# Patient Record
Sex: FEMALE | Race: WHITE | NOT HISPANIC OR LATINO | Employment: OTHER | URBAN - NONMETROPOLITAN AREA
[De-identification: names, ages, dates, MRNs, and addresses within clinical notes are randomized per-mention and may not be internally consistent; named-entity substitution may affect disease eponyms.]

---

## 2017-03-16 ENCOUNTER — TRANSCRIBE ORDERS (OUTPATIENT)
Dept: ADMINISTRATIVE | Facility: HOSPITAL | Age: 62
End: 2017-03-16

## 2017-03-16 ENCOUNTER — HOSPITAL ENCOUNTER (OUTPATIENT)
Dept: GENERAL RADIOLOGY | Facility: HOSPITAL | Age: 62
Discharge: HOME | End: 2017-03-16
Attending: INTERNAL MEDICINE | Admitting: INTERNAL MEDICINE

## 2017-03-16 ENCOUNTER — APPOINTMENT (OUTPATIENT)
Dept: LAB | Facility: HOSPITAL | Age: 62
End: 2017-03-16
Attending: INTERNAL MEDICINE

## 2017-03-16 DIAGNOSIS — R50.9 FEVER, UNSPECIFIED: Primary | ICD-10-CM

## 2017-03-16 DIAGNOSIS — J02.9 ACUTE PHARYNGITIS, UNSPECIFIED ETIOLOGY: ICD-10-CM

## 2017-03-16 LAB
ALBUMIN SERPL-MCNC: 4.2 G/DL (ref 3.5–5)
ALBUMIN/GLOB SERPL: 1.2 G/DL (ref 1.1–2.5)
ALP SERPL-CCNC: 83 U/L (ref 24–120)
ALT SERPL W P-5'-P-CCNC: 20 U/L (ref 0–54)
ANION GAP SERPL CALCULATED.3IONS-SCNC: 12 MMOL/L (ref 4–13)
AST SERPL-CCNC: 38 U/L (ref 7–45)
BACTERIA UR QL AUTO: ABNORMAL /HPF
BASOPHILS # BLD AUTO: 0.03 10*3/MM3 (ref 0–0.2)
BASOPHILS NFR BLD AUTO: 0.2 % (ref 0–2)
BILIRUB SERPL-MCNC: 0.7 MG/DL (ref 0.1–1)
BILIRUB UR QL STRIP: ABNORMAL
BUN BLD-MCNC: 20 MG/DL (ref 5–21)
BUN/CREAT SERPL: 28.2 (ref 7–25)
CALCIUM SPEC-SCNC: 8.8 MG/DL (ref 8.4–10.4)
CHLORIDE SERPL-SCNC: 96 MMOL/L (ref 98–110)
CLARITY UR: ABNORMAL
CO2 SERPL-SCNC: 33 MMOL/L (ref 24–31)
COLOR UR: ABNORMAL
CREAT BLD-MCNC: 0.71 MG/DL (ref 0.5–1.4)
DEPRECATED RDW RBC AUTO: 43.3 FL (ref 40–54)
EOSINOPHIL # BLD AUTO: 0.09 10*3/MM3 (ref 0–0.7)
EOSINOPHIL NFR BLD AUTO: 0.6 % (ref 0–4)
ERYTHROCYTE [DISTWIDTH] IN BLOOD BY AUTOMATED COUNT: 14.5 % (ref 12–15)
ERYTHROCYTE [SEDIMENTATION RATE] IN BLOOD: 22 MM/HR (ref 0–20)
FLUAV AG NPH QL: NEGATIVE
FLUBV AG NPH QL IA: NEGATIVE
GFR SERPL CREATININE-BSD FRML MDRD: 84 ML/MIN/1.73
GLOBULIN UR ELPH-MCNC: 3.6 GM/DL
GLUCOSE BLD-MCNC: 88 MG/DL (ref 70–100)
GLUCOSE UR STRIP-MCNC: NEGATIVE MG/DL
HCT VFR BLD AUTO: 39.3 % (ref 37–47)
HETEROPH AB SER QL LA: NEGATIVE
HGB BLD-MCNC: 13.2 G/DL (ref 12–16)
HGB UR QL STRIP.AUTO: NEGATIVE
HYALINE CASTS UR QL AUTO: ABNORMAL /LPF
IMM GRANULOCYTES # BLD: 0.03 10*3/MM3 (ref 0–0.03)
IMM GRANULOCYTES NFR BLD: 0.2 % (ref 0–5)
KETONES UR QL STRIP: ABNORMAL
LEUKOCYTE ESTERASE UR QL STRIP.AUTO: ABNORMAL
LYMPHOCYTES # BLD AUTO: 2.4 10*3/MM3 (ref 0.72–4.86)
LYMPHOCYTES NFR BLD AUTO: 17.1 % (ref 15–45)
MAGNESIUM SERPL-MCNC: 1.1 MG/DL (ref 1.4–2.2)
MCH RBC QN AUTO: 27.5 PG (ref 28–32)
MCHC RBC AUTO-ENTMCNC: 33.6 G/DL (ref 33–36)
MCV RBC AUTO: 81.9 FL (ref 82–98)
MONOCYTES # BLD AUTO: 1.09 10*3/MM3 (ref 0.19–1.3)
MONOCYTES NFR BLD AUTO: 7.8 % (ref 4–12)
NEUTROPHILS # BLD AUTO: 10.41 10*3/MM3 (ref 1.87–8.4)
NEUTROPHILS NFR BLD AUTO: 74.1 % (ref 39–78)
NITRITE UR QL STRIP: NEGATIVE
PH UR STRIP.AUTO: 6 [PH] (ref 5–8)
PLATELET # BLD AUTO: 284 10*3/MM3 (ref 130–400)
PMV BLD AUTO: 9.9 FL (ref 6–12)
POTASSIUM BLD-SCNC: 3.2 MMOL/L (ref 3.5–5.3)
PROT SERPL-MCNC: 7.8 G/DL (ref 6.3–8.7)
PROT UR QL STRIP: ABNORMAL
RBC # BLD AUTO: 4.8 10*6/MM3 (ref 4.2–5.4)
RBC # UR: ABNORMAL /HPF
REF LAB TEST METHOD: ABNORMAL
SODIUM BLD-SCNC: 141 MMOL/L (ref 135–145)
SP GR UR STRIP: 1.02 (ref 1–1.03)
SQUAMOUS #/AREA URNS HPF: ABNORMAL /HPF
UROBILINOGEN UR QL STRIP: ABNORMAL
WBC NRBC COR # BLD: 14.05 10*3/MM3 (ref 4.8–10.8)
WBC UR QL AUTO: ABNORMAL /HPF

## 2017-03-16 PROCEDURE — 86308 HETEROPHILE ANTIBODY SCREEN: CPT | Performed by: INTERNAL MEDICINE

## 2017-03-16 PROCEDURE — 87040 BLOOD CULTURE FOR BACTERIA: CPT | Performed by: INTERNAL MEDICINE

## 2017-03-16 PROCEDURE — 85025 COMPLETE CBC W/AUTO DIFF WBC: CPT | Performed by: INTERNAL MEDICINE

## 2017-03-16 PROCEDURE — 83735 ASSAY OF MAGNESIUM: CPT | Performed by: INTERNAL MEDICINE

## 2017-03-16 PROCEDURE — 80053 COMPREHEN METABOLIC PANEL: CPT | Performed by: INTERNAL MEDICINE

## 2017-03-16 PROCEDURE — 85651 RBC SED RATE NONAUTOMATED: CPT | Performed by: INTERNAL MEDICINE

## 2017-03-16 PROCEDURE — 81001 URINALYSIS AUTO W/SCOPE: CPT | Performed by: INTERNAL MEDICINE

## 2017-03-16 PROCEDURE — 71020 HC CHEST PA AND LATERAL: CPT

## 2017-03-16 PROCEDURE — 87804 INFLUENZA ASSAY W/OPTIC: CPT | Performed by: INTERNAL MEDICINE

## 2017-03-16 PROCEDURE — 36415 COLL VENOUS BLD VENIPUNCTURE: CPT | Performed by: INTERNAL MEDICINE

## 2017-03-16 PROCEDURE — 87070 CULTURE OTHR SPECIMN AEROBIC: CPT | Performed by: INTERNAL MEDICINE

## 2017-03-18 LAB — BACTERIA SPEC AEROBE CULT: NORMAL

## 2017-03-21 LAB
BACTERIA SPEC AEROBE CULT: NORMAL
BACTERIA SPEC AEROBE CULT: NORMAL

## 2017-08-22 ENCOUNTER — TRANSCRIBE ORDERS (OUTPATIENT)
Dept: ADMINISTRATIVE | Facility: HOSPITAL | Age: 62
End: 2017-08-22

## 2017-08-22 DIAGNOSIS — Z13.820 SCREENING FOR OSTEOPOROSIS: ICD-10-CM

## 2017-08-22 DIAGNOSIS — Z12.31 ENCOUNTER FOR SCREENING MAMMOGRAM FOR MALIGNANT NEOPLASM OF BREAST: Primary | ICD-10-CM

## 2017-08-23 ENCOUNTER — HOSPITAL ENCOUNTER (OUTPATIENT)
Dept: BONE DENSITY | Facility: HOSPITAL | Age: 62
Discharge: HOME | End: 2017-08-23
Attending: INTERNAL MEDICINE

## 2017-08-23 ENCOUNTER — HOSPITAL ENCOUNTER (OUTPATIENT)
Dept: MAMMOGRAPHY | Facility: HOSPITAL | Age: 62
Discharge: HOME | End: 2017-08-23
Attending: INTERNAL MEDICINE | Admitting: INTERNAL MEDICINE

## 2017-08-23 DIAGNOSIS — Z13.820 SCREENING FOR OSTEOPOROSIS: ICD-10-CM

## 2017-08-23 DIAGNOSIS — Z12.31 ENCOUNTER FOR SCREENING MAMMOGRAM FOR MALIGNANT NEOPLASM OF BREAST: ICD-10-CM

## 2017-08-23 PROCEDURE — 77063 BREAST TOMOSYNTHESIS BI: CPT

## 2017-08-23 PROCEDURE — 77080 DXA BONE DENSITY AXIAL: CPT

## 2017-08-23 PROCEDURE — G0202 SCR MAMMO BI INCL CAD: HCPCS

## 2018-02-27 ENCOUNTER — APPOINTMENT (OUTPATIENT)
Dept: PREADMISSION TESTING | Facility: HOSPITAL | Age: 63
End: 2018-02-27

## 2018-02-27 ENCOUNTER — HOSPITAL ENCOUNTER (OUTPATIENT)
Dept: GENERAL RADIOLOGY | Facility: HOSPITAL | Age: 63
Discharge: HOME | End: 2018-02-27
Admitting: ORTHOPAEDIC SURGERY

## 2018-02-27 VITALS
HEIGHT: 59 IN | BODY MASS INDEX: 35.29 KG/M2 | RESPIRATION RATE: 16 BRPM | WEIGHT: 175.04 LBS | DIASTOLIC BLOOD PRESSURE: 75 MMHG | HEART RATE: 58 BPM | TEMPERATURE: 98.3 F | OXYGEN SATURATION: 99 % | SYSTOLIC BLOOD PRESSURE: 141 MMHG

## 2018-02-27 LAB
ALBUMIN SERPL-MCNC: 4.2 G/DL (ref 3.5–5)
ALBUMIN/GLOB SERPL: 1.4 G/DL (ref 1.1–2.5)
ALP SERPL-CCNC: 62 U/L (ref 24–120)
ALT SERPL W P-5'-P-CCNC: 29 U/L (ref 0–54)
ANION GAP SERPL CALCULATED.3IONS-SCNC: 10 MMOL/L (ref 4–13)
APTT PPP: 33.7 SECONDS (ref 24.1–34.8)
AST SERPL-CCNC: 35 U/L (ref 7–45)
BACTERIA UR QL AUTO: ABNORMAL /HPF
BASOPHILS # BLD AUTO: 0.03 10*3/MM3 (ref 0–0.2)
BASOPHILS NFR BLD AUTO: 0.5 % (ref 0–2)
BILIRUB SERPL-MCNC: 0.3 MG/DL (ref 0.1–1)
BILIRUB UR QL STRIP: NEGATIVE
BUN BLD-MCNC: 20 MG/DL (ref 5–21)
BUN/CREAT SERPL: 31.7 (ref 7–25)
CALCIUM SPEC-SCNC: 9.3 MG/DL (ref 8.4–10.4)
CHLORIDE SERPL-SCNC: 98 MMOL/L (ref 98–110)
CLARITY UR: CLEAR
CO2 SERPL-SCNC: 33 MMOL/L (ref 24–31)
COLOR UR: YELLOW
CREAT BLD-MCNC: 0.63 MG/DL (ref 0.5–1.4)
DEPRECATED RDW RBC AUTO: 41.4 FL (ref 40–54)
EOSINOPHIL # BLD AUTO: 0.09 10*3/MM3 (ref 0–0.7)
EOSINOPHIL NFR BLD AUTO: 1.4 % (ref 0–4)
ERYTHROCYTE [DISTWIDTH] IN BLOOD BY AUTOMATED COUNT: 14.2 % (ref 12–15)
GFR SERPL CREATININE-BSD FRML MDRD: 96 ML/MIN/1.73
GLOBULIN UR ELPH-MCNC: 3 GM/DL
GLUCOSE BLD-MCNC: 109 MG/DL (ref 70–100)
GLUCOSE UR STRIP-MCNC: NEGATIVE MG/DL
HCT VFR BLD AUTO: 39.2 % (ref 37–47)
HGB BLD-MCNC: 13 G/DL (ref 12–16)
HGB UR QL STRIP.AUTO: NEGATIVE
HYALINE CASTS UR QL AUTO: ABNORMAL /LPF
IMM GRANULOCYTES # BLD: 0.03 10*3/MM3 (ref 0–0.03)
IMM GRANULOCYTES NFR BLD: 0.5 % (ref 0–5)
INR PPP: 0.94 (ref 0.91–1.09)
KETONES UR QL STRIP: NEGATIVE
LEUKOCYTE ESTERASE UR QL STRIP.AUTO: ABNORMAL
LYMPHOCYTES # BLD AUTO: 1.8 10*3/MM3 (ref 0.72–4.86)
LYMPHOCYTES NFR BLD AUTO: 27.5 % (ref 15–45)
MCH RBC QN AUTO: 27 PG (ref 28–32)
MCHC RBC AUTO-ENTMCNC: 33.2 G/DL (ref 33–36)
MCV RBC AUTO: 81.5 FL (ref 82–98)
MONOCYTES # BLD AUTO: 0.58 10*3/MM3 (ref 0.19–1.3)
MONOCYTES NFR BLD AUTO: 8.9 % (ref 4–12)
NEUTROPHILS # BLD AUTO: 4.02 10*3/MM3 (ref 1.87–8.4)
NEUTROPHILS NFR BLD AUTO: 61.2 % (ref 39–78)
NITRITE UR QL STRIP: NEGATIVE
NRBC BLD MANUAL-RTO: 0 /100 WBC (ref 0–0)
PH UR STRIP.AUTO: 7 [PH] (ref 5–8)
PLATELET # BLD AUTO: 271 10*3/MM3 (ref 130–400)
PMV BLD AUTO: 9.8 FL (ref 6–12)
POTASSIUM BLD-SCNC: 3.8 MMOL/L (ref 3.5–5.3)
PROT SERPL-MCNC: 7.2 G/DL (ref 6.3–8.7)
PROT UR QL STRIP: NEGATIVE
PROTHROMBIN TIME: 12.8 SECONDS (ref 11.9–14.6)
RBC # BLD AUTO: 4.81 10*6/MM3 (ref 4.2–5.4)
RBC # UR: ABNORMAL /HPF
REF LAB TEST METHOD: ABNORMAL
SODIUM BLD-SCNC: 141 MMOL/L (ref 135–145)
SP GR UR STRIP: 1.01 (ref 1–1.03)
SQUAMOUS #/AREA URNS HPF: ABNORMAL /HPF
UROBILINOGEN UR QL STRIP: ABNORMAL
WBC NRBC COR # BLD: 6.55 10*3/MM3 (ref 4.8–10.8)
WBC UR QL AUTO: ABNORMAL /HPF

## 2018-02-27 PROCEDURE — 85025 COMPLETE CBC W/AUTO DIFF WBC: CPT | Performed by: ORTHOPAEDIC SURGERY

## 2018-02-27 PROCEDURE — 71046 X-RAY EXAM CHEST 2 VIEWS: CPT

## 2018-02-27 PROCEDURE — 85730 THROMBOPLASTIN TIME PARTIAL: CPT | Performed by: ORTHOPAEDIC SURGERY

## 2018-02-27 PROCEDURE — 81001 URINALYSIS AUTO W/SCOPE: CPT | Performed by: ORTHOPAEDIC SURGERY

## 2018-02-27 PROCEDURE — 36415 COLL VENOUS BLD VENIPUNCTURE: CPT

## 2018-02-27 PROCEDURE — 93005 ELECTROCARDIOGRAM TRACING: CPT

## 2018-02-27 PROCEDURE — 93010 ELECTROCARDIOGRAM REPORT: CPT | Performed by: INTERNAL MEDICINE

## 2018-02-27 PROCEDURE — 80053 COMPREHEN METABOLIC PANEL: CPT | Performed by: ORTHOPAEDIC SURGERY

## 2018-02-27 PROCEDURE — 85610 PROTHROMBIN TIME: CPT | Performed by: ORTHOPAEDIC SURGERY

## 2018-02-27 ASSESSMENT — KOOS JR
KOOS JR SCORE: 36.931
KOOS JR SCORE: 20

## 2018-03-09 ENCOUNTER — HOSPITAL ENCOUNTER (INPATIENT)
Facility: HOSPITAL | Age: 63
LOS: 3 days | Discharge: HOME | End: 2018-03-12
Attending: ORTHOPAEDIC SURGERY | Admitting: ORTHOPAEDIC SURGERY

## 2018-03-09 ENCOUNTER — ANESTHESIA EVENT (OUTPATIENT)
Dept: PERIOP | Facility: HOSPITAL | Age: 63
End: 2018-03-09

## 2018-03-09 ENCOUNTER — APPOINTMENT (OUTPATIENT)
Dept: GENERAL RADIOLOGY | Facility: HOSPITAL | Age: 63
End: 2018-03-09

## 2018-03-09 ENCOUNTER — ANESTHESIA (OUTPATIENT)
Dept: PERIOP | Facility: HOSPITAL | Age: 63
End: 2018-03-09

## 2018-03-09 DIAGNOSIS — Z74.09 IMPAIRED MOBILITY: Primary | ICD-10-CM

## 2018-03-09 PROBLEM — E16.2 HYPOGLYCEMIA: Chronic | Status: ACTIVE | Noted: 2018-03-09

## 2018-03-09 PROBLEM — E78.5 HYPERLIPIDEMIA: Chronic | Status: ACTIVE | Noted: 2018-03-09

## 2018-03-09 PROBLEM — M17.12 LEFT KNEE DJD: Status: ACTIVE | Noted: 2018-03-09

## 2018-03-09 LAB
ABO GROUP BLD: NORMAL
BLD GP AB SCN SERPL QL: NEGATIVE
RH BLD: POSITIVE

## 2018-03-09 PROCEDURE — 25010000002 DEXAMETHASONE PER 1 MG: Performed by: ORTHOPAEDIC SURGERY

## 2018-03-09 PROCEDURE — 25010000003 CEFAZOLIN PER 500 MG: Performed by: ORTHOPAEDIC SURGERY

## 2018-03-09 PROCEDURE — 25010000002 MIDAZOLAM PER 1 MG: Performed by: ANESTHESIOLOGY

## 2018-03-09 PROCEDURE — 25010000002 DEXAMETHASONE PER 1 MG: Performed by: NURSE ANESTHETIST, CERTIFIED REGISTERED

## 2018-03-09 PROCEDURE — 94799 UNLISTED PULMONARY SVC/PX: CPT

## 2018-03-09 PROCEDURE — 25010000002 KETOROLAC TROMETHAMINE PER 15 MG: Performed by: NURSE ANESTHETIST, CERTIFIED REGISTERED

## 2018-03-09 PROCEDURE — C1713 ANCHOR/SCREW BN/BN,TIS/BN: HCPCS | Performed by: ORTHOPAEDIC SURGERY

## 2018-03-09 PROCEDURE — 86901 BLOOD TYPING SEROLOGIC RH(D): CPT | Performed by: ORTHOPAEDIC SURGERY

## 2018-03-09 PROCEDURE — 0SRD0J9 REPLACEMENT OF LEFT KNEE JOINT WITH SYNTHETIC SUBSTITUTE, CEMENTED, OPEN APPROACH: ICD-10-PCS | Performed by: ORTHOPAEDIC SURGERY

## 2018-03-09 PROCEDURE — 25010000002 PROPOFOL 10 MG/ML EMULSION: Performed by: NURSE ANESTHETIST, CERTIFIED REGISTERED

## 2018-03-09 PROCEDURE — 73560 X-RAY EXAM OF KNEE 1 OR 2: CPT

## 2018-03-09 PROCEDURE — 86900 BLOOD TYPING SEROLOGIC ABO: CPT | Performed by: ORTHOPAEDIC SURGERY

## 2018-03-09 PROCEDURE — 86850 RBC ANTIBODY SCREEN: CPT | Performed by: ORTHOPAEDIC SURGERY

## 2018-03-09 PROCEDURE — C1776 JOINT DEVICE (IMPLANTABLE): HCPCS | Performed by: ORTHOPAEDIC SURGERY

## 2018-03-09 DEVICE — IMPLANTABLE DEVICE: Type: IMPLANTABLE DEVICE | Status: FUNCTIONAL

## 2018-03-09 DEVICE — COMP FEM PERSONA PS COCR CMT NRW SZ6 LT: Type: IMPLANTABLE DEVICE | Status: FUNCTIONAL

## 2018-03-09 DEVICE — CAP TOTL KN CMT PREMIUM: Type: IMPLANTABLE DEVICE | Status: FUNCTIONAL

## 2018-03-09 DEVICE — STEM TIB/KN PERSONA CMT 5D SZD LT: Type: IMPLANTABLE DEVICE | Status: FUNCTIONAL

## 2018-03-09 RX ORDER — METOCLOPRAMIDE HYDROCHLORIDE 5 MG/ML
5 INJECTION INTRAMUSCULAR; INTRAVENOUS
Status: DISCONTINUED | OUTPATIENT
Start: 2018-03-09 | End: 2018-03-09 | Stop reason: HOSPADM

## 2018-03-09 RX ORDER — DEXAMETHASONE SODIUM PHOSPHATE 4 MG/ML
INJECTION, SOLUTION INTRA-ARTICULAR; INTRALESIONAL; INTRAMUSCULAR; INTRAVENOUS; SOFT TISSUE AS NEEDED
Status: DISCONTINUED | OUTPATIENT
Start: 2018-03-09 | End: 2018-03-09 | Stop reason: SURG

## 2018-03-09 RX ORDER — FLUMAZENIL 0.1 MG/ML
0.2 INJECTION INTRAVENOUS AS NEEDED
Status: DISCONTINUED | OUTPATIENT
Start: 2018-03-09 | End: 2018-03-09 | Stop reason: HOSPADM

## 2018-03-09 RX ORDER — MIDAZOLAM HYDROCHLORIDE 1 MG/ML
2 INJECTION INTRAMUSCULAR; INTRAVENOUS
Status: DISCONTINUED | OUTPATIENT
Start: 2018-03-09 | End: 2018-03-09 | Stop reason: HOSPADM

## 2018-03-09 RX ORDER — KETOROLAC TROMETHAMINE 30 MG/ML
INJECTION, SOLUTION INTRAMUSCULAR; INTRAVENOUS AS NEEDED
Status: DISCONTINUED | OUTPATIENT
Start: 2018-03-09 | End: 2018-03-09 | Stop reason: SURG

## 2018-03-09 RX ORDER — MORPHINE SULFATE 2 MG/ML
2 INJECTION, SOLUTION INTRAMUSCULAR; INTRAVENOUS AS NEEDED
Status: DISCONTINUED | OUTPATIENT
Start: 2018-03-09 | End: 2018-03-09 | Stop reason: HOSPADM

## 2018-03-09 RX ORDER — CLINDAMYCIN PHOSPHATE 900 MG/50ML
900 INJECTION INTRAVENOUS ONCE
Status: DISCONTINUED | OUTPATIENT
Start: 2018-03-09 | End: 2018-03-09

## 2018-03-09 RX ORDER — ACETAMINOPHEN 160 MG/5ML
650 SOLUTION ORAL EVERY 4 HOURS PRN
Status: DISCONTINUED | OUTPATIENT
Start: 2018-03-09 | End: 2018-03-12 | Stop reason: HOSPADM

## 2018-03-09 RX ORDER — ACETAMINOPHEN 650 MG/1
650 SUPPOSITORY RECTAL EVERY 4 HOURS PRN
Status: DISCONTINUED | OUTPATIENT
Start: 2018-03-09 | End: 2018-03-12 | Stop reason: HOSPADM

## 2018-03-09 RX ORDER — OXYCODONE HYDROCHLORIDE 5 MG/1
10 TABLET ORAL EVERY 4 HOURS PRN
Status: DISCONTINUED | OUTPATIENT
Start: 2018-03-09 | End: 2018-03-10

## 2018-03-09 RX ORDER — SODIUM CHLORIDE, SODIUM LACTATE, POTASSIUM CHLORIDE, CALCIUM CHLORIDE 600; 310; 30; 20 MG/100ML; MG/100ML; MG/100ML; MG/100ML
1000 INJECTION, SOLUTION INTRAVENOUS CONTINUOUS
Status: DISCONTINUED | OUTPATIENT
Start: 2018-03-09 | End: 2018-03-09 | Stop reason: HOSPADM

## 2018-03-09 RX ORDER — ONDANSETRON 4 MG/1
4 TABLET, FILM COATED ORAL EVERY 6 HOURS PRN
Status: DISCONTINUED | OUTPATIENT
Start: 2018-03-09 | End: 2018-03-12 | Stop reason: HOSPADM

## 2018-03-09 RX ORDER — SODIUM CHLORIDE 0.9 % (FLUSH) 0.9 %
3 SYRINGE (ML) INJECTION AS NEEDED
Status: DISCONTINUED | OUTPATIENT
Start: 2018-03-09 | End: 2018-03-09 | Stop reason: HOSPADM

## 2018-03-09 RX ORDER — NALOXONE HCL 0.4 MG/ML
0.4 VIAL (ML) INJECTION
Status: DISCONTINUED | OUTPATIENT
Start: 2018-03-09 | End: 2018-03-12 | Stop reason: HOSPADM

## 2018-03-09 RX ORDER — DEXAMETHASONE SODIUM PHOSPHATE 10 MG/ML
10 INJECTION INTRAMUSCULAR; INTRAVENOUS ONCE
Status: COMPLETED | OUTPATIENT
Start: 2018-03-09 | End: 2018-03-09

## 2018-03-09 RX ORDER — NALOXONE HCL 0.4 MG/ML
0.1 VIAL (ML) INJECTION
Status: DISCONTINUED | OUTPATIENT
Start: 2018-03-09 | End: 2018-03-12 | Stop reason: HOSPADM

## 2018-03-09 RX ORDER — PHENYLEPHRINE HCL IN 0.9% NACL 0.8MG/10ML
SYRINGE (ML) INTRAVENOUS AS NEEDED
Status: DISCONTINUED | OUTPATIENT
Start: 2018-03-09 | End: 2018-03-09 | Stop reason: SURG

## 2018-03-09 RX ORDER — IPRATROPIUM BROMIDE AND ALBUTEROL SULFATE 2.5; .5 MG/3ML; MG/3ML
3 SOLUTION RESPIRATORY (INHALATION) ONCE AS NEEDED
Status: DISCONTINUED | OUTPATIENT
Start: 2018-03-09 | End: 2018-03-09 | Stop reason: HOSPADM

## 2018-03-09 RX ORDER — MAGNESIUM HYDROXIDE 1200 MG/15ML
LIQUID ORAL AS NEEDED
Status: DISCONTINUED | OUTPATIENT
Start: 2018-03-09 | End: 2018-03-09 | Stop reason: HOSPADM

## 2018-03-09 RX ORDER — NALOXONE HCL 0.4 MG/ML
0.04 VIAL (ML) INJECTION AS NEEDED
Status: DISCONTINUED | OUTPATIENT
Start: 2018-03-09 | End: 2018-03-09 | Stop reason: HOSPADM

## 2018-03-09 RX ORDER — BUPIVACAINE HYDROCHLORIDE 5 MG/ML
INJECTION, SOLUTION EPIDURAL; INTRACAUDAL AS NEEDED
Status: DISCONTINUED | OUTPATIENT
Start: 2018-03-09 | End: 2018-03-09 | Stop reason: SURG

## 2018-03-09 RX ORDER — HYDRALAZINE HYDROCHLORIDE 20 MG/ML
5 INJECTION INTRAMUSCULAR; INTRAVENOUS
Status: DISCONTINUED | OUTPATIENT
Start: 2018-03-09 | End: 2018-03-09 | Stop reason: HOSPADM

## 2018-03-09 RX ORDER — OXYCODONE HCL 10 MG/1
10 TABLET, FILM COATED, EXTENDED RELEASE ORAL EVERY 12 HOURS SCHEDULED
Status: DISCONTINUED | OUTPATIENT
Start: 2018-03-09 | End: 2018-03-12 | Stop reason: HOSPADM

## 2018-03-09 RX ORDER — ONDANSETRON 2 MG/ML
4 INJECTION INTRAMUSCULAR; INTRAVENOUS AS NEEDED
Status: DISCONTINUED | OUTPATIENT
Start: 2018-03-09 | End: 2018-03-09 | Stop reason: HOSPADM

## 2018-03-09 RX ORDER — PAROXETINE 10 MG/1
10 TABLET, FILM COATED ORAL EVERY MORNING
Status: DISCONTINUED | OUTPATIENT
Start: 2018-03-10 | End: 2018-03-12 | Stop reason: HOSPADM

## 2018-03-09 RX ORDER — TRANEXAMIC ACID 100 MG/ML
INJECTION, SOLUTION INTRAVENOUS AS NEEDED
Status: DISCONTINUED | OUTPATIENT
Start: 2018-03-09 | End: 2018-03-09 | Stop reason: SURG

## 2018-03-09 RX ORDER — SODIUM CHLORIDE, SODIUM LACTATE, POTASSIUM CHLORIDE, CALCIUM CHLORIDE 600; 310; 30; 20 MG/100ML; MG/100ML; MG/100ML; MG/100ML
100 INJECTION, SOLUTION INTRAVENOUS CONTINUOUS
Status: DISCONTINUED | OUTPATIENT
Start: 2018-03-09 | End: 2018-03-09 | Stop reason: HOSPADM

## 2018-03-09 RX ORDER — ATORVASTATIN CALCIUM 10 MG/1
10 TABLET, FILM COATED ORAL NIGHTLY
Status: DISCONTINUED | OUTPATIENT
Start: 2018-03-09 | End: 2018-03-12 | Stop reason: HOSPADM

## 2018-03-09 RX ORDER — MORPHINE SULFATE 4 MG/ML
4 INJECTION, SOLUTION INTRAMUSCULAR; INTRAVENOUS
Status: DISCONTINUED | OUTPATIENT
Start: 2018-03-09 | End: 2018-03-12 | Stop reason: HOSPADM

## 2018-03-09 RX ORDER — ACETAMINOPHEN 325 MG/1
650 TABLET ORAL EVERY 4 HOURS PRN
Status: DISCONTINUED | OUTPATIENT
Start: 2018-03-09 | End: 2018-03-12 | Stop reason: HOSPADM

## 2018-03-09 RX ORDER — SODIUM CHLORIDE 0.9 % (FLUSH) 0.9 %
1-10 SYRINGE (ML) INJECTION AS NEEDED
Status: DISCONTINUED | OUTPATIENT
Start: 2018-03-09 | End: 2018-03-09 | Stop reason: HOSPADM

## 2018-03-09 RX ORDER — ONDANSETRON 4 MG/1
4 TABLET, ORALLY DISINTEGRATING ORAL EVERY 6 HOURS PRN
Status: DISCONTINUED | OUTPATIENT
Start: 2018-03-09 | End: 2018-03-12 | Stop reason: HOSPADM

## 2018-03-09 RX ORDER — ACETAMINOPHEN 500 MG
1000 TABLET ORAL ONCE
Status: COMPLETED | OUTPATIENT
Start: 2018-03-09 | End: 2018-03-09

## 2018-03-09 RX ORDER — SODIUM CHLORIDE 9 MG/ML
150 INJECTION, SOLUTION INTRAVENOUS CONTINUOUS
Status: DISCONTINUED | OUTPATIENT
Start: 2018-03-09 | End: 2018-03-12 | Stop reason: HOSPADM

## 2018-03-09 RX ORDER — PROPOFOL 10 MG/ML
VIAL (ML) INTRAVENOUS AS NEEDED
Status: DISCONTINUED | OUTPATIENT
Start: 2018-03-09 | End: 2018-03-09

## 2018-03-09 RX ORDER — MEPERIDINE HYDROCHLORIDE 25 MG/ML
12.5 INJECTION INTRAMUSCULAR; INTRAVENOUS; SUBCUTANEOUS
Status: DISCONTINUED | OUTPATIENT
Start: 2018-03-09 | End: 2018-03-09 | Stop reason: HOSPADM

## 2018-03-09 RX ORDER — PREGABALIN 75 MG/1
75 CAPSULE ORAL ONCE
Status: COMPLETED | OUTPATIENT
Start: 2018-03-09 | End: 2018-03-09

## 2018-03-09 RX ORDER — ONDANSETRON 2 MG/ML
4 INJECTION INTRAMUSCULAR; INTRAVENOUS EVERY 6 HOURS PRN
Status: DISCONTINUED | OUTPATIENT
Start: 2018-03-09 | End: 2018-03-12 | Stop reason: HOSPADM

## 2018-03-09 RX ORDER — OXYCODONE HCL 10 MG/1
10 TABLET, FILM COATED, EXTENDED RELEASE ORAL ONCE
Status: COMPLETED | OUTPATIENT
Start: 2018-03-09 | End: 2018-03-09

## 2018-03-09 RX ORDER — PROPOFOL 10 MG/ML
VIAL (ML) INTRAVENOUS CONTINUOUS PRN
Status: DISCONTINUED | OUTPATIENT
Start: 2018-03-09 | End: 2018-03-09 | Stop reason: SURG

## 2018-03-09 RX ORDER — DIAZEPAM 5 MG/1
5 TABLET ORAL EVERY 6 HOURS PRN
Status: DISCONTINUED | OUTPATIENT
Start: 2018-03-09 | End: 2018-03-12 | Stop reason: HOSPADM

## 2018-03-09 RX ORDER — SENNA AND DOCUSATE SODIUM 50; 8.6 MG/1; MG/1
2 TABLET, FILM COATED ORAL NIGHTLY
Status: DISCONTINUED | OUTPATIENT
Start: 2018-03-09 | End: 2018-03-12 | Stop reason: HOSPADM

## 2018-03-09 RX ORDER — FAMOTIDINE 20 MG/1
20 TABLET, FILM COATED ORAL DAILY
Status: DISCONTINUED | OUTPATIENT
Start: 2018-03-09 | End: 2018-03-12 | Stop reason: HOSPADM

## 2018-03-09 RX ORDER — MIDAZOLAM HYDROCHLORIDE 1 MG/ML
1 INJECTION INTRAMUSCULAR; INTRAVENOUS
Status: DISCONTINUED | OUTPATIENT
Start: 2018-03-09 | End: 2018-03-09 | Stop reason: HOSPADM

## 2018-03-09 RX ORDER — LABETALOL HYDROCHLORIDE 5 MG/ML
5 INJECTION, SOLUTION INTRAVENOUS
Status: DISCONTINUED | OUTPATIENT
Start: 2018-03-09 | End: 2018-03-09 | Stop reason: HOSPADM

## 2018-03-09 RX ORDER — CLINDAMYCIN PHOSPHATE 900 MG/50ML
900 INJECTION INTRAVENOUS EVERY 8 HOURS
Status: COMPLETED | OUTPATIENT
Start: 2018-03-09 | End: 2018-03-11

## 2018-03-09 RX ADMIN — Medication 80 MCG: at 09:46

## 2018-03-09 RX ADMIN — Medication 160 MCG: at 10:22

## 2018-03-09 RX ADMIN — TRANEXAMIC ACID 1000 MG: 100 INJECTION, SOLUTION INTRAVENOUS at 09:05

## 2018-03-09 RX ADMIN — PROPOFOL 75 MCG/KG/MIN: 10 INJECTION, EMULSION INTRAVENOUS at 08:55

## 2018-03-09 RX ADMIN — RIVAROXABAN 10 MG: 10 TABLET, FILM COATED ORAL at 18:27

## 2018-03-09 RX ADMIN — OXYCODONE HYDROCHLORIDE 10 MG: 5 TABLET ORAL at 13:38

## 2018-03-09 RX ADMIN — PREGABALIN 75 MG: 75 CAPSULE ORAL at 07:39

## 2018-03-09 RX ADMIN — MIDAZOLAM HYDROCHLORIDE 2 MG: 1 INJECTION, SOLUTION INTRAMUSCULAR; INTRAVENOUS at 08:09

## 2018-03-09 RX ADMIN — CEFAZOLIN 2 G: 1 INJECTION, POWDER, FOR SOLUTION INTRAVENOUS at 18:26

## 2018-03-09 RX ADMIN — Medication 80 MCG: at 09:28

## 2018-03-09 RX ADMIN — Medication 80 MCG: at 09:03

## 2018-03-09 RX ADMIN — CEFAZOLIN 2 G: 1 INJECTION, POWDER, FOR SOLUTION INTRAVENOUS at 09:03

## 2018-03-09 RX ADMIN — SODIUM CHLORIDE, POTASSIUM CHLORIDE, SODIUM LACTATE AND CALCIUM CHLORIDE 1000 ML: 600; 310; 30; 20 INJECTION, SOLUTION INTRAVENOUS at 07:09

## 2018-03-09 RX ADMIN — EPHEDRINE SULFATE 15 MG: 50 INJECTION INTRAMUSCULAR; INTRAVENOUS; SUBCUTANEOUS at 09:31

## 2018-03-09 RX ADMIN — FAMOTIDINE 20 MG: 20 TABLET, FILM COATED ORAL at 13:40

## 2018-03-09 RX ADMIN — MAGNESIUM GLUCONATE 500 MG ORAL TABLET 2000 MG: 500 TABLET ORAL at 18:26

## 2018-03-09 RX ADMIN — DEXAMETHASONE SODIUM PHOSPHATE 10 MG: 10 INJECTION, SOLUTION INTRAMUSCULAR; INTRAVENOUS at 08:09

## 2018-03-09 RX ADMIN — Medication 80 MCG: at 09:13

## 2018-03-09 RX ADMIN — CLINDAMYCIN PHOSPHATE 900 MG: 18 INJECTION, SOLUTION INTRAVENOUS at 13:40

## 2018-03-09 RX ADMIN — OXYCODONE HYDROCHLORIDE 10 MG: 5 TABLET ORAL at 19:04

## 2018-03-09 RX ADMIN — KETOROLAC TROMETHAMINE 15 MG: 30 INJECTION, SOLUTION INTRAMUSCULAR at 10:55

## 2018-03-09 RX ADMIN — BUPIVACAINE HYDROCHLORIDE 3 ML: 5 INJECTION, SOLUTION EPIDURAL; INTRACAUDAL; PERINEURAL at 08:50

## 2018-03-09 RX ADMIN — SODIUM CHLORIDE 150 ML/HR: 9 INJECTION, SOLUTION INTRAVENOUS at 18:25

## 2018-03-09 RX ADMIN — OXYCODONE HYDROCHLORIDE 10 MG: 10 TABLET, FILM COATED, EXTENDED RELEASE ORAL at 07:39

## 2018-03-09 RX ADMIN — DEXAMETHASONE SODIUM PHOSPHATE 10 MG: 4 INJECTION, SOLUTION INTRAMUSCULAR; INTRAVENOUS at 09:00

## 2018-03-09 RX ADMIN — ACETAMINOPHEN 1000 MG: 500 TABLET ORAL at 07:39

## 2018-03-09 RX ADMIN — ATORVASTATIN CALCIUM 10 MG: 10 TABLET, FILM COATED ORAL at 20:44

## 2018-03-09 RX ADMIN — OXYCODONE HYDROCHLORIDE 10 MG: 10 TABLET, FILM COATED, EXTENDED RELEASE ORAL at 20:44

## 2018-03-09 RX ADMIN — LIDOCAINE HYDROCHLORIDE 0.5 ML: 10 INJECTION, SOLUTION EPIDURAL; INFILTRATION; INTRACAUDAL; PERINEURAL at 07:09

## 2018-03-09 RX ADMIN — DOCUSATE SODIUM AND SENNOSIDES 2 TABLET: 8.6; 5 TABLET, FILM COATED ORAL at 20:44

## 2018-03-09 RX ADMIN — CLINDAMYCIN PHOSPHATE 900 MG: 18 INJECTION, SOLUTION INTRAVENOUS at 20:44

## 2018-03-09 RX ADMIN — SODIUM CHLORIDE, POTASSIUM CHLORIDE, SODIUM LACTATE AND CALCIUM CHLORIDE: 600; 310; 30; 20 INJECTION, SOLUTION INTRAVENOUS at 09:32

## 2018-03-09 RX ADMIN — EPHEDRINE SULFATE 10 MG: 50 INJECTION INTRAMUSCULAR; INTRAVENOUS; SUBCUTANEOUS at 09:46

## 2018-03-09 NOTE — BRIEF OP NOTE
TOTAL KNEE ARTHROPLASTY  Progress Note    Sammi Cordero  3/9/2018    Pre-op Diagnosis:   LEFT KNEE OSTEOARTHRITIS        Post-Op Diagnosis Codes:       Procedure/CPT® Codes:      Procedure(s):  LEFT TOTAL KNEE ARTHROPLASTY    Surgeon(s):  Orlin Meza MD    Anesthesia: Spinal    Staff:   Circulator: Iman Tovar RN  Scrub Person: Laine Haynes  Other: Claire De Leon    Estimated Blood Loss: minimal    Urine Voided: 300 mL    Specimens:                None      Drains:   Urethral Catheter 03/09/18 0927 100% silicone 10 10 (Active)   Daily Indications Required activity restriction from trauma, surgery, (e.g. unstable spine, fracture, hemodynamics) 3/9/2018 11:13 AM   Securement secured to upper leg with adhesive device 3/9/2018 11:13 AM   Tolerance no signs/symptoms of discomfort 3/9/2018 11:13 AM           Findings:     Complications:       Orlin Meza MD     Date: 3/9/2018  Time: 11:30 AM

## 2018-03-09 NOTE — PLAN OF CARE
Problem: Patient Care Overview (Adult)  Goal: Plan of Care Review  Outcome: Ongoing (interventions implemented as appropriate)   03/09/18 8658   Coping/Psychosocial Response Interventions   Plan Of Care Reviewed With patient   Patient Care Overview   Progress improving   Outcome Evaluation   Outcome Summary/Follow up Plan Patient had a left total knee today by Dr. Meza. Medicated once for pain with some relief noted. Safety maintained.     Goal: Adult Individualization and Mutuality  Outcome: Ongoing (interventions implemented as appropriate)    Goal: Discharge Needs Assessment  Outcome: Ongoing (interventions implemented as appropriate)      Problem: Perioperative Period (Adult)  Goal: Signs and Symptoms of Listed Potential Problems Will be Absent or Manageable (Perioperative Period)  Outcome: Ongoing (interventions implemented as appropriate)      Problem: Knee Replacement, Total (Adult)  Goal: Signs and Symptoms of Listed Potential Problems Will be Absent or Manageable (Knee Replacement, Total)  Outcome: Ongoing (interventions implemented as appropriate)

## 2018-03-09 NOTE — OP NOTE
Operative Report    Pt Name: [unfilled]  MRN: @MRN@  Birthdate: @@  Date: @TD@        PREOPERATIVE DIAGNOSIS: Left knee primary osteoarthritis    POSTOPERATIVE DIAGNOSIS: Left knee primary osteoarthritis     PROCEDURE:  Left Total Knee Arthroplasty     SURGEON:  Orlin Meza MD     ASSISTANT:  Adalberto Arciniega PA-C    The assistant aided in limb position as well as retractor placement.  The assistant was also critical in implantation of the prosthesis.  In addition to this, the assistant was responsible for wound closure.  It was necessary to have the assistant present in order to complete the procedure.    ANESTHESIA:  Spinal    ESTIMATED BLOOD LOSS:  100cc    COMPLICATIONS:  None.    CONDITION:  Stable.    INDICATIONS:  61 yo female  presents today for a left total knee arthroplasty for primary osteoarthritis.  The patient has failed conservative management including: time, activity modifications, NSAIDs and corticosteroids.    PROCEDURE:  The patient was interviewed in the pre-anesthesia area.  The operative limb was then marked with a marking pen.  The patient was administered a regional block per the anesthesia team.  The patient was then taken to the operative suite where general endotracheal anesthesia was performed by the anesthesia team.  A time out was then called to confirm the administration of anesthesia as well as the administration of tranexamic acid prior to incision.  The patient was then prepped and draped in standard sterile fashion using ChloraPrep.      Once fully prepped and draped, the limb was reprepped with ChloraPrep.  Sterile marking pen was then used to earlene out the tibia distally as well as the first web space.  The leg was then exsanguinated with Esmarch and tourniquet was inflated to 300mmhg.  The operative foot was placed in a Murray leg machado and a standard midline incision was carried out followed by a medial parapatellar arthrotomy.  The knee did demonstrate a large knee joint  effusion with tricompartmental osteoarthrosis.     Attention was then turned to the distal femoral resection.  A drill was used to enter the intramedullary canal just anterior to the PCL insertion.  The extramedullary guide was then placed.  The guide was set at 4 degrees of valgus.  We then made a plus 2 mm distal femoral cut.  We then sized the femur to a size 6 using an anterior referencing guide and penned the femoral block into position.  Care was taken to make sure that the block was perpendicular to the epicondylar axis and parallel to the Whitesides line, thus in external rotation.  Our anterior, posterior as well as chamfer cuts were then made.      Attention was then turned to the tibia.  The PCL retractor was placed posteriorly as the ACL and PCL were resected.  The infrapatellar fat pad was excised and Homans retractor was placed medially and laterally.  An extramedullary tibial guide was then set parallel to the tibia on the AP and lateral views in line with the first web space and second metatarsal, just slightly medial to the medial center of the ankle.  The guide was set at 7 degrees posterior slope.  A 2/10 guide was then used to measure for approximately a 2 mm resection medially and 10 mm resection laterally.  The proximal tibia was then removed en block.  The knee was then brought into extension where the medial and lateral menisci were then excised with a radiofrequency device.  We also performed a medial soft tissue release around the posteromedial corner of the knee.  Curved osteotome was then used to remove any posterior osteophytes off the medial and lateral femoral condyles as well as strip the posterior capsule.  A size D tibial tray was then placed with the central portion of the tibial tray in line with the medial third of the tibial tubercle, thus in external rotation, and pinned into position.     We then placed the femoral component into position, taking care to make sure that the  femoral component was flush with the lateral cortex.  The box cutting guide was placed and our standard box cut was made without difficulty.  We then placed a box insert and placed a size 10 trial spacer.  The knee was brought into extension and did achieve full extension.  The knee was also stable in flexion.  There was approximately 2 mm of opening with varus and valgus stress testing.  Intraoperative fluoroscopy at this point confirmed position of our components.    Attention was then turned to the patella.  The patella was everted and resected down to 14 mm.  We then prepped for a size 29 patella implant  to sit superomedially for tracking purposes.     At this point in time, all trial components were removed.  The knee was copiously irrigated with pulsatile lavage.  We then cemented in our size D tibia component with a size 6 femoral component as well as our 29 mm patellar component.  A 10 mm trial spacer was placed with the knee in full extension as we waited on the cement to cure.  Meticulous cement clean up was carried out.  Once the cement was allowed to cure, we then trialed with our trial spacers.  We elected to go with a size 10 mm polyethylene spacer as with this in position the knee achieved full extension and was stable in flexion, as well as having 2 mm of opening with varus/valgus stress testing.  Thus, the trial component was removed.  The knee was once again copiously irrigated with pulsatile lavage.  We then injected the posterior capsule with a local cocktail solution, both posteromedially and posterolaterally.  Care was taken to make sure there was no remaining cement.  A size 10 mm polyethylene spacer was then placed.  The knee was then brought into 30 degrees of flexion.  The tourniquet was deflated and meticulous hemostasis was maintained with electrocautery.  The extensor mechanism was then closed with a #1 Vicryl suture.  The skin was then approximated with a 3-0 Vicryl suture and closed  with a Prineo wound closure system.  The patient was placed in a sterile dressing.  The patient was awakened from anesthesia without difficulty and was transferred to the PACU in stable condition.  All sponge, needle and instrument counts were correct at the end of the procedure.     Orlin Meza MD  3/9/2018  11:52 AM

## 2018-03-09 NOTE — ANESTHESIA POSTPROCEDURE EVALUATION
Patient: Sammi Cordero    Procedure Summary     Date Anesthesia Start Anesthesia Stop Room / Location    03/09/18 0825 1117 [facility] OR [facility] / [facility]       Procedure Diagnosis Surgeon Provider    LEFT TOTAL KNEE ARTHROPLASTY (Left Knee) (LEFT KNEE OSTEOARTHRITIS ) MD Miky Reyna CRNA          Anesthesia Type: spinal  Last vitals  BP   123/43 (03/09/18 1535)   Temp   97.5 °F (36.4 °C) (03/09/18 1535)   Pulse   84 (03/09/18 1535)   Resp   18 (03/09/18 1535)     SpO2   94 % (03/09/18 1535)     Post Anesthesia Care and Evaluation    Patient location during evaluation: PACU  Patient participation: complete - patient participated  Level of consciousness: awake and alert  Pain management: adequate  Airway patency: patent  Anesthetic complications: No anesthetic complications  PONV Status: none  Cardiovascular status: acceptable and hemodynamically stable  Respiratory status: acceptable  Hydration status: acceptable    Comments: Blood pressure 123/43, pulse 84, temperature 97.5 °F (36.4 °C), temperature source Oral, resp. rate 18, SpO2 94 %, not currently breastfeeding.    Patient discharged from PACU based upon Asya score. Please see RN notes for further details

## 2018-03-09 NOTE — ANESTHESIA PROCEDURE NOTES
Spinal Block    Patient location during procedure: OB  Indication:procedure for pain  Performed By  CRNA: YORDY STRAUSS  Preanesthetic Checklist  Completed: patient identified, site marked, surgical consent, pre-op evaluation, timeout performed, IV checked, risks and benefits discussed and monitors and equipment checked  Spinal Block Prep:  Patient Position:sitting  Sterile Tech:cap, gloves, mask and sterile barriers  Prep:DuraPrep  Patient Monitoring:blood pressure monitoring and continuous pulse oximetry  Spinal Block Procedure  Approach:midline  Guidance:landmark technique and palpation technique  Location:L3-L4  Needle Type:Pencan  Needle Gauge:22 G  Placement of Spinal needle event:cerebrospinal fluid aspirated  Paresthesia: no  Fluid Appearance:clear  Post Assessment  Patient Tolerance:patient tolerated the procedure well with no apparent complications  Complications yes  Additional Notes  X 2 ATTEMPT, 3ML 1% LIDOCAINE LOCAL INFILTRATION, NEGATIVE PARESTHESIA

## 2018-03-09 NOTE — PLAN OF CARE
Problem: Patient Care Overview (Adult)  Goal: Plan of Care Review  Outcome: Ongoing (interventions implemented as appropriate)   03/09/18 1217   Coping/Psychosocial Response Interventions   Plan Of Care Reviewed With patient   Patient Care Overview   Progress improving   Outcome Evaluation   Outcome Summary/Follow up Plan Meets PACU d/c criteria. Feeling and movement in legs progressing downward.       Problem: Perioperative Period (Adult)  Goal: Signs and Symptoms of Listed Potential Problems Will be Absent or Manageable (Perioperative Period)  Outcome: Ongoing (interventions implemented as appropriate)

## 2018-03-09 NOTE — ANESTHESIA PREPROCEDURE EVALUATION
Anesthesia Evaluation     Patient summary reviewed   no history of anesthetic complications:  NPO Solid Status: > 8 hours  NPO Liquid Status: > 8 hours           Airway   Mallampati: I  TM distance: <3 FB  Neck ROM: full  No difficulty expected  Dental - normal exam     Pulmonary - normal exam   (-) asthma, sleep apnea, not a smoker  Cardiovascular - normal exam  Exercise tolerance: good (4-7 METS)    ECG reviewed    (+) hyperlipidemia,   (-) hypertension      Neuro/Psych  (-) seizures, TIA, CVA  GI/Hepatic/Renal/Endo    (+) obesity,  GERD,    (-) liver disease, no renal disease, diabetes    Musculoskeletal     Abdominal    Substance History      OB/GYN          Other                        Anesthesia Plan    ASA 2     spinal     intravenous induction   Anesthetic plan and risks discussed with patient.

## 2018-03-09 NOTE — PLAN OF CARE
Problem: Patient Care Overview (Adult)  Goal: Plan of Care Review  Outcome: Ongoing (interventions implemented as appropriate)   03/09/18 0755   Coping/Psychosocial Response Interventions   Plan Of Care Reviewed With patient   Patient Care Overview   Progress no change       Problem: Perioperative Period (Adult)  Goal: Signs and Symptoms of Listed Potential Problems Will be Absent or Manageable (Perioperative Period)  Outcome: Ongoing (interventions implemented as appropriate)   03/09/18 0755   Perioperative Period   Problems Assessed (Perioperative Period) pain;embolism;infection   Problems Present (Perioperative Period) none

## 2018-03-10 LAB
ANION GAP SERPL CALCULATED.3IONS-SCNC: 9 MMOL/L (ref 4–13)
BASOPHILS # BLD AUTO: 0.01 10*3/MM3 (ref 0–0.2)
BASOPHILS NFR BLD AUTO: 0.1 % (ref 0–2)
BUN BLD-MCNC: 11 MG/DL (ref 5–21)
BUN/CREAT SERPL: 20.4 (ref 7–25)
CALCIUM SPEC-SCNC: 8.2 MG/DL (ref 8.4–10.4)
CHLORIDE SERPL-SCNC: 96 MMOL/L (ref 98–110)
CO2 SERPL-SCNC: 36 MMOL/L (ref 24–31)
CREAT BLD-MCNC: 0.54 MG/DL (ref 0.5–1.4)
DEPRECATED RDW RBC AUTO: 40.9 FL (ref 40–54)
EOSINOPHIL # BLD AUTO: 0 10*3/MM3 (ref 0–0.7)
EOSINOPHIL NFR BLD AUTO: 0 % (ref 0–4)
ERYTHROCYTE [DISTWIDTH] IN BLOOD BY AUTOMATED COUNT: 13.9 % (ref 12–15)
GFR SERPL CREATININE-BSD FRML MDRD: 115 ML/MIN/1.73
GLUCOSE BLD-MCNC: 145 MG/DL (ref 70–100)
HCT VFR BLD AUTO: 30.9 % (ref 37–47)
HGB BLD-MCNC: 10.4 G/DL (ref 12–16)
IMM GRANULOCYTES # BLD: 0.05 10*3/MM3 (ref 0–0.03)
IMM GRANULOCYTES NFR BLD: 0.4 % (ref 0–5)
LYMPHOCYTES # BLD AUTO: 0.95 10*3/MM3 (ref 0.72–4.86)
LYMPHOCYTES NFR BLD AUTO: 7.3 % (ref 15–45)
MCH RBC QN AUTO: 27.4 PG (ref 28–32)
MCHC RBC AUTO-ENTMCNC: 33.7 G/DL (ref 33–36)
MCV RBC AUTO: 81.3 FL (ref 82–98)
MONOCYTES # BLD AUTO: 1.18 10*3/MM3 (ref 0.19–1.3)
MONOCYTES NFR BLD AUTO: 9.1 % (ref 4–12)
NEUTROPHILS # BLD AUTO: 10.83 10*3/MM3 (ref 1.87–8.4)
NEUTROPHILS NFR BLD AUTO: 83.1 % (ref 39–78)
NRBC BLD MANUAL-RTO: 0 /100 WBC (ref 0–0)
PLATELET # BLD AUTO: 280 10*3/MM3 (ref 130–400)
PMV BLD AUTO: 9.6 FL (ref 6–12)
POTASSIUM BLD-SCNC: 3.2 MMOL/L (ref 3.5–5.3)
RBC # BLD AUTO: 3.8 10*6/MM3 (ref 4.2–5.4)
SODIUM BLD-SCNC: 141 MMOL/L (ref 135–145)
WBC NRBC COR # BLD: 13.02 10*3/MM3 (ref 4.8–10.8)

## 2018-03-10 PROCEDURE — G8978 MOBILITY CURRENT STATUS: HCPCS

## 2018-03-10 PROCEDURE — 97116 GAIT TRAINING THERAPY: CPT

## 2018-03-10 PROCEDURE — 80048 BASIC METABOLIC PNL TOTAL CA: CPT | Performed by: FAMILY MEDICINE

## 2018-03-10 PROCEDURE — 97110 THERAPEUTIC EXERCISES: CPT

## 2018-03-10 PROCEDURE — 85025 COMPLETE CBC W/AUTO DIFF WBC: CPT | Performed by: FAMILY MEDICINE

## 2018-03-10 PROCEDURE — G8979 MOBILITY GOAL STATUS: HCPCS

## 2018-03-10 PROCEDURE — 97162 PT EVAL MOD COMPLEX 30 MIN: CPT

## 2018-03-10 PROCEDURE — 25010000002 MORPHINE PER 10 MG: Performed by: ORTHOPAEDIC SURGERY

## 2018-03-10 PROCEDURE — 25010000003 CEFAZOLIN PER 500 MG: Performed by: ORTHOPAEDIC SURGERY

## 2018-03-10 RX ORDER — OXYCODONE HYDROCHLORIDE 5 MG/1
20 TABLET ORAL
Status: DISCONTINUED | OUTPATIENT
Start: 2018-03-10 | End: 2018-03-12 | Stop reason: HOSPADM

## 2018-03-10 RX ORDER — TRAMADOL HYDROCHLORIDE 50 MG/1
50 TABLET ORAL EVERY 6 HOURS SCHEDULED
Status: DISCONTINUED | OUTPATIENT
Start: 2018-03-10 | End: 2018-03-12 | Stop reason: HOSPADM

## 2018-03-10 RX ADMIN — DIAZEPAM 2.5 MG: 5 TABLET ORAL at 10:07

## 2018-03-10 RX ADMIN — DOCUSATE SODIUM AND SENNOSIDES 2 TABLET: 8.6; 5 TABLET, FILM COATED ORAL at 20:06

## 2018-03-10 RX ADMIN — OXYCODONE HYDROCHLORIDE 20 MG: 5 TABLET ORAL at 17:06

## 2018-03-10 RX ADMIN — FAMOTIDINE 20 MG: 20 TABLET, FILM COATED ORAL at 08:15

## 2018-03-10 RX ADMIN — CLINDAMYCIN PHOSPHATE 900 MG: 18 INJECTION, SOLUTION INTRAVENOUS at 14:27

## 2018-03-10 RX ADMIN — ATORVASTATIN CALCIUM 10 MG: 10 TABLET, FILM COATED ORAL at 20:06

## 2018-03-10 RX ADMIN — MAGNESIUM GLUCONATE 500 MG ORAL TABLET 2000 MG: 500 TABLET ORAL at 17:01

## 2018-03-10 RX ADMIN — MORPHINE SULFATE 2 MG: 4 INJECTION, SOLUTION INTRAMUSCULAR; INTRAVENOUS at 04:09

## 2018-03-10 RX ADMIN — OXYCODONE HYDROCHLORIDE 20 MG: 5 TABLET ORAL at 20:06

## 2018-03-10 RX ADMIN — OXYCODONE HYDROCHLORIDE 10 MG: 10 TABLET, FILM COATED, EXTENDED RELEASE ORAL at 20:06

## 2018-03-10 RX ADMIN — PAROXETINE 10 MG: 10 TABLET, FILM COATED ORAL at 08:15

## 2018-03-10 RX ADMIN — OXYCODONE HYDROCHLORIDE 20 MG: 5 TABLET ORAL at 10:07

## 2018-03-10 RX ADMIN — OXYCODONE HYDROCHLORIDE 10 MG: 5 TABLET ORAL at 02:06

## 2018-03-10 RX ADMIN — OXYCODONE HYDROCHLORIDE 10 MG: 10 TABLET, FILM COATED, EXTENDED RELEASE ORAL at 08:15

## 2018-03-10 RX ADMIN — CEFAZOLIN 2 G: 1 INJECTION, POWDER, FOR SOLUTION INTRAVENOUS at 00:19

## 2018-03-10 RX ADMIN — OXYCODONE HYDROCHLORIDE 10 MG: 5 TABLET ORAL at 06:01

## 2018-03-10 RX ADMIN — CLINDAMYCIN PHOSPHATE 900 MG: 18 INJECTION, SOLUTION INTRAVENOUS at 21:33

## 2018-03-10 RX ADMIN — OXYCODONE HYDROCHLORIDE 20 MG: 5 TABLET ORAL at 14:11

## 2018-03-10 RX ADMIN — TRAMADOL HYDROCHLORIDE 50 MG: 50 TABLET, COATED ORAL at 11:52

## 2018-03-10 RX ADMIN — RIVAROXABAN 10 MG: 10 TABLET, FILM COATED ORAL at 17:02

## 2018-03-10 RX ADMIN — TRAMADOL HYDROCHLORIDE 50 MG: 50 TABLET, COATED ORAL at 19:03

## 2018-03-10 RX ADMIN — CLINDAMYCIN PHOSPHATE 900 MG: 18 INJECTION, SOLUTION INTRAVENOUS at 06:01

## 2018-03-10 NOTE — PROGRESS NOTES
Family Medicine Progress Note    Patient:  Sammi Cordero  YOB: 1955    MRN: [id number]     Acct: [id number]     Admit date: 3/9/2018    Patient Seen, Chart, Consults notes, Labs, Radiology studies reviewed.    Subjective: Day 1 of stay with end-stage osteoarthritis of the left knee and most recent (in last 24 hours) has had no specific issues postoperatively.  She was able to work with physical therapy.  Pain is reasonably controlled.  Is tolerating diet.    Past, Family, Social History unchanged from admission.    Diet: Diet Regular    Medications:  Scheduled Meds:  atorvastatin 10 mg Oral Nightly   clindamycin 900 mg Intravenous Q8H   famotidine 20 mg Oral Daily   magnesium oxide 2,000 mg Oral Q PM   oxyCODONE 10 mg Oral Q12H   PARoxetine 10 mg Oral QAM   rivaroxaban 10 mg Oral Daily With Dinner   sennosides-docusate sodium 2 tablet Oral Nightly   traMADol 50 mg Oral Q6H     Continuous Infusions:  sodium chloride 150 mL/hr Last Rate: 150 mL/hr (03/09/18 1825)     PRN Meds:•  acetaminophen **OR** acetaminophen **OR** acetaminophen  •  diazePAM  •  HYDROmorphone **AND** naloxone  •  Morphine **AND** naloxone  •  ondansetron **OR** ondansetron ODT **OR** ondansetron  •  oxyCODONE  •  pneumococcal polysaccharide 23-valent    Objective:    Lab Results (last 24 hours)     Procedure Component Value Units Date/Time    Basic Metabolic Panel [[id number]]  (Abnormal) Collected:  03/10/18 0838    Specimen:  Blood Updated:  03/10/18 0936     Glucose 145 (H) mg/dL      BUN 11 mg/dL      Creatinine 0.54 mg/dL      Sodium 141 mmol/L      Potassium 3.2 (L) mmol/L      Chloride 96 (L) mmol/L      CO2 36.0 (H) mmol/L      Calcium 8.2 (L) mg/dL      eGFR Non African Amer 115 mL/min/1.73      BUN/Creatinine Ratio 20.4     Anion Gap 9.0 mmol/L     Narrative:       GFR Normal >60  Chronic Kidney Disease <60  Kidney Failure <15    CBC & Differential [[id number]] Collected:  03/10/18 0838    Specimen:  Blood Updated:   03/10/18 0848    Narrative:       The following orders were created for panel order CBC & Differential.  Procedure                               Abnormality         Status                     ---------                               -----------         ------                     CBC Auto Differential[[id number]]        Abnormal            Final result                 Please view results for these tests on the individual orders.    CBC Auto Differential [[id number]]  (Abnormal) Collected:  03/10/18 0838    Specimen:  Blood Updated:  03/10/18 0848     WBC 13.02 (H) 10*3/mm3      RBC 3.80 (L) 10*6/mm3      Hemoglobin 10.4 (L) g/dL      Hematocrit 30.9 (L) %      MCV 81.3 (L) fL      MCH 27.4 (L) pg      MCHC 33.7 g/dL      RDW 13.9 %      RDW-SD 40.9 fl      MPV 9.6 fL      Platelets 280 10*3/mm3      Neutrophil % 83.1 (H) %      Lymphocyte % 7.3 (L) %      Monocyte % 9.1 %      Eosinophil % 0.0 %      Basophil % 0.1 %      Immature Grans % 0.4 %      Neutrophils, Absolute 10.83 (H) 10*3/mm3      Lymphocytes, Absolute 0.95 10*3/mm3      Monocytes, Absolute 1.18 10*3/mm3      Eosinophils, Absolute 0.00 10*3/mm3      Basophils, Absolute 0.01 10*3/mm3      Immature Grans, Absolute 0.05 (H) 10*3/mm3      nRBC 0.0 /100 WBC            Imaging Results (last 72 hours)     Procedure Component Value Units Date/Time    XR Knee 1 or 2 View Left [[id number]] Collected:  03/09/18 1218     Updated:  03/09/18 1221    Narrative:       EXAMINATION:   XR KNEE 1 OR 2 VW LEFT-  3/9/2018 12:18 PM CST     HISTORY: Postop     AP and lateral view the left knee is obtained. Postsurgical changes  noted from total left knee arthroplasty. Skeletal structures are  relatively aligned.     IMPRESSION status post left knee arthroplasty  This report was finalized on 03/09/2018 12:18 by Dr. Tim Smith MD.           Physical Exam:    Vitals: /62 (BP Location: Left arm, Patient Position: Lying)   Pulse 75   Temp 97.6 °F (36.4 °C) (Oral)   Resp 18  "  Ht 152.4 cm (60\")   Wt 75.3 kg (166 lb)   SpO2 94%   Breastfeeding? No   BMI 32.42 kg/m²   24 hour intake/output:  Intake/Output Summary (Last 24 hours) at 03/10/18 1133  Last data filed at 03/10/18 0338   Gross per 24 hour   Intake                0 ml   Output             2375 ml   Net            -2375 ml     Last 3 weights:  Wt Readings from Last 3 Encounters:   03/09/18 75.3 kg (166 lb)   02/27/18 79.4 kg (175 lb 0.7 oz)   10/17/16 80.7 kg (178 lb)       General Appearance alert, appears stated age and cooperative  Head normocephalic, without obvious abnormality  Eyes lids and lashes normal, conjunctivae and sclerae normal and no icterus  Neck suppple and trachea midline  Lungs clear to auscultation, respirations regular, respirations even and respirations unlabored  Heart regular rhythm & normal rate and normal S1, S2  Abdomen normal bowel sounds, no masses, soft non-tender and no guarding  Extremities moves extremities well and no edema  Skin Wound clean dry and intact  Neurologic Mental Status orientated to person, place, time and situation        Assessment:    Principal Problem:    Left knee DJD  Active Problems:    Hypoglycemia    Hyperlipidemia          Plan:  Continue postoperative routine care.  Watch for any signs of constipation and obstipation RELATED to surgery.  Blood counts currently at an expected level of anemia.  Anticipate that without further problems in her pain is controlled she will likely be discharged home in the next 24-48 hours.      Electronically signed by You Orozco MD on 3/10/2018 at 11:33 AM            "

## 2018-03-10 NOTE — DISCHARGE PLACEMENT REQUEST
"To:  [facility]  From:  Yesy Boucher [facility]  [phone]    Sammi Quinones (62 y.o. Female)     Date of Birth Social Security Number Address Home Phone MRN    1955  [address] [place] [address] [phone] [id number]    Denominational Marital Status          Other        Admission Date Admission Type Admitting Provider Attending Provider Department, Room/Bed    3/9/18 Elective Orlin Meza MD Patel, Shiraz Khushroo, MD [facility] 3A, 326/1    Discharge Date Discharge Disposition Discharge Destination                       Attending Provider:  Orlin Meza MD    Allergies:  Penicillins, Sulfa Antibiotics    Isolation:  None   Infection:  None   Code Status:  FULL    Ht:  152.4 cm (60\")   Wt:  75.3 kg (166 lb)    Admission Cmt:  None   Principal Problem:  Left knee DJD [M17.12]                 Active Insurance as of 3/9/2018     Primary Coverage     Payor Plan Insurance Group Employer/Plan Group    [facility] [employer] EMPL[employer]EE [id number]     Payor Plan Address Payor Plan Phone Number Effective From Effective To    [address] [phone] 1/1/2018     [place] [address]       Subscriber Name Subscriber Birth Date Member ID       SAMMI QUINONES 1955 [id number]                 Emergency Contacts      (Rel.) Home Phone Work Phone Mobile Phone    Sade Lepe (Daughter) [phone] -- --        Inpatient Consult to Case Management  [[id number]] (Order [id number])   Order   Date: 3/9/2018 Department: [address] Released By: Khadijah Triplett RN (auto-released) Authorizing: Orlin Meza MD   Order History   Inpatient   Date/Time Action Taken User Additional Information   03/09/18 1237 Release Khadijah Triplett RN (auto-released) From Order: [id number]   Order Details     Frequency Duration Priority Order Class   Once 1  occurrence Routine Hospital Performed   Order Questions "     Question Answer Comment   Reason for Consult? post op protocol with home pt           Consult Order Info     ID Description Priority Start Date Start Time   [id number] Inpatient Consult to Case [facility]  Routine 03/09/2018 12:38 PM   Provider Specialty Referred to   ______________________________________ _____________________________________   Acknowledgement Info     For At Acknowledged By Acknowledged On   Placing Order 03/09/18 1237 Khadijah Triplett RN 03/09/18 1237   Reprint Order Requisition     Inpatient Consult to [facility] (Order #[id number]) on 3/9/18   Encounter     View Encounter           Order Provider Info         Office phone Pager E-mail   Ordering User Orlin Meza [phone] -- --   Authorizing Provider Orlin Meza [phone] -- --   Attending Provider Orlin Meza [phone] -- --   Order Transmittal Tracking     Inpatient Consult to [facility]  (Order #[id number]) on 3/9/18            History & Physical      H&P filed by German Amezquita MD at 3/9/2018  7:41 PM     Scan on 3/9/2018 : [facility] - 02/15/2018 (below)            Electronically signed by Interface, Scans Incoming at 3/9/2018  7:41 PM     H&P filed by German Amezquita MD at 2/26/2018  2:55 PM     Scan on 3/9/2018 : 02/15/2018 (below)            Electronically signed by Interface, Scans Incoming at 2/26/2018  2:55 PM     H&P filed by German Amezquita MD at 2/26/2018 11:01 AM     Scan on 3/9/2018 : 02/15/2018 (below)            Electronically signed by Interface, Scans Incoming at 2/26/2018 11:01 AM     H&P filed by German Amezquita MD at 2/21/2018  3:46 PM     Scan on 3/9/2018 : HISTORY AND PHYSICAL 3/9/18 (below)            Electronically signed by Interface, Scans Incoming at 2/21/2018  3:46 PM       Prior to Admission Medications     Prescriptions Last Dose Informant Patient Reported? Taking?    magnesium  "oxide (MAGOX) 400 (241.3 MG) MG tablet tablet Past Week Pharmacy Yes Yes    Take 2,000 mg by mouth Every Evening.    meloxicam (MOBIC) 15 MG tablet Past Week Pharmacy Yes Yes    Take 7.5 mg by mouth Daily.    PARoxetine (PAXIL) 10 MG tablet Past Week Pharmacy Yes Yes    Take 10 mg by mouth Every Morning.    simvastatin (ZOCOR) 20 MG tablet Past Week Pharmacy Yes Yes    Take 1 tablet by mouth daily.          Hospital Medications (active)       Dose Frequency Start End    acetaminophen (TYLENOL) 160 MG/5ML solution 650 mg 650 mg Every 4 Hours PRN 3/9/2018     Sig - Route: Take 20.3 mL by mouth Every 4 (Four) Hours As Needed for Mild Pain . - Oral    Linked Group 1:  \"Or\" Linked Group Details        acetaminophen (TYLENOL) suppository 650 mg 650 mg Every 4 Hours PRN 3/9/2018     Sig - Route: Insert 1 suppository into the rectum Every 4 (Four) Hours As Needed for Mild Pain . - Rectal    Linked Group 1:  \"Or\" Linked Group Details        acetaminophen (TYLENOL) tablet 650 mg 650 mg Every 4 Hours PRN 3/9/2018     Sig - Route: Take 2 tablets by mouth Every 4 (Four) Hours As Needed for Mild Pain . - Oral    Linked Group 1:  \"Or\" Linked Group Details        atorvastatin (LIPITOR) tablet 10 mg 10 mg Nightly 3/9/2018     Sig - Route: Take 1 tablet by mouth Every Night. - Oral    ceFAZolin (ANCEF) 2 g/20ml IV PUSH syringe 2 g Every 8 Hours 3/9/2018 3/10/2018    Sig - Route: Infuse 20 mL into a venous catheter Every 8 (Eight) Hours. - Intravenous    clindamycin (CLEOCIN) 900 mg in dextrose 5% 50 mL IVPB (premix) 900 mg Every 8 Hours 3/9/2018 3/11/2018    Sig - Route: Infuse 50 mL into a venous catheter Every 8 (Eight) Hours. - Intravenous    diazePAM (VALIUM) tablet 5 mg 5 mg Every 6 Hours PRN 3/9/2018 3/19/2018    Sig - Route: Take 1 tablet by mouth Every 6 (Six) Hours As Needed for Anxiety or Muscle Spasms. - Oral    famotidine (PEPCID) tablet 20 mg 20 mg Daily 3/9/2018     Sig - Route: Take 1 tablet by mouth Daily. - Oral    " "HYDROmorphone (DILAUDID) injection 1 mg 1 mg Every 4 Hours PRN 3/9/2018 3/19/2018    Sig - Route: Infuse 1 mL into a venous catheter Every 4 (Four) Hours As Needed for Severe Pain . - Intravenous    Linked Group 2:  \"And\" Linked Group Details        magnesium oxide (MAGOX) tablet 2,000 mg 2,000 mg Every Evening 3/9/2018     Sig - Route: Take 5 tablets by mouth Every Evening. - Oral    Morphine sulfate (PF) injection 4 mg 4 mg Every 2 Hours PRN 3/9/2018 3/19/2018    Sig - Route: Infuse 1 mL into a venous catheter Every 2 (Two) Hours As Needed for Moderate Pain . - Intravenous    Linked Group 3:  \"And\" Linked Group Details        naloxone (NARCAN) injection 0.1 mg 0.1 mg Every 5 Minutes PRN 3/9/2018     Sig - Route: Infuse 0.25 mL into a venous catheter Every 5 (Five) Minutes As Needed for Respiratory Depression. - Intravenous    Linked Group 2:  \"And\" Linked Group Details        naloxone (NARCAN) injection 0.4 mg 0.4 mg Every 5 Minutes PRN 3/9/2018     Sig - Route: Infuse 1 mL into a venous catheter Every 5 (Five) Minutes As Needed for Respiratory Depression. - Intravenous    Linked Group 3:  \"And\" Linked Group Details        ondansetron (ZOFRAN) injection 4 mg 4 mg Every 6 Hours PRN 3/9/2018     Sig - Route: Infuse 2 mL into a venous catheter Every 6 (Six) Hours As Needed for Nausea or Vomiting. - Intravenous    Linked Group 4:  \"Or\" Linked Group Details        ondansetron (ZOFRAN) tablet 4 mg 4 mg Every 6 Hours PRN 3/9/2018     Sig - Route: Take 1 tablet by mouth Every 6 (Six) Hours As Needed for Nausea or Vomiting. - Oral    Linked Group 4:  \"Or\" Linked Group Details        ondansetron ODT (ZOFRAN-ODT) disintegrating tablet 4 mg 4 mg Every 6 Hours PRN 3/9/2018     Sig - Route: Take 1 tablet by mouth Every 6 (Six) Hours As Needed for Nausea or Vomiting. - Oral    Linked Group 4:  \"Or\" Linked Group Details        oxyCODONE (oxyCONTIN) 12 hr tablet 10 mg 10 mg Every 12 Hours Scheduled 3/9/2018 3/19/2018    Sig - " Route: Take 1 tablet by mouth Every 12 (Twelve) Hours. - Oral    oxyCODONE (ROXICODONE) immediate release tablet 20 mg 20 mg Every 3 Hours PRN 3/10/2018     Sig - Route: Take 4 tablets by mouth Every 3 (Three) Hours As Needed for Moderate Pain . - Oral    PARoxetine (PAXIL) tablet 10 mg 10 mg Every Morning 3/10/2018     Sig - Route: Take 1 tablet by mouth Every Morning. - Oral    pneumococcal polysaccharide 23-valent (PNEUMOVAX-23) vaccine 0.5 mL 0.5 mL During Hospitalization 3/9/2018     Sig - Route: Inject 0.5 mL into the shoulder, thigh, or buttocks During Hospitalization for Immunization. - Intramuscular    rivaroxaban (XARELTO) tablet 10 mg 10 mg Daily With Dinner 3/9/2018     Sig - Route: Take 1 tablet by mouth Daily With Dinner. - Oral    sennosides-docusate sodium (SENOKOT-S) 8.6-50 MG tablet 2 tablet 2 tablet Nightly 3/9/2018     Sig - Route: Take 2 tablets by mouth Every Night. - Oral    sodium chloride 0.9 % infusion 150 mL/hr Continuous 3/9/2018     Sig - Route: Infuse 150 mL/hr into a venous catheter Continuous. - Intravenous    traMADol (ULTRAM) tablet 50 mg 50 mg Every 6 Hours Scheduled 3/10/2018 3/20/2018    Sig - Route: Take 1 tablet by mouth Every 6 (Six) Hours. - Oral    atropine sulfate injection 0.5 mg (Discontinued) 0.5 mg Once As Needed 3/9/2018 3/9/2018    Sig - Route: Infuse 5 mL into a venous catheter 1 (One) Time As Needed for Bradycardia (symptomatic bradycardia (hypotension, dizziness, confusion). Notify attending anesthesiologist.). - Intravenous    Reason for Discontinue: Patient Transfer    buffered lidocaine syringe 0.5 mL (Discontinued) 0.5 mL Once As Needed 3/9/2018 3/9/2018    Sig - Route: Inject 0.5 mL as directed 1 (One) Time As Needed (IV Start). - Injection    Reason for Discontinue: Patient Transfer    bupivacaine (PF) (MARCAINE) 0.5 % injection (Discontinued)  As Needed 3/9/2018 3/9/2018    Sig: As Needed.    Reason for Discontinue: Anesthesia Stop    bupivacaine liposome  (EXPAREL) 1.3 % injection (Discontinued)  As Needed 3/9/2018 3/9/2018    Sig: As Needed.    Reason for Discontinue: Patient Discharge    dexamethasone (DECADRON) injection (Discontinued)  As Needed 3/9/2018 3/9/2018    Sig - Route: Infuse  into a venous catheter As Needed. - Intravenous    Reason for Discontinue: Anesthesia Stop    ePHEDrine injection (Discontinued)  As Needed 3/9/2018 3/9/2018    Sig: As Needed.    Reason for Discontinue: Anesthesia Stop    flumazenil (ROMAZICON) injection 0.2 mg (Discontinued) 0.2 mg As Needed 3/9/2018 3/9/2018    Sig - Route: Infuse 2 mL into a venous catheter As Needed (unresponsiveness). - Intravenous    Reason for Discontinue: Patient Transfer    hydrALAZINE (APRESOLINE) injection 5 mg (Discontinued) 5 mg Every 10 Minutes PRN 3/9/2018 3/9/2018    Sig - Route: Infuse 0.25 mL into a venous catheter Every 10 (Ten) Minutes As Needed for High Blood Pressure (for systolic blood pressure greater than 180 mmHg or diastolic blood pressure greater than 105 mmHg when HR less than 60). - Intravenous    Reason for Discontinue: Patient Transfer    HYDROmorphone (DILAUDID) injection 0.5 mg (Discontinued) 0.5 mg As Needed 3/9/2018 3/9/2018    Sig - Route: Infuse 0.5 mL into a venous catheter As Needed for Moderate Pain  or Severe Pain . - Intravenous    Reason for Discontinue: Patient Transfer    ipratropium-albuterol (DUO-NEB) nebulizer solution 3 mL (Discontinued) 3 mL Once As Needed 3/9/2018 3/9/2018    Sig - Route: Take 3 mL by nebulization 1 (One) Time As Needed for Wheezing or Shortness of Air (bronchospasm). - Nebulization    Reason for Discontinue: Patient Transfer    ketorolac (TORADOL) injection (Discontinued)  As Needed 3/9/2018 3/9/2018    Sig: As Needed for Moderate Pain .    Reason for Discontinue: Anesthesia Stop    labetalol (NORMODYNE,TRANDATE) injection 5 mg (Discontinued) 5 mg Every 5 Minutes PRN 3/9/2018 3/9/2018    Sig - Route: Infuse 1 mL into a venous catheter Every 5  "(Five) Minutes As Needed for High Blood Pressure (for systolic blood pressure greater than 180 mmHg or diastolic blood pressure greater than 105 mmHg). - Intravenous    Reason for Discontinue: Patient Transfer    lactated ringers infusion 1,000 mL (Discontinued) 1,000 mL Continuous 3/9/2018 3/9/2018    Sig - Route: Infuse 1,000 mL into a venous catheter Continuous. - Intravenous    Reason for Discontinue: Patient Transfer    lactated ringers infusion (Discontinued) 100 mL/hr Continuous 3/9/2018 3/9/2018    Sig - Route: Infuse 100 mL/hr into a venous catheter Continuous. - Intravenous    Reason for Discontinue: Patient Transfer    meperidine (DEMEROL) injection 12.5 mg (Discontinued) 12.5 mg Every 5 Minutes PRN 3/9/2018 3/9/2018    Sig - Route: Infuse 0.5 mL into a venous catheter Every 5 (Five) Minutes As Needed for Shivering (May repeat x 3). - Intravenous    Reason for Discontinue: Patient Transfer    metoclopramide (REGLAN) injection 5 mg (Discontinued) 5 mg Every 15 Minutes PRN 3/9/2018 3/9/2018    Sig - Route: Infuse 1 mL into a venous catheter Every 15 (Fifteen) Minutes As Needed for Nausea or Vomiting (for nausea/vomiting). - Intravenous    Reason for Discontinue: Patient Transfer    midazolam (VERSED) injection 1 mg (Discontinued) 1 mg Every 5 Minutes PRN 3/9/2018 3/9/2018    Sig - Route: Infuse 1 mL into a venous catheter Every 5 (Five) Minutes As Needed for Anxiety (Max 4mg midazolam TOTAL). - Intravenous    Reason for Discontinue: Patient Transfer    Linked Group 5:  \"Or\" Linked Group Details        midazolam (VERSED) injection 2 mg (Discontinued) 2 mg Every 5 Minutes PRN 3/9/2018 3/9/2018    Sig - Route: Infuse 2 mL into a venous catheter Every 5 (Five) Minutes As Needed for Anxiety (Max 4mg midazolam TOTAL). - Intravenous    Reason for Discontinue: Patient Transfer    Linked Group 5:  \"Or\" Linked Group Details        Morphine sulfate (PF) injection 2 mg (Discontinued) 2 mg As Needed 3/9/2018 3/9/2018    " Sig - Route: Infuse 1 mL into a venous catheter As Needed for Mild Pain . - Intravenous    Reason for Discontinue: Patient Transfer    naloxone (NARCAN) injection 0.04 mg (Discontinued) 0.04 mg As Needed 3/9/2018 3/9/2018    Sig - Route: Infuse 0.1 mL into a venous catheter As Needed for Opioid Reversal (unresponsiveness, decrease oxygen saturation). - Intravenous    Reason for Discontinue: Patient Transfer    ondansetron (ZOFRAN) injection 4 mg (Discontinued) 4 mg As Needed 3/9/2018 3/9/2018    Sig - Route: Infuse 2 mL into a venous catheter As Needed for Nausea or Vomiting. - Intravenous    Reason for Discontinue: Patient Transfer    oxyCODONE (ROXICODONE) immediate release tablet 10 mg (Discontinued) 10 mg Every 4 Hours PRN 3/9/2018 3/10/2018    Sig - Route: Take 2 tablets by mouth Every 4 (Four) Hours As Needed for Moderate Pain . - Oral    Phenylephrine HCl-NaCl (PF) 0.8-0.9 MG/10ML-% syringe solution prefilled syringe (Discontinued)  As Needed 3/9/2018 3/9/2018    Sig - Route: Infuse  into a venous catheter As Needed. - Intravenous    Reason for Discontinue: Anesthesia Stop    Propofol (DIPRIVAN) injection (Discontinued)  Continuous PRN 3/9/2018 3/9/2018    Sig - Route: Infuse  into a venous catheter Continuous As Needed. - Intravenous    Reason for Discontinue: Anesthesia Stop    rivaroxaban (XARELTO) tablet 10 mg (Discontinued) 10 mg Daily 3/10/2018 3/9/2018    Sig - Route: Take 1 tablet by mouth Daily. - Oral    sodium chloride (NS) irrigation solution (Discontinued)  As Needed 3/9/2018 3/9/2018    Sig: As Needed.    Reason for Discontinue: Patient Discharge    sodium chloride 0.9 % flush 1-10 mL (Discontinued) 1-10 mL As Needed 3/9/2018 3/9/2018    Sig - Route: Infuse 1-10 mL into a venous catheter As Needed for Line Care. - Intravenous    Reason for Discontinue: Patient Transfer    sodium chloride 0.9 % flush 3 mL (Discontinued) 3 mL As Needed 3/9/2018 3/9/2018    Sig - Route: Infuse 3 mL into a venous  catheter As Needed for Line Care. - Intravenous    Reason for Discontinue: Patient Transfer    sodium chloride 3,000 mL with ceFAZolin 3 g irrigation (Discontinued)  As Needed 3/9/2018 3/9/2018    Sig: As Needed.    Reason for Discontinue: Patient Discharge    sodium chloride 30 mL, bupivacaine PF 50 mL mixture (Discontinued)  As Needed 3/9/2018 3/9/2018    Sig: As Needed.    Reason for Discontinue: Patient Discharge    Tranexamic Acid Sterile Solution (Discontinued)  As Needed 3/9/2018 3/9/2018    Sig: As Needed.    Reason for Discontinue: Anesthesia Stop             Operative/Procedure Notes (most recent note)      Orlin Meza MD at 3/9/2018 11:51 AM        Operative Report    Pt Name: [unfilled]  MRN: @MRN@  Birthdate: @@  Date: @TD@        PREOPERATIVE DIAGNOSIS: Left knee primary osteoarthritis    POSTOPERATIVE DIAGNOSIS: Left knee primary osteoarthritis     PROCEDURE:  Left Total Knee Arthroplasty     SURGEON:  Orlin Meza MD     ASSISTANT:  Adalberto Arciniega PA-C    The assistant aided in limb position as well as retractor placement.  The assistant was also critical in implantation of the prosthesis.  In addition to this, the assistant was responsible for wound closure.  It was necessary to have the assistant present in order to complete the procedure.    ANESTHESIA:  Spinal    ESTIMATED BLOOD LOSS:  100cc    COMPLICATIONS:  None.    CONDITION:  Stable.    INDICATIONS:  61 yo female  presents today for a left total knee arthroplasty for primary osteoarthritis.  The patient has failed conservative management including: time, activity modifications, NSAIDs and corticosteroids.    PROCEDURE:  The patient was interviewed in the pre-anesthesia area.  The operative limb was then marked with a marking pen.  The patient was administered a regional block per the anesthesia team.  The patient was then taken to the operative suite where general endotracheal anesthesia was performed by the anesthesia  team.  A time out was then called to confirm the administration of anesthesia as well as the administration of tranexamic acid prior to incision.  The patient was then prepped and draped in standard sterile fashion using ChloraPrep.      Once fully prepped and draped, the limb was reprepped with ChloraPrep.  Sterile marking pen was then used to earlene out the tibia distally as well as the first web space.  The leg was then exsanguinated with Esmarch and tourniquet was inflated to 300mmhg.  The operative foot was placed in a Murray leg machado and a standard midline incision was carried out followed by a medial parapatellar arthrotomy.  The knee did demonstrate a large knee joint effusion with tricompartmental osteoarthrosis.     Attention was then turned to the distal femoral resection.  A drill was used to enter the intramedullary canal just anterior to the PCL insertion.  The extramedullary guide was then placed.  The guide was set at 4 degrees of valgus.  We then made a plus 2 mm distal femoral cut.  We then sized the femur to a size 6 using an anterior referencing guide and penned the femoral block into position.  Care was taken to make sure that the block was perpendicular to the epicondylar axis and parallel to the Whitesides line, thus in external rotation.  Our anterior, posterior as well as chamfer cuts were then made.      Attention was then turned to the tibia.  The PCL retractor was placed posteriorly as the ACL and PCL were resected.  The infrapatellar fat pad was excised and Homans retractor was placed medially and laterally.  An extramedullary tibial guide was then set parallel to the tibia on the AP and lateral views in line with the first web space and second metatarsal, just slightly medial to the medial center of the ankle.  The guide was set at 7 degrees posterior slope.  A 2/10 guide was then used to measure for approximately a 2 mm resection medially and 10 mm resection laterally.  The proximal  tibia was then removed en block.  The knee was then brought into extension where the medial and lateral menisci were then excised with a radiofrequency device.  We also performed a medial soft tissue release around the posteromedial corner of the knee.  Curved osteotome was then used to remove any posterior osteophytes off the medial and lateral femoral condyles as well as strip the posterior capsule.  A size D tibial tray was then placed with the central portion of the tibial tray in line with the medial third of the tibial tubercle, thus in external rotation, and pinned into position.     We then placed the femoral component into position, taking care to make sure that the femoral component was flush with the lateral cortex.  The box cutting guide was placed and our standard box cut was made without difficulty.  We then placed a box insert and placed a size 10 trial spacer.  The knee was brought into extension and did achieve full extension.  The knee was also stable in flexion.  There was approximately 2 mm of opening with varus and valgus stress testing.  Intraoperative fluoroscopy at this point confirmed position of our components.    Attention was then turned to the patella.  The patella was everted and resected down to 14 mm.  We then prepped for a size 29 patella implant  to sit superomedially for tracking purposes.     At this point in time, all trial components were removed.  The knee was copiously irrigated with pulsatile lavage.  We then cemented in our size D tibia component with a size 6 femoral component as well as our 29 mm patellar component.  A 10 mm trial spacer was placed with the knee in full extension as we waited on the cement to cure.  Meticulous cement clean up was carried out.  Once the cement was allowed to cure, we then trialed with our trial spacers.  We elected to go with a size 10 mm polyethylene spacer as with this in position the knee achieved full extension and was stable in  flexion, as well as having 2 mm of opening with varus/valgus stress testing.  Thus, the trial component was removed.  The knee was once again copiously irrigated with pulsatile lavage.  We then injected the posterior capsule with a local cocktail solution, both posteromedially and posterolaterally.  Care was taken to make sure there was no remaining cement.  A size 10 mm polyethylene spacer was then placed.  The knee was then brought into 30 degrees of flexion.  The tourniquet was deflated and meticulous hemostasis was maintained with electrocautery.  The extensor mechanism was then closed with a #1 Vicryl suture.  The skin was then approximated with a 3-0 Vicryl suture and closed with a Prineo wound closure system.  The patient was placed in a sterile dressing.  The patient was awakened from anesthesia without difficulty and was transferred to the PACU in stable condition.  All sponge, needle and instrument counts were correct at the end of the procedure.     Orlin Meza MD  3/9/2018  11:52 AM      Electronically signed by Orlin Meza MD at 3/9/2018 11:56 AM       Physician Progress Notes (most recent note)     No notes of this type exist for this encounter.           Consult Notes (most recent note)      Nico Ledesma MD at 3/9/2018  6:15 PM          Initial Consult      CHIEF COMPLAINT:  Left knee pain    Reason for Admission:  Left TKA    History Obtained From:  patient    HISTORY OF PRESENT ILLNESS:      The patient is a 62 y.o. female who was admitted to Dr. Meza's service and underwent a Left TKA. She is doing ok after surgery. Her pain is stable. No CP or SOA. No abdominal pain or N/V. She has a Roach in place. No recent illnesses or fevers. She has been eating and drinking.     Past Medical History:    Past Medical History:   Diagnosis Date   • Allergic rhinitis    • Colon polyps    • Depression    • Difficulty swallowing solids    • DJD (degenerative joint disease)    •  Gitelman syndrome    • Hyperlipidemia    • Hypoglycemia    • Obesity    • Osteopenia    • PONV (postoperative nausea and vomiting)      Past Surgical History:    Past Surgical History:   Procedure Laterality Date   • COLONOSCOPY  08/20/2010   • COLONOSCOPY W/ POLYPECTOMY  09/14/2016    2   5 mm sessile polyps removed with hot snare repeat 5 years   • DILATATION AND CURETTAGE     • ENDOSCOPY  09/14/2016    Medium sized HH, LA Grade A esohagitis, Fluid in the middle third of esohagus    • VT TOTAL KNEE ARTHROPLASTY Left 3/9/2018    Procedure: LEFT TOTAL KNEE ARTHROPLASTY;  Surgeon: Orlin Meza MD;  Location: [facility] [facility] OR;  Service: Orthopedics   • REPLACEMENT TOTAL KNEE Right          Medications Prior to Admission:    Prescriptions Prior to Admission   Medication Sig Dispense Refill Last Dose   • magnesium oxide (MAGOX) 400 (241.3 MG) MG tablet tablet Take 2,000 mg by mouth Every Evening.   Past Week at Unknown time   • meloxicam (MOBIC) 15 MG tablet Take 7.5 mg by mouth Daily.   Past Week at Unknown time   • PARoxetine (PAXIL) 10 MG tablet Take 10 mg by mouth Every Morning.  5 Past Week at Unknown time   • simvastatin (ZOCOR) 20 MG tablet Take 1 tablet by mouth daily.  9 Past Week at Unknown time       Allergies:  Penicillins and Sulfa antibiotics    Social History:   TOBACCO:   reports that she quit smoking about 15 years ago. Her smoking use included Cigarettes. She smoked 0.50 packs per day. She has never used smokeless tobacco.  ETOH:   reports that she does not drink alcohol.  DRUGS:   reports that she does not use drugs.  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 No   BMI 32.42 kg/m²      PHYSICAL EXAM:  Gen: NAD, alert, pleasant, in bed  HEENT: WNL  Lymph: no LAD  Neck: no JVD or masses  Chest: CTA bilat  CV: RRR  Abdomen: NT/ND  Extrem: no C/C/E  Neuro: non focal  Skin: no rashes  Joints: no redness  DATA:  I have reviewed the admission labs and imaging tests.    ASSESSMENT AND PLAN:      S/P Left TKA---follow with Ortho, continue with pain treatment, therapy, and anticoagulation  HPL---continue statin      Nico Ledesma MD  8:18 AM 3/10/2018    Electronically signed by Nico Ledesma MD at 3/10/2018  8:23 AM       Physical Therapy Notes (most recent note)     No notes of this type exist for this encounter.        "

## 2018-03-10 NOTE — CONSULTS
Initial Consult      CHIEF COMPLAINT:  Left knee pain    Reason for Admission:  Left TKA    History Obtained From:  patient    HISTORY OF PRESENT ILLNESS:      The patient is a 62 y.o. female who was admitted to Dr. Meza's service and underwent a Left TKA. She is doing ok after surgery. Her pain is stable. No CP or SOA. No abdominal pain or N/V. She has a Roach in place. No recent illnesses or fevers. She has been eating and drinking.     Past Medical History:    Past Medical History:   Diagnosis Date   • Allergic rhinitis    • Colon polyps    • Depression    • Difficulty swallowing solids    • DJD (degenerative joint disease)    • Gitelman syndrome    • Hyperlipidemia    • Hypoglycemia    • Obesity    • Osteopenia    • PONV (postoperative nausea and vomiting)      Past Surgical History:    Past Surgical History:   Procedure Laterality Date   • COLONOSCOPY  08/20/2010   • COLONOSCOPY W/ POLYPECTOMY  09/14/2016    2   5 mm sessile polyps removed with hot snare repeat 5 years   • DILATATION AND CURETTAGE     • ENDOSCOPY  09/14/2016    Medium sized HH, LA Grade A esohagitis, Fluid in the middle third of esohagus    • VT TOTAL KNEE ARTHROPLASTY Left 3/9/2018    Procedure: LEFT TOTAL KNEE ARTHROPLASTY;  Surgeon: Orlin Meza MD;  Location: [facility];  Service: Orthopedics   • REPLACEMENT TOTAL KNEE Right          Medications Prior to Admission:    Prescriptions Prior to Admission   Medication Sig Dispense Refill Last Dose   • magnesium oxide (MAGOX) 400 (241.3 MG) MG tablet tablet Take 2,000 mg by mouth Every Evening.   Past Week at Unknown time   • meloxicam (MOBIC) 15 MG tablet Take 7.5 mg by mouth Daily.   Past Week at Unknown time   • PARoxetine (PAXIL) 10 MG tablet Take 10 mg by mouth Every Morning.  5 Past Week at Unknown time   • simvastatin (ZOCOR) 20 MG tablet Take 1 tablet by mouth daily.  9 Past Week at Unknown time       Allergies:  Penicillins and Sulfa antibiotics    Social History:   TOBACCO:    "reports that she quit smoking about 15 years ago. Her smoking use included Cigarettes. She smoked 0.50 packs per day. She has never used smokeless tobacco.  ETOH:   reports that she does not drink alcohol.  DRUGS:   reports that she does not use drugs.  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 No   BMI 32.42 kg/m²     PHYSICAL EXAM:  Gen: NAD, alert, pleasant, in bed  HEENT: WNL  Lymph: no LAD  Neck: no JVD or masses  Chest: CTA bilat  CV: RRR  Abdomen: NT/ND  Extrem: no C/C/E  Neuro: non focal  Skin: no rashes  Joints: no redness  DATA:  I have reviewed the admission labs and imaging tests.    ASSESSMENT AND PLAN:      S/P Left TKA---follow with Ortho, continue with pain treatment, therapy, and anticoagulation  HPL---continue statin      Nico Ledesma MD  8:18 AM 3/10/2018  "

## 2018-03-10 NOTE — PLAN OF CARE
Problem: Patient Care Overview  Goal: Plan of Care Review  Outcome: Ongoing (interventions implemented as appropriate)   03/10/18 0468   Coping/Psychosocial   Plan of Care Reviewed With patient   OTHER   Outcome Summary patient has had increase pain today, Meds for pain given as prescribed, alert and oriented, up to walker, voiding, ambulated with PT     Goal: Individualization and Mutuality  Outcome: Ongoing (interventions implemented as appropriate)    Goal: Discharge Needs Assessment  Outcome: Ongoing (interventions implemented as appropriate)    Goal: Interprofessional Rounds/Family Conf  Outcome: Ongoing (interventions implemented as appropriate)      Problem: Knee Arthroplasty (Total, Partial) (Adult)  Goal: Signs and Symptoms of Listed Potential Problems Will be Absent, Minimized or Managed (Knee Arthroplasty)  Outcome: Ongoing (interventions implemented as appropriate)    Goal: Anesthesia/Sedation Recovery  Outcome: Ongoing (interventions implemented as appropriate)      Problem: Fall Risk (Adult)  Goal: Absence of Fall  Outcome: Ongoing (interventions implemented as appropriate)

## 2018-03-10 NOTE — PLAN OF CARE
Problem: Patient Care Overview (Adult)  Goal: Plan of Care Review  Outcome: Ongoing (interventions implemented as appropriate)   03/10/18 1113   Coping/Psychosocial Response Interventions   Plan Of Care Reviewed With patient;family   Outcome Evaluation   Outcome Summary/Follow up Plan PT eval complete. She required min assist to get her L LE in and out of bed and CGA to stand and ambulate ~ 125 ft with a RW. She demos pain and decreased ROM in L LE s/p surgery. PT will work to progress her functional mobility, strength and L knee ROM. Anticiapte d/c home with outpatient PT.

## 2018-03-10 NOTE — PLAN OF CARE
Problem: Patient Care Overview (Adult)  Goal: Plan of Care Review  Outcome: Ongoing (interventions implemented as appropriate)   03/09/18 8709   Coping/Psychosocial Response Interventions   Plan Of Care Reviewed With patient   Patient Care Overview   Progress no change   Outcome Evaluation   Outcome Summary/Follow up Plan PT CO pain 4-6, PRN PO pain meds as ordered, tolerating well. States she still ha some numbness in her toes. Drsg CDI, no NV changes. Cont to monitor.        Problem: Knee Replacement, Total (Adult)  Goal: Signs and Symptoms of Listed Potential Problems Will be Absent or Manageable (Knee Replacement, Total)  Outcome: Ongoing (interventions implemented as appropriate)

## 2018-03-10 NOTE — PROGRESS NOTES
Orthopaedic Progress Note      3/10/2018   9:19 AM    Name:  Sammi Cordero  MRN:    [id number]     Acct:     [id number]   Room:  [address] [place] [address] Day: 1     Admit Date: 3/9/2018  PCP: Avery Whaley MD        Subjective:     C/C: POD 1 Day Post-Op LEFT TOTAL KNEE ARTHROPLASTY (Left) tka    Interval History: Status: remained stable     Pain: remain unchanged       Medications:     Allergies:   Allergies   Allergen Reactions   • Penicillins Swelling and Rash   • Sulfa Antibiotics Rash and Other (See Comments)     Temp. fluctuations        Current Meds:   Current Facility-Administered Medications   Medication Dose Route Frequency Provider Last Rate Last Dose   • acetaminophen (TYLENOL) tablet 650 mg  650 mg Oral Q4H PRN Orlin Meza MD        Or   • acetaminophen (TYLENOL) 160 MG/5ML solution 650 mg  650 mg Oral Q4H PRN Orlin Meza MD        Or   • acetaminophen (TYLENOL) suppository 650 mg  650 mg Rectal Q4H PRN Orlin Meza MD       • atorvastatin (LIPITOR) tablet 10 mg  10 mg Oral Nightly Orlin Meza MD   10 mg at 03/09/18 2044   • clindamycin (CLEOCIN) 900 mg in dextrose 5% 50 mL IVPB (premix)  900 mg Intravenous Q8H Orlin Meza MD 50 mL/hr at 03/10/18 0601 900 mg at 03/10/18 0601   • diazePAM (VALIUM) tablet 5 mg  5 mg Oral Q6H PRN Orlin Meza MD       • famotidine (PEPCID) tablet 20 mg  20 mg Oral Daily Orlin Meza MD   20 mg at 03/10/18 0815   • HYDROmorphone (DILAUDID) injection 1 mg  1 mg Intravenous Q4H PRN Orlin Meza MD        And   • naloxone (NARCAN) injection 0.1 mg  0.1 mg Intravenous Q5 Min PRN Orlin Meza MD       • magnesium oxide (MAGOX) tablet 2,000 mg  2,000 mg Oral Q PM Orlin Meza MD   2,000 mg at 03/09/18 1826   • Morphine sulfate (PF) injection 4 mg  4 mg Intravenous Q2H PRN Orlin Meza MD   2 mg at 03/10/18 0409    And   • naloxone (NARCAN) injection 0.4 mg  0.4 mg  Intravenous Q5 Min PRN Orlin Meza MD       • ondansetron (ZOFRAN) tablet 4 mg  4 mg Oral Q6H PRN Orlin Meza MD        Or   • ondansetron ODT (ZOFRAN-ODT) disintegrating tablet 4 mg  4 mg Oral Q6H PRN Orlin Meza MD        Or   • ondansetron (ZOFRAN) injection 4 mg  4 mg Intravenous Q6H PRN Orlin Meza MD       • oxyCODONE (oxyCONTIN) 12 hr tablet 10 mg  10 mg Oral Q12H Orlin Meza MD   10 mg at 03/10/18 0815   • oxyCODONE (ROXICODONE) immediate release tablet 20 mg  20 mg Oral Q3H PRN Orlin Meza MD       • PARoxetine (PAXIL) tablet 10 mg  10 mg Oral QAM Orlin Meza MD   10 mg at 03/10/18 0815   • pneumococcal polysaccharide 23-valent (PNEUMOVAX-23) vaccine 0.5 mL  0.5 mL Intramuscular During Hospitalization Orlin Meza MD       • rivaroxaban (XARELTO) tablet 10 mg  10 mg Oral Daily With Dinner Orlin Meza MD   10 mg at 03/09/18 1827   • sennosides-docusate sodium (SENOKOT-S) 8.6-50 MG tablet 2 tablet  2 tablet Oral Nightly Orlin Meza MD   2 tablet at 03/09/18 2044   • sodium chloride 0.9 % infusion  150 mL/hr Intravenous Continuous Orlin Meza  mL/hr at 03/09/18 1825 150 mL/hr at 03/09/18 1825   • traMADol (ULTRAM) tablet 50 mg  50 mg Oral Q6H Orlin Meza MD               Data:     Code Status:  Full Code    Family History   Problem Relation Age of Onset   • Breast cancer Neg Hx        Social History     Social History   • Marital status:      Spouse name: N/A   • Number of children: N/A   • Years of education: N/A     Occupational History   • Not on file.     Social History Main Topics   • Smoking status: Former Smoker     Packs/day: 0.50     Types: Cigarettes     Quit date: 2003   • Smokeless tobacco: Never Used   • Alcohol use No   • Drug use: No   • Sexual activity: Defer     Other Topics Concern   • Not on file     Social History Narrative   • No narrative on file  "      Vitals:  /63 (BP Location: Left arm, Patient Position: Lying)   Pulse 73   Temp 97.7 °F (36.5 °C) (Oral)   Resp 18   Ht 152.4 cm (60\")   Wt 75.3 kg (166 lb)   SpO2 95%   Breastfeeding? No   BMI 32.42 kg/m²   Temp (24hrs), Av.6 °F (36.4 °C), Min:96.8 °F (36 °C), Max:98.2 °F (36.8 °C)      I/O (24Hr):    Intake/Output Summary (Last 24 hours) at 03/10/18 0919  Last data filed at 03/10/18 033   Gross per 24 hour   Intake             1300 ml   Output             2675 ml   Net            -1375 ml       Labs:  Lab Results (last 72 hours)     Procedure Component Value Units Date/Time    CBC & Differential [[id number]] Collected:  03/10/18 0838    Specimen:  Blood Updated:  03/10/18 0848    Narrative:       The following orders were created for panel order CBC & Differential.  Procedure                               Abnormality         Status                     ---------                               -----------         ------                     CBC Auto Differential[[id number]]        Abnormal            Final result                 Please view results for these tests on the individual orders.    CBC Auto Differential [[id number]]  (Abnormal) Collected:  03/10/18 0838    Specimen:  Blood Updated:  03/10/18 0848     WBC 13.02 (H) 10*3/mm3      RBC 3.80 (L) 10*6/mm3      Hemoglobin 10.4 (L) g/dL      Hematocrit 30.9 (L) %      MCV 81.3 (L) fL      MCH 27.4 (L) pg      MCHC 33.7 g/dL      RDW 13.9 %      RDW-SD 40.9 fl      MPV 9.6 fL      Platelets 280 10*3/mm3      Neutrophil % 83.1 (H) %      Lymphocyte % 7.3 (L) %      Monocyte % 9.1 %      Eosinophil % 0.0 %      Basophil % 0.1 %      Immature Grans % 0.4 %      Neutrophils, Absolute 10.83 (H) 10*3/mm3      Lymphocytes, Absolute 0.95 10*3/mm3      Monocytes, Absolute 1.18 10*3/mm3      Eosinophils, Absolute 0.00 10*3/mm3      Basophils, Absolute 0.01 10*3/mm3      Immature Grans, Absolute 0.05 (H) 10*3/mm3      nRBC 0.0 /100 WBC     Basic Metabolic " Panel [[id number]] Collected:  03/10/18 0838    Specimen:  Blood Updated:  03/10/18 0841              Physical Exam:     Left Knee: Incision is clean, dry, intact. Dressing is Clean, Dry, Intact. Neurovascular intact distally. Moderate tenderness to palpation, soft throughout. No signs or symptoms of DVT or PE.      Assessment:     Primary Problem  Left knee DJD  POD 1 Day Post-Op LEFT TOTAL KNEE ARTHROPLASTY (Left) tka         Plan:     1.     1. Continue PT/OT.  2. Continue DVT prophylaxis.  3. Continue WBAT left lower extremity.  4. Anticipate discharge tomorrow if pain well controlled.      Electronically signed by Orlin Meza MD on 3/10/2018 at 9:19 AM

## 2018-03-10 NOTE — THERAPY EVALUATION
Acute Care - Physical Therapy Initial Evaluation  [facility]     Patient Name: Sammi Cordero  : 1955  MRN: [id number]  Today's Date: 3/10/2018   Onset of Illness/Injury or Date of Surgery: 18  Date of Referral to PT: 18  Referring Physician: Vipul Land      Admit Date: 3/9/2018    Visit Dx:     ICD-10-CM ICD-9-CM   1. Impaired mobility Z74.09 799.89     Patient Active Problem List   Diagnosis   • Left knee DJD   • Hypoglycemia   • Hyperlipidemia     Past Medical History:   Diagnosis Date   • Allergic rhinitis    • Colon polyps    • Depression    • Difficulty swallowing solids    • DJD (degenerative joint disease)    • Gitelman syndrome    • Hyperlipidemia    • Hypoglycemia    • Obesity    • Osteopenia    • PONV (postoperative nausea and vomiting)      Past Surgical History:   Procedure Laterality Date   • COLONOSCOPY  2010   • COLONOSCOPY W/ POLYPECTOMY  2016    2   5 mm sessile polyps removed with hot snare repeat 5 years   • DILATATION AND CURETTAGE     • ENDOSCOPY  2016    Medium sized HH, LA Grade A esohagitis, Fluid in the middle third of esohagus    • [place] TOTAL KNEE ARTHROPLASTY Left 3/9/2018    Procedure: LEFT TOTAL KNEE ARTHROPLASTY;  Surgeon: Orlin Meza MD;  Location: [facility];  Service: Orthopedics   • REPLACEMENT TOTAL KNEE Right         PT ASSESSMENT (last 72 hours)      Physical Therapy Evaluation     Row Name 03/10/18 1113          PT Evaluation Time/Intention    Subjective Information complains of;pain;weakness;fatigue  -TOD     Document Type evaluation  -TOD     Mode of Treatment physical therapy  -TOD     Total Evaluation Minutes, Physical Therapy 42  -TOD     Row Name 03/10/18 1113          General Information    Patient Profile Reviewed? yes  -TOD     Onset of Illness/Injury or Date of Surgery 18  -TOD     Referring Physician Vipul Land  -TOD     Patient Observations alert;cooperative;agree to therapy  -TOD     Patient/Family Observations Family x  1 in room  -TOD     General Observations of Patient Alert, oriented, L knee bandage removed  -TOD     Prior Level of Function independent:;all household mobility;community mobility;ADL's;bathing;dressing;work  -TOD     Equipment Currently Used at Home commode;bath bench;walker, rolling;cane, straight;wheelchair  -TOD     Pertinent History of Current Functional Problem L knee OA s/p 3/9 L TKA. 3/10 H&H: 10.4/30.9.   -TOD     Existing Precautions/Restrictions fall  -TOD     Equipment Issued to Patient walker, front wheeled  -TOD     Risks Reviewed patient:;LOB;nausea/vomiting;dizziness;increased discomfort;change in vital signs;lines disloged;increased drainage  -TOD     Benefits Reviewed patient:;improve function;increase independence;increase strength;increase balance;decrease pain;increase knowledge;improve skin integrity  -TOD     Barriers to Rehab physical barrier  -TOD     Row Name 03/10/18 1113          Relationship/Environment    Lives With alone  -TOD     Family Caregiver if Needed child(lily), adult;sibling(s)  -TOD     Row Name 03/10/18 1113          Resource/Environmental Concerns    Current Living Arrangements home/apartment/condo  -TOD     Resource/Environmental Concerns home accessibility  -TOD     Home Accessibility Concerns stairs to enter home  -TOD     Row Name 03/10/18 1113          Home Main Entrance    Number of Stairs, Main Entrance seven  -TOD     Stair Railings, Main Entrance railing on left side (ascending)  -TOD     Row Name 03/10/18 1113          Cognitive Assessment/Interventions    Additional Documentation Cognitive Assessment/Intervention (Group)  -TOD     Row Name 03/10/18 1113          Cognitive Assessment/Intervention- PT/OT    Affect/Mental Status (Cognitive) WFL  -TOD     Orientation Status (Cognition) oriented x 4  -TOD     Follows Commands (Cognition) WFL  -TOD     Cognitive Function (Cognitive) WFL  -TOD     Personal Safety Interventions fall prevention program maintained;gait belt;muscle strengthening  facilitated;nonskid shoes/slippers when out of bed;supervised activity  -TOD     [facility] Name 03/10/18 1113          Safety Issues, Functional Mobility    Impairments Affecting Function (Mobility) pain;range of motion (ROM)  -TOD     Row Name 03/10/18 [facility]          Bed Mobility Assessment/Treatment    Bed Mobility Assessment/Treatment supine-sit-supine  -TOD     Supine-Sit-Supine [place] (Bed Mobility) minimum assist (75% patient effort)   Assist with L LE in/out of bed  -TOD     Bed Mobility, Safety Issues decreased use of legs for bridging/pushing  -TOD     Assistive Device (Bed Mobility) bed rails;head of bed elevated  -TOD     Row Name 03/10/18 1113          Transfer Assessment/Treatment    Transfer Assessment/Treatment sit-stand transfer;stand-sit transfer  -TOD     Sit-Stand [place] (Transfers) contact guard  -TOD     Stand-Sit [place] (Transfers) contact guard  -TOD     Row Name 03/10/18 1113          Sit-Stand Transfer    Assistive Device (Sit-Stand Transfers) walker, front-wheeled  -TOD     Row Name 03/10/18 [facility]          Stand-Sit Transfer    Assistive Device (Stand-Sit Transfers) walker, front-wheeled  -TOD     Row Name 03/10/18 [facility]          Gait/Stairs Assessment/Training    Gait/Stairs Assessment/Training gait/ambulation independence;gait/ambulation assistive device;distance ambulated;gait pattern;gait deviations  -TOD     [place] Level (Gait) contact guard  -TOD     Assistive Device (Gait) walker, front-wheeled  -TOD     Distance in Feet (Gait) 125  -TOD     Pattern (Gait) swing-through  -TOD     Deviations/Abnormal Patterns (Gait) left sided deviations;antalgic;bilateral deviations;petra decreased;stride length decreased  -TOD     [facility] Name 03/10/18 1113          General ROM    GENERAL ROM COMMENTS B UE and R LE AROM WFL, Deferred L LE  -TOD     Row Name 03/10/18 1113          General Assessment (Manual Muscle Testing)    Comment, General Manual Muscle Testing (MMT) Assessment B UE and R LE  functionally 4+/5. Deferred L LE  -TOD     Row Name 03/10/18 1113          Sensory Assessment/Intervention    Sensory General Assessment no sensation deficits identified   per pt  -TOD     Row Name 03/10/18 1113          Vision Assessment/Intervention    Visual Impairment/Limitations WFL   per pt  -TOD     Row Name 03/10/18 1113          Pain Assessment    Additional Documentation Pain Scale: Numbers Pre/Post-Treatment (Group)  -TOD     Row Name 03/10/18 1113          Pain Scale: Numbers Pre/Post-Treatment    Pain Scale: Numbers, Pretreatment 8/10  -TOD     Pain Location - Side Left  -TOD     Pain Location knee  -TOD     Pre/Post Treatment Pain Comment tolerated  -TOD     Pain Intervention(s) Medication (See MAR);Ambulation/increased activity  -TOD     Row Name 03/10/18 1113          Health Promotion    Additional Documentation Plan of Care Review (Group)  -TOD     Row Name             Wound 03/09/18 0837 Left knee surgical;incision    Wound - Properties Group Date first assessed: 03/09/18  -TM Time first assessed: 0837  -TM Present On Admission : no  -TM Side: Left  -TM Location: knee  -TM Type: surgical;incision  -TM Additional Comments: open to air, per Dr. Meza  -TM    Row Name 03/10/18 1113          Coping    Observed Emotional State accepting  -TOD     Row Name 03/10/18 1113          Plan of Care Review    Plan of Care Reviewed With patient;family  -TOD     Row Name 03/10/18 1113          Physical Therapy Clinical Impression    Date of Referral to PT 03/09/18  -TOD     PT Diagnosis (PT Clinical Impression) impaired mobility  -TOD     Functional Level at Time of Evaluation (PT Clinical Impression) Min assist bed mobility and CGA gait ~ 125 ft with RW  -TOD     Patient/Family Goals Statement (PT Clinical Impression) Go home hopefully tomorrow  -TOD     Criteria for Skilled Interventions Met (PT Clinical Impression) yes;treatment indicated  -TOD     Impairments Found (describe specific impairments) gait, locomotion, and  balance;ROM  -TOD     Rehab Potential (PT Clinical Summary) good, to achieve stated therapy goals  -TDO     Predicted Duration of Therapy (PT) until d/c  -TOD     Care Plan Review (PT) evaluation/treatment results reviewed;care plan/treatment goals reviewed;risks/benefits reviewed;current/potential barriers reviewed;patient/other agree to care plan  -TOD     Care Plan Review, Other Participant (PT Clinical Impression) family  -TOD     Row Name 03/10/18 1113          Physical Therapy Goals    Transfer Goal Selection (PT) transfer, PT goal 1  -TOD     Gait Training Goal Selection (PT) gait training, PT goal 1  -TOD     ROM Goal Selection (PT) ROM, PT goal 1  -TOD     Stairs Goal Selection (PT) stairs, PT goal 1  -TOD     Additional Documentation Stairs Goal Selection (PT) (Row);ROM Goal Selection (PT) (Row)  -TOD     Row Name 03/10/18 1113          Transfer Goal 1 (PT)    Activity/Assistive Device (Transfer Goal 1, PT) sit-to-stand/stand-to-sit;bed-to-chair/chair-to-bed;walker, rolling  -TOD     [place] Level/Cues Needed (Transfer Goal 1, PT) independent  -TOD     Time Frame (Transfer Goal 1, PT) long term goal (LTG);10 days  -TOD     Barriers (Transfers Goal 1, PT) pain, ROM  -TOD     Progress/Outcome (Transfer Goal 1, PT) goal ongoing  -TOD     Row Name 03/10/18 1113          Gait Training Goal 1 (PT)    Activity/Assistive Device (Gait Training Goal 1, PT) gait (walking locomotion);walker, rolling  -TOD     [place] Level (Gait Training Goal 1, PT) independent  -TOD     Distance (Gait Goal 1, PT) 200  -TOD     Time Frame (Gait Training Goal 1, PT) long term goal (LTG);10 days  -TOD     Barriers (Gait Training Goal 1, PT) pain, ROM  -TOD     Progress/Outcome (Gait Training Goal 1, PT) goal ongoing  -TOD     Row Name 03/10/18 1113          ROM Goal 1 (PT)    ROM Goal 1 (PT) L knee 0-90 degrees, AAROM  -TOD     Time Frame (ROM Goal 1, PT) long term goal (LTG)   10 days  -TOD     Barriers (ROM Goal 1, PT) pain, ROM  -TOD      Progress/Outcome (ROM Goal 1, PT) goal ongoing  -TOD     Row Name 03/10/18 1113          Stairs Goal 1 (PT)    Activity/Assistive Device (Stairs Goal 1, PT) stairs, all skills;step-over step;step-to-step;walker, rolling;using handrail;ascending stairs;descending stairs  -TOD     [place] Level/Cues Needed (Stairs Goal 1, PT) contact guard assist  -TOD     Number of Stairs (Stairs Goal 1, PT) 7  -TOD     Time Frame (Stairs Goal 1, PT) long term goal (LTG);10 days  -TOD     Barriers (Stairs Goal 1, PT) pain, ROM  -TOD     Progress/Outcome (Stairs Goal 1, PT) goal ongoing  -TOD     Row Name 03/10/18 1113          Positioning and Restraints    Pre-Treatment Position in bed  -TOD     Post Treatment Position bed  -TOD     In Bed notified nsg;fowlers;call light within reach;encouraged to call for assist;with family/caregiver  -TOD     Row Name 03/10/18 1113          Living Environment    Home Accessibility stairs to enter home   walk in shower, tub shower  -TOD       User Key  (r) = Recorded By, (t) = Taken By, (c) = Cosigned By    Initials Name Provider Type    TOD Pastor, PT DPT Physical Therapist    TM Rama Giron RN Registered Nurse          Physical Therapy Education     Title: PT OT SLP Therapies (Active)     Topic: Physical Therapy (Active)     Point: Mobility training (Done)    Learning Progress Summary     Learner Status Readiness Method Response Comment Documented by    Patient Done Acceptance E CIERRA WILLIAMSON Educated pt/family on progression of PT POC and benefits of activity TOD 03/10/18 1113    Family Done Acceptance E CIERRA WILLIAMSON Educated pt/family on progression of PT POC and benefits of activity TOD 03/10/18 1113                      User Key     Initials Effective Dates Name Provider Type Discipline    TOD 08/02/16 -  Brandon Pastor, PT DPT Physical Therapist PT                PT Recommendation and Plan  Planned Therapy Interventions (PT Eval): bed mobility training, transfer training, gait training, balance  training, home exercise program, patient/family education, postural re-education, stair training, strengthening, ROM (range of motion)  Therapy Frequency (PT Clinical Impression): 2 times/day  Plan of Care Reviewed With: patient, family  Plan of Care Reviewed With: patient, family          Outcome Measures     Row Name 03/10/18 1113             How much help from another person do you currently need...    Turning from your back to your side while in flat bed without using bedrails? 3  -TOD      Moving from lying on back to sitting on the side of a flat bed without bedrails? 3  -TOD      Moving to and from a bed to a chair (including a wheelchair)? 3  -TOD      Standing up from a chair using your arms (e.g., wheelchair, bedside chair)? 3  -TOD      Climbing 3-5 steps with a railing? 3  -TOD      To walk in hospital room? 3  -TOD      AM-PAC 6 Clicks Score 18  -TOD         Functional Assessment    Outcome Measure Options AM-PAC 6 Clicks Basic Mobility (PT)  -TOD        User Key  (r) = Recorded By, (t) = Taken By, (c) = Cosigned By    Initials Name Provider Type    TOD Pastor, PT DPT Physical Therapist           Time Calculation:         PT Charges     Row Name 03/10/18 1203             Time Calculation    Start Time 1113  -TOD      Stop Time 1155  -TOD      Time Calculation (min) 42 min  -TOD      PT Received On 03/10/18  -TOD      PT Goal Re-Cert Due Date 03/20/18  -TOD        User Key  (r) = Recorded By, (t) = Taken By, (c) = Cosigned By    Initials Name Provider Type    TOD Pastor PT DPT Physical Therapist          Therapy Charges for Today     Code Description Service Date Service Provider Modifiers Qty    02519033027 HC PT MOBILITY CURRENT 3/10/2018 Brandon Pastor, PT DPT GP, CK 1    [id number] HC PT MOBILITY PROJECTED 3/10/2018 Brandon Pastor, PT DPT GP, CI 1    [id number] HC PT EVAL MOD COMPLEXITY 3 3/10/2018 Brandon Pastor, PT DPT GP 1          PT G-Codes  Outcome Measure Options: AM-PAC 6 Clicks  Basic Mobility (PT)  Score: 18  Functional Limitation: Mobility: Walking and moving around  Mobility: Walking and Moving Around Current Status (): At least 40 percent but less than 60 percent impaired, limited or restricted  Mobility: Walking and Moving Around Goal Status (): At least 1 percent but less than 20 percent impaired, limited or restricted      Brandon Pastor, PT DPT  3/10/2018

## 2018-03-10 NOTE — PROGRESS NOTES
Discharge Planning Assessment  [facility]     Patient Name: Sammi Cordero  MRN: [id number]  Today's Date: 3/10/2018    Admit Date: 3/9/2018          Discharge Needs Assessment     Row Name 03/10/18 0912       Living Environment    Lives With alone    Unique Family Situation Pt states she will have family and friends over to help her.    Current Living Arrangements home/apartment/condo    Primary Care Provided by self    Quality of Family Relationships helpful;involved    Able to Return to Prior Arrangements yes       Resource/Environmental Concerns    Resource/Environmental Concerns none    Transportation Concerns car, none       Transition Planning    Patient/Family Anticipates Transition to home    Patient/Family Anticipated Services at Transition home health care   Referral for  services.  Pt was provided a list of  agencies and chose [facility].  SW has faxed referral to them at [phone].  Anticipated dc tomorrow 3/11.    Transportation Anticipated family or friend will provide       Discharge Needs Assessment    Readmission Within the Last 30 Days no previous admission in last 30 days    Concerns to be Addressed no discharge needs identified    Equipment Currently Used at Home cane, straight;commode;walker, rolling    Anticipated Changes Related to Illness none    Equipment Needed After Discharge none    Discharge Facility/Level of Care Needs home with home health    Offered/Gave Vendor List yes    Patient's Choice of Community Agency(s) [facility]            Discharge Plan    No documentation.       Destination     No service coordination in this encounter.      Durable Medical Equipment     No service coordination in this encounter.      Dialysis/Infusion     No service coordination in this encounter.      Home Medical Care     No service coordination in this encounter.      Social Care     No service coordination in this encounter.                Demographic Summary    No documentation.            Functional Status    No documentation.           Psychosocial    No documentation.           Abuse/Neglect    No documentation.           Legal    No documentation.           Substance Abuse    No documentation.           Patient Forms    No documentation.         MIC CosmeW

## 2018-03-11 LAB
DEPRECATED RDW RBC AUTO: 43.7 FL (ref 40–54)
ERYTHROCYTE [DISTWIDTH] IN BLOOD BY AUTOMATED COUNT: 14.4 % (ref 12–15)
HCT VFR BLD AUTO: 31.9 % (ref 37–47)
HGB BLD-MCNC: 10.6 G/DL (ref 12–16)
MCH RBC QN AUTO: 27.5 PG (ref 28–32)
MCHC RBC AUTO-ENTMCNC: 33.2 G/DL (ref 33–36)
MCV RBC AUTO: 82.6 FL (ref 82–98)
PLATELET # BLD AUTO: 266 10*3/MM3 (ref 130–400)
PMV BLD AUTO: 9.5 FL (ref 6–12)
RBC # BLD AUTO: 3.86 10*6/MM3 (ref 4.2–5.4)
WBC NRBC COR # BLD: 11.73 10*3/MM3 (ref 4.8–10.8)

## 2018-03-11 PROCEDURE — 94799 UNLISTED PULMONARY SVC/PX: CPT

## 2018-03-11 PROCEDURE — 85027 COMPLETE CBC AUTOMATED: CPT | Performed by: ORTHOPAEDIC SURGERY

## 2018-03-11 PROCEDURE — 97116 GAIT TRAINING THERAPY: CPT

## 2018-03-11 PROCEDURE — 97110 THERAPEUTIC EXERCISES: CPT

## 2018-03-11 RX ORDER — BISACODYL 10 MG
10 SUPPOSITORY, RECTAL RECTAL DAILY
Status: DISCONTINUED | OUTPATIENT
Start: 2018-03-11 | End: 2018-03-12 | Stop reason: HOSPADM

## 2018-03-11 RX ORDER — DIAZEPAM 5 MG/1
5 TABLET ORAL EVERY 6 HOURS PRN
Qty: 40 TABLET | Refills: 0 | Status: SHIPPED | OUTPATIENT
Start: 2018-03-11 | End: 2018-04-11

## 2018-03-11 RX ORDER — OXYCODONE HYDROCHLORIDE 10 MG/1
TABLET ORAL
Qty: 90 TABLET | Refills: 0 | Status: SHIPPED | OUTPATIENT
Start: 2018-03-11 | End: 2018-04-11

## 2018-03-11 RX ORDER — ONDANSETRON 4 MG/1
4 TABLET, FILM COATED ORAL EVERY 6 HOURS PRN
Qty: 30 TABLET | Refills: 0 | Status: SHIPPED | OUTPATIENT
Start: 2018-03-11 | End: 2018-08-22

## 2018-03-11 RX ORDER — SENNA AND DOCUSATE SODIUM 50; 8.6 MG/1; MG/1
2 TABLET, FILM COATED ORAL NIGHTLY
Qty: 60 TABLET | Refills: 1 | Status: SHIPPED | OUTPATIENT
Start: 2018-03-11 | End: 2018-08-22

## 2018-03-11 RX ADMIN — OXYCODONE HYDROCHLORIDE 5 MG: 5 TABLET ORAL at 15:36

## 2018-03-11 RX ADMIN — FAMOTIDINE 20 MG: 20 TABLET, FILM COATED ORAL at 08:14

## 2018-03-11 RX ADMIN — DOCUSATE SODIUM AND SENNOSIDES 2 TABLET: 8.6; 5 TABLET, FILM COATED ORAL at 20:22

## 2018-03-11 RX ADMIN — CLINDAMYCIN PHOSPHATE 900 MG: 18 INJECTION, SOLUTION INTRAVENOUS at 05:34

## 2018-03-11 RX ADMIN — TRAMADOL HYDROCHLORIDE 50 MG: 50 TABLET, COATED ORAL at 00:00

## 2018-03-11 RX ADMIN — BISACODYL 10 MG: 10 SUPPOSITORY RECTAL at 10:39

## 2018-03-11 RX ADMIN — TRAMADOL HYDROCHLORIDE 50 MG: 50 TABLET, COATED ORAL at 05:34

## 2018-03-11 RX ADMIN — OXYCODONE HYDROCHLORIDE 20 MG: 5 TABLET ORAL at 00:00

## 2018-03-11 RX ADMIN — OXYCODONE HYDROCHLORIDE 20 MG: 5 TABLET ORAL at 07:45

## 2018-03-11 RX ADMIN — SODIUM CHLORIDE 1000 ML: 9 INJECTION, SOLUTION INTRAVENOUS at 12:37

## 2018-03-11 RX ADMIN — OXYCODONE HYDROCHLORIDE 10 MG: 10 TABLET, FILM COATED, EXTENDED RELEASE ORAL at 20:22

## 2018-03-11 RX ADMIN — PAROXETINE 10 MG: 10 TABLET, FILM COATED ORAL at 08:14

## 2018-03-11 RX ADMIN — ATORVASTATIN CALCIUM 10 MG: 10 TABLET, FILM COATED ORAL at 20:22

## 2018-03-11 RX ADMIN — OXYCODONE HYDROCHLORIDE 20 MG: 5 TABLET ORAL at 03:48

## 2018-03-11 RX ADMIN — OXYCODONE HYDROCHLORIDE 10 MG: 10 TABLET, FILM COATED, EXTENDED RELEASE ORAL at 08:15

## 2018-03-11 RX ADMIN — MAGNESIUM GLUCONATE 500 MG ORAL TABLET 2000 MG: 500 TABLET ORAL at 17:49

## 2018-03-11 RX ADMIN — RIVAROXABAN 10 MG: 10 TABLET, FILM COATED ORAL at 17:49

## 2018-03-11 NOTE — PLAN OF CARE
Problem: Patient Care Overview  Goal: Plan of Care Review  Outcome: Ongoing (interventions implemented as appropriate)   03/11/18 5767   Coping/Psychosocial   Plan of Care Reviewed With patient;son   OTHER   Outcome Summary Up with minimal assist and walker to bathroom does well. Medicated for pain as needed. Voiding . Pleasant   Plan of Care Review   Progress improving       Problem: Knee Arthroplasty (Total, Partial) (Adult)  Goal: Signs and Symptoms of Listed Potential Problems Will be Absent, Minimized or Managed (Knee Arthroplasty)  Outcome: Ongoing (interventions implemented as appropriate)    Goal: Anesthesia/Sedation Recovery  Outcome: Outcome(s) achieved Date Met: 03/11/18      Problem: Fall Risk (Adult)  Goal: Identify Related Risk Factors and Signs and Symptoms  Outcome: Outcome(s) achieved Date Met: 03/11/18    Goal: Absence of Fall  Outcome: Ongoing (interventions implemented as appropriate)

## 2018-03-11 NOTE — PROGRESS NOTES
Continued Stay Note   [facility]     Patient Name: Sammi Cordero  MRN: [id number]  Today's Date: 3/11/2018    Admit Date: 3/9/2018          Discharge Plan     Row Name 03/11/18 0848       Plan    Patient/Family in Agreement with Plan yes    Final Discharge Disposition Code 06 - home with home health care    Final Note  has notified Ali at [facility] of dc and faxed dc summary.  HOLA Walker.              Discharge Codes    No documentation.       Expected Discharge Date and Time     Expected Discharge Date Expected Discharge Time    Mar 11, 2018             HOLA Cosme

## 2018-03-11 NOTE — DISCHARGE SUMMARY
Date of Discharge:  3/11/2018    Discharge Diagnosis: djd knee    Presenting Problem/History of Present Illness  Left knee DJD [M17.12]       Hospital Course  Patient is a 62 y.o. female presented with djd l knee.      Procedures Performed  Procedure(s):  LEFT TOTAL KNEE ARTHROPLASTY       Consults:   Consults     Date and Time Order Name Status Description    3/9/2018 1237 Inpatient Consult to Hospitalist            Pertinent Test Results: wnl    Condition on Discharge:  stable    Vital Signs  Temp:  [97.6 °F (36.4 °C)-99.1 °F (37.3 °C)] 99.1 °F (37.3 °C)  Heart Rate:  [75-81] 79  Resp:  [16-18] 16  BP: (118-155)/(51-62) 118/59    Physical Exam:     General Appearance:    Alert, cooperative, in no acute distress   Head:    Normocephalic, without obvious abnormality, atraumatic   Eyes:            Lids and lashes normal, conjunctivae and sclerae normal, no   icterus, no pallor, corneas clear, PERRLA   Ears:    Ears appear intact with no abnormalities noted   Throat:   No oral lesions, no thrush, oral mucosa moist   Neck:   No adenopathy, supple, trachea midline, no thyromegaly, no     carotid bruit, no JVD   Back:     No kyphosis present, no scoliosis present, no skin lesions,       erythema or scars, no tenderness to percussion or                   palpation,   range of motion normal   Lungs:     Clear to auscultation,respirations regular, even and                   unlabored    Heart:    Regular rhythm and normal rate, normal S1 and S2, no            murmur, no gallop, no rub, no click   Breast Exam:    Deferred   Abdomen:     Normal bowel sounds, no masses, no organomegaly, soft        non-tender, non-distended, no guarding, no rebound                 tenderness   Genitalia:    Deferred   Extremities:   Moves all extremities well, no edema, no cyanosis, no              redness   Pulses:   Pulses palpable and equal bilaterally   Skin:   No bleeding, bruising or rash   Lymph nodes:   No palpable adenopathy    Neurologic:   Cranial nerves 2 - 12 grossly intact, sensation intact, DTR        present and equal bilaterally       Discharge Disposition  Home or Self Care    Discharge Medications   Anders Corderovasyl HERNANDEZ   Home Medication Instructions CARMEN:[id number]    Printed on:03/11/18 1561   Medication Information                      diazePAM (VALIUM) 5 MG tablet  Take 1 tablet by mouth Every 6 (Six) Hours As Needed for Muscle Spasms for up to 40 doses.             magnesium oxide (MAGOX) 400 (241.3 MG) MG tablet tablet  Take 2,000 mg by mouth Every Evening.             ondansetron (ZOFRAN) 4 MG tablet  Take 1 tablet by mouth Every 6 (Six) Hours As Needed for Nausea or Vomiting.             oxyCODONE (ROXICODONE) 10 MG tablet  1 to 2 tabs po q 3 to 4 hours prn             PARoxetine (PAXIL) 10 MG tablet  Take 10 mg by mouth Every Morning.             rivaroxaban (XARELTO) 10 MG tablet  Take one tab po QD x 19 days.    Then ASA 81 mg po BID x 3 weeks if able to tolerate aspirin             sennosides-docusate sodium (SENOKOT-S) 8.6-50 MG tablet  Take 2 tablets by mouth Every Night.             simvastatin (ZOCOR) 20 MG tablet  Take 1 tablet by mouth daily.                 Discharge Diet:     Activity at Discharge:     Follow-up Appointments  No future appointments.      Test Results Pending at Discharge       Orlin Meza MD  03/11/18  8:27 AM    Time: 15 minutes

## 2018-03-11 NOTE — PROGRESS NOTES
Family Medicine Progress Note    Patient:  Sammi Cordero  YOB: 1955    MRN: [id number]     Acct: [id number]     Admit date: 3/9/2018    Patient Seen, Chart, Consults notes, Labs, Radiology studies reviewed.    Subjective: Day 2 of stay with left knee end-stage osteoarthritis requiring arthroplasty and most recent (in last 24 hours) has had no new complaints.  Pain is reasonably controlled.  Is tolerating diet.  Eager to go home today.    Past, Family, Social History unchanged from admission.    Diet: Diet Regular    Medications:  Scheduled Meds:  atorvastatin 10 mg Oral Nightly   bisacodyl 10 mg Rectal Daily   famotidine 20 mg Oral Daily   magnesium oxide 2,000 mg Oral Q PM   oxyCODONE 10 mg Oral Q12H   PARoxetine 10 mg Oral QAM   rivaroxaban 10 mg Oral Daily With Dinner   sennosides-docusate sodium 2 tablet Oral Nightly   traMADol 50 mg Oral Q6H     Continuous Infusions:  sodium chloride 150 mL/hr Last Rate: 150 mL/hr (03/09/18 3965)     PRN Meds:•  acetaminophen **OR** acetaminophen **OR** acetaminophen  •  diazePAM  •  HYDROmorphone **AND** naloxone  •  Morphine **AND** naloxone  •  ondansetron **OR** ondansetron ODT **OR** ondansetron  •  oxyCODONE  •  pneumococcal polysaccharide 23-valent    Objective:    Lab Results (last 24 hours)     ** No results found for the last 24 hours. **           Imaging Results (last 72 hours)     Procedure Component Value Units Date/Time    XR Knee 1 or 2 View Left [[id number]] Collected:  03/09/18 1218     Updated:  03/09/18 1221    Narrative:       EXAMINATION:   XR KNEE 1 OR 2 VW LEFT-  3/9/2018 12:18 PM CST     HISTORY: Postop     AP and lateral view the left knee is obtained. Postsurgical changes  noted from total left knee arthroplasty. Skeletal structures are  relatively aligned.     IMPRESSION status post left knee arthroplasty  This report was finalized on 03/09/2018 12:18 by Dr. Tim Smith MD.           Physical Exam:    Vitals: /59 (BP  "Location: Right arm, Patient Position: Sitting)   Pulse 79   Temp 99.1 °F (37.3 °C) (Oral)   Resp 16   Ht 152.4 cm (60\")   Wt 75.3 kg (166 lb)   SpO2 94%   Breastfeeding? No   BMI 32.42 kg/m²   24 hour intake/output:  Intake/Output Summary (Last 24 hours) at 03/11/18 1113  Last data filed at 03/10/18 2205   Gross per 24 hour   Intake              170 ml   Output              300 ml   Net             -130 ml     Last 3 weights:  Wt Readings from Last 3 Encounters:   03/09/18 75.3 kg (166 lb)   02/27/18 79.4 kg (175 lb 0.7 oz)   10/17/16 80.7 kg (178 lb)       General Appearance alert, appears stated age and cooperative  Head normocephalic, without obvious abnormality  Eyes lids and lashes normal, conjunctivae and sclerae normal and no icterus  Neck suppple and trachea midline  Lungs clear to auscultation, respirations regular, respirations even and respirations unlabored  Heart regular rhythm & normal rate and normal S1, S2  Abdomen normal bowel sounds, soft non-tender and no guarding  Extremities no edema and no cyanosis  Skin Incisions clean dry and intact  Neurologic Mental Status orientated to person, place, time and situation, Cranial Nerves cranial nerves 2 - 12 grossly intact as examined        Assessment:    Principal Problem:    Left knee DJD  Active Problems:    Hypoglycemia    Hyperlipidemia          Plan:  Medically stable for discharge.  I agree with plans as per Dr. Meza.  As far as follow-up she can call Dr. Ledesma's office on Monday to arrange for that.      Electronically signed by You Orozco MD on 3/11/2018 at 11:13 AM            "

## 2018-03-11 NOTE — DISCHARGE PLACEMENT REQUEST
"To:  [facility]  From:  HOLA Walker.  [phone]    Sammi Quinones (62 y.o. Female)     Date of Birth Social Security Number Address Home Phone MRN    1955  [address] [place] [id number] [phone] [id number]    [employer] Marital Status          Other        Admission Date Admission Type Admitting Provider Attending Provider Department, Room/Bed    3/9/18 Elective Orlin Meza MD Patel, Shiraz Khushroo, MD [facility] 3A, 326/1    Discharge Date Discharge Disposition Discharge Destination         Home or Self Care              Attending Provider:  Orlin Meza MD    Allergies:  Penicillins, Sulfa Antibiotics    Isolation:  None   Infection:  None   Code Status:  FULL    Ht:  152.4 cm (60\")   Wt:  75.3 kg (166 lb)    Admission Cmt:  None   Principal Problem:  Left knee DJD [M17.12]                 Active Insurance as of 3/9/2018     Primary Coverage     Payor Plan Insurance Group Employer/Plan Group    [employer] [id number]     Payor Plan Address Payor Plan Phone Number Effective From Effective To    [address] [phone] 1/1/2018     [place] [address]       Subscriber Name Subscriber Birth Date Member ID       SAMMI QUINONES 1955 [id number]                 Emergency Contacts      (Rel.) Home Phone Work Phone Mobile Phone    Sade Lepe (Daughter) [phone] -- --                 Discharge Summary      Orlin Meza MD at 3/11/2018  8:21 AM            Date of Discharge:  3/11/2018    Discharge Diagnosis: djd knee    Presenting Problem/History of Present Illness  Left knee DJD [M17.12]       Hospital Course  Patient is a 62 y.o. female presented with djd l knee.      Procedures Performed  Procedure(s):  LEFT TOTAL KNEE ARTHROPLASTY       Consults:   Consults     Date and Time Order Name Status Description    3/9/2018 1237 Inpatient Consult to Hospitalist  "           Pertinent Test Results: wnl    Condition on Discharge:  stable    Vital Signs  Temp:  [97.6 °F (36.4 °C)-99.1 °F (37.3 °C)] 99.1 °F (37.3 °C)  Heart Rate:  [75-81] 79  Resp:  [16-18] 16  BP: (118-155)/(51-62) 118/59    Physical Exam:     General Appearance:    Alert, cooperative, in no acute distress   Head:    Normocephalic, without obvious abnormality, atraumatic   Eyes:            Lids and lashes normal, conjunctivae and sclerae normal, no   icterus, no pallor, corneas clear, PERRLA   Ears:    Ears appear intact with no abnormalities noted   Throat:   No oral lesions, no thrush, oral mucosa moist   Neck:   No adenopathy, supple, trachea midline, no thyromegaly, no     carotid bruit, no JVD   Back:     No kyphosis present, no scoliosis present, no skin lesions,       erythema or scars, no tenderness to percussion or                   palpation,   range of motion normal   Lungs:     Clear to auscultation,respirations regular, even and                   unlabored    Heart:    Regular rhythm and normal rate, normal S1 and S2, no            murmur, no gallop, no rub, no click   Breast Exam:    Deferred   Abdomen:     Normal bowel sounds, no masses, no organomegaly, soft        non-tender, non-distended, no guarding, no rebound                 tenderness   Genitalia:    Deferred   Extremities:   Moves all extremities well, no edema, no cyanosis, no              redness   Pulses:   Pulses palpable and equal bilaterally   Skin:   No bleeding, bruising or rash   Lymph nodes:   No palpable adenopathy   Neurologic:   Cranial nerves 2 - 12 grossly intact, sensation intact, DTR        present and equal bilaterally       Discharge Disposition  Home or Self Care    Discharge Medications   Sammi Cordero   Home Medication Instructions CARMEN:[id number]    Printed on:03/11/18 0898   Medication Information                      diazePAM (VALIUM) 5 MG tablet  Take 1 tablet by mouth Every 6 (Six) Hours As Needed for  Muscle Spasms for up to 40 doses.             magnesium oxide (MAGOX) 400 (241.3 MG) MG tablet tablet  Take 2,000 mg by mouth Every Evening.             ondansetron (ZOFRAN) 4 MG tablet  Take 1 tablet by mouth Every 6 (Six) Hours As Needed for Nausea or Vomiting.             oxyCODONE (ROXICODONE) 10 MG tablet  1 to 2 tabs po q 3 to 4 hours prn             PARoxetine (PAXIL) 10 MG tablet  Take 10 mg by mouth Every Morning.             rivaroxaban (XARELTO) 10 MG tablet  Take one tab po QD x 19 days.    Then ASA 81 mg po BID x 3 weeks if able to tolerate aspirin             sennosides-docusate sodium (SENOKOT-S) 8.6-50 MG tablet  Take 2 tablets by mouth Every Night.             simvastatin (ZOCOR) 20 MG tablet  Take 1 tablet by mouth daily.                 Discharge Diet:     Activity at Discharge:     Follow-up Appointments  No future appointments.      Test Results Pending at Discharge       Orlin Meza MD  03/11/18  8:27 AM    Time: 15 minutes          Electronically signed by Orlin Meza MD at 3/11/2018  8:29 AM       Discharge Order     Start     Ordered    03/11/18 0820  Discharge patient  Once     Expected Discharge Date:  03/11/18    Expected Discharge Time:  Afternoon    Discharge Disposition:  Home or Self Care    Please choose which facility the patient is currently admitted if they are being discharged to another facility or unit.:   [facility]    Mode:  Family       Question Answer Comment   Please choose which facility the patient is currently admitted if they are being discharged to another facility or unit.  [facility]    Mode: Family        03/11/18 0819

## 2018-03-11 NOTE — PLAN OF CARE
Problem: Patient Care Overview (Adult)  Goal: Plan of Care Review  Outcome: Ongoing (interventions implemented as appropriate)   03/11/18 1038   Coping/Psychosocial Response Interventions   Plan Of Care Reviewed With patient   Patient Care Overview   Progress improving   Outcome Evaluation   Outcome Summary/Follow up Plan Pt requires min assist for bed mobility and supervision assist for sit to stand transfers. Pt ambulated 75' supervision assist with rolling walker and completed TKR exorcises with assist. Plans to discharge home today with home health.

## 2018-03-11 NOTE — THERAPY TREATMENT NOTE
Acute Care - Physical Therapy Treatment Note  [facility]     Patient Name: Sammi Cordero  : 1955  MRN: [id number]  Today's Date: 3/11/2018  Onset of Illness/Injury or Date of Surgery: 18  Date of Referral to PT: 18  Referring Physician: Vipul Land    Admit Date: 3/9/2018    Visit Dx:    ICD-10-CM ICD-9-CM   1. Impaired mobility Z74.09 799.89     Patient Active Problem List   Diagnosis   • Left knee DJD   • Hypoglycemia   • Hyperlipidemia       Therapy Treatment    Therapy Treatment / Health Promotion    Treatment Time/Intention  Discipline: physical therapy assistant  Document Type: therapy note (daily note)  Subjective Information: complains of, pain  Patient Effort: good    Vitals/Pain/Safety  Pain Scale: Numbers Pre/Post-Treatment  Pain Scale: Numbers, Pretreatment: 4/10  Pain Location - Side: Left  Pain Location: knee  Pain Intervention(s): Ambulation/increased activity  Pain Scale: Word Pre/Post-Treatment  Pain Location - Side: Left  Pain Location: knee  Pain Intervention(s): Ambulation/increased activity  Pain Scale: FACES Pre/Post-Treatment  Pain Location - Side: Left  Pain Location: knee  Pain Intervention(s): Ambulation/increased activity  Safety Issues, Functional Mobility  Impairments Affecting Function (Mobility): pain  Positioning and Restraints  Pre-Treatment Position: in bed  Post Treatment Position: chair  In Chair: reclined, call light within reach, encouraged to call for assist    Mobility,ADL,Motor, Modality  Bed Mobility Assessment/Treatment  Supine-Sit-Supine [place] (Bed Mobility): minimum assist (75% patient effort), verbal cues  Bed Mobility, Safety Issues: decreased use of legs for bridging/pushing  Assistive Device (Bed Mobility): bed rails  Sit-Stand Transfer  Sit-Stand [place] (Transfers): contact guard  Assistive Device (Sit-Stand Transfers): walker, front-wheeled  Stand-Sit Transfer  Stand-Sit [place] (Transfers): supervision  Assistive Device  (Stand-Sit Transfers): walker, front-wheeled  Gait/Stairs Assessment/Training  [place] Level (Gait): supervision, verbal cues  Assistive Device (Gait): walker, front-wheeled  Distance in Feet (Gait): 75  Pattern (Gait): swing-to  Deviations/Abnormal Patterns (Gait): antalgic, petra decreased     Therapeutic Exercise  Exercise Type (Therapeutic Exercise): AAROM (active assistive range of motion)  Position (Therapeutic Exercise): supine  Sets/Reps (Therapeutic Exercise): 10           ROM/MMT             Sensory, Edema, Orthotics          Cognition, Communication, Swallow       Outcome Summary               PT Rehab Goals     Row Name 03/10/18 1113             Transfer Goal 1 (PT)    Activity/Assistive Device (Transfer Goal 1, PT) sit-to-stand/stand-to-sit;bed-to-chair/chair-to-bed;walker, rolling  -TOD      [place] Level/Cues Needed (Transfer Goal 1, PT) independent  -TOD      Time Frame (Transfer Goal 1, PT) long term goal (LTG);10 days  -TOD      Barriers (Transfers Goal 1, PT) pain, ROM  -TOD      Progress/Outcome (Transfer Goal 1, PT) goal ongoing  -TOD         Gait Training Goal 1 (PT)    Activity/Assistive Device (Gait Training Goal 1, PT) gait (walking locomotion);walker, rolling  -TOD      [place] Level (Gait Training Goal 1, PT) independent  -TOD      Distance (Gait Goal 1, PT) 200  -TOD      Time Frame (Gait Training Goal 1, PT) long term goal (LTG);10 days  -TOD      Barriers (Gait Training Goal 1, PT) pain, ROM  -TOD      Progress/Outcome (Gait Training Goal 1, PT) goal ongoing  -TOD         ROM Goal 1 (PT)    ROM Goal 1 (PT) L knee 0-90 degrees, AAROM  -TOD      Time Frame (ROM Goal 1, PT) long term goal (LTG)   10 days  -TOD      Barriers (ROM Goal 1, PT) pain, ROM  -TOD      Progress/Outcome (ROM Goal 1, PT) goal ongoing  -TOD         Stairs Goal 1 (PT)    Activity/Assistive Device (Stairs Goal 1, PT) stairs, all skills;step-over step;step-to-step;walker, rolling;using handrail;ascending  stairs;descending stairs  -TOD      [place] Level/Cues Needed (Stairs Goal 1, PT) contact guard assist  -TOD      Number of Stairs (Stairs Goal 1, PT) 7  -TOD      Time Frame (Stairs Goal 1, PT) long term goal (LTG);10 days  -TOD      Barriers (Stairs Goal 1, PT) pain, ROM  -TOD      Progress/Outcome (Stairs Goal 1, PT) goal ongoing  -TOD        User Key  (r) = Recorded By, (t) = Taken By, (c) = Cosigned By    Initials Name Provider Type    TOD Pastor, PT DPT Physical Therapist          Physical Therapy Education     Title: PT OT SLP Therapies (Active)     Topic: Physical Therapy (Active)     Point: Mobility training (Active)    Learning Progress Summary     Learner Status Readiness Method Response Comment Documented by    Patient Active Acceptance E,D NR bed mobility,, transfers,, gait, plan of care  03/11/18 1038     Done Acceptance E CIERRA WILLIAMSON Educated pt/family on progression of PT POC and benefits of activity TOD 03/10/18 1113    Family Done Acceptance E CIERRA WILLIAMSON Educated pt/family on progression of PT POC and benefits of activity TOD 03/10/18 1113          Point: Home exercise program (Active)    Learning Progress Summary     Learner Status Readiness Method Response Comment Documented by    Patient Active Acceptance E,D NR bed mobility,, transfers,, gait, plan of care  03/11/18 1038          Point: Body mechanics (Active)    Learning Progress Summary     Learner Status Readiness Method Response Comment Documented by    Patient Active Acceptance E,D NR bed mobility,, transfers,, gait, plan of care  03/11/18 1038                      User Key     Initials Effective Dates Name Provider Type Discipline    TOD 08/02/16 -  Brandon Pastor, PT DPT Physical Therapist PT     06/22/15 -  Tammy Jimenez PTA Physical Therapy Assistant PT                    PT Recommendation and Plan  Anticipated Discharge Disposition (PT): home or self care (outpatient PT)  Planned Therapy Interventions (PT Eval): bed mobility  training, transfer training, gait training, balance training, home exercise program, patient/family education, postural re-education, stair training, strengthening, ROM (range of motion)  Therapy Frequency (PT Clinical Impression): 2 times/day             Outcome Measures     Row Name 03/11/18 1000 03/10/18 1113          How much help from another person do you currently need...    Turning from your back to your side while in flat bed without using bedrails? 4  -CW 3  -TOD     Moving from lying on back to sitting on the side of a flat bed without bedrails? 3  -CW 3  -TOD     Moving to and from a bed to a chair (including a wheelchair)? 3  -CW 3  -TOD     Standing up from a chair using your arms (e.g., wheelchair, bedside chair)? 3  -CW 3  -TOD     Climbing 3-5 steps with a railing? 3  -CW 3  -TOD     To walk in hospital room? 3  -CW 3  -TOD     AM-PAC 6 Clicks Score 19  -CW 18  -TOD        Functional Assessment    Outcome Measure Options AM-PAC 6 Clicks Basic Mobility (PT)  -CW AM-PAC 6 Clicks Basic Mobility (PT)  -TOD       User Key  (r) = Recorded By, (t) = Taken By, (c) = Cosigned By    Initials Name Provider Type    TOD Pastor, PT DPT Physical Therapist    BENTLEY Jimenez PTA Physical Therapy Assistant           Time Calculation:         PT Charges     Row Name 03/11/18 1040             Time Calculation    Start Time 0731  -CW      Stop Time 0754  -CW      Time Calculation (min) 23 min  -CW      PT Received On 03/11/18  -CW      PT Goal Re-Cert Due Date 03/20/18  -CW         Time Calculation- PT    Total Timed Code Minutes- PT 23 minute(s)  -CW        User Key  (r) = Recorded By, (t) = Taken By, (c) = Cosigned By    Initials Name Provider Type    BENTLEY Jimenez PTA Physical Therapy Assistant          Therapy Charges for Today     Code Description Service Date Service Provider Modifiers Qty    60868061212 HC PT THER PROC EA 15 MIN 3/11/2018 Tammy Jimenez PTA GP 1    17106291831 HC GAIT  TRAINING EA 15 MIN 3/11/2018 Tammy Jimenez PTA GP 1          PT G-Codes  Outcome Measure Options: AM-PAC 6 Clicks Basic Mobility (PT)  Score: 18  Functional Limitation: Mobility: Walking and moving around  Mobility: Walking and Moving Around Current Status (): At least 40 percent but less than 60 percent impaired, limited or restricted  Mobility: Walking and Moving Around Goal Status (): At least 1 percent but less than 20 percent impaired, limited or restricted    Tammy Jimenez PTA  3/11/2018

## 2018-03-11 NOTE — PLAN OF CARE
Problem: Patient Care Overview  Goal: Plan of Care Review  Outcome: Ongoing (interventions implemented as appropriate)   03/11/18 0691   Coping/Psychosocial   Plan of Care Reviewed With patient   OTHER   Outcome Summary patient had planned on being discharged today but had episode of hypotension and hypoxia, MD ordered O2 and ivf bolus and blood pressure improved, spoke to tMD and said to hold dishcarge until am. Patient had no complaints of pain today, has been up with PT and walker to bathroom, family remains at bedside. Plan is to dc is am   Plan of Care Review   Progress improving     Goal: Individualization and Mutuality  Outcome: Ongoing (interventions implemented as appropriate)    Goal: Discharge Needs Assessment  Outcome: Ongoing (interventions implemented as appropriate)    Goal: Interprofessional Rounds/Family Conf  Outcome: Ongoing (interventions implemented as appropriate)      Problem: Knee Arthroplasty (Total, Partial) (Adult)  Goal: Signs and Symptoms of Listed Potential Problems Will be Absent, Minimized or Managed (Knee Arthroplasty)  Outcome: Ongoing (interventions implemented as appropriate)      Problem: Fall Risk (Adult)  Goal: Absence of Fall  Outcome: Ongoing (interventions implemented as appropriate)

## 2018-03-11 NOTE — PROGRESS NOTES
Orthopaedic Progress Note      3/11/2018   8:11 AM    Name:  Sammi Cordero  MRN:    [id number]     Acct:     [id number]   Room:  [address] [place] [address] Day: 2     Admit Date: 3/9/2018  PCP: Avery Whaley MD        Subjective:     C/C: POD 2 Days Post-Op LEFT TOTAL KNEE ARTHROPLASTY (Left) tka    Interval History: Status: is fine     Pain: improved       Medications:     Allergies:   Allergies   Allergen Reactions   • Penicillins Swelling and Rash   • Sulfa Antibiotics Rash and Other (See Comments)     Temp. fluctuations        Current Meds:   Current Facility-Administered Medications   Medication Dose Route Frequency Provider Last Rate Last Dose   • acetaminophen (TYLENOL) tablet 650 mg  650 mg Oral Q4H PRN Orlin Meza MD        Or   • acetaminophen (TYLENOL) 160 MG/5ML solution 650 mg  650 mg Oral Q4H PRN Orlin Meza MD        Or   • acetaminophen (TYLENOL) suppository 650 mg  650 mg Rectal Q4H PRN Orlin Meza MD       • atorvastatin (LIPITOR) tablet 10 mg  10 mg Oral Nightly Orlin Meza MD   10 mg at 03/10/18 2006   • diazePAM (VALIUM) tablet 5 mg  5 mg Oral Q6H PRN Orlin Meza MD   2.5 mg at 03/10/18 1007   • famotidine (PEPCID) tablet 20 mg  20 mg Oral Daily Orlin Meza MD   20 mg at 03/10/18 0815   • HYDROmorphone (DILAUDID) injection 1 mg  1 mg Intravenous Q4H PRN Orlin Meza MD        And   • naloxone (NARCAN) injection 0.1 mg  0.1 mg Intravenous Q5 Min PRN Orlin Meza MD       • magnesium oxide (MAGOX) tablet 2,000 mg  2,000 mg Oral Q PM Orlin Meza MD   2,000 mg at 03/10/18 1701   • Morphine sulfate (PF) injection 4 mg  4 mg Intravenous Q2H PRN Orlin Meza MD   2 mg at 03/10/18 0409    And   • naloxone (NARCAN) injection 0.4 mg  0.4 mg Intravenous Q5 Min PRN Orlin Meza MD       • ondansetron (ZOFRAN) tablet 4 mg  4 mg Oral Q6H PRN Orlin Meza MD        Or   • ondansetron ODT  (ZOFRAN-ODT) disintegrating tablet 4 mg  4 mg Oral Q6H PRN Orlin Meza MD        Or   • ondansetron (ZOFRAN) injection 4 mg  4 mg Intravenous Q6H PRN Orlin Meza MD       • oxyCODONE (oxyCONTIN) 12 hr tablet 10 mg  10 mg Oral Q12H Orlin Meza MD   10 mg at 03/10/18 2006   • oxyCODONE (ROXICODONE) immediate release tablet 20 mg  20 mg Oral Q3H PRN Orlin Meza MD   20 mg at 03/11/18 0745   • PARoxetine (PAXIL) tablet 10 mg  10 mg Oral QAM Orlin Meza MD   10 mg at 03/10/18 0815   • pneumococcal polysaccharide 23-valent (PNEUMOVAX-23) vaccine 0.5 mL  0.5 mL Intramuscular During Hospitalization Orlin Meza MD       • rivaroxaban (XARELTO) tablet 10 mg  10 mg Oral Daily With Dinner Orlin Meza MD   10 mg at 03/10/18 1702   • sennosides-docusate sodium (SENOKOT-S) 8.6-50 MG tablet 2 tablet  2 tablet Oral Nightly Orlin Meza MD   2 tablet at 03/10/18 2006   • sodium chloride 0.9 % infusion  150 mL/hr Intravenous Continuous Orlin Meza  mL/hr at 03/09/18 1825 150 mL/hr at 03/09/18 1825   • traMADol (ULTRAM) tablet 50 mg  50 mg Oral Q6H Orlin Meza MD   50 mg at 03/11/18 0534           Data:     Code Status:  Full Code    Family History   Problem Relation Age of Onset   • Breast cancer Neg Hx        Social History     Social History   • Marital status:      Spouse name: N/A   • Number of children: N/A   • Years of education: N/A     Occupational History   • Not on file.     Social History Main Topics   • Smoking status: Former Smoker     Packs/day: 0.50     Types: Cigarettes     Quit date: 2003   • Smokeless tobacco: Never Used   • Alcohol use No   • Drug use: No   • Sexual activity: Defer     Other Topics Concern   • Not on file     Social History Narrative   • No narrative on file       Vitals:  /51 (BP Location: Left arm, Patient Position: Lying)   Pulse 81   Temp 99.1 °F (37.3 °C) (Oral)    "Resp 16   Ht 152.4 cm (60\")   Wt 75.3 kg (166 lb)   SpO2 93%   Breastfeeding? No   BMI 32.42 kg/m²   Temp (24hrs), Av.5 °F (36.9 °C), Min:97.6 °F (36.4 °C), Max:99.1 °F (37.3 °C)      I/O (24Hr):    Intake/Output Summary (Last 24 hours) at 18  Last data filed at 03/10/18 2205   Gross per 24 hour   Intake              170 ml   Output              300 ml   Net             -130 ml       Labs:  Lab Results (last 72 hours)     Procedure Component Value Units Date/Time    Basic Metabolic Panel [[id number]]  (Abnormal) Collected:  03/10/18 0838    Specimen:  Blood Updated:  03/10/18 0936     Glucose 145 (H) mg/dL      BUN 11 mg/dL      Creatinine 0.54 mg/dL      Sodium 141 mmol/L      Potassium 3.2 (L) mmol/L      Chloride 96 (L) mmol/L      CO2 36.0 (H) mmol/L      Calcium 8.2 (L) mg/dL      eGFR Non African Amer 115 mL/min/1.73      BUN/Creatinine Ratio 20.4     Anion Gap 9.0 mmol/L     Narrative:       GFR Normal >60  Chronic Kidney Disease <60  Kidney Failure <15    CBC & Differential [[id number]] Collected:  03/10/18 0838    Specimen:  Blood Updated:  03/10/18 0848    Narrative:       The following orders were created for panel order CBC & Differential.  Procedure                               Abnormality         Status                     ---------                               -----------         ------                     CBC Auto Differential[[id number]]        Abnormal            Final result                 Please view results for these tests on the individual orders.    CBC Auto Differential [[id number]]  (Abnormal) Collected:  03/10/18 0838    Specimen:  Blood Updated:  03/10/18 0848     WBC 13.02 (H) 10*3/mm3      RBC 3.80 (L) 10*6/mm3      Hemoglobin 10.4 (L) g/dL      Hematocrit 30.9 (L) %      MCV 81.3 (L) fL      MCH 27.4 (L) pg      MCHC 33.7 g/dL      RDW 13.9 %      RDW-SD 40.9 fl      MPV 9.6 fL      Platelets 280 10*3/mm3      Neutrophil % 83.1 (H) %      Lymphocyte % 7.3 (L) %      " Monocyte % 9.1 %      Eosinophil % 0.0 %      Basophil % 0.1 %      Immature Grans % 0.4 %      Neutrophils, Absolute 10.83 (H) 10*3/mm3      Lymphocytes, Absolute 0.95 10*3/mm3      Monocytes, Absolute 1.18 10*3/mm3      Eosinophils, Absolute 0.00 10*3/mm3      Basophils, Absolute 0.01 10*3/mm3      Immature Grans, Absolute 0.05 (H) 10*3/mm3      nRBC 0.0 /100 WBC               Physical Exam:     Left Knee: Incision is clean, dry, intact. Dressing is Clean, Dry, Intact. Neurovascular intact distally. Moderate tenderness to palpation, soft throughout. No signs or symptoms of DVT or PE.      Assessment:     Primary Problem  Left knee DJD  POD 2 Days Post-Op LEFT TOTAL KNEE ARTHROPLASTY (Left) tka         Plan:     1. DC home today after passing PT.    1. Continue PT/OT.  2. Continue DVT prophylaxis.  3. Continue WBAT left lower extremity.  4. Anticipate discharge today  if pain well controlled.      Electronically signed by Orlin Meza MD on 3/11/2018 at 8:11 AM

## 2018-03-12 VITALS
HEART RATE: 81 BPM | RESPIRATION RATE: 16 BRPM | SYSTOLIC BLOOD PRESSURE: 128 MMHG | BODY MASS INDEX: 32.59 KG/M2 | HEIGHT: 60 IN | WEIGHT: 166 LBS | TEMPERATURE: 98.9 F | DIASTOLIC BLOOD PRESSURE: 58 MMHG | OXYGEN SATURATION: 92 %

## 2018-03-12 PROCEDURE — 97110 THERAPEUTIC EXERCISES: CPT

## 2018-03-12 PROCEDURE — 97530 THERAPEUTIC ACTIVITIES: CPT

## 2018-03-12 PROCEDURE — 97116 GAIT TRAINING THERAPY: CPT

## 2018-03-12 RX ADMIN — OXYCODONE HYDROCHLORIDE 20 MG: 5 TABLET ORAL at 01:43

## 2018-03-12 RX ADMIN — PAROXETINE 10 MG: 10 TABLET, FILM COATED ORAL at 08:11

## 2018-03-12 RX ADMIN — OXYCODONE HYDROCHLORIDE 20 MG: 5 TABLET ORAL at 12:04

## 2018-03-12 RX ADMIN — FAMOTIDINE 20 MG: 20 TABLET, FILM COATED ORAL at 09:00

## 2018-03-12 RX ADMIN — TRAMADOL HYDROCHLORIDE 50 MG: 50 TABLET, COATED ORAL at 13:17

## 2018-03-12 RX ADMIN — OXYCODONE HYDROCHLORIDE 10 MG: 10 TABLET, FILM COATED, EXTENDED RELEASE ORAL at 08:11

## 2018-03-12 NOTE — PLAN OF CARE
Problem: Patient Care Overview (Adult)  Goal: Plan of Care Review  Outcome: Ongoing (interventions implemented as appropriate)   03/12/18 1039   Coping/Psychosocial Response Interventions   Plan Of Care Reviewed With patient   Patient Care Overview   Progress progress toward functional goals as expected   Outcome Evaluation   Outcome Summary/Follow up Plan Pt requires cga/min assist for bed mobility. Independent with sit to stand transfers and gait. Ambulated 10' x 3 with rolling walker requiring sitting rest breaks. Educated pt on stair training but pt was unable to ascend stairs. Family will assist pt into house by wheelchair. Plans are to discharge home with home health today.

## 2018-03-12 NOTE — PROGRESS NOTES
Continued Stay Note   [place]     Patient Name: Sammi Cordero  MRN: [id number]  Today's Date: 3/12/2018    Admit Date: 3/9/2018          Discharge Plan     Row Name 03/12/18 1047       Plan    Plan [facility]    Patient/Family in Agreement with Plan yes    Final Discharge Disposition Code 06 - home with home health care    Final Note Keyanna with [facility] has called to inform that [facility] is not in network with patient's insurance. RAMONE spoke to patient in room re this and she has now selected [facility]. RAMONE spoke to Rama Guo RN with [facility] and she states she will be able to take referral and will speak to patient in room re admission.     Row Name 03/12/18 1034       Plan    Plan [facility]    Final Discharge Disposition Code 06 - home with home health care    Final Note Patient is discharged home today. RAMONE faxed progress notes to [facility] at [phone]. [facility] already has discharge summary.               Discharge Codes    No documentation.       Expected Discharge Date and Time     Expected Discharge Date Expected Discharge Time    Mar 12, 2018             HOLA Howe

## 2018-03-12 NOTE — PROGRESS NOTES
Continued Stay Note   [place]     Patient Name: Sammi Cordero  MRN: [id number]  Today's Date: 3/12/2018    Admit Date: 3/9/2018          Discharge Plan     Row Name 03/12/18 1034       Plan    Plan [facility]    Final Discharge Disposition Code 06 - home with home health care    Final Note Patient is discharged home today. RAMONE faxed progress notes to [facility] at [phone]. [facility] already has discharge summary.               Discharge Codes    No documentation.       Expected Discharge Date and Time     Expected Discharge Date Expected Discharge Time    Mar 12, 2018             HOLA Howe

## 2018-03-12 NOTE — PROGRESS NOTES
"Progress Note  Sammi Cordero  3/12/2018 8:56 AM  Subjective:   Admit Date:   3/9/2018      CC/ADMIT DX:       Interval History:   Reviewed overnight events and nursing notes.  No new complaints. Her pain is stable. No CP or SOA. SHe is eating.        I have reviewed all labs/diagnostics from the last 24hrs.       ROS:   I have done a 10 point ROS and all are negative, except what is mentioned in the HPI.    Diet Regular    Medications:     sodium chloride 150 mL/hr Last Rate: 150 mL/hr (03/09/18 1825)       atorvastatin 10 mg Oral Nightly   bisacodyl 10 mg Rectal Daily   famotidine 20 mg Oral Daily   magnesium oxide 2,000 mg Oral Q PM   oxyCODONE 10 mg Oral Q12H   PARoxetine 10 mg Oral QAM   rivaroxaban 10 mg Oral Daily With Dinner   sennosides-docusate sodium 2 tablet Oral Nightly   traMADol 50 mg Oral Q6H           Objective:   Vitals: /58 (BP Location: Right arm, Patient Position: Sitting)   Pulse 81   Temp 98.9 °F (37.2 °C) (Oral)   Resp 16   Ht 152.4 cm (60\")   Wt 75.3 kg (166 lb)   SpO2 92%   Breastfeeding? No   BMI 32.42 kg/m²    Intake/Output Summary (Last 24 hours) at 03/12/18 0856  Last data filed at 03/12/18 0851   Gross per 24 hour   Intake              600 ml   Output                0 ml   Net              600 ml     General appearance: alert and cooperative with exam  Lungs: clear to auscultation bilaterally  Heart: RRR  Abdomen: soft, non-tender; bowel sounds normal; no masses,  no organomegaly  Extremities: extremities normal, atraumatic, no cyanosis or edema  Neurologic:  No obvious focal neurologic deficits.    Assessment and Plan:   Principal Problem:    Left knee DJD  Active Problems:    Hypoglycemia    Hyperlipidemia    S/P Left TKA    Plan:  1.  Continue present medication(s)   2.  OK for d/c.  to do labs this week.   3.  Follow with Ortho      Discharge planning:   her home     Reviewed treatment plans with the patient and/or family.             Electronically signed by " Nico Ledesma MD on 3/12/2018 at 8:56 AM

## 2018-03-12 NOTE — PLAN OF CARE
Problem: Patient Care Overview  Goal: Plan of Care Review  Outcome: Ongoing (interventions implemented as appropriate)   03/12/18 0339   Coping/Psychosocial   Plan of Care Reviewed With patient   OTHER   Outcome Summary Doing good with getting out of bed and using walker to go to bathroom and back. Not as much pain meds needed this shift. Resting easy at this time.    Plan of Care Review   Progress improving       Problem: Knee Arthroplasty (Total, Partial) (Adult)  Goal: Signs and Symptoms of Listed Potential Problems Will be Absent, Minimized or Managed (Knee Arthroplasty)  Outcome: Ongoing (interventions implemented as appropriate)      Problem: Fall Risk (Adult)  Goal: Absence of Fall  Outcome: Outcome(s) achieved Date Met: 03/12/18

## 2018-03-12 NOTE — PROGRESS NOTES
Spoke with patient's family(patient sleeping) about plans for [facility] and they are agreeable to admission. Patient's daughter requested a walker for home and discussed with MADI Cazares,CM. Rama Guo RN,[facility]

## 2018-03-12 NOTE — THERAPY DISCHARGE NOTE
Acute Care - IRF Physical Therapy Discharge Summary  [facility]       Patient Name: Sammi Cordero  : 1955  MRN: [id number]    Today's Date: 3/12/2018  Onset of Illness/Injury or Date of Surgery: 18    Date of Referral to PT: 18  Referring Physician: Vipul Land      Admit Date: 3/9/2018      PT Recommendation and Plan    Visit Dx:    ICD-10-CM ICD-9-CM   1. Impaired mobility Z74.09 799.89             Outcome Measures     Row Name 18 1000 18 1000 03/10/18 1113       How much help from another person do you currently need...    Turning from your back to your side while in flat bed without using bedrails? 4  -CW 4  -CW 3  -TOD    Moving from lying on back to sitting on the side of a flat bed without bedrails? 3  -CW 3  -CW 3  -TOD    Moving to and from a bed to a chair (including a wheelchair)? 3  -CW 3  -CW 3  -TOD    Standing up from a chair using your arms (e.g., wheelchair, bedside chair)? 4  -CW 3  -CW 3  -TOD    Climbing 3-5 steps with a railing? 1  -CW 3  -CW 3  -TOD    To walk in hospital room? 4  -CW 3  -CW 3  -TOD    AM-PAC 6 Clicks Score 19  -CW 19  -CW 18  -TOD       Functional Assessment    Outcome Measure Options AM-PAC 6 Clicks Basic Mobility (PT)  -CW AM-PAC 6 Clicks Basic Mobility (PT)  -CW AM-PAC 6 Clicks Basic Mobility (PT)  -TOD      User Key  (r) = Recorded By, (t) = Taken By, (c) = Cosigned By    Initials Name Provider Type    TOD Pastor, PT DPT Physical Therapist    BENTLEY Jimenez, PTA Physical Therapy Assistant                PT Charges     Row Name 18 1044             Time Calculation    Start Time 0900  -CW      Stop Time 1003  -CW      Time Calculation (min) 63 min  -CW      PT Received On 18  -CW      PT Goal Re-Cert Due Date 18  -CW         Time Calculation- PT    Total Timed Code Minutes- PT 63 minute(s)  -CW        User Key  (r) = Recorded By, (t) = Taken By, (c) = Cosigned By    Initials Name Provider Type    BENTLEY MCLAUGHLIN  Tony, BRIELLE Physical Therapy Assistant                    PT Discharge Summary  Anticipated Discharge Disposition (PT): home with home health care  Reason for Discharge: Discharge from facility  Outcomes Achieved: Refer to plan of care for updates on goals achieved  Discharge Destination: Home with home health      Soniya Dawn PTA   3/12/2018

## 2018-03-12 NOTE — THERAPY DISCHARGE NOTE
Acute Care - Physical Therapy Discharge Summary  [facility]       Patient Name: Sammi Cordero  : 1955  MRN: [id number]    Today's Date: 3/12/2018  Onset of Illness/Injury or Date of Surgery: 18    Date of Referral to PT: 18  Referring Physician: Vipul Land      Admit Date: 3/9/2018      PT Recommendation and Plan    Visit Dx:    ICD-10-CM ICD-9-CM   1. Impaired mobility Z74.09 799.89             Outcome Measures     Row Name 18 1000 18 1000 03/10/18 1113       How much help from another person do you currently need...    Turning from your back to your side while in flat bed without using bedrails? 4  -CW 4  -CW 3  -TOD    Moving from lying on back to sitting on the side of a flat bed without bedrails? 3  -CW 3  -CW 3  -TOD    Moving to and from a bed to a chair (including a wheelchair)? 3  -CW 3  -CW 3  -TOD    Standing up from a chair using your arms (e.g., wheelchair, bedside chair)? 4  -CW 3  -CW 3  -TOD    Climbing 3-5 steps with a railing? 1  -CW 3  -CW 3  -TOD    To walk in hospital room? 4  -CW 3  -CW 3  -TOD    AM-PAC 6 Clicks Score 19  -CW 19  -CW 18  -TOD       Functional Assessment    Outcome Measure Options AM-PAC 6 Clicks Basic Mobility (PT)  -CW AM-PAC 6 Clicks Basic Mobility (PT)  -CW AM-PAC 6 Clicks Basic Mobility (PT)  -TOD      User Key  (r) = Recorded By, (t) = Taken By, (c) = Cosigned By    Initials Name Provider Type    TOD Pastor, PT DPT Physical Therapist    BENTLEY Jimenez, PTA Physical Therapy Assistant                PT Charges     Row Name 18 1044             Time Calculation    Start Time 0900  -CW      Stop Time 1003  -CW      Time Calculation (min) 63 min  -CW      PT Received On 18  -CW      PT Goal Re-Cert Due Date 18  -CW         Time Calculation- PT    Total Timed Code Minutes- PT 63 minute(s)  -CW        User Key  (r) = Recorded By, (t) = Taken By, (c) = Cosigned By    Initials Name Provider Type    BENTLEY Jimenez,  PTA Physical Therapy Assistant                  PT Rehab Goals     Row Name 03/12/18 1539 03/10/18 1113          Transfer Goal 1 (PT)    Activity/Assistive Device (Transfer Goal 1, PT)  -- sit-to-stand/stand-to-sit;bed-to-chair/chair-to-bed;walker, rolling  -TOD     Saline Level/Cues Needed (Transfer Goal 1, PT)  -- independent  -TOD     Time Frame (Transfer Goal 1, PT)  -- long term goal (LTG);10 days  -TOD     Barriers (Transfers Goal 1, PT)  -- pain, ROM  -TOD     Progress/Outcome (Transfer Goal 1, PT) goal not met;discharged from facility  -KJ goal ongoing  -TOD        Gait Training Goal 1 (PT)    Activity/Assistive Device (Gait Training Goal 1, PT)  -- gait (walking locomotion);walker, rolling  -TOD     Saline Level (Gait Training Goal 1, PT)  -- independent  -TOD     Distance (Gait Goal 1, PT)  -- 200  -TOD     Time Frame (Gait Training Goal 1, PT)  -- long term goal (LTG);10 days  -TOD     Barriers (Gait Training Goal 1, PT)  -- pain, ROM  -TOD     Progress/Outcome (Gait Training Goal 1, PT) goal not met;discharged from facility  -KJ goal ongoing  -TOD        ROM Goal 1 (PT)    ROM Goal 1 (PT)  -- L knee 0-90 degrees, AAROM  -TOD     Time Frame (ROM Goal 1, PT)  -- long term goal (LTG)   10 days  -TOD     Barriers (ROM Goal 1, PT)  -- pain, ROM  -TOD     Progress/Outcome (ROM Goal 1, PT) goal not met;discharged from facility  -KJ goal ongoing  -TOD        Stairs Goal 1 (PT)    Activity/Assistive Device (Stairs Goal 1, PT)  -- stairs, all skills;step-over step;step-to-step;walker, rolling;using handrail;ascending stairs;descending stairs  -TOD     Saline Level/Cues Needed (Stairs Goal 1, PT)  -- contact guard assist  -TOD     Number of Stairs (Stairs Goal 1, PT)  -- 7  -TOD     Time Frame (Stairs Goal 1, PT)  -- long term goal (LTG);10 days  -TOD     Barriers (Stairs Goal 1, PT)  -- pain, ROM  -TOD     Progress/Outcome (Stairs Goal 1, PT) goal not met;discharged from facility  -KJ goal ongoing  -TOD       User  Key  (r) = Recorded By, (t) = Taken By, (c) = Cosigned By    Initials Name Provider Type    ERNESTINE Dawn PTA Physical Therapy Assistant    TOD Pastor, PT DPT Physical Therapist          Therapy Charges for Today     Code Description Service Date Service Provider Modifiers Qty    08212269484 HC PT THER PROC EA 15 MIN 3/11/2018 Tammy Jimenez, PTA GP 1    53559342934 HC GAIT TRAINING EA 15 MIN 3/11/2018 Tammy Jimenez, PTA GP 1    68101468255 HC PT THERAPEUTIC ACT EA 15 MIN 3/12/2018 Tammy Jimenez, PTA GP 2    00078846475 HC PT THER PROC EA 15 MIN 3/12/2018 Tammy Jimenez, PTA GP 1    17089997515 HC GAIT TRAINING EA 15 MIN 3/12/2018 Tammy Jimenez, PTA GP 1                 Tammy Jimenez, PTA   3/12/2018

## 2018-03-12 NOTE — PAYOR COMM NOTE
"Sammi Quinones (62 y.o. Female)     Date of Birth Social Security Number Address Home Phone MRN    1955  [address] [place] [address] [phone] [id number]    [employer] Marital Status          Other        Admission Date Admission Type Admitting Provider Attending Provider Department, Room/Bed    3/9/18 Elective Orlin Meza MD Patel, Shiraz Khushroo, MD [facility] 3A, 326/1    Discharge Date Discharge Disposition Discharge Destination         Home or Self Care              Attending Provider:  Orlin Meza MD    Allergies:  Penicillins, Sulfa Antibiotics    Isolation:  None   Infection:  None   Code Status:  FULL    Ht:  152.4 cm (60\")   Wt:  75.3 kg (166 lb)    Admission Cmt:  None   Principal Problem:  Left knee DJD [M17.12]                 Active Insurance as of 3/9/2018     Primary Coverage     Payor Plan Insurance Group Employer/Plan Group    [employer] [facility] EMPL[employer]EE [id number]     Payor Plan Address Payor Plan Phone Number Effective From Effective To    [address] [phone] 1/1/2018     [place] [id number]       Subscriber Name Subscriber Birth Date Member ID       SAMMI QUINONES 1955 [id number]                 Emergency Contacts      (Rel.) Home Phone Work Phone Mobile Phone    Sade Lepe (Daughter) [phone] -- --               History & Physical      H&P filed by German Amezquita MD at 3/9/2018  7:41 PM     Scan on 3/9/2018 : [facility] - 02/15/2018 (below)            Electronically signed by Interface, Scans Incoming at 3/9/2018  7:41 PM     H&P filed by Geramn Amezquita MD at 2/26/2018  2:55 PM     Scan on 3/9/2018 : 02/15/2018 (below)            Electronically signed by Interface, Scans Incoming at 2/26/2018  2:55 PM     H&P filed by German Amezquita MD at 2/26/2018 11:01 AM     Scan on 3/9/2018 : 02/15/2018 (below)            Electronically signed by " Interface, Scans Incoming at 2/26/2018 11:01 AM     H&P filed by German Amezquita MD at 2/21/2018  3:46 PM     Scan on 3/9/2018 : HISTORY AND PHYSICAL 3/9/18 (below)            Electronically signed by Interface, Scans Incoming at 2/21/2018  3:46 PM             ICU Vital Signs     Row Name 03/12/18 0800 03/11/18 2022 03/11/18 1919 03/11/18 1257 03/11/18 1112       Vitals    Temp 98.9 °F (37.2 °C)  -- 98.3 °F (36.8 °C)  -- 97.7 °F (36.5 °C)    Temp src Oral  -- Oral  -- Oral    Pulse 81  -- 80 80 84    Heart Rate Source Monitor  -- Monitor Monitor Monitor    Resp 16  -- 16 16 16    Resp Rate Source Visual  -- Visual Visual Visual    /58  -- 125/54 131/45 (!)  120/39   RN [id number]    Noninvasive MAP (mmHg)  --  --  -- 67  --    BP Location Right arm  -- Left arm Right arm Right arm    BP Method Automatic  -- Automatic Automatic Automatic    Patient Position Sitting  -- Lying Lying Lying       Oxygen Therapy    SpO2 92 %  -- 99 % 98 % 91 %    Pulse Oximetry Type  --  -- Continuous Intermittent Intermittent    Device (Oxygen Therapy) room air room air  -- room air room air    Row Name 03/11/18 0814 03/11/18 0745 03/11/18 0435 03/11/18 0030 03/10/18 2030       Vitals    Temp  --  -- 99.1 °F (37.3 °C) 99.1 °F (37.3 °C) 98.7 °F (37.1 °C)    Temp src  --  -- Oral Oral Oral    Pulse 79  -- 81 81 80    Heart Rate Source Monitor  -- Monitor Monitor Monitor    Resp 16  -- 16 16 18    Resp Rate Source Visual  -- Visual Visual Visual    /59  -- 123/51 120/53 139/60    BP Location Right arm  -- Left arm Left arm Right arm    BP Method Automatic  -- Automatic Automatic Automatic    Patient Position Sitting  -- Lying Lying Lying       Oxygen Therapy    SpO2 94 %  -- 93 % 95 % 96 %    Pulse Oximetry Type Intermittent  -- Intermittent Intermittent Intermittent    Device (Oxygen Therapy) room air room air room air room air room air    Row Name 03/10/18 2006 03/10/18 1609 03/10/18 1100 03/10/18 1056 03/10/18 0800        "Vitals    Temp  -- 98.2 °F (36.8 °C)  -- 97.6 °F (36.4 °C)  --    Temp src  -- Oral  -- Oral  --    Pulse  -- 79  -- 75  --    Heart Rate Source  -- Monitor  -- Monitor  --    Resp  -- 18  -- 18  --    Resp Rate Source  -- Visual  -- Visual  --    BP  -- 155/59  -- 134/62  --    BP Location  -- Left arm  -- Left arm  --    BP Method  -- Automatic  -- Automatic  --    Patient Position  -- Lying  -- Lying  --       Oxygen Therapy    SpO2  -- 93 % 96 % 94 %  --    Device (Oxygen Therapy) room air  -- room air room air room air    Row Name 03/10/18 0750 03/10/18 0338 03/09/18 2310 03/09/18 2000 03/09/18 1918       Vitals    Temp 97.7 °F (36.5 °C) 98.2 °F (36.8 °C) 98 °F (36.7 °C)  -- 97.5 °F (36.4 °C)    Temp src Oral Oral Oral  -- Oral    Pulse 73 78 70  -- 73    Heart Rate Source Monitor Monitor Monitor  -- Monitor    Resp 18 18 18  -- 18    Resp Rate Source Visual Visual Visual  -- Visual    /63 115/52 95/44   nurse notified  -- 118/53    BP Location Left arm Left arm Left arm  -- Left arm    BP Method Automatic Automatic Automatic  -- Automatic    Patient Position Lying Lying Lying  -- Lying       Oxygen Therapy    SpO2 95 % 91 % 95 %  -- 96 %    Device (Oxygen Therapy) room air room air  --  --  --    O2 Device  --  -- room air room air room air    Row Name 03/09/18 1535 03/09/18 1404 03/09/18 1332 03/09/18 1240 03/09/18 1219       Height and Weight    Height 152.4 cm (60\")  --  --  --  --    Weight 75.3 kg (166 lb)  --  --  --  --    Weight Method Bed scale  --  --  --  --    BSA (Calculated - sq m) 1.72 sq meters  --  --  --  --    BMI (Calculated) 32.4  --  --  --  --    Weight in (lb) to have BMI = 25 127.7  --  --  --  --       Vitals    Temp 97.5 °F (36.4 °C) 97.5 °F (36.4 °C) 97.5 °F (36.4 °C) 96.8 °F (36 °C)  --    Temp src Oral Oral Oral Axillary  --    Pulse 84 80 74 81 81    Heart Rate Source Monitor Monitor Monitor Monitor  --    Resp 18 16 18 16 16    Resp Rate Source Visual Visual Visual Visual  " --    /43 124/41 120/61 105/45 108/43    Noninvasive MAP (mmHg)  --  -- 75  --  --    BP Location Left arm Left arm Left arm Left arm  --    BP Method Automatic Automatic Automatic Automatic  --    Patient Position Lying Lying Lying Lying  --       Oxygen Therapy    SpO2 94 % 93 % 97 % 95 % 95 %    Pulse Oximetry Type Intermittent Intermittent  -- Continuous  --    O2 Device room air room air room air room air  --    Row Name 03/09/18 1215 03/09/18 1200 03/09/18 1145 03/09/18 1133 03/09/18 1128       Vitals    Temp 98 °F (36.7 °C)  --  --  --  --    Temp src Temporal Artery   --  --  --  --    Pulse 81 77 81 85 88    Heart Rate Source  --  -- Monitor Monitor Monitor    Resp 14 12 14 14 14    Resp Rate Source  --  -- Visual Visual Visual    BP (!)  97/36 (!)  98/36 108/46 103/46 103/48    Noninvasive MAP (mmHg)  -- 52 59 59 61    BP Location  --  -- Left arm Left arm Left arm    BP Method  --  -- Automatic Automatic Automatic    Patient Position  --  -- Lying Lying Lying       Oxygen Therapy    SpO2 94 % 92 % 95 % 94 % 94 %    Pulse Oximetry Type  --  -- Continuous Continuous Continuous    O2 Device  --  -- room air room air room air    Row Name 03/09/18 1123 03/09/18 1118 03/09/18 1113             Vitals    Temp  --  -- 97.6 °F (36.4 °C)      Temp src  --  -- Temporal Artery       Pulse 90 96 88      Heart Rate Source Monitor Monitor Monitor      Resp 14 16 16      Resp Rate Source Visual Visual Visual      /45 104/44 100/43      Noninvasive MAP (mmHg) 58 57 57      BP Location Left arm Left arm Left arm      BP Method Automatic Automatic Automatic      Patient Position Lying Lying Lying         Oxygen Therapy    SpO2 96 % 96 % 95 %      Pulse Oximetry Type Continuous Continuous Continuous      O2 Device room air room air room air          Hospital Medications (all)       Dose Frequency Start End    acetaminophen (TYLENOL) 160 MG/5ML solution 650 mg 650 mg Every 4 Hours PRN 3/9/2018     Sig - Route: Take  "20.3 mL by mouth Every 4 (Four) Hours As Needed for Mild Pain . - Oral    Linked Group 1:  \"Or\" Linked Group Details        acetaminophen (TYLENOL) suppository 650 mg 650 mg Every 4 Hours PRN 3/9/2018     Sig - Route: Insert 1 suppository into the rectum Every 4 (Four) Hours As Needed for Mild Pain . - Rectal    Linked Group 1:  \"Or\" Linked Group Details        acetaminophen (TYLENOL) tablet 1,000 mg 1,000 mg Once 3/9/2018 3/9/2018    Sig - Route: Take 2 tablets by mouth 1 (One) Time. - Oral    acetaminophen (TYLENOL) tablet 650 mg 650 mg Every 4 Hours PRN 3/9/2018     Sig - Route: Take 2 tablets by mouth Every 4 (Four) Hours As Needed for Mild Pain . - Oral    Linked Group 1:  \"Or\" Linked Group Details        atorvastatin (LIPITOR) tablet 10 mg 10 mg Nightly 3/9/2018     Sig - Route: Take 1 tablet by mouth Every Night. - Oral    bisacodyl (DULCOLAX) suppository 10 mg 10 mg Daily 3/11/2018     Sig - Route: Insert 1 suppository into the rectum Daily. - Rectal    buffered lidocaine syringe 0.5 mL 0.5 mL Once As Needed 3/9/2018 3/9/2018    Sig - Route: Inject 0.5 mL into the skin 1 (One) Time As Needed (Prior to IV Insertion). - Intradermal    ceFAZolin (ANCEF) 2 g in sodium chloride 0.9 % 50 mL IVPB 2 g Once 3/9/2018 3/9/2018    Sig - Route: Infuse 2 g into a venous catheter 1 (One) Time. - Intravenous    ceFAZolin (ANCEF) 2 g/20ml IV PUSH syringe 2 g Every 8 Hours 3/9/2018 3/10/2018    Sig - Route: Infuse 20 mL into a venous catheter Every 8 (Eight) Hours. - Intravenous    clindamycin (CLEOCIN) 900 mg in dextrose 5% 50 mL IVPB (premix) 900 mg Every 8 Hours 3/9/2018 3/11/2018    Sig - Route: Infuse 50 mL into a venous catheter Every 8 (Eight) Hours. - Intravenous    dexamethasone (DECADRON) injection 10 mg 10 mg Once 3/9/2018 3/9/2018    Sig - Route: Infuse 1 mL into a venous catheter 1 (One) Time. - Intravenous    diazePAM (VALIUM) tablet 5 mg 5 mg Every 6 Hours PRN 3/9/2018 3/19/2018    Sig - Route: Take 1 tablet by " "mouth Every 6 (Six) Hours As Needed for Anxiety or Muscle Spasms. - Oral    famotidine (PEPCID) tablet 20 mg 20 mg Daily 3/9/2018     Sig - Route: Take 1 tablet by mouth Daily. - Oral    HYDROmorphone (DILAUDID) injection 1 mg 1 mg Every 4 Hours PRN 3/9/2018 3/19/2018    Sig - Route: Infuse 1 mL into a venous catheter Every 4 (Four) Hours As Needed for Severe Pain . - Intravenous    Linked Group 2:  \"And\" Linked Group Details        magnesium oxide (MAGOX) tablet 2,000 mg 2,000 mg Every Evening 3/9/2018     Sig - Route: Take 5 tablets by mouth Every Evening. - Oral    Morphine sulfate (PF) injection 4 mg 4 mg Every 2 Hours PRN 3/9/2018 3/19/2018    Sig - Route: Infuse 1 mL into a venous catheter Every 2 (Two) Hours As Needed for Moderate Pain . - Intravenous    Linked Group 3:  \"And\" Linked Group Details        naloxone (NARCAN) injection 0.1 mg 0.1 mg Every 5 Minutes PRN 3/9/2018     Sig - Route: Infuse 0.25 mL into a venous catheter Every 5 (Five) Minutes As Needed for Respiratory Depression. - Intravenous    Linked Group 2:  \"And\" Linked Group Details        naloxone (NARCAN) injection 0.4 mg 0.4 mg Every 5 Minutes PRN 3/9/2018     Sig - Route: Infuse 1 mL into a venous catheter Every 5 (Five) Minutes As Needed for Respiratory Depression. - Intravenous    Linked Group 3:  \"And\" Linked Group Details        ondansetron (ZOFRAN) injection 4 mg 4 mg Every 6 Hours PRN 3/9/2018     Sig - Route: Infuse 2 mL into a venous catheter Every 6 (Six) Hours As Needed for Nausea or Vomiting. - Intravenous    Linked Group 4:  \"Or\" Linked Group Details        ondansetron (ZOFRAN) tablet 4 mg 4 mg Every 6 Hours PRN 3/9/2018     Sig - Route: Take 1 tablet by mouth Every 6 (Six) Hours As Needed for Nausea or Vomiting. - Oral    Linked Group 4:  \"Or\" Linked Group Details        ondansetron ODT (ZOFRAN-ODT) disintegrating tablet 4 mg 4 mg Every 6 Hours PRN 3/9/2018     Sig - Route: Take 1 tablet by mouth Every 6 (Six) Hours As Needed " "for Nausea or Vomiting. - Oral    Linked Group 4:  \"Or\" Linked Group Details        oxyCODONE (oxyCONTIN) 12 hr tablet 10 mg 10 mg Once 3/9/2018 3/9/2018    Sig - Route: Take 1 tablet by mouth 1 (One) Time. - Oral    oxyCODONE (oxyCONTIN) 12 hr tablet 10 mg 10 mg Every 12 Hours Scheduled 3/9/2018 3/19/2018    Sig - Route: Take 1 tablet by mouth Every 12 (Twelve) Hours. - Oral    oxyCODONE (ROXICODONE) immediate release tablet 20 mg 20 mg Every 3 Hours PRN 3/10/2018     Sig - Route: Take 4 tablets by mouth Every 3 (Three) Hours As Needed for Moderate Pain . - Oral    PARoxetine (PAXIL) tablet 10 mg 10 mg Every Morning 3/10/2018     Sig - Route: Take 1 tablet by mouth Every Morning. - Oral    pneumococcal polysaccharide 23-valent (PNEUMOVAX-23) vaccine 0.5 mL 0.5 mL During Hospitalization 3/9/2018     Sig - Route: Inject 0.5 mL into the shoulder, thigh, or buttocks During Hospitalization for Immunization. - Intramuscular    pregabalin (LYRICA) capsule 75 mg 75 mg Once 3/9/2018 3/9/2018    Sig - Route: Take 1 capsule by mouth 1 (One) Time. - Oral    rivaroxaban (XARELTO) tablet 10 mg 10 mg Daily With Dinner 3/9/2018     Sig - Route: Take 1 tablet by mouth Daily With Dinner. - Oral    sennosides-docusate sodium (SENOKOT-S) 8.6-50 MG tablet 2 tablet 2 tablet Nightly 3/9/2018     Sig - Route: Take 2 tablets by mouth Every Night. - Oral    sodium chloride 0.9 % bolus 500 mL 500 mL Once 3/11/2018 3/11/2018    Sig - Route: Infuse 500 mL into a venous catheter 1 (One) Time. - Intravenous    sodium chloride 0.9 % infusion 150 mL/hr Continuous 3/9/2018     Sig - Route: Infuse 150 mL/hr into a venous catheter Continuous. - Intravenous    traMADol (ULTRAM) tablet 50 mg 50 mg Every 6 Hours Scheduled 3/10/2018 3/20/2018    Sig - Route: Take 1 tablet by mouth Every 6 (Six) Hours. - Oral    atropine sulfate injection 0.5 mg (Discontinued) 0.5 mg Once As Needed 3/9/2018 3/9/2018    Sig - Route: Infuse 5 mL into a venous catheter 1 " (One) Time As Needed for Bradycardia (symptomatic bradycardia (hypotension, dizziness, confusion). Notify attending anesthesiologist.). - Intravenous    Reason for Discontinue: Patient Transfer    buffered lidocaine syringe 0.5 mL (Discontinued) 0.5 mL Once As Needed 3/9/2018 3/9/2018    Sig - Route: Inject 0.5 mL as directed 1 (One) Time As Needed (IV Start). - Injection    Reason for Discontinue: Patient Transfer    bupivacaine liposome (EXPAREL) 1.3 % injection (Discontinued)  As Needed 3/9/2018 3/9/2018    Sig: As Needed.    Reason for Discontinue: Patient Discharge    clindamycin (CLEOCIN) 900 mg in dextrose 5% 50 mL IVPB (premix) (Discontinued) 900 mg Once 3/9/2018 3/9/2018    Sig - Route: Infuse 50 mL into a venous catheter 1 (One) Time. - Intravenous    flumazenil (ROMAZICON) injection 0.2 mg (Discontinued) 0.2 mg As Needed 3/9/2018 3/9/2018    Sig - Route: Infuse 2 mL into a venous catheter As Needed (unresponsiveness). - Intravenous    Reason for Discontinue: Patient Transfer    hydrALAZINE (APRESOLINE) injection 5 mg (Discontinued) 5 mg Every 10 Minutes PRN 3/9/2018 3/9/2018    Sig - Route: Infuse 0.25 mL into a venous catheter Every 10 (Ten) Minutes As Needed for High Blood Pressure (for systolic blood pressure greater than 180 mmHg or diastolic blood pressure greater than 105 mmHg when HR less than 60). - Intravenous    Reason for Discontinue: Patient Transfer    HYDROmorphone (DILAUDID) injection 0.5 mg (Discontinued) 0.5 mg As Needed 3/9/2018 3/9/2018    Sig - Route: Infuse 0.5 mL into a venous catheter As Needed for Moderate Pain  or Severe Pain . - Intravenous    Reason for Discontinue: Patient Transfer    ipratropium-albuterol (DUO-NEB) nebulizer solution 3 mL (Discontinued) 3 mL Once As Needed 3/9/2018 3/9/2018    Sig - Route: Take 3 mL by nebulization 1 (One) Time As Needed for Wheezing or Shortness of Air (bronchospasm). - Nebulization    Reason for Discontinue: Patient Transfer    labetalol  "(NORMODYNE,TRANDATE) injection 5 mg (Discontinued) 5 mg Every 5 Minutes PRN 3/9/2018 3/9/2018    Sig - Route: Infuse 1 mL into a venous catheter Every 5 (Five) Minutes As Needed for High Blood Pressure (for systolic blood pressure greater than 180 mmHg or diastolic blood pressure greater than 105 mmHg). - Intravenous    Reason for Discontinue: Patient Transfer    lactated ringers infusion 1,000 mL (Discontinued) 1,000 mL Continuous 3/9/2018 3/9/2018    Sig - Route: Infuse 1,000 mL into a venous catheter Continuous. - Intravenous    Reason for Discontinue: Patient Transfer    lactated ringers infusion (Discontinued) 100 mL/hr Continuous 3/9/2018 3/9/2018    Sig - Route: Infuse 100 mL/hr into a venous catheter Continuous. - Intravenous    Reason for Discontinue: Patient Transfer    meperidine (DEMEROL) injection 12.5 mg (Discontinued) 12.5 mg Every 5 Minutes PRN 3/9/2018 3/9/2018    Sig - Route: Infuse 0.5 mL into a venous catheter Every 5 (Five) Minutes As Needed for Shivering (May repeat x 3). - Intravenous    Reason for Discontinue: Patient Transfer    metoclopramide (REGLAN) injection 5 mg (Discontinued) 5 mg Every 15 Minutes PRN 3/9/2018 3/9/2018    Sig - Route: Infuse 1 mL into a venous catheter Every 15 (Fifteen) Minutes As Needed for Nausea or Vomiting (for nausea/vomiting). - Intravenous    Reason for Discontinue: Patient Transfer    midazolam (VERSED) injection 1 mg (Discontinued) 1 mg Every 5 Minutes PRN 3/9/2018 3/9/2018    Sig - Route: Infuse 1 mL into a venous catheter Every 5 (Five) Minutes As Needed for Anxiety (Max 4mg midazolam TOTAL). - Intravenous    Reason for Discontinue: Patient Transfer    Linked Group 5:  \"Or\" Linked Group Details        midazolam (VERSED) injection 2 mg (Discontinued) 2 mg Every 5 Minutes PRN 3/9/2018 3/9/2018    Sig - Route: Infuse 2 mL into a venous catheter Every 5 (Five) Minutes As Needed for Anxiety (Max 4mg midazolam TOTAL). - Intravenous    Reason for Discontinue: " "Patient Transfer    Linked Group 5:  \"Or\" Linked Group Details        Morphine sulfate (PF) injection 2 mg (Discontinued) 2 mg As Needed 3/9/2018 3/9/2018    Sig - Route: Infuse 1 mL into a venous catheter As Needed for Mild Pain . - Intravenous    Reason for Discontinue: Patient Transfer    naloxone (NARCAN) injection 0.04 mg (Discontinued) 0.04 mg As Needed 3/9/2018 3/9/2018    Sig - Route: Infuse 0.1 mL into a venous catheter As Needed for Opioid Reversal (unresponsiveness, decrease oxygen saturation). - Intravenous    Reason for Discontinue: Patient Transfer    ondansetron (ZOFRAN) injection 4 mg (Discontinued) 4 mg As Needed 3/9/2018 3/9/2018    Sig - Route: Infuse 2 mL into a venous catheter As Needed for Nausea or Vomiting. - Intravenous    Reason for Discontinue: Patient Transfer    oxyCODONE (ROXICODONE) immediate release tablet 10 mg (Discontinued) 10 mg Every 4 Hours PRN 3/9/2018 3/10/2018    Sig - Route: Take 2 tablets by mouth Every 4 (Four) Hours As Needed for Moderate Pain . - Oral    rivaroxaban (XARELTO) tablet 10 mg (Discontinued) 10 mg Daily 3/10/2018 3/9/2018    Sig - Route: Take 1 tablet by mouth Daily. - Oral    sodium chloride (NS) irrigation solution (Discontinued)  As Needed 3/9/2018 3/9/2018    Sig: As Needed.    Reason for Discontinue: Patient Discharge    sodium chloride 0.9 % flush 1-10 mL (Discontinued) 1-10 mL As Needed 3/9/2018 3/9/2018    Sig - Route: Infuse 1-10 mL into a venous catheter As Needed for Line Care. - Intravenous    Reason for Discontinue: Patient Transfer    sodium chloride 0.9 % flush 3 mL (Discontinued) 3 mL As Needed 3/9/2018 3/9/2018    Sig - Route: Infuse 3 mL into a venous catheter As Needed for Line Care. - Intravenous    Reason for Discontinue: Patient Transfer    sodium chloride 3,000 mL with ceFAZolin 3 g irrigation (Discontinued)  As Needed 3/9/2018 3/9/2018    Sig: As Needed.    Reason for Discontinue: Patient Discharge    sodium chloride 30 mL, bupivacaine " PF 50 mL mixture (Discontinued)  As Needed 3/9/2018 3/9/2018    Sig: As Needed.    Reason for Discontinue: Patient Discharge    vancomycin 1 g/250 mL 0.9% NS (vial-mate) (Discontinued) 1,000 mg Once 3/9/2018 3/9/2018    Sig - Route: Infuse 250 mL into a venous catheter 1 (One) Time. - Intravenous          Lab Results (last 72 hours)     Procedure Component Value Units Date/Time    CBC (No Diff) [[id number]]  (Abnormal) Collected:  03/11/18 1202    Specimen:  Blood Updated:  03/11/18 1214     WBC 11.73 (H) 10*3/mm3      RBC 3.86 (L) 10*6/mm3      Hemoglobin 10.6 (L) g/dL      Hematocrit 31.9 (L) %      MCV 82.6 fL      MCH 27.5 (L) pg      MCHC 33.2 g/dL      RDW 14.4 %      RDW-SD 43.7 fl      MPV 9.5 fL      Platelets 266 10*3/mm3     Basic Metabolic Panel [[id number]]  (Abnormal) Collected:  03/10/18 0838    Specimen:  Blood Updated:  03/10/18 0936     Glucose 145 (H) mg/dL      BUN 11 mg/dL      Creatinine 0.54 mg/dL      Sodium 141 mmol/L      Potassium 3.2 (L) mmol/L      Chloride 96 (L) mmol/L      CO2 36.0 (H) mmol/L      Calcium 8.2 (L) mg/dL      eGFR Non African Amer 115 mL/min/1.73      BUN/Creatinine Ratio 20.4     Anion Gap 9.0 mmol/L     Narrative:       GFR Normal >60  Chronic Kidney Disease <60  Kidney Failure <15    CBC & Differential [[id number]] Collected:  03/10/18 0838    Specimen:  Blood Updated:  03/10/18 0848    Narrative:       The following orders were created for panel order CBC & Differential.  Procedure                               Abnormality         Status                     ---------                               -----------         ------                     CBC Auto Differential[[id number]]        Abnormal            Final result                 Please view results for these tests on the individual orders.    CBC Auto Differential [[id number]]  (Abnormal) Collected:  03/10/18 0838    Specimen:  Blood Updated:  03/10/18 0848     WBC 13.02 (H) 10*3/mm3      RBC 3.80 (L) 10*6/mm3       Hemoglobin 10.4 (L) g/dL      Hematocrit 30.9 (L) %      MCV 81.3 (L) fL      MCH 27.4 (L) pg      MCHC 33.7 g/dL      RDW 13.9 %      RDW-SD 40.9 fl      MPV 9.6 fL      Platelets 280 10*3/mm3      Neutrophil % 83.1 (H) %      Lymphocyte % 7.3 (L) %      Monocyte % 9.1 %      Eosinophil % 0.0 %      Basophil % 0.1 %      Immature Grans % 0.4 %      Neutrophils, Absolute 10.83 (H) 10*3/mm3      Lymphocytes, Absolute 0.95 10*3/mm3      Monocytes, Absolute 1.18 10*3/mm3      Eosinophils, Absolute 0.00 10*3/mm3      Basophils, Absolute 0.01 10*3/mm3      Immature Grans, Absolute 0.05 (H) 10*3/mm3      nRBC 0.0 /100 WBC           Imaging Results (last 72 hours)     Procedure Component Value Units Date/Time    XR Knee 1 or 2 View Left [[id number]] Collected:  03/09/18 1218     Updated:  03/09/18 1221    Narrative:       EXAMINATION:   XR KNEE 1 OR 2 VW LEFT-  3/9/2018 12:18 PM CST     HISTORY: Postop     AP and lateral view the left knee is obtained. Postsurgical changes  noted from total left knee arthroplasty. Skeletal structures are  relatively aligned.     IMPRESSION status post left knee arthroplasty  This report was finalized on 03/09/2018 12:18 by Dr. Tim Smith MD.          Orders (last 72 hrs)     Start     Ordered    03/12/18 0808  Please have Home Health do BMP and CBC on Thursday, and forward to her PCP, Dr. Whaley.  Nursing Communication  Once     Comments:  Please have Home Health do BMP and CBC on Thursday, and forward to her PCP, Dr. Whaley.    03/12/18 0808    03/11/18 1336  Discharge patient  Once     Comments:  Do not need to wait on Dr. Meza, she can go when she is ready    03/11/18 1336    03/11/18 1200  sodium chloride 0.9 % bolus 500 mL  Once      03/11/18 1124    03/11/18 1124  CBC (No Diff)  STAT      03/11/18 1123    03/11/18 1115  bisacodyl (DULCOLAX) suppository 10 mg  Daily      03/11/18 1036    03/11/18 0820  Discharge patient  Once,   Status:  Canceled      03/11/18 0819    03/11/18  0000  diazePAM (VALIUM) 5 MG tablet  Every 6 Hours PRN      18 0819    18 0000  ondansetron (ZOFRAN) 4 MG tablet  Every 6 Hours PRN      18 0819    18 0000  oxyCODONE (ROXICODONE) 10 MG tablet      18 0819    18 0000  rivaroxaban (XARELTO) 10 MG tablet      18 0819    18 0000  sennosides-docusate sodium (SENOKOT-S) 8.6-50 MG tablet  Nightly      18 0819    03/10/18 1600  Vital Signs  Every 8 Hours      18 1237    03/10/18 1600  Neurovascular Checks  Every 8 Hours      18 1237    03/10/18 1207  PT Plan of Care Cert / Re-Cert  Once     Comments:  Physical Therapy Plan of Care  Initial Certification  Certification Period: 3/10/2018 - 2018    Patient Name: Sammi Cordero  : 1955    (Z74.09) Impaired mobility  (primary encounter diagnosis)                  Assessment  PT Assessment  Functional Level at Time of Evaluation (PT Clinical Impression): Min assist bed mobility and CGA gait ~ 125 ft with RW  Impairments Found (describe specific impairments): gait, locomotion, and balance, ROM  PT Diagnosis (PT Clinical Impression): impaired mobility  Criteria for Skilled Interventions Met (PT Clinical Impression): yes, treatment indicated              PT Rehab Goals     Row Name 03/10/18 1113             Transfer Goal 1 (PT)    Activity/Assistive Device (Transfer Goal 1, PT)   sit-to-stand/stand-to-sit;bed-to-chair/chair-to-bed;walker, rolling  -TOD        [place] Level/Cues Needed (Transfer Goal 1, PT) independent  -TOD      Time Frame (Transfer Goal 1, PT) long term goal (LTG);10 days  -TOD      Barriers (Transfers Goal 1, PT) pain, ROM  -TOD      Progress/Outcome (Transfer Goal 1, PT) goal ongoing  -TOD         Gait Training Goal 1 (PT)    Activity/Assistive Device (Gait Training Goal 1, PT) gait (walking   locomotion);walker, rolling  -TOD      [place] Level (Gait Training Goal 1, PT) independent  -TOD      Distance (Gait Goal 1, PT) 200  -TOD       Time Frame (Gait Training Goal 1, PT) long term goal (LTG);10 days  -TOD        Barriers (Gait Training Goal 1, PT) pain, ROM  -TOD      Progress/Outcome (Gait Training Goal 1, PT) goal ongoing  -TOD         ROM Goal 1 (PT)    ROM Goal 1 (PT) L knee 0-90 degrees, AAROM  -TOD      Time Frame (ROM Goal 1, PT) long term goal (LTG)   10 days  -TOD      Barriers (ROM Goal 1, PT) pain, ROM  -TOD      Progress/Outcome (ROM Goal 1, PT) goal ongoing  -TOD         Stairs Goal 1 (PT)    Activity/Assistive Device (Stairs Goal 1, PT) stairs, all   skills;step-over step;step-to-step;walker, rolling;using   handrail;ascending stairs;descending stairs  -TOD      [place] Level/Cues Needed (Stairs Goal 1, PT) contact guard assist    -TOD      Number of Stairs (Stairs Goal 1, PT) 7  -TOD      Time Frame (Stairs Goal 1, PT) long term goal (LTG);10 days  -TOD      Barriers (Stairs Goal 1, PT) pain, ROM  -TOD      Progress/Outcome (Stairs Goal 1, PT) goal ongoing  -TOD        User Key  (r) = Recorded By, (t) = Taken By, (c) = Cosigned By    Initials Name Provider Type    TOD Pastor, PT DPT Physical Therapist            PT OP Goals     Row Name 03/10/18 1203          Time Calculation    PT Goal Re-Cert Due Date 03/20/18  -TOD       User Key  (r) = Recorded By, (t) = Taken By, (c) = Cosigned By    Initials Name Provider Type    TOD Pastor, PT DPT Physical Therapist            Plan  PT Plan  Therapy Frequency (PT Clinical Impression): 2 times/day  Planned Therapy Interventions (PT Eval): bed mobility training, transfer training, gait training, balance training, home exercise program, patient/family education, postural re-education, stair training, strengthening, ROM (range of motion)        Brandon Pastor, PT DPT  3/10/2018            By cosigning this order, either electronically or physically, I certify that the therapy services are furnished while this patient is under my care, the services outline above are required by  this patient, and will be reviewed every 90 days.        M.D.:__________________________________________ Date: ______________    03/10/18 1206    03/10/18 1200  traMADol (ULTRAM) tablet 50 mg  Every 6 Hours Scheduled      03/10/18 0851    03/10/18 1006  Inpatient Spiritual Care Consult  Once     Provider:  (Not yet assigned)    03/10/18 1007    03/10/18 0900  rivaroxaban (XARELTO) tablet 10 mg  Daily,   Status:  Discontinued      03/09/18 1237    03/10/18 0900  oxyCODONE (ROXICODONE) immediate release tablet 20 mg  Every 3 Hours PRN      03/10/18 0850    03/10/18 0818  Basic Metabolic Panel  Once      03/10/18 0818    03/10/18 0818  CBC & Differential  Once      03/10/18 0818    03/10/18 0818  CBC Auto Differential  PROCEDURE ONCE      03/10/18 0818    03/10/18 0700  PARoxetine (PAXIL) tablet 10 mg  Every Morning      03/09/18 1237    03/10/18 0600  Discontinue Indwelling Urinary Catheter in AM  Once      03/09/18 1237    03/09/18 2100  atorvastatin (LIPITOR) tablet 10 mg  Nightly      03/09/18 1237    03/09/18 2100  oxyCODONE (oxyCONTIN) 12 hr tablet 10 mg  Every 12 Hours Scheduled      03/09/18 1237    03/09/18 2100  sennosides-docusate sodium (SENOKOT-S) 8.6-50 MG tablet 2 tablet  Nightly      03/09/18 1237    03/09/18 1800  rivaroxaban (XARELTO) tablet 10 mg  Daily With Dinner      03/09/18 1444    03/09/18 1700  magnesium oxide (MAGOX) tablet 2,000 mg  Every Evening      03/09/18 1237    03/09/18 1700  ceFAZolin (ANCEF) 2 g/20ml IV PUSH syringe  Every 8 Hours      03/09/18 1237    03/09/18 1610  pneumococcal polysaccharide 23-valent (PNEUMOVAX-23) vaccine 0.5 mL  During Hospitalization      03/09/18 1611    03/09/18 1600  Vital Signs  Every 4 Hours      03/09/18 1237    03/09/18 1600  Neurovascular Checks  Every 4 Hours      03/09/18 1237    03/09/18 1515  sodium chloride 0.9 % infusion  Continuous      03/09/18 1444    03/09/18 1400  Turn, Cough & Deep Breathe Every 2 Hours Until Ambulating  Every 2 Hours       03/09/18 1237    03/09/18 1400  Incentive Spirometry  Every 2 Hours While Awake     Comments:  Until lungs are clear and no productive cough.    03/09/18 1237    03/09/18 1330  clindamycin (CLEOCIN) 900 mg in dextrose 5% 50 mL IVPB (premix)  Every 8 Hours      03/09/18 1237    03/09/18 1315  famotidine (PEPCID) tablet 20 mg  Daily      03/09/18 1237    03/09/18 1238  Weight Bearing - As Tolerated  Continuous      03/09/18 1237    03/09/18 1238  Pillows May Be Used to Elevate Operative Leg, But Do NOT Flex Operative Knee Over a Pillow  Until Discontinued      03/09/18 1237    03/09/18 1238  Instruct Patient to Do At Least 10 Ankle Pumps Per Hour While Awake  Once     Comments:  Instruct Patient to Do At Least 10 Ankle Pumps Per Hour While Awake    03/09/18 1237    03/09/18 1238  Intake and Output  Every Shift      03/09/18 1237    03/09/18 1238  Saline lock IV with adequate po intake.  Continuous      03/09/18 1237    03/09/18 1238  Hemovac Drain to Self Suction  Continuous      03/09/18 1237    03/09/18 1238  Continue Indwelling Urinary Catheter Already in Place  Once      03/09/18 1237    03/09/18 1238  Notify Provider if Bladder Distention Continues  Until Discontinued      03/09/18 1237    03/09/18 1238  Catheter Care  Every Shift      03/09/18 1237    03/09/18 1238  Oxygen Therapy-  Continuous      03/09/18 1237    03/09/18 1238  Advance diet as tolerated  Until Discontinued      03/09/18 1237    03/09/18 1238  Inpatient Consult to Case Management   Once     Provider:  (Not yet assigned)    03/09/18 1237    03/09/18 1238  Full Code  Continuous      03/09/18 1237    03/09/18 1238  VTE Risk Assessment - High Risk  Once      03/09/18 1237    03/09/18 1238  Place Venous Foot Pump  Once      03/09/18 1237    03/09/18 1238  Maintain Venous Foot Pump  Continuous      03/09/18 1237    03/09/18 1238  Diet Regular  Diet Effective Now      03/09/18 1237    03/09/18 1238  Inpatient Consult to Hospitalist   Once     Provider:  Nico Ledesma MD    03/09/18 1237    03/09/18 1238  PT Consult: Eval & Treat  Once      03/09/18 1237    03/09/18 1238  Assess Need for Indwelling Urinary Catheter - Follow Removal Protocol  Continuous     Comments:  Indwelling Urinary Catheter Removal Criteria  Discontinue Indwelling Urinary Catheter Unless One of the Following is Present  Urinary Retention or Obstruction  Chronic Roach Catheter Use  End of Life  Critical Illness with Strict I/O   Tract or Abdominal Surgery  Stage 3/4 Sacral / Perineal Wound  Required Activity Restriction: Trauma  Required Activity Restriction: Spine Surgery  If Patient is Being Followed by Urology Contact Them PRIOR to Removal  Do Not Remove Indwelling Urinary Catheter Order is Present with a CLINICAL REASON to Maintain the Catheter. Provider is Required to Include a Clinical Reason to Maintain a Urinary Catheter    Chronic Roach Catheter Use (Present on Admission)  Assess for Continued Need & Document Medical Necessity  If Infection is Suspected, Contact the Provider        03/09/18 1237    03/09/18 1238  Catheter Care  Every Shift      03/09/18 1237    03/09/18 1237  acetaminophen (TYLENOL) tablet 650 mg  Every 4 Hours PRN      03/09/18 1237    03/09/18 1237  acetaminophen (TYLENOL) 160 MG/5ML solution 650 mg  Every 4 Hours PRN      03/09/18 1237    03/09/18 1237  acetaminophen (TYLENOL) suppository 650 mg  Every 4 Hours PRN      03/09/18 1237    03/09/18 1237  Morphine sulfate (PF) injection 4 mg  Every 2 Hours PRN      03/09/18 1237    03/09/18 1237  naloxone (NARCAN) injection 0.4 mg  Every 5 Minutes PRN      03/09/18 1237    03/09/18 1237  HYDROmorphone (DILAUDID) injection 1 mg  Every 4 Hours PRN      03/09/18 1237    03/09/18 1237  naloxone (NARCAN) injection 0.1 mg  Every 5 Minutes PRN      03/09/18 1237    03/09/18 1237  ondansetron (ZOFRAN) tablet 4 mg  Every 6 Hours PRN      03/09/18 1237    03/09/18 1237  ondansetron ODT (ZOFRAN-ODT)  disintegrating tablet 4 mg  Every 6 Hours PRN      03/09/18 1237    03/09/18 1237  ondansetron (ZOFRAN) injection 4 mg  Every 6 Hours PRN      03/09/18 1237    03/09/18 1237  oxyCODONE (ROXICODONE) immediate release tablet 10 mg  Every 4 Hours PRN,   Status:  Discontinued      03/09/18 1237    03/09/18 1139  diazePAM (VALIUM) tablet 5 mg  Every 6 Hours PRN      03/09/18 1139    03/09/18 1118  Inpatient Admission  Once      03/09/18 1117    03/09/18 1116  Vital signs every 5 minutes for 15 minutes, every 15 minutes thereafter.  Once,   Status:  Canceled      03/09/18 1115    03/09/18 1116  Call Anesthesiologist for additional IV Fluid bolus for Hypotension/Tachycardia  Continuous,   Status:  Canceled      03/09/18 1115    03/09/18 1116  Notify Anesthesia of Any Acute Changes in Patient Condition  Until Discontinued,   Status:  Canceled      03/09/18 1115    03/09/18 1116  Notify Anesthesia for Unrelieved Pain  Until Discontinued,   Status:  Canceled      03/09/18 1115    03/09/18 1116  Once DC criteria to floor met, follow surgeon's orders.  Until Discontinued,   Status:  Canceled      03/09/18 1115    03/09/18 1116  Discharge patient from PACU when discharge criteria is met.  Until Discontinued,   Status:  Canceled      03/09/18 1115    03/09/18 1116  XR Knee 1 or 2 View Left  1 Time Imaging      03/09/18 1115    03/09/18 1115  Apply warming blanket  As Needed,   Status:  Canceled     Comments:  For a recorded temp of <36.9 C    03/09/18 1115    03/09/18 1115  Morphine sulfate (PF) injection 2 mg  As Needed,   Status:  Discontinued      03/09/18 1115    03/09/18 1115  HYDROmorphone (DILAUDID) injection 0.5 mg  As Needed,   Status:  Discontinued      03/09/18 1115    03/09/18 1115  labetalol (NORMODYNE,TRANDATE) injection 5 mg  Every 5 Minutes PRN,   Status:  Discontinued      03/09/18 1115    03/09/18 1115  hydrALAZINE (APRESOLINE) injection 5 mg  Every 10 Minutes PRN,   Status:  Discontinued      03/09/18 6822     03/09/18 1115  atropine sulfate injection 0.5 mg  Once As Needed,   Status:  Discontinued      03/09/18 1115 03/09/18 1115  ipratropium-albuterol (DUO-NEB) nebulizer solution 3 mL  Once As Needed,   Status:  Discontinued      03/09/18 1115 03/09/18 1115  naloxone (NARCAN) injection 0.04 mg  As Needed,   Status:  Discontinued      03/09/18 1115 03/09/18 1115  flumazenil (ROMAZICON) injection 0.2 mg  As Needed,   Status:  Discontinued      03/09/18 1115 03/09/18 1115  ondansetron (ZOFRAN) injection 4 mg  As Needed,   Status:  Discontinued      03/09/18 1115 03/09/18 1115  metoclopramide (REGLAN) injection 5 mg  Every 15 Minutes PRN,   Status:  Discontinued      03/09/18 1115 03/09/18 1115  meperidine (DEMEROL) injection 12.5 mg  Every 5 Minutes PRN,   Status:  Discontinued      03/09/18 1115 03/09/18 0927  Insert Roach Catheter  Once,   Status:  Canceled      03/09/18 0927 03/09/18 0924  sodium chloride (NS) irrigation solution  As Needed,   Status:  Discontinued      03/09/18 0924 03/09/18 0923  bupivacaine liposome (EXPAREL) 1.3 % injection  As Needed,   Status:  Discontinued      03/09/18 0924 03/09/18 0921  sodium chloride 30 mL, bupivacaine PF 50 mL mixture  As Needed,   Status:  Discontinued      03/09/18 0923 03/09/18 0919  sodium chloride 3,000 mL with ceFAZolin 3 g irrigation  As Needed,   Status:  Discontinued      03/09/18 0921 03/09/18 0845  lactated ringers infusion  Continuous,   Status:  Discontinued      03/09/18 0803 03/09/18 0803  Vital Signs - Per Anesthesia Protocol  As Needed,   Status:  Canceled      03/09/18 0803 03/09/18 0803  sodium chloride 0.9 % flush 1-10 mL  As Needed,   Status:  Discontinued      03/09/18 0803 03/09/18 0803  buffered lidocaine syringe 0.5 mL  Once As Needed,   Status:  Discontinued      03/09/18 0803 03/09/18 0803  midazolam (VERSED) injection 1 mg  Every 5 Minutes PRN,   Status:  Discontinued      03/09/18 0803    03/09/18  0803  midazolam (VERSED) injection 2 mg  Every 5 Minutes PRN,   Status:  Discontinued      03/09/18 0803    03/09/18 0730  lactated ringers infusion 1,000 mL  Continuous,   Status:  Discontinued      03/09/18 0656    03/09/18 0656  sodium chloride 0.9 % flush 3 mL  As Needed,   Status:  Discontinued      03/09/18 0656    Unscheduled  Ice to Incision for 48 Hours  As Needed      03/09/18 1237    Unscheduled  Apply Ice to Incision PRN for Edema, After Activity or Exercise, and to Lessen Discomfort  As Needed      03/09/18 1237    Unscheduled  May Stand to Void or Use Bedside Commode Day of Surgery. POD 1 & Thereafter Use Bedside Commode or Bathroom  As Needed      03/09/18 1237    Unscheduled  Change dressing  As Needed     Comments:  May d/c dressing changes when no drainage from wound.    03/09/18 1237    Unscheduled  Bladder Scan if Patient Unable to Void 4-6 Hours After Catheter Removal  As Needed      03/09/18 1237    Unscheduled  Straight Cath Every 4-6 Hours As Needed If Patient is Unable to Void After 4-6 Hours, Bladder Scan Volume is Greater Than 350-500mL & Patient Has Symptoms of Bladder Discomfort / Distention  As Needed      03/09/18 1237    Unscheduled  Consult Pharmacist For Review of Medications That May Cause Urinary Retention - RN To Place Order for Consult it Needed  As Needed      03/09/18 1237    Unscheduled  Schedule / Prompt Voiding For Patients With Urinary Incontinence  As Needed      03/09/18 1237             Operative/Procedure Notes (last 72 hours) (Notes from 3/9/2018 10:18 AM through 3/12/2018 10:18 AM)      Orlin Meza MD at 3/9/2018 11:30 AM          TOTAL KNEE ARTHROPLASTY  Progress Note    Sammi Cordero  3/9/2018    Pre-op Diagnosis:   LEFT KNEE OSTEOARTHRITIS        Post-Op Diagnosis Codes:       Procedure/CPT® Codes:      Procedure(s):  LEFT TOTAL KNEE ARTHROPLASTY    Surgeon(s):  Orlin Meza MD    Anesthesia: Spinal    Staff:   Circulator: Iman Tovar  RN  Scrub Person: Laine Haynes  Other: Claire De Leon    Estimated Blood Loss: minimal    Urine Voided: 300 mL    Specimens:                None      Drains:   Urethral Catheter 18 0927 100% silicone 10 10 (Active)   Daily Indications Required activity restriction from trauma, surgery, (e.g. unstable spine, fracture, hemodynamics) 3/9/2018 11:13 AM   Securement secured to upper leg with adhesive device 3/9/2018 11:13 AM   Tolerance no signs/symptoms of discomfort 3/9/2018 11:13 AM           Findings:     Complications:       Orlin Meza MD     Date: 3/9/2018  Time: 11:30 AM        Electronically signed by Orlin Mzea MD at 3/9/2018 11:30 AM     Orlin Meza MD at 3/9/2018 11:51 AM        Operative Report    Pt Name: [unfilled]  MRN: @MRN@  Birthdate: @@  Date: @TD@        PREOPERATIVE DIAGNOSIS: Left knee primary osteoarthritis    POSTOPERATIVE DIAGNOSIS: Left knee primary osteoarthritis     PROCEDURE:  Left Total Knee Arthroplasty     SURGEON:  Orlin Meza MD     ASSISTANT:  Adalberto Arciniega PA-C    The assistant aided in limb position as well as retractor placement.  The assistant was also critical in implantation of the prosthesis.  In addition to this, the assistant was responsible for wound closure.  It was necessary to have the assistant present in order to complete the procedure.    ANESTHESIA:  Spinal    ESTIMATED BLOOD LOSS:  100cc    COMPLICATIONS:  None.    CONDITION:  Stable.    INDICATIONS:  63 yo female  presents today for a left total knee arthroplasty for primary osteoarthritis.  The patient has failed conservative management including: time, activity modifications, NSAIDs and corticosteroids.    PROCEDURE:  The patient was interviewed in the pre-anesthesia area.  The operative limb was then marked with a marking pen.  The patient was administered a regional block per the anesthesia team.  The patient was then taken to the operative suite where  general endotracheal anesthesia was performed by the anesthesia team.  A time out was then called to confirm the administration of anesthesia as well as the administration of tranexamic acid prior to incision.  The patient was then prepped and draped in standard sterile fashion using ChloraPrep.      Once fully prepped and draped, the limb was reprepped with ChloraPrep.  Sterile marking pen was then used to earlene out the tibia distally as well as the first web space.  The leg was then exsanguinated with Esmarch and tourniquet was inflated to 300mmhg.  The operative foot was placed in a Murray leg machado and a standard midline incision was carried out followed by a medial parapatellar arthrotomy.  The knee did demonstrate a large knee joint effusion with tricompartmental osteoarthrosis.     Attention was then turned to the distal femoral resection.  A drill was used to enter the intramedullary canal just anterior to the PCL insertion.  The extramedullary guide was then placed.  The guide was set at 4 degrees of valgus.  We then made a plus 2 mm distal femoral cut.  We then sized the femur to a size 6 using an anterior referencing guide and penned the femoral block into position.  Care was taken to make sure that the block was perpendicular to the epicondylar axis and parallel to the Whitesides line, thus in external rotation.  Our anterior, posterior as well as chamfer cuts were then made.      Attention was then turned to the tibia.  The PCL retractor was placed posteriorly as the ACL and PCL were resected.  The infrapatellar fat pad was excised and Homans retractor was placed medially and laterally.  An extramedullary tibial guide was then set parallel to the tibia on the AP and lateral views in line with the first web space and second metatarsal, just slightly medial to the medial center of the ankle.  The guide was set at 7 degrees posterior slope.  A 2/10 guide was then used to measure for approximately a 2 mm  resection medially and 10 mm resection laterally.  The proximal tibia was then removed en block.  The knee was then brought into extension where the medial and lateral menisci were then excised with a radiofrequency device.  We also performed a medial soft tissue release around the posteromedial corner of the knee.  Curved osteotome was then used to remove any posterior osteophytes off the medial and lateral femoral condyles as well as strip the posterior capsule.  A size D tibial tray was then placed with the central portion of the tibial tray in line with the medial third of the tibial tubercle, thus in external rotation, and pinned into position.     We then placed the femoral component into position, taking care to make sure that the femoral component was flush with the lateral cortex.  The box cutting guide was placed and our standard box cut was made without difficulty.  We then placed a box insert and placed a size 10 trial spacer.  The knee was brought into extension and did achieve full extension.  The knee was also stable in flexion.  There was approximately 2 mm of opening with varus and valgus stress testing.  Intraoperative fluoroscopy at this point confirmed position of our components.    Attention was then turned to the patella.  The patella was everted and resected down to 14 mm.  We then prepped for a size 29 patella implant  to sit superomedially for tracking purposes.     At this point in time, all trial components were removed.  The knee was copiously irrigated with pulsatile lavage.  We then cemented in our size D tibia component with a size 6 femoral component as well as our 29 mm patellar component.  A 10 mm trial spacer was placed with the knee in full extension as we waited on the cement to cure.  Meticulous cement clean up was carried out.  Once the cement was allowed to cure, we then trialed with our trial spacers.  We elected to go with a size 10 mm polyethylene spacer as with this in  position the knee achieved full extension and was stable in flexion, as well as having 2 mm of opening with varus/valgus stress testing.  Thus, the trial component was removed.  The knee was once again copiously irrigated with pulsatile lavage.  We then injected the posterior capsule with a local cocktail solution, both posteromedially and posterolaterally.  Care was taken to make sure there was no remaining cement.  A size 10 mm polyethylene spacer was then placed.  The knee was then brought into 30 degrees of flexion.  The tourniquet was deflated and meticulous hemostasis was maintained with electrocautery.  The extensor mechanism was then closed with a #1 Vicryl suture.  The skin was then approximated with a 3-0 Vicryl suture and closed with a Prineo wound closure system.  The patient was placed in a sterile dressing.  The patient was awakened from anesthesia without difficulty and was transferred to the PACU in stable condition.  All sponge, needle and instrument counts were correct at the end of the procedure.     Orlin Meza MD  3/9/2018  11:52 AM      Electronically signed by Orlin Meza MD at 3/9/2018 11:56 AM          Physician Progress Notes (last 72 hours) (Notes from 3/9/2018 10:18 AM through 3/12/2018 10:18 AM)      Nico Ledesma MD at 3/12/2018  8:56 AM          Progress Note  Sammi Cordero  3/12/2018 8:56 AM  Subjective:   Admit Date:   3/9/2018      CC/ADMIT DX:       Interval History:   Reviewed overnight events and nursing notes.  No new complaints. Her pain is stable. No CP or SOA. SHe is eating.        I have reviewed all labs/diagnostics from the last 24hrs.       ROS:   I have done a 10 point ROS and all are negative, except what is mentioned in the HPI.    Diet Regular    Medications:     sodium chloride 150 mL/hr Last Rate: 150 mL/hr (03/09/18 4106)       atorvastatin 10 mg Oral Nightly   bisacodyl 10 mg Rectal Daily   famotidine 20 mg Oral Daily   magnesium  "oxide 2,000 mg Oral Q PM   oxyCODONE 10 mg Oral Q12H   PARoxetine 10 mg Oral QAM   rivaroxaban 10 mg Oral Daily With Dinner   sennosides-docusate sodium 2 tablet Oral Nightly   traMADol 50 mg Oral Q6H           Objective:   Vitals: /58 (BP Location: Right arm, Patient Position: Sitting)   Pulse 81   Temp 98.9 °F (37.2 °C) (Oral)   Resp 16   Ht 152.4 cm (60\")   Wt 75.3 kg (166 lb)   SpO2 92%   Breastfeeding? No   BMI 32.42 kg/m²     Intake/Output Summary (Last 24 hours) at 03/12/18 0856  Last data filed at 03/12/18 0851   Gross per 24 hour   Intake              600 ml   Output                0 ml   Net              600 ml     General appearance: alert and cooperative with exam  Lungs: clear to auscultation bilaterally  Heart: RRR  Abdomen: soft, non-tender; bowel sounds normal; no masses,  no organomegaly  Extremities: extremities normal, atraumatic, no cyanosis or edema  Neurologic:  No obvious focal neurologic deficits.    Assessment and Plan:   Principal Problem:    Left knee DJD  Active Problems:    Hypoglycemia    Hyperlipidemia    S/P Left TKA    Plan:  1.  Continue present medication(s)   2.  OK for d/c. HH to do labs this week.   3.  Follow with Ortho      Discharge planning:   her home     Reviewed treatment plans with the patient and/or family.             Electronically signed by Nico Ledesma MD on 3/12/2018 at 8:56 AM      Electronically signed by Nico Ledesma MD at 3/12/2018  8:57 AM     You Orozco MD at 3/11/2018 11:13 AM           Family Medicine Progress Note    Patient:  Sammi Cordero  YOB: 1955    MRN: [id number]     Acct: [id number]     Admit date: 3/9/2018    Patient Seen, Chart, Consults notes, Labs, Radiology studies reviewed.    Subjective: Day 2 of stay with left knee end-stage osteoarthritis requiring arthroplasty and most recent (in last 24 hours) has had no new complaints.  Pain is reasonably controlled.  Is tolerating diet.  Eager " "to go home today.    Past, Family, Social History unchanged from admission.    Diet: Diet Regular    Medications:  Scheduled Meds:  atorvastatin 10 mg Oral Nightly   bisacodyl 10 mg Rectal Daily   famotidine 20 mg Oral Daily   magnesium oxide 2,000 mg Oral Q PM   oxyCODONE 10 mg Oral Q12H   PARoxetine 10 mg Oral QAM   rivaroxaban 10 mg Oral Daily With Dinner   sennosides-docusate sodium 2 tablet Oral Nightly   traMADol 50 mg Oral Q6H     Continuous Infusions:  sodium chloride 150 mL/hr Last Rate: 150 mL/hr (03/09/18 1825)     PRN Meds:•  acetaminophen **OR** acetaminophen **OR** acetaminophen  •  diazePAM  •  HYDROmorphone **AND** naloxone  •  Morphine **AND** naloxone  •  ondansetron **OR** ondansetron ODT **OR** ondansetron  •  oxyCODONE  •  pneumococcal polysaccharide 23-valent    Objective:    Lab Results (last 24 hours)     ** No results found for the last 24 hours. **           Imaging Results (last 72 hours)     Procedure Component Value Units Date/Time    XR Knee 1 or 2 View Left [[id number]] Collected:  03/09/18 1218     Updated:  03/09/18 1221    Narrative:       EXAMINATION:   XR KNEE 1 OR 2 VW LEFT-  3/9/2018 12:18 PM CST     HISTORY: Postop     AP and lateral view the left knee is obtained. Postsurgical changes  noted from total left knee arthroplasty. Skeletal structures are  relatively aligned.     IMPRESSION status post left knee arthroplasty  This report was finalized on 03/09/2018 12:18 by Dr. Tim Smith MD.           Physical Exam:    Vitals: /59 (BP Location: Right arm, Patient Position: Sitting)   Pulse 79   Temp 99.1 °F (37.3 °C) (Oral)   Resp 16   Ht 152.4 cm (60\")   Wt 75.3 kg (166 lb)   SpO2 94%   Breastfeeding? No   BMI 32.42 kg/m²    24 hour intake/output:  Intake/Output Summary (Last 24 hours) at 03/11/18 1113  Last data filed at 03/10/18 2205   Gross per 24 hour   Intake              170 ml   Output              300 ml   Net             -130 ml     Last 3 weights:  Wt " Readings from Last 3 Encounters:   03/09/18 75.3 kg (166 lb)   02/27/18 79.4 kg (175 lb 0.7 oz)   10/17/16 80.7 kg (178 lb)       General Appearance alert, appears stated age and cooperative  Head normocephalic, without obvious abnormality  Eyes lids and lashes normal, conjunctivae and sclerae normal and no icterus  Neck suppple and trachea midline  Lungs clear to auscultation, respirations regular, respirations even and respirations unlabored  Heart regular rhythm & normal rate and normal S1, S2  Abdomen normal bowel sounds, soft non-tender and no guarding  Extremities no edema and no cyanosis  Skin Incisions clean dry and intact  Neurologic Mental Status orientated to person, place, time and situation, Cranial Nerves cranial nerves 2 - 12 grossly intact as examined        Assessment:    Principal Problem:    Left knee DJD  Active Problems:    Hypoglycemia    Hyperlipidemia          Plan:  Medically stable for discharge.  I agree with plans as per Dr. Meza.  As far as follow-up she can call Dr. Ledesma's office on Monday to arrange for that.      Electronically signed by You Orozco MD on 3/11/2018 at 11:13 AM              Electronically signed by You Orozco MD at 3/11/2018 11:15 AM     Orlin Meza MD at 3/11/2018  8:10 AM          Orthopaedic Progress Note      3/11/2018   8:11 AM    Name:  Sammi Cordero  MRN:    [id number]     Acct:     [id number]   Room:  [address] [place] [address] Day: 2     Admit Date: 3/9/2018  PCP: Avery Whaley MD        Subjective:     C/C: POD 2 Days Post-Op LEFT TOTAL KNEE ARTHROPLASTY (Left) tka    Interval History: Status: is fine     Pain: improved       Medications:     Allergies:   Allergies   Allergen Reactions   • Penicillins Swelling and Rash   • Sulfa Antibiotics Rash and Other (See Comments)     Temp. fluctuations        Current Meds:   Current Facility-Administered Medications   Medication Dose Route Frequency Provider Last Rate Last Dose   •  acetaminophen (TYLENOL) tablet 650 mg  650 mg Oral Q4H PRN Orlin Meza MD        Or   • acetaminophen (TYLENOL) 160 MG/5ML solution 650 mg  650 mg Oral Q4H PRN Orlin Meza MD        Or   • acetaminophen (TYLENOL) suppository 650 mg  650 mg Rectal Q4H PRN Orlin Meza MD       • atorvastatin (LIPITOR) tablet 10 mg  10 mg Oral Nightly Orlin Meza MD   10 mg at 03/10/18 2006   • diazePAM (VALIUM) tablet 5 mg  5 mg Oral Q6H PRN Orlin Meza MD   2.5 mg at 03/10/18 1007   • famotidine (PEPCID) tablet 20 mg  20 mg Oral Daily Orlin Meza MD   20 mg at 03/10/18 0815   • HYDROmorphone (DILAUDID) injection 1 mg  1 mg Intravenous Q4H PRN Orlin Meza MD        And   • naloxone (NARCAN) injection 0.1 mg  0.1 mg Intravenous Q5 Min PRN Orlin Meza MD       • magnesium oxide (MAGOX) tablet 2,000 mg  2,000 mg Oral Q PM Orlin Meza MD   2,000 mg at 03/10/18 1701   • Morphine sulfate (PF) injection 4 mg  4 mg Intravenous Q2H PRN Orlin Meza MD   2 mg at 03/10/18 0409    And   • naloxone (NARCAN) injection 0.4 mg  0.4 mg Intravenous Q5 Min PRN Orlin Meza MD       • ondansetron (ZOFRAN) tablet 4 mg  4 mg Oral Q6H PRN Orlin Meza MD        Or   • ondansetron ODT (ZOFRAN-ODT) disintegrating tablet 4 mg  4 mg Oral Q6H PRN Orlin Meza MD        Or   • ondansetron (ZOFRAN) injection 4 mg  4 mg Intravenous Q6H PRN Orlin Meza MD       • oxyCODONE (oxyCONTIN) 12 hr tablet 10 mg  10 mg Oral Q12H Orlin Meza MD   10 mg at 03/10/18 2006   • oxyCODONE (ROXICODONE) immediate release tablet 20 mg  20 mg Oral Q3H PRN Orlin Meza MD   20 mg at 03/11/18 0745   • PARoxetine (PAXIL) tablet 10 mg  10 mg Oral QAM Orlin Meza MD   10 mg at 03/10/18 0815   • pneumococcal polysaccharide 23-valent (PNEUMOVAX-23) vaccine 0.5 mL  0.5 mL Intramuscular During Hospitalization [facility]  "Rangel Meza MD       • rivaroxaban (XARELTO) tablet 10 mg  10 mg Oral Daily With Dinner Orlin Meza MD   10 mg at 03/10/18 1702   • sennosides-docusate sodium (SENOKOT-S) 8.6-50 MG tablet 2 tablet  2 tablet Oral Nightly Orlin Meza MD   2 tablet at 03/10/18 2006   • sodium chloride 0.9 % infusion  150 mL/hr Intravenous Continuous Orlin Meza  mL/hr at 18 182 150 mL/hr at 18 182   • traMADol (ULTRAM) tablet 50 mg  50 mg Oral Q6H Orlin Meza MD   50 mg at 18 0534           Data:     Code Status:  Full Code    Family History   Problem Relation Age of Onset   • Breast cancer Neg Hx        Social History     Social History   • Marital status:      Spouse name: N/A   • Number of children: N/A   • Years of education: N/A     Occupational History   • Not on file.     Social History Main Topics   • Smoking status: Former Smoker     Packs/day: 0.50     Types: Cigarettes     Quit date:    • Smokeless tobacco: Never Used   • Alcohol use No   • Drug use: No   • Sexual activity: Defer     Other Topics Concern   • Not on file     Social History Narrative   • No narrative on file       Vitals:  /51 (BP Location: Left arm, Patient Position: Lying)   Pulse 81   Temp 99.1 °F (37.3 °C) (Oral)   Resp 16   Ht 152.4 cm (60\")   Wt 75.3 kg (166 lb)   SpO2 93%   Breastfeeding? No   BMI 32.42 kg/m²    Temp (24hrs), Av.5 °F (36.9 °C), Min:97.6 °F (36.4 °C), Max:99.1 °F (37.3 °C)      I/O (24Hr):    Intake/Output Summary (Last 24 hours) at 18 0811  Last data filed at 03/10/18 2205   Gross per 24 hour   Intake              170 ml   Output              300 ml   Net             -130 ml       Labs:  Lab Results (last 72 hours)     Procedure Component Value Units Date/Time    Basic Metabolic Panel [[id number]]  (Abnormal) Collected:  03/10/18 0838    Specimen:  Blood Updated:  03/10/18 0936     Glucose 145 (H) mg/dL      BUN 11 mg/dL      " Creatinine 0.54 mg/dL      Sodium 141 mmol/L      Potassium 3.2 (L) mmol/L      Chloride 96 (L) mmol/L      CO2 36.0 (H) mmol/L      Calcium 8.2 (L) mg/dL      eGFR Non African Amer 115 mL/min/1.73      BUN/Creatinine Ratio 20.4     Anion Gap 9.0 mmol/L     Narrative:       GFR Normal >60  Chronic Kidney Disease <60  Kidney Failure <15    CBC & Differential [[id number]] Collected:  03/10/18 0838    Specimen:  Blood Updated:  03/10/18 0848    Narrative:       The following orders were created for panel order CBC & Differential.  Procedure                               Abnormality         Status                     ---------                               -----------         ------                     CBC Auto Differential[[id number]]        Abnormal            Final result                 Please view results for these tests on the individual orders.    CBC Auto Differential [[id number]]  (Abnormal) Collected:  03/10/18 0838    Specimen:  Blood Updated:  03/10/18 0848     WBC 13.02 (H) 10*3/mm3      RBC 3.80 (L) 10*6/mm3      Hemoglobin 10.4 (L) g/dL      Hematocrit 30.9 (L) %      MCV 81.3 (L) fL      MCH 27.4 (L) pg      MCHC 33.7 g/dL      RDW 13.9 %      RDW-SD 40.9 fl      MPV 9.6 fL      Platelets 280 10*3/mm3      Neutrophil % 83.1 (H) %      Lymphocyte % 7.3 (L) %      Monocyte % 9.1 %      Eosinophil % 0.0 %      Basophil % 0.1 %      Immature Grans % 0.4 %      Neutrophils, Absolute 10.83 (H) 10*3/mm3      Lymphocytes, Absolute 0.95 10*3/mm3      Monocytes, Absolute 1.18 10*3/mm3      Eosinophils, Absolute 0.00 10*3/mm3      Basophils, Absolute 0.01 10*3/mm3      Immature Grans, Absolute 0.05 (H) 10*3/mm3      nRBC 0.0 /100 WBC               Physical Exam:     Left Knee: Incision is clean, dry, intact. Dressing is Clean, Dry, Intact. Neurovascular intact distally. Moderate tenderness to palpation, soft throughout. No signs or symptoms of DVT or PE.      Assessment:     Primary Problem  Left knee DJD  POD 2 Days  Post-Op LEFT TOTAL KNEE ARTHROPLASTY (Left) tka         Plan:     1. DC home today after passing PT.    1. Continue PT/OT.  2. Continue DVT prophylaxis.  3. Continue WBAT left lower extremity.  4. Anticipate discharge today  if pain well controlled.      Electronically signed by Orlin Meza MD on 3/11/2018 at 8:11 AM       Electronically signed by Orlin Meza MD at 3/11/2018  8:13 AM     You Orozco MD at 3/10/2018 11:32 AM           Family Medicine Progress Note    Patient:  Sammi Cordero  YOB: 1955    MRN: [id number]     Acct: [id number]     Admit date: 3/9/2018    Patient Seen, Chart, Consults notes, Labs, Radiology studies reviewed.    Subjective: Day 1 of stay with end-stage osteoarthritis of the left knee and most recent (in last 24 hours) has had no specific issues postoperatively.  She was able to work with physical therapy.  Pain is reasonably controlled.  Is tolerating diet.    Past, Family, Social History unchanged from admission.    Diet: Diet Regular    Medications:  Scheduled Meds:  atorvastatin 10 mg Oral Nightly   clindamycin 900 mg Intravenous Q8H   famotidine 20 mg Oral Daily   magnesium oxide 2,000 mg Oral Q PM   oxyCODONE 10 mg Oral Q12H   PARoxetine 10 mg Oral QAM   rivaroxaban 10 mg Oral Daily With Dinner   sennosides-docusate sodium 2 tablet Oral Nightly   traMADol 50 mg Oral Q6H     Continuous Infusions:  sodium chloride 150 mL/hr Last Rate: 150 mL/hr (03/09/18 8756)     PRN Meds:•  acetaminophen **OR** acetaminophen **OR** acetaminophen  •  diazePAM  •  HYDROmorphone **AND** naloxone  •  Morphine **AND** naloxone  •  ondansetron **OR** ondansetron ODT **OR** ondansetron  •  oxyCODONE  •  pneumococcal polysaccharide 23-valent    Objective:    Lab Results (last 24 hours)     Procedure Component Value Units Date/Time    Basic Metabolic Panel [[id number]]  (Abnormal) Collected:  03/10/18 0838    Specimen:  Blood Updated:  03/10/18 0936     Glucose  145 (H) mg/dL      BUN 11 mg/dL      Creatinine 0.54 mg/dL      Sodium 141 mmol/L      Potassium 3.2 (L) mmol/L      Chloride 96 (L) mmol/L      CO2 36.0 (H) mmol/L      Calcium 8.2 (L) mg/dL      eGFR Non African Amer 115 mL/min/1.73      BUN/Creatinine Ratio 20.4     Anion Gap 9.0 mmol/L     Narrative:       GFR Normal >60  Chronic Kidney Disease <60  Kidney Failure <15    CBC & Differential [[id number]] Collected:  03/10/18 0838    Specimen:  Blood Updated:  03/10/18 0848    Narrative:       The following orders were created for panel order CBC & Differential.  Procedure                               Abnormality         Status                     ---------                               -----------         ------                     CBC Auto Differential[[id number]]        Abnormal            Final result                 Please view results for these tests on the individual orders.    CBC Auto Differential [[id number]]  (Abnormal) Collected:  03/10/18 0838    Specimen:  Blood Updated:  03/10/18 0848     WBC 13.02 (H) 10*3/mm3      RBC 3.80 (L) 10*6/mm3      Hemoglobin 10.4 (L) g/dL      Hematocrit 30.9 (L) %      MCV 81.3 (L) fL      MCH 27.4 (L) pg      MCHC 33.7 g/dL      RDW 13.9 %      RDW-SD 40.9 fl      MPV 9.6 fL      Platelets 280 10*3/mm3      Neutrophil % 83.1 (H) %      Lymphocyte % 7.3 (L) %      Monocyte % 9.1 %      Eosinophil % 0.0 %      Basophil % 0.1 %      Immature Grans % 0.4 %      Neutrophils, Absolute 10.83 (H) 10*3/mm3      Lymphocytes, Absolute 0.95 10*3/mm3      Monocytes, Absolute 1.18 10*3/mm3      Eosinophils, Absolute 0.00 10*3/mm3      Basophils, Absolute 0.01 10*3/mm3      Immature Grans, Absolute 0.05 (H) 10*3/mm3      nRBC 0.0 /100 WBC            Imaging Results (last 72 hours)     Procedure Component Value Units Date/Time    XR Knee 1 or 2 View Left [[id number]] Collected:  03/09/18 1218     Updated:  03/09/18 1221    Narrative:       EXAMINATION:   XR KNEE 1 OR 2 VW LEFT-  3/9/2018  "12:18 PM CST     HISTORY: Postop     AP and lateral view the left knee is obtained. Postsurgical changes  noted from total left knee arthroplasty. Skeletal structures are  relatively aligned.     IMPRESSION status post left knee arthroplasty  This report was finalized on 03/09/2018 12:18 by Dr. Tim Smith MD.           Physical Exam:    Vitals: /62 (BP Location: Left arm, Patient Position: Lying)   Pulse 75   Temp 97.6 °F (36.4 °C) (Oral)   Resp 18   Ht 152.4 cm (60\")   Wt 75.3 kg (166 lb)   SpO2 94%   Breastfeeding? No   BMI 32.42 kg/m²    24 hour intake/output:  Intake/Output Summary (Last 24 hours) at 03/10/18 1133  Last data filed at 03/10/18 0338   Gross per 24 hour   Intake                0 ml   Output             2375 ml   Net            -2375 ml     Last 3 weights:  Wt Readings from Last 3 Encounters:   03/09/18 75.3 kg (166 lb)   02/27/18 79.4 kg (175 lb 0.7 oz)   10/17/16 80.7 kg (178 lb)       General Appearance alert, appears stated age and cooperative  Head normocephalic, without obvious abnormality  Eyes lids and lashes normal, conjunctivae and sclerae normal and no icterus  Neck suppple and trachea midline  Lungs clear to auscultation, respirations regular, respirations even and respirations unlabored  Heart regular rhythm & normal rate and normal S1, S2  Abdomen normal bowel sounds, no masses, soft non-tender and no guarding  Extremities moves extremities well and no edema  Skin Wound clean dry and intact  Neurologic Mental Status orientated to person, place, time and situation        Assessment:    Principal Problem:    Left knee DJD  Active Problems:    Hypoglycemia    Hyperlipidemia          Plan:  Continue postoperative routine care.  Watch for any signs of constipation and obstipation RELATED to surgery.  Blood counts currently at an expected level of anemia.  Anticipate that without further problems in her pain is controlled she will likely be discharged home in the next 24-48 " hours.      Electronically signed by You Orozco MD on 3/10/2018 at 11:33 AM              Electronically signed by You Orozco MD at 3/10/2018 11:38 AM     Orlin Meza MD at 3/10/2018  9:19 AM          Orthopaedic Progress Note      3/10/2018   9:19 AM    Name:  Sammi Cordero  MRN:    [id number]     Acct:     [id number]   Room:  [address] [place] [address] Day: 1     Admit Date: 3/9/2018  PCP: Avery Whaley MD        Subjective:     C/C: POD 1 Day Post-Op LEFT TOTAL KNEE ARTHROPLASTY (Left) tka    Interval History: Status: remained stable     Pain: remain unchanged       Medications:     Allergies:   Allergies   Allergen Reactions   • Penicillins Swelling and Rash   • Sulfa Antibiotics Rash and Other (See Comments)     Temp. fluctuations        Current Meds:   Current Facility-Administered Medications   Medication Dose Route Frequency Provider Last Rate Last Dose   • acetaminophen (TYLENOL) tablet 650 mg  650 mg Oral Q4H PRN Orlin Meza MD        Or   • acetaminophen (TYLENOL) 160 MG/5ML solution 650 mg  650 mg Oral Q4H PRN Orlin Meza MD        Or   • acetaminophen (TYLENOL) suppository 650 mg  650 mg Rectal Q4H PRN Orlin Meza MD       • atorvastatin (LIPITOR) tablet 10 mg  10 mg Oral Nightly Orlin Meza MD   10 mg at 03/09/18 2044   • clindamycin (CLEOCIN) 900 mg in dextrose 5% 50 mL IVPB (premix)  900 mg Intravenous Q8H Orlin Meza MD 50 mL/hr at 03/10/18 0601 900 mg at 03/10/18 0601   • diazePAM (VALIUM) tablet 5 mg  5 mg Oral Q6H PRN Orlin Meza MD       • famotidine (PEPCID) tablet 20 mg  20 mg Oral Daily Orlin Meza MD   20 mg at 03/10/18 0815   • HYDROmorphone (DILAUDID) injection 1 mg  1 mg Intravenous Q4H PRN Orlin Meza MD        And   • naloxone (NARCAN) injection 0.1 mg  0.1 mg Intravenous Q5 Min PRN Orlin Meza MD       • magnesium oxide (MAGOX) tablet 2,000 mg  2,000 mg Oral Q PM  Orlin Meza MD   2,000 mg at 03/09/18 1826   • Morphine sulfate (PF) injection 4 mg  4 mg Intravenous Q2H PRN Orlin Meza MD   2 mg at 03/10/18 0409    And   • naloxone (NARCAN) injection 0.4 mg  0.4 mg Intravenous Q5 Min PRN Orlin Meza MD       • ondansetron (ZOFRAN) tablet 4 mg  4 mg Oral Q6H PRN Orlin Meza MD        Or   • ondansetron ODT (ZOFRAN-ODT) disintegrating tablet 4 mg  4 mg Oral Q6H PRN Orlin Meza MD        Or   • ondansetron (ZOFRAN) injection 4 mg  4 mg Intravenous Q6H PRN Orlin Meza MD       • oxyCODONE (oxyCONTIN) 12 hr tablet 10 mg  10 mg Oral Q12H rOlin Meza MD   10 mg at 03/10/18 0815   • oxyCODONE (ROXICODONE) immediate release tablet 20 mg  20 mg Oral Q3H PRN Orlin Meza MD       • PARoxetine (PAXIL) tablet 10 mg  10 mg Oral QAM Orlin Meza MD   10 mg at 03/10/18 0815   • pneumococcal polysaccharide 23-valent (PNEUMOVAX-23) vaccine 0.5 mL  0.5 mL Intramuscular During Hospitalization Orlin Meza MD       • rivaroxaban (XARELTO) tablet 10 mg  10 mg Oral Daily With Dinner Orlin Meza MD   10 mg at 03/09/18 1827   • sennosides-docusate sodium (SENOKOT-S) 8.6-50 MG tablet 2 tablet  2 tablet Oral Nightly Orlin Meza MD   2 tablet at 03/09/18 2044   • sodium chloride 0.9 % infusion  150 mL/hr Intravenous Continuous Orlin Meza  mL/hr at 03/09/18 1825 150 mL/hr at 03/09/18 1825   • traMADol (ULTRAM) tablet 50 mg  50 mg Oral Q6H Orlin Meza MD               Data:     Code Status:  Full Code    Family History   Problem Relation Age of Onset   • Breast cancer Neg Hx        Social History     Social History   • Marital status:      Spouse name: N/A   • Number of children: N/A   • Years of education: N/A     Occupational History   • Not on file.     Social History Main Topics   • Smoking status: Former Smoker     Packs/day: 0.50      "Types: Cigarettes     Quit date:    • Smokeless tobacco: Never Used   • Alcohol use No   • Drug use: No   • Sexual activity: Defer     Other Topics Concern   • Not on file     Social History Narrative   • No narrative on file       Vitals:  /63 (BP Location: Left arm, Patient Position: Lying)   Pulse 73   Temp 97.7 °F (36.5 °C) (Oral)   Resp 18   Ht 152.4 cm (60\")   Wt 75.3 kg (166 lb)   SpO2 95%   Breastfeeding? No   BMI 32.42 kg/m²    Temp (24hrs), Av.6 °F (36.4 °C), Min:96.8 °F (36 °C), Max:98.2 °F (36.8 °C)      I/O (24Hr):    Intake/Output Summary (Last 24 hours) at 03/10/18 0919  Last data filed at 03/10/18 0338   Gross per 24 hour   Intake             1300 ml   Output             2675 ml   Net            -1375 ml       Labs:  Lab Results (last 72 hours)     Procedure Component Value Units Date/Time    CBC & Differential [[id number]] Collected:  03/10/18 0838    Specimen:  Blood Updated:  03/10/18 0848    Narrative:       The following orders were created for panel order CBC & Differential.  Procedure                               Abnormality         Status                     ---------                               -----------         ------                     CBC Auto Differential[[id number]]        Abnormal            Final result                 Please view results for these tests on the individual orders.    CBC Auto Differential [[id number]]  (Abnormal) Collected:  03/10/18 0838    Specimen:  Blood Updated:  03/10/18 0848     WBC 13.02 (H) 10*3/mm3      RBC 3.80 (L) 10*6/mm3      Hemoglobin 10.4 (L) g/dL      Hematocrit 30.9 (L) %      MCV 81.3 (L) fL      MCH 27.4 (L) pg      MCHC 33.7 g/dL      RDW 13.9 %      RDW-SD 40.9 fl      MPV 9.6 fL      Platelets 280 10*3/mm3      Neutrophil % 83.1 (H) %      Lymphocyte % 7.3 (L) %      Monocyte % 9.1 %      Eosinophil % 0.0 %      Basophil % 0.1 %      Immature Grans % 0.4 %      Neutrophils, Absolute 10.83 (H) 10*3/mm3      Lymphocytes, " Absolute 0.95 10*3/mm3      Monocytes, Absolute 1.18 10*3/mm3      Eosinophils, Absolute 0.00 10*3/mm3      Basophils, Absolute 0.01 10*3/mm3      Immature Grans, Absolute 0.05 (H) 10*3/mm3      nRBC 0.0 /100 WBC     Basic Metabolic Panel [[id number]] Collected:  03/10/18 0838    Specimen:  Blood Updated:  03/10/18 0841              Physical Exam:     Left Knee: Incision is clean, dry, intact. Dressing is Clean, Dry, Intact. Neurovascular intact distally. Moderate tenderness to palpation, soft throughout. No signs or symptoms of DVT or PE.      Assessment:     Primary Problem  Left knee DJD  POD 1 Day Post-Op LEFT TOTAL KNEE ARTHROPLASTY (Left) tka         Plan:     2.     1. Continue PT/OT.  2. Continue DVT prophylaxis.  3. Continue WBAT left lower extremity.  4. Anticipate discharge tomorrow if pain well controlled.      Electronically signed by Orlin Meza MD on 3/10/2018 at 9:19 AM       Electronically signed by Orlin Meza MD at 3/10/2018  9:21 AM          Discharge Summary      Orlin Meza MD at 3/11/2018  8:21 AM            Date of Discharge:  3/11/2018    Discharge Diagnosis: djd knee    Presenting Problem/History of Present Illness  Left knee DJD [M17.12]       Hospital Course  Patient is a 62 y.o. female presented with djd l knee.      Procedures Performed  Procedure(s):  LEFT TOTAL KNEE ARTHROPLASTY       Consults:   Consults     Date and Time Order Name Status Description    3/9/2018 1237 Inpatient Consult to Hospitalist            Pertinent Test Results: wnl    Condition on Discharge:  stable    Vital Signs  Temp:  [97.6 °F (36.4 °C)-99.1 °F (37.3 °C)] 99.1 °F (37.3 °C)  Heart Rate:  [75-81] 79  Resp:  [16-18] 16  BP: (118-155)/(51-62) 118/59    Physical Exam:     General Appearance:    Alert, cooperative, in no acute distress   Head:    Normocephalic, without obvious abnormality, atraumatic   Eyes:            Lids and lashes normal, conjunctivae and sclerae normal, no    icterus, no pallor, corneas clear, PERRLA   Ears:    Ears appear intact with no abnormalities noted   Throat:   No oral lesions, no thrush, oral mucosa moist   Neck:   No adenopathy, supple, trachea midline, no thyromegaly, no     carotid bruit, no JVD   Back:     No kyphosis present, no scoliosis present, no skin lesions,       erythema or scars, no tenderness to percussion or                   palpation,   range of motion normal   Lungs:     Clear to auscultation,respirations regular, even and                   unlabored    Heart:    Regular rhythm and normal rate, normal S1 and S2, no            murmur, no gallop, no rub, no click   Breast Exam:    Deferred   Abdomen:     Normal bowel sounds, no masses, no organomegaly, soft        non-tender, non-distended, no guarding, no rebound                 tenderness   Genitalia:    Deferred   Extremities:   Moves all extremities well, no edema, no cyanosis, no              redness   Pulses:   Pulses palpable and equal bilaterally   Skin:   No bleeding, bruising or rash   Lymph nodes:   No palpable adenopathy   Neurologic:   Cranial nerves 2 - 12 grossly intact, sensation intact, DTR        present and equal bilaterally       Discharge Disposition  Home or Self Care    Discharge Medications   Sammi Cordero   Home Medication Instructions CARMEN:[id number]    Printed on:03/11/18 6567   Medication Information                      diazePAM (VALIUM) 5 MG tablet  Take 1 tablet by mouth Every 6 (Six) Hours As Needed for Muscle Spasms for up to 40 doses.             magnesium oxide (MAGOX) 400 (241.3 MG) MG tablet tablet  Take 2,000 mg by mouth Every Evening.             ondansetron (ZOFRAN) 4 MG tablet  Take 1 tablet by mouth Every 6 (Six) Hours As Needed for Nausea or Vomiting.             oxyCODONE (ROXICODONE) 10 MG tablet  1 to 2 tabs po q 3 to 4 hours prn             PARoxetine (PAXIL) 10 MG tablet  Take 10 mg by mouth Every Morning.             rivaroxaban (XARELTO) 10  MG tablet  Take one tab po QD x 19 days.    Then ASA 81 mg po BID x 3 weeks if able to tolerate aspirin             sennosides-docusate sodium (SENOKOT-S) 8.6-50 MG tablet  Take 2 tablets by mouth Every Night.             simvastatin (ZOCOR) 20 MG tablet  Take 1 tablet by mouth daily.                 Discharge Diet:     Activity at Discharge:     Follow-up Appointments  No future appointments.      Test Results Pending at Discharge       Orlin Meza MD  03/11/18  8:27 AM    Time: 15 minutes          Electronically signed by Orlin Meza MD at 3/11/2018  8:29 AM

## 2018-03-12 NOTE — THERAPY TREATMENT NOTE
Acute Care - Physical Therapy Treatment Note  [facility]     Patient Name: Sammi Cordero  : 1955  MRN: [id number]  Today's Date: 3/12/2018  Onset of Illness/Injury or Date of Surgery: 18  Date of Referral to PT: 18  Referring Physician: Vipul Land    Admit Date: 3/9/2018    Visit Dx:    ICD-10-CM ICD-9-CM   1. Impaired mobility Z74.09 799.89     Patient Active Problem List   Diagnosis   • Left knee DJD   • Hypoglycemia   • Hyperlipidemia       Therapy Treatment    Therapy Treatment / Health Promotion    Treatment Time/Intention  Discipline: physical therapy assistant  Document Type: therapy note (daily note)  Subjective Information: complains of, pain, fatigue, weakness  Patient Effort: good    Vitals/Pain/Safety  Vital Signs  Post Systolic BP Rehab: 129  Post Treatment Diastolic BP: 57  Posttreatment Heart Rate (beats/min): 84  Post SpO2 (%): 97  O2 Delivery Post Treatment: room air  Pre Patient Position: Supine  Intra Patient Position: Sitting  Post Patient Position: Supine  Pain Scale: Numbers Pre/Post-Treatment  Pain Scale: Numbers, Pretreatment: 7/10  Pain Location - Side: Left  Pain Location: knee  Pain Intervention(s): Medication (See MAR), Ambulation/increased activity  Pain Scale: Word Pre/Post-Treatment  Pain Location - Side: Left  Pain Location: knee  Pain Intervention(s): Medication (See MAR), Ambulation/increased activity  Pain Scale: FACES Pre/Post-Treatment  Pain Location - Side: Left  Pain Location: knee  Pain Intervention(s): Medication (See MAR), Ambulation/increased activity  Positioning and Restraints  Pre-Treatment Position: in bed  Post Treatment Position: bed  In Bed: supine, call light within reach, encouraged to call for assist, with family/caregiver    Mobility,ADL,Motor, Modality  Bed Mobility Assessment/Treatment  Supine-Sit-Supine [place] (Bed Mobility): contact guard, minimum assist (75% patient effort), verbal cues  Bed Mobility, Safety Issues: decreased use  of legs for bridging/pushing  Assistive Device (Bed Mobility): bed rails  Transfer Assessment/Treatment  Transfer Assessment/Treatment: toilet transfer  Sit-Stand Transfer  Sit-Stand [place] (Transfers): conditional independence  Assistive Device (Sit-Stand Transfers): walker, front-wheeled  Stand-Sit Transfer  Stand-Sit [place] (Transfers): conditional independence  Assistive Device (Stand-Sit Transfers): walker, front-wheeled  Toilet Transfer  Type (Toilet Transfer): sit-stand, stand-sit (into bathroom)  [place] Level (Toilet Transfer): contact guard  Assistive Device (Toilet Transfer): walker, front-wheeled  Gait/Stairs Assessment/Training  [place] Level (Gait): conditional independence  Assistive Device (Gait): walker, front-wheeled  Distance in Feet (Gait): 10 (sitting rest, 10', sitting, 10')  Pattern (Gait): step-to  Bilateral Gait Deviations: heel strike decreased  Right Sided Gait Deviations:  (slides R foot forward intermittently)  [place] Level (Stairs):  (Pt )  Comment (Gait/Stairs): Pt unable to ascend stairs. Family will take pt up stairs by wheelchair.     Therapeutic Exercise  Exercise Type (Therapeutic Exercise): AAROM (active assistive range of motion)  Position (Therapeutic Exercise): supine  Sets/Reps (Therapeutic Exercise): 10           ROM/MMT             Sensory, Edema, Orthotics          Cognition, Communication, Swallow  Cognitive Assessment/Intervention- PT/OT  Personal Safety Interventions: gait belt, fall prevention program maintained, nonskid shoes/slippers when out of bed  Speaking Valve  Posttreatment Heart Rate (beats/min): 84  Post SpO2 (%): 97  General Eating/Swallowing Observations  Post SpO2 (%): 97    Outcome Summary               PT Rehab Goals     Row Name 03/10/18 1113             Transfer Goal 1 (PT)    Activity/Assistive Device (Transfer Goal 1, PT) sit-to-stand/stand-to-sit;bed-to-chair/chair-to-bed;walker, rolling  -TOD      [place] Level/Cues  Needed (Transfer Goal 1, PT) independent  -TOD      Time Frame (Transfer Goal 1, PT) long term goal (LTG);10 days  -TOD      Barriers (Transfers Goal 1, PT) pain, ROM  -TOD      Progress/Outcome (Transfer Goal 1, PT) goal ongoing  -TOD         Gait Training Goal 1 (PT)    Activity/Assistive Device (Gait Training Goal 1, PT) gait (walking locomotion);walker, rolling  -TOD      [place] Level (Gait Training Goal 1, PT) independent  -TOD      Distance (Gait Goal 1, PT) 200  -TOD      Time Frame (Gait Training Goal 1, PT) long term goal (LTG);10 days  -TOD      Barriers (Gait Training Goal 1, PT) pain, ROM  -TOD      Progress/Outcome (Gait Training Goal 1, PT) goal ongoing  -TOD         ROM Goal 1 (PT)    ROM Goal 1 (PT) L knee 0-90 degrees, AAROM  -TOD      Time Frame (ROM Goal 1, PT) long term goal (LTG)   10 days  -TOD      Barriers (ROM Goal 1, PT) pain, ROM  -TOD      Progress/Outcome (ROM Goal 1, PT) goal ongoing  -TOD         Stairs Goal 1 (PT)    Activity/Assistive Device (Stairs Goal 1, PT) stairs, all skills;step-over step;step-to-step;walker, rolling;using handrail;ascending stairs;descending stairs  -TOD      [place] Level/Cues Needed (Stairs Goal 1, PT) contact guard assist  -TOD      Number of Stairs (Stairs Goal 1, PT) 7  -TOD      Time Frame (Stairs Goal 1, PT) long term goal (LTG);10 days  -TOD      Barriers (Stairs Goal 1, PT) pain, ROM  -TOD      Progress/Outcome (Stairs Goal 1, PT) goal ongoing  -TOD        User Key  (r) = Recorded By, (t) = Taken By, (c) = Cosigned By    Initials Name Provider Type    TOD Pastor, PT DPT Physical Therapist          Physical Therapy Education     Title: PT OT SLP Therapies (Active)     Topic: Physical Therapy (Active)     Point: Mobility training (Active)    Learning Progress Summary     Learner Status Readiness Method Response Comment Documented by    Patient Active Acceptance E,D NR bed mobility, transfers, gait, stairs, plan of care CW 03/12/18 Jorge     Active  Acceptance E,D NR bed mobility,, transfers,, gait, plan of care CW 03/11/18 1038     Done Acceptance E CIERRA WILLIAMSON Educated pt/family on progression of PT POC and benefits of activity TOD 03/10/18 1113    Family Done Acceptance E CIERRA WILLIAMSON Educated pt/family on progression of PT POC and benefits of activity TOD 03/10/18 1113          Point: Home exercise program (Active)    Learning Progress Summary     Learner Status Readiness Method Response Comment Documented by    Patient Active Acceptance E,D NR bed mobility, transfers, gait, stairs, plan of care CW 03/12/18 1038     Active Acceptance E,D NR bed mobility,, transfers,, gait, plan of care CW 03/11/18 1038          Point: Body mechanics (Active)    Learning Progress Summary     Learner Status Readiness Method Response Comment Documented by    Patient Active Acceptance E,D NR bed mobility, transfers, gait, stairs, plan of care CW 03/12/18 1038     Active Acceptance E,D NR bed mobility,, transfers,, gait, plan of care CW 03/11/18 1038          Point: Precautions (Active)    Learning Progress Summary     Learner Status Readiness Method Response Comment Documented by    Patient Active Acceptance E,D NR bed mobility, transfers, gait, stairs, plan of care CW 03/12/18 1038                      User Key     Initials Effective Dates Name Provider Type Discipline     08/02/16 -  Brandon Pastor, PT DPT Physical Therapist PT     06/22/15 -  Tammy Jimenez PTA Physical Therapy Assistant PT                    PT Recommendation and Plan  Anticipated Discharge Disposition (PT): home or self care (outpatient PT)  Planned Therapy Interventions (PT Eval): bed mobility training, transfer training, gait training, balance training, home exercise program, patient/family education, postural re-education, stair training, strengthening, ROM (range of motion)  Therapy Frequency (PT Clinical Impression): 2 times/day             Outcome Measures     Row Name 03/12/18 1000 03/11/18 1000  03/10/18 1113       How much help from another person do you currently need...    Turning from your back to your side while in flat bed without using bedrails? 4  -CW 4  -CW 3  -TOD    Moving from lying on back to sitting on the side of a flat bed without bedrails? 3  -CW 3  -CW 3  -TOD    Moving to and from a bed to a chair (including a wheelchair)? 3  -CW 3  -CW 3  -TOD    Standing up from a chair using your arms (e.g., wheelchair, bedside chair)? 4  -CW 3  -CW 3  -TOD    Climbing 3-5 steps with a railing? 1  -CW 3  -CW 3  -TOD    To walk in hospital room? 4  -CW 3  -CW 3  -TOD    AM-PAC 6 Clicks Score 19  -CW 19  -CW 18  -TOD       Functional Assessment    Outcome Measure Options AM-PAC 6 Clicks Basic Mobility (PT)  -CW AM-PAC 6 Clicks Basic Mobility (PT)  -CW AM-PAC 6 Clicks Basic Mobility (PT)  -TOD      User Key  (r) = Recorded By, (t) = Taken By, (c) = Cosigned By    Initials Name Provider Type    TOD Pastor, PT DPT Physical Therapist    CW Tammy Jimenez PTA Physical Therapy Assistant           Time Calculation:         PT Charges     Row Name 03/12/18 1044             Time Calculation    Start Time 0900  -CW      Stop Time 1003  -CW      Time Calculation (min) 63 min  -CW      PT Received On 03/12/18  -CW      PT Goal Re-Cert Due Date 03/20/18  -CW         Time Calculation- PT    Total Timed Code Minutes- PT 63 minute(s)  -CW        User Key  (r) = Recorded By, (t) = Taken By, (c) = Cosigned By    Initials Name Provider Type    BENTLEY Jimenez PTA Physical Therapy Assistant          Therapy Charges for Today     Code Description Service Date Service Provider Modifiers Qty    02237145347 HC PT THER PROC EA 15 MIN 3/11/2018 Tammy Jimenez PTA GP 1    25365575793 HC GAIT TRAINING EA 15 MIN 3/11/2018 Tammy Jimenez PTA GP 1    12640265832 HC PT THERAPEUTIC ACT EA 15 MIN 3/12/2018 Tammy Jimenez PTA GP 2    24428063630 HC PT THER PROC EA 15 MIN 3/12/2018 Tammy Jimenez PTA GP 1     [id number]  GAIT TRAINING EA 15 MIN 3/12/2018 Tammy Jimenez, PTA GP 1          PT G-Codes  Outcome Measure Options: AM-PAC 6 Clicks Basic Mobility (PT)  Score: 18  Functional Limitation: Mobility: Walking and moving around  Mobility: Walking and Moving Around Current Status (): At least 40 percent but less than 60 percent impaired, limited or restricted  Mobility: Walking and Moving Around Goal Status (): At least 1 percent but less than 20 percent impaired, limited or restricted    Tammy Jimenez PTA  3/12/2018

## 2018-06-08 ENCOUNTER — TRANSCRIBE ORDERS (OUTPATIENT)
Dept: ADMINISTRATIVE | Facility: HOSPITAL | Age: 63
End: 2018-06-08

## 2018-06-08 DIAGNOSIS — Z12.39 BREAST SCREENING: Primary | ICD-10-CM

## 2018-08-22 ENCOUNTER — OFFICE VISIT (OUTPATIENT)
Dept: OBSTETRICS AND GYNECOLOGY | Facility: CLINIC | Age: 63
End: 2018-08-22

## 2018-08-22 VITALS
SYSTOLIC BLOOD PRESSURE: 140 MMHG | DIASTOLIC BLOOD PRESSURE: 84 MMHG | BODY MASS INDEX: 30.43 KG/M2 | WEIGHT: 155 LBS | HEIGHT: 60 IN

## 2018-08-22 DIAGNOSIS — Z01.419 WELL WOMAN EXAM WITH ROUTINE GYNECOLOGICAL EXAM: Primary | ICD-10-CM

## 2018-08-22 DIAGNOSIS — Z12.4 ENCOUNTER FOR PAPANICOLAOU SMEAR FOR CERVICAL CANCER SCREENING: ICD-10-CM

## 2018-08-22 PROCEDURE — 99396 PREV VISIT EST AGE 40-64: CPT | Performed by: NURSE PRACTITIONER

## 2018-08-22 PROCEDURE — G0123 SCREEN CERV/VAG THIN LAYER: HCPCS | Performed by: NURSE PRACTITIONER

## 2018-08-22 RX ORDER — MAGNESIUM CHLORIDE 71.5 G/G
1 TABLET ORAL 4 TIMES DAILY
COMMUNITY
End: 2018-09-02

## 2018-08-22 NOTE — PROGRESS NOTES
Sammi Cordero is a 63 y.o.      Chief Complaint   Patient presents with   • Gynecologic Exam     Here for annual exam, pap smear. LPS 5-4-16 WNL. Has mammogram ordered for [facility] at the end of the month.  No gyn complaints.            HPI - Sammi is in for annual  Exam.  She is doing well,  She has no vaginal bleeding. She is not taking hormones.  In the 's, she had cryo by Dr. Sanchez.  There is no ovarian, breast or colon cancer in her family.   Dr. Whaley is her PCP. She denies any gyn or bladder problems.  She has Gitelman's Syndrome (causes magnesium to run low).      The following portions of the patient's history were reviewed and updated as appropriate:vital signs, allergies, current medications, past family history, past medical history, past social history, past surgical history and problem list.      Current Outpatient Prescriptions:   •  magnesium cl-calcium carbonate (SLOW-MAG) 71.5-119 MG tablet delayed-release tablet, Take 1 tablet by mouth 4 (Four) Times a Day. Takes 4 tabs daily, Disp: , Rfl:   •  PARoxetine (PAXIL) 10 MG tablet, Take 10 mg by mouth Every Morning., Disp: , Rfl: 5  •  simvastatin (ZOCOR) 20 MG tablet, Take 1 tablet by mouth daily., Disp: , Rfl: 9    Review of Systems   Constitutional: Negative for activity change, appetite change and chills.   HENT: Negative for congestion, dental problem, drooling and ear pain.    Eyes: Negative for blurred vision, double vision and itching.   Respiratory: Negative for apnea, choking, chest tightness and shortness of breath.    Cardiovascular: Negative for chest pain and leg swelling.   Gastrointestinal: Negative for abdominal distention, abdominal pain, blood in stool and diarrhea.   Endocrine: Negative for cold intolerance, heat intolerance and breast discharge.   Genitourinary: Negative for difficulty urinating, frequency and urgency.   Musculoskeletal: Positive for arthralgias and myalgias.   Skin: Negative for color change and dry  "skin.   Neurological: Negative for seizures, syncope, facial asymmetry, light-headedness and confusion.   Psychiatric/Behavioral: Negative for agitation, behavioral problems, hallucinations and depressed mood.     Breast ROS: negative    Objective      /84 (BP Location: Right arm, Patient Position: Sitting, Cuff Size: Adult)   Ht 152.4 cm (60\")   Wt 70.3 kg (155 lb)   Breastfeeding? No   BMI 30.27 kg/m²       Physical Exam   Constitutional: She is oriented to person, place, and time. She appears well-developed and well-nourished. No distress.   HENT:   Head: Normocephalic and atraumatic.   Eyes: Right eye exhibits no discharge. Left eye exhibits no discharge.   Neck: Normal range of motion. Neck supple. No tracheal deviation present. No thyromegaly present.   Cardiovascular: Normal rate and regular rhythm.    No murmur heard.  Pulmonary/Chest: Effort normal and breath sounds normal. Right breast exhibits no inverted nipple and no mass. Left breast exhibits no inverted nipple and no mass.   Abdominal: Soft. She exhibits no distension. There is no tenderness.   Genitourinary: Vagina normal and uterus normal. Pelvic exam was performed with patient supine. There is no tenderness or lesion on the right labia. There is no tenderness or lesion on the left labia. Cervix does not exhibit motion tenderness, discharge or friability. Right adnexum displays no tenderness and no fullness. Left adnexum displays no tenderness and no fullness. No tenderness or bleeding in the vagina. No vaginal discharge found.   Musculoskeletal: Normal range of motion. She exhibits no edema.   Neurological: She is alert and oriented to person, place, and time. No cranial nerve deficit. Coordination normal.   Skin: Skin is warm and dry. No erythema. No pallor.   Psychiatric: She has a normal mood and affect. Her behavior is normal. Judgment normal.   Nursing note and vitals reviewed.       Assessment/Plan       Sammi was seen today for " gynecologic exam.    Diagnoses and all orders for this visit:    Sammi was seen today for gynecologic exam.    Diagnoses and all orders for this visit:    Encounter for Papanicolaou smear for cervical cancer screening  -     Liquid-based Pap Smear, Screening    Well woman exam with routine gynecological exam    Mammogram is scheduled .  Patient is counseled re: BSE, diet, exercise, mammogram, calcium and Vit. D3      Denice Talavera, APRN  8/22/2018

## 2018-08-24 LAB
GEN CATEG CVX/VAG CYTO-IMP: NORMAL
LAB AP CASE REPORT: NORMAL
LAB AP GYN ADDITIONAL INFORMATION: NORMAL
PATH INTERP SPEC-IMP: NORMAL
STAT OF ADQ CVX/VAG CYTO-IMP: NORMAL

## 2018-08-27 ENCOUNTER — HOSPITAL ENCOUNTER (OUTPATIENT)
Dept: MAMMOGRAPHY | Facility: HOSPITAL | Age: 63
Discharge: HOME | End: 2018-08-27
Attending: INTERNAL MEDICINE | Admitting: INTERNAL MEDICINE

## 2018-08-27 DIAGNOSIS — Z12.39 BREAST SCREENING: ICD-10-CM

## 2018-08-27 PROCEDURE — 77067 SCR MAMMO BI INCL CAD: CPT

## 2018-08-27 PROCEDURE — 77063 BREAST TOMOSYNTHESIS BI: CPT

## 2018-09-02 ENCOUNTER — HOSPITAL ENCOUNTER (EMERGENCY)
Facility: HOSPITAL | Age: 63
Discharge: HOME | End: 2018-09-02
Admitting: NURSE PRACTITIONER

## 2018-09-02 ENCOUNTER — APPOINTMENT (OUTPATIENT)
Dept: GENERAL RADIOLOGY | Facility: HOSPITAL | Age: 63
End: 2018-09-02

## 2018-09-02 VITALS
TEMPERATURE: 97.2 F | HEIGHT: 60 IN | HEART RATE: 57 BPM | OXYGEN SATURATION: 99 % | SYSTOLIC BLOOD PRESSURE: 130 MMHG | BODY MASS INDEX: 31.41 KG/M2 | WEIGHT: 160 LBS | DIASTOLIC BLOOD PRESSURE: 62 MMHG | RESPIRATION RATE: 18 BRPM

## 2018-09-02 DIAGNOSIS — S16.1XXA STRAIN OF NECK MUSCLE, INITIAL ENCOUNTER: Primary | ICD-10-CM

## 2018-09-02 PROCEDURE — 72050 X-RAY EXAM NECK SPINE 4/5VWS: CPT

## 2018-09-02 PROCEDURE — 96372 THER/PROPH/DIAG INJ SC/IM: CPT

## 2018-09-02 PROCEDURE — 25010000002 ORPHENADRINE CITRATE PER 60 MG: Performed by: NURSE PRACTITIONER

## 2018-09-02 PROCEDURE — 25010000002 KETOROLAC TROMETHAMINE PER 15 MG: Performed by: NURSE PRACTITIONER

## 2018-09-02 PROCEDURE — 99283 EMERGENCY DEPT VISIT LOW MDM: CPT

## 2018-09-02 RX ORDER — METHYLPREDNISOLONE 4 MG/1
TABLET ORAL
Qty: 21 TABLET | Refills: 0 | Status: SHIPPED | OUTPATIENT
Start: 2018-09-02 | End: 2018-10-18

## 2018-09-02 RX ORDER — ORPHENADRINE CITRATE 30 MG/ML
60 INJECTION INTRAMUSCULAR; INTRAVENOUS ONCE
Status: COMPLETED | OUTPATIENT
Start: 2018-09-02 | End: 2018-09-02

## 2018-09-02 RX ORDER — TRAMADOL HYDROCHLORIDE 50 MG/1
50 TABLET ORAL EVERY 6 HOURS PRN
Qty: 15 TABLET | Refills: 0 | Status: SHIPPED | OUTPATIENT
Start: 2018-09-02 | End: 2018-10-18 | Stop reason: ALTCHOICE

## 2018-09-02 RX ORDER — KETOROLAC TROMETHAMINE 30 MG/ML
60 INJECTION, SOLUTION INTRAMUSCULAR; INTRAVENOUS ONCE
Status: COMPLETED | OUTPATIENT
Start: 2018-09-02 | End: 2018-09-02

## 2018-09-02 RX ORDER — ORPHENADRINE CITRATE 100 MG/1
100 TABLET, EXTENDED RELEASE ORAL 2 TIMES DAILY PRN
Qty: 20 TABLET | Refills: 0 | Status: SHIPPED | OUTPATIENT
Start: 2018-09-02 | End: 2018-10-18 | Stop reason: ALTCHOICE

## 2018-09-02 RX ADMIN — KETOROLAC TROMETHAMINE 60 MG: 60 INJECTION, SOLUTION INTRAMUSCULAR at 12:24

## 2018-09-02 RX ADMIN — ORPHENADRINE CITRATE 60 MG: 30 INJECTION INTRAMUSCULAR; INTRAVENOUS at 12:22

## 2018-09-02 NOTE — ED PROVIDER NOTES
Subjective   Patient is a 63-year-old white female presents with neck pain for the past 4 days.  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  She states that on Wednesday she was having pain but had work on Thursday continued to pull him patient's.  She states that Friday her pain was much worse.  She denies any radicular symptoms.  No numbness or focal weakness.  Patient has taken ibuprofen and Tylenol without relief the symptoms.        History provided by:  Patient   used: No        Review of Systems   Constitutional: Negative.    HENT: Negative.    Eyes: Negative.    Respiratory: Negative.    Cardiovascular: Negative.    Gastrointestinal: Negative.    Endocrine: Negative.    Genitourinary: Negative.    Musculoskeletal:        Patient is a 63-year-old white female presents with neck pain for the past 4 days.  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  She states that on Wednesday she was having pain but had work on Thursday continued to pull him patient's.  She states that Friday her pain was much worse.  She denies any radicular symptoms.  No numbness or focal weakness.  Patient has taken ibuprofen and Tylenol without relief the symptoms.     Skin: Negative.    Allergic/Immunologic: Negative.    Neurological: Negative.    Hematological: Negative.    Psychiatric/Behavioral: Negative.    All other systems reviewed and are negative.      Past Medical History:   Diagnosis Date   • Allergic rhinitis    • Colon polyps    • Depression    • Difficulty swallowing solids    • DJD (degenerative joint disease)    • Gitelman syndrome     low magnesium   • Hyperlipidemia    • Obesity    • Osteopenia    • PONV (postoperative nausea and vomiting)        Allergies   Allergen Reactions   • Penicillins Swelling and Rash   • Sulfa Antibiotics Rash and Other (See Comments)     Temp. fluctuations        Past Surgical  History:   Procedure Laterality Date   • COLONOSCOPY  08/20/2010   • COLONOSCOPY W/ POLYPECTOMY  09/14/2016    2   5 mm sessile polyps removed with hot snare repeat 5 years   • DILATATION AND CURETTAGE     • ENDOSCOPY  09/14/2016    Medium sized HH, LA Grade A esohagitis, Fluid in the middle third of esohagus    • TX TOTAL KNEE ARTHROPLASTY Left 3/9/2018    Procedure: LEFT TOTAL KNEE ARTHROPLASTY;  Surgeon: Orlin Meza MD;  Location: [facility];  Service: Orthopedics   • REPLACEMENT TOTAL KNEE Right        Family History   Problem Relation Age of Onset   • No Known Problems Father    • No Known Problems Mother    • No Known Problems Brother    • No Known Problems Sister    • No Known Problems Son    • No Known Problems Daughter    • No Known Problems Daughter    • No Known Problems Maternal Grandmother    • No Known Problems Paternal Grandmother    • No Known Problems Maternal Aunt    • No Known Problems Paternal Aunt    • Breast cancer Neg Hx    • Ovarian cancer Neg Hx    • Colon cancer Neg Hx    • BRCA 1/2 Neg Hx    • Endometrial cancer Neg Hx        Social History     Social History   • Marital status:      Social History Main Topics   • Smoking status: Former Smoker     Packs/day: 0.50     Types: Cigarettes     Quit date: 2003   • Smokeless tobacco: Never Used   • Alcohol use No   • Drug use: No   • Sexual activity: No     Other Topics Concern   • Not on file       Prior to Admission medications    Medication Sig Start Date End Date Taking? Authorizing Provider   magnesium oxide (MAGOX) 400 (241.3 Mg) MG tablet tablet Take 1,600 mg by mouth 2 (Two) Times a Day.   Yes Sid Vance MD   PARoxetine (PAXIL) 10 MG tablet Take 10 mg by mouth Every Morning. 8/7/16  Yes Sid Vance MD   simvastatin (ZOCOR) 20 MG tablet Take 1 tablet by mouth daily. 7/29/16  Yes ProviderSid MD   magnesium cl-calcium carbonate (SLOW-MAG) 71.5-119 MG tablet delayed-release tablet Take 1 tablet  "by mouth 4 (Four) Times a Day. Takes 4 tabs daily  9/2/18  Provider, MD Sid       /62 (BP Location: Right arm, Patient Position: Sitting)   Pulse 57   Temp 97.2 °F (36.2 °C) (Temporal Artery )   Resp 18   Ht 152.4 cm (60\")   Wt 72.6 kg (160 lb)   SpO2 99%   BMI 31.25 kg/m²     Objective   Physical Exam   Constitutional: She is oriented to person, place, and time. She appears well-developed and well-nourished.   HENT:   Head: Normocephalic and atraumatic.   Eyes: Pupils are equal, round, and reactive to light. Conjunctivae and EOM are normal.   Neck: Normal range of motion. Neck supple. No tracheal deviation present. No thyromegaly present.   Tenderness on palpation to the paraspinal muscles of the left cervical spine.  There are some palpable spasms noted in the sternocleidomastoid area.   are strong and equal.  DTRs are intact.  She moves the extremities without difficulty.  Peripheral pulses are palpable.   Cardiovascular: Normal rate, regular rhythm, normal heart sounds and intact distal pulses.    Pulmonary/Chest: Effort normal and breath sounds normal. No respiratory distress. She has no wheezes. She has no rales. She exhibits no tenderness.   Abdominal: Soft. Bowel sounds are normal.   Musculoskeletal: Normal range of motion.   Neurological: She is alert and oriented to person, place, and time. She has normal reflexes. No cranial nerve deficit.   Skin: Skin is warm and dry.   Psychiatric: She has a normal mood and affect. Her behavior is normal. Judgment and thought content normal.   Nursing note and vitals reviewed.      Procedures         Lab Results (last 24 hours)     ** No results found for the last 24 hours. **          XR Spine Cervical Complete 4 or 5 View   Final Result   1. No acute bony findings.   2. Right carotid artery atherosclerosis.   This report was finalized on 09/02/2018 13:04 by Dr Rita Odell MD.          ED Course  ED Course as of Sep 02 1345   Sun Sep 02, " 2018   1238 Pending xray at this time   [CW]   3648 Reeval pt- states that she is feeling some better at this time after medications. Reviewed xray with dr bassett. Will place of work for about 5 days and place on light duty for about 1 week after that. Advised to follow up with dr beth this week. [facility] report completed #[id number]. No signs of suspicious activity noted. Will write for small amt of pain medication for acute pain. Reviewed side effects and potential for abuse.   [CW]      ED Course User Index  [CW] Laine Moreno, ROSENDA          MDM  Number of Diagnoses or Management Options  Strain of neck muscle, initial encounter: minor     Amount and/or Complexity of Data Reviewed  Tests in the radiology section of CPT®: ordered and reviewed    Patient Progress  Patient progress: stable      Final diagnoses:   Strain of neck muscle, initial encounter          Laine Moreno APRN  09/02/18 1812

## 2018-09-02 NOTE — DISCHARGE INSTRUCTIONS
Return to ER if symptoms worsen   Ice to area for 24 hours, then moist heat compresses three times a day for 15-20 min intervals     Cryotherapy  WHAT IS CRYOTHERAPY?  Cryotherapy, or cold therapy, is a treatment that uses cold temperatures to treat an injury or medical condition. It includes using cold packs or ice packs to reduce pain and swelling. Only use cryotherapy if your doctor says it is okay.  HOW DO I USE CRYOTHERAPY?  · Place a towel between the cold source and your skin.  · Apply the cold source for no more than 20 minutes at a time.  · Check your skin after 5 minutes to make sure there are no signs of a poor response to cold or skin damage. Check for:  ? White spots on your skin. Your skin may look blotchy or mottled.  ? Skin that looks blue or pale.  ? Skin that feels waxy or hard.  · Repeat these steps as many times each day as told by your doctor.    HOW CAN I MAKE A COLD PACK?  When using a cold pack at home to reduce pain and swelling, you can use:  · A silica gel cold pack that has been left in the freezer. You can buy this online or in stores.  · A plastic bag of frozen vegetables.  · A sealable plastic bag that has been filled with crushed ice.    Always wrap the pack in a dry or damp towel to avoid direct contact with your skin.  WHEN SHOULD I CALL MY DOCTOR?  Call your doctor if:  · You start to have white spots on your skin. This may give your skin a blotchy or mottled look.  · Your skin turns blue or pale.  · Your skin becomes waxy or hard.  · Your swelling gets worse.    This information is not intended to replace advice given to you by your health care provider. Make sure you discuss any questions you have with your health care provider.  Document Released: 06/05/2009 Document Revised: 05/25/2017 Document Reviewed: 08/31/2016  [employer] Interactive Patient Education © 2017 [employer]      Cervical Sprain  A cervical sprain is a stretch or tear in one or more of the tough, cord-like  tissues that connect bones (ligaments) in the neck. Cervical sprains can range from mild to severe. Severe cervical sprains can cause the spinal bones (vertebrae) in the neck to be unstable. This can lead to spinal cord damage and can result in serious nervous system problems.  The amount of time that it takes for a cervical sprain to get better depends on the cause and extent of the injury. Most cervical sprains heal in 4-6 weeks.  What are the causes?  Cervical sprains may be caused by an injury (trauma), such as from a motor vehicle accident, a fall, or sudden forward and backward whipping movement of the head and neck (whiplash injury).  Mild cervical sprains may be caused by wear and tear over time, such as from poor posture, sitting in a chair that does not provide support, or looking up or down for long periods of time.  What increases the risk?  The following factors may make you more likely to develop this condition:  · Participating in activities that have a high risk of trauma to the neck. These include contact sports, auto racing, gymnastics, and diving.  · Taking risks when driving or riding in a motor vehicle, such as speeding.  · Having osteoarthritis of the spine.  · Having poor strength and flexibility of the neck.  · A previous neck injury.  · Having poor posture.  · Spending a lot of time in certain positions that put stress on the neck, such as sitting at a computer for long periods of time.    What are the signs or symptoms?  Symptoms of this condition include:  · Pain, soreness, stiffness, tenderness, swelling, or a burning sensation in the front, back, or sides of the neck.  · Sudden tightening of neck muscles that you cannot control (muscle spasms).  · Pain in the shoulders or upper back.  · Limited ability to move the neck.  · Headache.  · Dizziness.  · Nausea.  · Vomiting.  · Weakness, numbness, or tingling in a hand or an arm.    Symptoms may develop right away after injury, or they may  develop over a few days. In some cases, symptoms may go away with treatment and return (recur) over time.  How is this diagnosed?  This condition may be diagnosed based on:  · Your medical history.  · Your symptoms.  · Any recent injuries or known neck problems that you have, such as arthritis in the neck.  · A physical exam.  · Imaging tests, such as:  ? X-rays.  ? MRI.  ? CT scan.    How is this treated?  This condition is treated by resting and icing the injured area and doing physical therapy exercises. Depending on the severity of your condition, treatment may also include:  · Keeping your neck in place (immobilized) for periods of time. This may be done using:  ? A cervical collar. This supports your chin and the back of your head.  ? A cervical traction device. This is a sling that holds up your head. This removes weight and pressure from your neck, and it may help to relieve pain.  · Medicines that help to relieve pain and inflammation.  · Medicines that help to relax your muscles (muscle relaxants).  · Surgery. This is rare.    Follow these instructions at home:  If you have a cervical collar:  · Wear it as told by your health care provider. Do not remove the collar unless instructed by your health care provider.  · Ask your health care provider before you make any adjustments to your collar.  · If you have long hair, keep it outside of the collar.  · Ask your health care provider if you can remove the collar for cleaning and bathing. If you are allowed to remove the collar for cleaning or bathing:  ? Follow instructions from your health care provider about how to remove the collar safely.  ? Clean the collar by wiping it with mild soap and water and drying it completely.  ? If your collar has removable pads, remove them every 1-2 days and wash them by hand with soap and water. Let them air-dry completely before you put them back in the collar.  ? Check your skin under the collar for irritation or sores. If  you see any, tell your health care provider.  Managing pain, stiffness, and swelling  · If directed, use a cervical traction device as told by your health care provider.  · If directed, apply heat to the affected area before you do your physical therapy or as often as told by your health care provider. Use the heat source that your health care provider recommends, such as a moist heat pack or a heating pad.  ? Place a towel between your skin and the heat source.  ? Leave the heat on for 20-30 minutes.  ? Remove the heat if your skin turns bright red. This is especially important if you are unable to feel pain, heat, or cold. You may have a greater risk of getting burned.  · If directed, put ice on the affected area:  ? Put ice in a plastic bag.  ? Place a towel between your skin and the bag.  ? Leave the ice on for 20 minutes, 2-3 times a day.  Activity  · Do not drive while wearing a cervical collar. If you do not have a cervical collar, ask your health care provider if it is safe to drive while your neck heals.  · Do not drive or use heavy machinery while taking prescription pain medicine or muscle relaxants, unless your health care provider approves.  · Do not lift anything that is heavier than 10 lb (4.5 kg) until your health care provider tells you that it is safe.  · Rest as directed by your health care provider. Avoid positions and activities that make your symptoms worse. Ask your health care provider what activities are safe for you.  · If physical therapy was prescribed, do exercises as told by your health care provider or physical therapist.  General instructions  · Take over-the-counter and prescription medicines only as told by your health care provider.  · Do not use any products that contain nicotine or tobacco, such as cigarettes and e-cigarettes. These can delay healing. If you need help quitting, ask your health care provider.  · Keep all follow-up visits as told by your health care provider or  physical therapist. This is important.  How is this prevented?  To prevent a cervical sprain from happening again:  · Use and maintain good posture. Make any needed adjustments to your workstation to help you use good posture.  · Exercise regularly as directed by your health care provider or physical therapist.  · Avoid risky activities that may cause a cervical sprain.    Contact a health care provider if:  · You have symptoms that get worse or do not get better after 2 weeks of treatment.  · You have pain that gets worse or does not get better with medicine.  · You develop new, unexplained symptoms.  · You have sores or irritated skin on your neck from wearing your cervical collar.  Get help right away if:  · You have severe pain.  · You develop numbness, tingling, or weakness in any part of your body.  · You cannot move a part of your body (you have paralysis).  · You have neck pain along with:  ? Severe dizziness.  ? Headache.  Summary  · A cervical sprain is a stretch or tear in one or more of the tough, cord-like tissues that connect bones (ligaments) in the neck.  · Cervical sprains may be caused by an injury (trauma), such as from a motor vehicle accident, a fall, or sudden forward and backward whipping movement of the head and neck (whiplash injury).  · Symptoms may develop right away after injury, or they may develop over a few days.  · This condition is treated by resting and icing the injured area and doing physical therapy exercises.  This information is not intended to replace advice given to you by your health care provider. Make sure you discuss any questions you have with your health care provider.  Document Released: 10/14/2008 Document Revised: 08/16/2017 Document Reviewed: 08/16/2017  [employer] Interactive Patient Education © 2018 [employer]

## 2018-09-10 NOTE — ED NOTES
"ED Call Back Questions    1. How are you doing since leaving the Emergency Department?    Doing well, going to PT, good er visit  2. Do you have any questions about your discharge instructions? No     3. Have you filled your new prescriptions yet? Yes   a. Do you have any questions about those medications? No     4. Were you able to make a follow-up appointment with the physician? Yes     5. Do you have a primary care physician? Yes   a. If No, would you like for me to set you up with one? N/A  i. If Yes, “I will have our ED  give you a call right back at this number to work with you on the best time for an appointment.”    6. We are always looking to get better at what we do. Do you have any suggestions for what we can do to be even better? No   a. If Yes, \"Thank you for sharing your concerns. I apologize. I will follow up with our manager and patient . Would you like someone to call you back?\" N/A    7. Is there anything else I can do for you? No     "

## 2018-09-27 PROBLEM — E83.42 HYPOMAGNESEMIA: Status: ACTIVE | Noted: 2018-09-27

## 2018-09-27 RX ORDER — MAGNESIUM SULFATE HEPTAHYDRATE 40 MG/ML
4 INJECTION, SOLUTION INTRAVENOUS ONCE
Status: CANCELLED
Start: 2018-10-03 | End: 2018-10-03

## 2018-09-27 RX ORDER — SODIUM CHLORIDE 9 MG/ML
250 INJECTION, SOLUTION INTRAVENOUS ONCE
Status: CANCELLED | OUTPATIENT
Start: 2018-10-03

## 2018-09-30 ENCOUNTER — NURSE TRIAGE (OUTPATIENT)
Dept: CALL CENTER | Facility: HOSPITAL | Age: 63
End: 2018-09-30

## 2018-09-30 NOTE — TELEPHONE ENCOUNTER
"  Caller has received a call from the  Cancer infusion about an appointment, unable to understand the caller,  Looked into Epic has an appointment 10/3 but nothing further, will be calling in th morning  Reason for Disposition  • Caller requesting an appointment, triage offered and declined    Additional Information  • Negative: Lab calling with strep throat test results and triager can call in prescription  • Negative: Lab calling with urinalysis test results and triager can call in prescription  • Negative: Medication questions  • Negative: ED call to PCP  • Negative: Physician call to PCP  • Negative: Call about patient who is currently hospitalized  • Negative: Lab or radiology calling with CRITICAL test results  • Negative: [1] Prescription not at pharmacy AND [2] was prescribed today by PCP  • Negative: [1] Follow-up call from patient regarding patient's clinical status AND [2] information urgent  • Negative: [1] Caller requests to speak ONLY to PCP AND [2] URGENT question  • Negative: [1] Caller requests to speak to PCP now AND [2] won't tell us reason for call  (Exception: if 10 pm to 6 am, caller must first discuss reason for the call)  • Negative: Notification of hospital admission  • Negative: Notification of death  • Negative: Caller requesting lab results  • Negative: Lab or radiology calling with test results  • Negative: [1] Follow-up call from patient regarding patient's clinical status AND [2] information NON-URGENT  • Negative: [1] Caller requests to speak ONLY to PCP AND [2] NON-URGENT question    Answer Assessment - Initial Assessment Questions  1. REASON FOR CALL or QUESTION: \"What is your reason for calling today?\" or \"How can I best  help you?\" or \"What question do you have that I can help answer?\"      Unsure of when her appointment is , received a message and the message was cut off  2. CALLER: Document the source of call. (e.g., laboratory, patient).      Infusion appointment    Protocols " used: PCP CALL - NO TRIAGE-ADULT-AH

## 2018-10-03 ENCOUNTER — INFUSION (OUTPATIENT)
Dept: ONCOLOGY | Facility: HOSPITAL | Age: 63
End: 2018-10-03

## 2018-10-03 VITALS
TEMPERATURE: 97.8 F | DIASTOLIC BLOOD PRESSURE: 57 MMHG | SYSTOLIC BLOOD PRESSURE: 122 MMHG | HEART RATE: 77 BPM | HEIGHT: 60 IN | WEIGHT: 159 LBS | RESPIRATION RATE: 16 BRPM | BODY MASS INDEX: 31.22 KG/M2 | OXYGEN SATURATION: 95 %

## 2018-10-03 DIAGNOSIS — E83.42 HYPOMAGNESEMIA: Primary | ICD-10-CM

## 2018-10-03 PROCEDURE — 96365 THER/PROPH/DIAG IV INF INIT: CPT

## 2018-10-03 PROCEDURE — 96366 THER/PROPH/DIAG IV INF ADDON: CPT

## 2018-10-03 PROCEDURE — 25010000002 MAGNESIUM SULFATE IN D5W 1G/100ML (PREMIX) 1-5 GM/100ML-% SOLUTION: Performed by: PSYCHIATRY & NEUROLOGY

## 2018-10-03 RX ORDER — SODIUM CHLORIDE 9 MG/ML
250 INJECTION, SOLUTION INTRAVENOUS ONCE
Status: CANCELLED | OUTPATIENT
Start: 2018-10-03

## 2018-10-03 RX ORDER — MAGNESIUM SULFATE 1 G/100ML
4 INJECTION INTRAVENOUS ONCE
Status: CANCELLED
Start: 2018-10-03 | End: 2018-10-03

## 2018-10-03 RX ORDER — SODIUM CHLORIDE 9 MG/ML
250 INJECTION, SOLUTION INTRAVENOUS ONCE
Status: COMPLETED | OUTPATIENT
Start: 2018-10-03 | End: 2018-10-03

## 2018-10-03 RX ORDER — MAGNESIUM SULFATE 1 G/100ML
4 INJECTION INTRAVENOUS ONCE
Status: COMPLETED | OUTPATIENT
Start: 2018-10-03 | End: 2018-10-03

## 2018-10-03 RX ADMIN — MAGNESIUM SULFATE HEPTAHYDRATE 4 G: 1 INJECTION, SOLUTION INTRAVENOUS at 09:29

## 2018-10-03 RX ADMIN — SODIUM CHLORIDE 250 ML: 9 INJECTION, SOLUTION INTRAVENOUS at 09:29

## 2018-10-08 ENCOUNTER — APPOINTMENT (OUTPATIENT)
Dept: LAB | Facility: HOSPITAL | Age: 63
End: 2018-10-08
Attending: INTERNAL MEDICINE

## 2018-10-08 ENCOUNTER — TRANSCRIBE ORDERS (OUTPATIENT)
Dept: ADMINISTRATIVE | Facility: HOSPITAL | Age: 63
End: 2018-10-08

## 2018-10-08 DIAGNOSIS — E83.42 HYPOMAGNESEMIA: Primary | ICD-10-CM

## 2018-10-08 LAB
ALBUMIN SERPL-MCNC: 4.1 G/DL (ref 3.5–5)
ANION GAP SERPL CALCULATED.3IONS-SCNC: 10 MMOL/L (ref 4–13)
BUN BLD-MCNC: 26 MG/DL (ref 5–21)
BUN/CREAT SERPL: 38.8 (ref 7–25)
CALCIUM SPEC-SCNC: 9.2 MG/DL (ref 8.4–10.4)
CHLORIDE SERPL-SCNC: 99 MMOL/L (ref 98–110)
CO2 SERPL-SCNC: 31 MMOL/L (ref 24–31)
CREAT BLD-MCNC: 0.67 MG/DL (ref 0.5–1.4)
GFR SERPL CREATININE-BSD FRML MDRD: 89 ML/MIN/1.73
GLUCOSE BLD-MCNC: 108 MG/DL (ref 70–100)
MAGNESIUM SERPL-MCNC: 1.2 MG/DL (ref 1.4–2.2)
PHOSPHATE SERPL-MCNC: 4.6 MG/DL (ref 2.5–4.5)
POTASSIUM BLD-SCNC: 4.1 MMOL/L (ref 3.5–5.3)
SODIUM BLD-SCNC: 140 MMOL/L (ref 135–145)

## 2018-10-08 PROCEDURE — 36415 COLL VENOUS BLD VENIPUNCTURE: CPT

## 2018-10-08 PROCEDURE — 83735 ASSAY OF MAGNESIUM: CPT | Performed by: INTERNAL MEDICINE

## 2018-10-08 PROCEDURE — 80069 RENAL FUNCTION PANEL: CPT | Performed by: INTERNAL MEDICINE

## 2018-10-15 RX ORDER — MAGNESIUM SULFATE HEPTAHYDRATE 40 MG/ML
4 INJECTION, SOLUTION INTRAVENOUS ONCE
Status: CANCELLED
Start: 2018-10-19 | End: 2018-10-19

## 2018-10-15 RX ORDER — MAGNESIUM SULFATE HEPTAHYDRATE 40 MG/ML
4 INJECTION, SOLUTION INTRAVENOUS ONCE
Status: CANCELLED
Start: 2018-10-15 | End: 2018-10-15

## 2018-10-18 ENCOUNTER — INFUSION (OUTPATIENT)
Dept: ONCOLOGY | Facility: HOSPITAL | Age: 63
End: 2018-10-18

## 2018-10-18 VITALS
BODY MASS INDEX: 31.61 KG/M2 | SYSTOLIC BLOOD PRESSURE: 131 MMHG | TEMPERATURE: 98.6 F | DIASTOLIC BLOOD PRESSURE: 60 MMHG | WEIGHT: 161 LBS | OXYGEN SATURATION: 96 % | HEIGHT: 60 IN | RESPIRATION RATE: 17 BRPM | HEART RATE: 84 BPM

## 2018-10-18 DIAGNOSIS — E83.42 HYPOMAGNESEMIA: Primary | ICD-10-CM

## 2018-10-18 PROCEDURE — 25010000002 MAGNESIUM SULFATE IN D5W 1G/100ML (PREMIX) 1-5 GM/100ML-% SOLUTION: Performed by: INTERNAL MEDICINE

## 2018-10-18 PROCEDURE — 96365 THER/PROPH/DIAG IV INF INIT: CPT

## 2018-10-18 PROCEDURE — 96366 THER/PROPH/DIAG IV INF ADDON: CPT

## 2018-10-18 RX ORDER — MAGNESIUM SULFATE 1 G/100ML
4 INJECTION INTRAVENOUS ONCE
Status: COMPLETED | OUTPATIENT
Start: 2018-10-18 | End: 2018-10-18

## 2018-10-18 RX ORDER — MAGNESIUM SULFATE 1 G/100ML
4 INJECTION INTRAVENOUS ONCE
Status: CANCELLED
Start: 2018-10-19 | End: 2018-10-19

## 2018-10-18 RX ORDER — AMILORIDE HYDROCHLORIDE 5 MG/1
5 TABLET ORAL DAILY
Status: ON HOLD | COMMUNITY
End: 2019-10-13 | Stop reason: SDUPTHER

## 2018-10-18 RX ORDER — SODIUM CHLORIDE 9 MG/ML
250 INJECTION, SOLUTION INTRAVENOUS ONCE
Status: COMPLETED | OUTPATIENT
Start: 2018-10-18 | End: 2018-10-18

## 2018-10-18 RX ADMIN — SODIUM CHLORIDE 250 ML: 9 INJECTION, SOLUTION INTRAVENOUS at 07:40

## 2018-10-18 RX ADMIN — MAGNESIUM SULFATE HEPTAHYDRATE 4 G: 1 INJECTION, SOLUTION INTRAVENOUS at 07:51

## 2018-10-19 ENCOUNTER — APPOINTMENT (OUTPATIENT)
Dept: ONCOLOGY | Facility: HOSPITAL | Age: 63
End: 2018-10-19

## 2018-10-29 ENCOUNTER — APPOINTMENT (OUTPATIENT)
Dept: LAB | Facility: HOSPITAL | Age: 63
End: 2018-10-29
Attending: INTERNAL MEDICINE

## 2018-10-29 ENCOUNTER — TRANSCRIBE ORDERS (OUTPATIENT)
Dept: ADMINISTRATIVE | Facility: HOSPITAL | Age: 63
End: 2018-10-29

## 2018-10-29 DIAGNOSIS — E83.42 HYPOMAGNESEMIA: Primary | ICD-10-CM

## 2018-10-29 LAB — MAGNESIUM SERPL-MCNC: 1.1 MG/DL (ref 1.4–2.2)

## 2018-10-29 PROCEDURE — 83735 ASSAY OF MAGNESIUM: CPT | Performed by: INTERNAL MEDICINE

## 2018-10-29 PROCEDURE — 36415 COLL VENOUS BLD VENIPUNCTURE: CPT

## 2019-01-29 ENCOUNTER — TRANSCRIBE ORDERS (OUTPATIENT)
Dept: ADMINISTRATIVE | Facility: HOSPITAL | Age: 64
End: 2019-01-29

## 2019-01-29 ENCOUNTER — APPOINTMENT (OUTPATIENT)
Dept: LAB | Facility: HOSPITAL | Age: 64
End: 2019-01-29
Attending: INTERNAL MEDICINE

## 2019-01-29 DIAGNOSIS — E83.42 HYPOMAGNESEMIA: ICD-10-CM

## 2019-01-29 DIAGNOSIS — E87.6 HYPOPOTASSEMIA: Primary | ICD-10-CM

## 2019-01-29 LAB
ALBUMIN SERPL-MCNC: 4.4 G/DL (ref 3.5–5)
ANION GAP SERPL CALCULATED.3IONS-SCNC: 12 MMOL/L (ref 4–13)
BUN BLD-MCNC: 21 MG/DL (ref 5–21)
BUN/CREAT SERPL: 35 (ref 7–25)
CALCIUM SPEC-SCNC: 9.1 MG/DL (ref 8.4–10.4)
CHLORIDE SERPL-SCNC: 95 MMOL/L (ref 98–110)
CO2 SERPL-SCNC: 31 MMOL/L (ref 24–31)
CREAT BLD-MCNC: 0.6 MG/DL (ref 0.5–1.4)
GFR SERPL CREATININE-BSD FRML MDRD: 101 ML/MIN/1.73
GLUCOSE BLD-MCNC: 123 MG/DL (ref 70–100)
MAGNESIUM SERPL-MCNC: 1.1 MG/DL (ref 1.4–2.2)
PHOSPHATE SERPL-MCNC: 4.4 MG/DL (ref 2.5–4.5)
POTASSIUM BLD-SCNC: 3.7 MMOL/L (ref 3.5–5.3)
SODIUM BLD-SCNC: 138 MMOL/L (ref 135–145)

## 2019-01-29 PROCEDURE — 36415 COLL VENOUS BLD VENIPUNCTURE: CPT

## 2019-01-29 PROCEDURE — 80069 RENAL FUNCTION PANEL: CPT | Performed by: INTERNAL MEDICINE

## 2019-01-29 PROCEDURE — 83735 ASSAY OF MAGNESIUM: CPT | Performed by: INTERNAL MEDICINE

## 2019-02-19 RX ORDER — SIMVASTATIN 20 MG
TABLET ORAL
Qty: 30 TABLET | Refills: 2 | Status: CANCELLED | OUTPATIENT
Start: 2019-02-18

## 2019-06-14 ENCOUNTER — TRANSCRIBE ORDERS (OUTPATIENT)
Dept: ADMINISTRATIVE | Facility: HOSPITAL | Age: 64
End: 2019-06-14

## 2019-06-14 DIAGNOSIS — Z12.39 BREAST SCREENING: Primary | ICD-10-CM

## 2019-06-14 DIAGNOSIS — Z78.0 POSTMENOPAUSAL: ICD-10-CM

## 2019-08-13 ENCOUNTER — TRANSCRIBE ORDERS (OUTPATIENT)
Dept: ADMINISTRATIVE | Facility: HOSPITAL | Age: 64
End: 2019-08-13

## 2019-08-13 DIAGNOSIS — E83.42 HYPOMAGNESEMIA: Primary | ICD-10-CM

## 2019-08-13 DIAGNOSIS — E87.6 HYPOPOTASSEMIA: ICD-10-CM

## 2019-08-14 ENCOUNTER — LAB (OUTPATIENT)
Dept: LAB | Facility: HOSPITAL | Age: 64
End: 2019-08-14

## 2019-08-14 DIAGNOSIS — E83.42 HYPOMAGNESEMIA: ICD-10-CM

## 2019-08-14 DIAGNOSIS — E87.6 HYPOPOTASSEMIA: ICD-10-CM

## 2019-08-14 LAB
ALBUMIN SERPL-MCNC: 4.1 G/DL (ref 3.5–5)
ALBUMIN/GLOB SERPL: 1.1 G/DL (ref 1.1–2.5)
ALP SERPL-CCNC: 76 U/L (ref 24–120)
ALT SERPL W P-5'-P-CCNC: 20 U/L (ref 0–54)
ANION GAP SERPL CALCULATED.3IONS-SCNC: 11 MMOL/L (ref 4–13)
AST SERPL-CCNC: 28 U/L (ref 7–45)
BILIRUB SERPL-MCNC: 0.6 MG/DL (ref 0.1–1)
BUN BLD-MCNC: 16 MG/DL (ref 5–21)
BUN/CREAT SERPL: 27.6 (ref 7–25)
CALCIUM SPEC-SCNC: 8.6 MG/DL (ref 8.4–10.4)
CHLORIDE SERPL-SCNC: 97 MMOL/L (ref 98–110)
CO2 SERPL-SCNC: 30 MMOL/L (ref 24–31)
CREAT BLD-MCNC: 0.58 MG/DL (ref 0.5–1.4)
GFR SERPL CREATININE-BSD FRML MDRD: 105 ML/MIN/1.73
GLOBULIN UR ELPH-MCNC: 3.8 GM/DL
GLUCOSE BLD-MCNC: 122 MG/DL (ref 70–100)
MAGNESIUM SERPL-MCNC: 1.1 MG/DL (ref 1.4–2.2)
POTASSIUM BLD-SCNC: 3.5 MMOL/L (ref 3.5–5.3)
PROT SERPL-MCNC: 7.9 G/DL (ref 6.3–8.7)
SODIUM BLD-SCNC: 138 MMOL/L (ref 135–145)

## 2019-08-14 PROCEDURE — 36415 COLL VENOUS BLD VENIPUNCTURE: CPT

## 2019-08-14 PROCEDURE — 80053 COMPREHEN METABOLIC PANEL: CPT | Performed by: INTERNAL MEDICINE

## 2019-08-14 PROCEDURE — 83735 ASSAY OF MAGNESIUM: CPT | Performed by: INTERNAL MEDICINE

## 2019-08-28 ENCOUNTER — HOSPITAL ENCOUNTER (OUTPATIENT)
Dept: MAMMOGRAPHY | Facility: HOSPITAL | Age: 64
Discharge: HOME | End: 2019-08-28
Admitting: INTERNAL MEDICINE

## 2019-08-28 ENCOUNTER — HOSPITAL ENCOUNTER (OUTPATIENT)
Dept: BONE DENSITY | Facility: HOSPITAL | Age: 64
Discharge: HOME | End: 2019-08-28

## 2019-08-28 DIAGNOSIS — Z78.0 POSTMENOPAUSAL: ICD-10-CM

## 2019-08-28 DIAGNOSIS — Z12.39 BREAST SCREENING: ICD-10-CM

## 2019-08-28 PROCEDURE — 77080 DXA BONE DENSITY AXIAL: CPT

## 2019-08-28 PROCEDURE — 77063 BREAST TOMOSYNTHESIS BI: CPT

## 2019-08-28 PROCEDURE — 77067 SCR MAMMO BI INCL CAD: CPT

## 2019-10-07 ENCOUNTER — HOSPITAL ENCOUNTER (OUTPATIENT)
Dept: GENERAL RADIOLOGY | Facility: HOSPITAL | Age: 64
Discharge: HOME | End: 2019-10-07
Admitting: NURSE PRACTITIONER

## 2019-10-07 ENCOUNTER — TRANSCRIBE ORDERS (OUTPATIENT)
Dept: ADMINISTRATIVE | Facility: HOSPITAL | Age: 64
End: 2019-10-07

## 2019-10-07 DIAGNOSIS — R50.9 FEVER, UNSPECIFIED: Primary | ICD-10-CM

## 2019-10-07 DIAGNOSIS — R59.0 LOCALIZED ENLARGED LYMPH NODES: ICD-10-CM

## 2019-10-07 PROCEDURE — 71046 X-RAY EXAM CHEST 2 VIEWS: CPT

## 2019-10-11 ENCOUNTER — APPOINTMENT (OUTPATIENT)
Dept: GENERAL RADIOLOGY | Facility: HOSPITAL | Age: 64
End: 2019-10-11

## 2019-10-11 ENCOUNTER — HOSPITAL ENCOUNTER (INPATIENT)
Facility: HOSPITAL | Age: 64
LOS: 4 days | Discharge: HOME | End: 2019-10-15
Attending: FAMILY MEDICINE | Admitting: SPECIALIST

## 2019-10-11 ENCOUNTER — APPOINTMENT (OUTPATIENT)
Dept: CT IMAGING | Facility: HOSPITAL | Age: 64
End: 2019-10-11

## 2019-10-11 DIAGNOSIS — A41.9 SEPSIS, DUE TO UNSPECIFIED ORGANISM, UNSPECIFIED WHETHER ACUTE ORGAN DYSFUNCTION PRESENT (HCC): Primary | ICD-10-CM

## 2019-10-11 DIAGNOSIS — R65.10 SIRS (SYSTEMIC INFLAMMATORY RESPONSE SYNDROME) (HCC): ICD-10-CM

## 2019-10-11 DIAGNOSIS — R93.5 ABNORMAL ABDOMINAL CT SCAN: ICD-10-CM

## 2019-10-11 DIAGNOSIS — E86.9 VOLUME DEPLETION: ICD-10-CM

## 2019-10-11 DIAGNOSIS — N28.9 RENAL INSUFFICIENCY: ICD-10-CM

## 2019-10-11 DIAGNOSIS — J18.9 PNEUMONIA DUE TO INFECTIOUS ORGANISM, UNSPECIFIED LATERALITY, UNSPECIFIED PART OF LUNG: ICD-10-CM

## 2019-10-11 DIAGNOSIS — E83.42 HYPOMAGNESEMIA: ICD-10-CM

## 2019-10-11 PROBLEM — N17.9 AKI (ACUTE KIDNEY INJURY): Status: ACTIVE | Noted: 2019-10-11

## 2019-10-11 PROBLEM — D72.829 LEUKOCYTOSIS: Status: ACTIVE | Noted: 2019-10-11

## 2019-10-11 PROBLEM — E87.6 GITELMAN SYNDROME: Status: ACTIVE | Noted: 2019-10-11

## 2019-10-11 PROBLEM — N15.8 GITELMAN SYNDROME: Status: ACTIVE | Noted: 2019-10-11

## 2019-10-11 PROBLEM — R21 RASH: Status: ACTIVE | Noted: 2019-10-11

## 2019-10-11 LAB
ALBUMIN SERPL-MCNC: 3.3 G/DL (ref 3.5–5.2)
ALBUMIN/GLOB SERPL: 0.7 G/DL
ALP SERPL-CCNC: 59 U/L (ref 39–117)
ALT SERPL W P-5'-P-CCNC: 12 U/L (ref 1–33)
AMORPH URATE CRY URNS QL MICRO: ABNORMAL /HPF
AMYLASE SERPL-CCNC: 84 U/L (ref 28–100)
ANION GAP SERPL CALCULATED.3IONS-SCNC: 17 MMOL/L (ref 5–15)
AST SERPL-CCNC: 20 U/L (ref 1–32)
BACTERIA UR QL AUTO: ABNORMAL /HPF
BASOPHILS # BLD AUTO: 0.02 10*3/MM3 (ref 0–0.2)
BASOPHILS NFR BLD AUTO: 0.2 % (ref 0–1.5)
BILIRUB SERPL-MCNC: 0.5 MG/DL (ref 0.2–1.2)
BILIRUB UR QL STRIP: NEGATIVE
BUN BLD-MCNC: 19 MG/DL (ref 8–23)
BUN/CREAT SERPL: 13.7 (ref 7–25)
CALCIUM SPEC-SCNC: 8.2 MG/DL (ref 8.6–10.5)
CHLORIDE SERPL-SCNC: 99 MMOL/L (ref 98–107)
CLARITY UR: ABNORMAL
CO2 SERPL-SCNC: 25 MMOL/L (ref 22–29)
COLOR UR: ABNORMAL
CREAT BLD-MCNC: 1.39 MG/DL (ref 0.57–1)
D-LACTATE SERPL-SCNC: 3 MMOL/L (ref 0.5–2)
D-LACTATE SERPL-SCNC: 3.6 MMOL/L (ref 0.5–2)
DEPRECATED RDW RBC AUTO: 44.3 FL (ref 37–54)
EOSINOPHIL # BLD AUTO: 0.07 10*3/MM3 (ref 0–0.4)
EOSINOPHIL NFR BLD AUTO: 0.6 % (ref 0.3–6.2)
ERYTHROCYTE [DISTWIDTH] IN BLOOD BY AUTOMATED COUNT: 16.3 % (ref 12.3–15.4)
FERRITIN SERPL-MCNC: 126.9 NG/ML (ref 13–150)
FLUAV AG NPH QL: NEGATIVE
FLUBV AG NPH QL IA: NEGATIVE
GFR SERPL CREATININE-BSD FRML MDRD: 38 ML/MIN/1.73
GLOBULIN UR ELPH-MCNC: 4.8 GM/DL
GLUCOSE BLD-MCNC: 119 MG/DL (ref 65–99)
GLUCOSE UR STRIP-MCNC: NEGATIVE MG/DL
HCT VFR BLD AUTO: 37.5 % (ref 34–46.6)
HGB BLD-MCNC: 12 G/DL (ref 12–15.9)
HGB UR QL STRIP.AUTO: NEGATIVE
HOLD SPECIMEN: NORMAL
HOLD SPECIMEN: NORMAL
HYALINE CASTS UR QL AUTO: ABNORMAL /LPF
IMM GRANULOCYTES # BLD AUTO: 0.16 10*3/MM3 (ref 0–0.05)
IMM GRANULOCYTES NFR BLD AUTO: 1.3 % (ref 0–0.5)
INR PPP: 1.14 (ref 0.91–1.09)
KETONES UR QL STRIP: NEGATIVE
LDH SERPL-CCNC: 360 U/L (ref 135–214)
LEUKOCYTE ESTERASE UR QL STRIP.AUTO: NEGATIVE
LIPASE SERPL-CCNC: 40 U/L (ref 13–60)
LYMPHOCYTES # BLD AUTO: 0.44 10*3/MM3 (ref 0.7–3.1)
LYMPHOCYTES NFR BLD AUTO: 3.5 % (ref 19.6–45.3)
MAGNESIUM SERPL-MCNC: 0.6 MG/DL (ref 1.6–2.4)
MCH RBC QN AUTO: 24.4 PG (ref 26.6–33)
MCHC RBC AUTO-ENTMCNC: 32 G/DL (ref 31.5–35.7)
MCV RBC AUTO: 76.4 FL (ref 79–97)
MONOCYTES # BLD AUTO: 0.62 10*3/MM3 (ref 0.1–0.9)
MONOCYTES NFR BLD AUTO: 4.9 % (ref 5–12)
NEUTROPHILS # BLD AUTO: 11.24 10*3/MM3 (ref 1.7–7)
NEUTROPHILS NFR BLD AUTO: 89.5 % (ref 42.7–76)
NITRITE UR QL STRIP: NEGATIVE
NRBC BLD AUTO-RTO: 0 /100 WBC (ref 0–0.2)
PH UR STRIP.AUTO: <=5 [PH] (ref 5–8)
PLATELET # BLD AUTO: 325 10*3/MM3 (ref 140–450)
PMV BLD AUTO: 9.6 FL (ref 6–12)
POTASSIUM BLD-SCNC: 3.6 MMOL/L (ref 3.5–5.2)
PROCALCITONIN SERPL-MCNC: 4.17 NG/ML (ref 0.1–0.25)
PROT SERPL-MCNC: 8.1 G/DL (ref 6–8.5)
PROT UR QL STRIP: ABNORMAL
PROTHROMBIN TIME: 15 SECONDS (ref 11.9–14.6)
RBC # BLD AUTO: 4.91 10*6/MM3 (ref 3.77–5.28)
RBC # UR: ABNORMAL /HPF
REF LAB TEST METHOD: ABNORMAL
SODIUM BLD-SCNC: 141 MMOL/L (ref 136–145)
SP GR UR STRIP: >1.03 (ref 1–1.03)
SQUAMOUS #/AREA URNS HPF: ABNORMAL /HPF
TROPONIN T SERPL-MCNC: <0.01 NG/ML (ref 0–0.03)
TSH SERPL DL<=0.05 MIU/L-ACNC: 4.55 UIU/ML (ref 0.27–4.2)
UROBILINOGEN UR QL STRIP: ABNORMAL
WBC NRBC COR # BLD: 12.55 10*3/MM3 (ref 3.4–10.8)
WBC UR QL AUTO: ABNORMAL /HPF
WHOLE BLOOD HOLD SPECIMEN: NORMAL
WHOLE BLOOD HOLD SPECIMEN: NORMAL

## 2019-10-11 PROCEDURE — 82150 ASSAY OF AMYLASE: CPT | Performed by: NURSE PRACTITIONER

## 2019-10-11 PROCEDURE — 85025 COMPLETE CBC W/AUTO DIFF WBC: CPT | Performed by: NURSE PRACTITIONER

## 2019-10-11 PROCEDURE — P9612 CATHETERIZE FOR URINE SPEC: HCPCS

## 2019-10-11 PROCEDURE — 0100U HC BIOFIRE FILMARRAY RESP PANEL 2: CPT | Performed by: NURSE PRACTITIONER

## 2019-10-11 PROCEDURE — 25010000002 DIPHENHYDRAMINE PER 50 MG: Performed by: NURSE PRACTITIONER

## 2019-10-11 PROCEDURE — 84484 ASSAY OF TROPONIN QUANT: CPT | Performed by: NURSE PRACTITIONER

## 2019-10-11 PROCEDURE — 83735 ASSAY OF MAGNESIUM: CPT | Performed by: NURSE PRACTITIONER

## 2019-10-11 PROCEDURE — 25010000002 PROMETHAZINE PER 50 MG: Performed by: NURSE PRACTITIONER

## 2019-10-11 PROCEDURE — 93010 ELECTROCARDIOGRAM REPORT: CPT | Performed by: INTERNAL MEDICINE

## 2019-10-11 PROCEDURE — 83605 ASSAY OF LACTIC ACID: CPT | Performed by: NURSE PRACTITIONER

## 2019-10-11 PROCEDURE — 25010000002 IOPAMIDOL 61 % SOLUTION: Performed by: NURSE PRACTITIONER

## 2019-10-11 PROCEDURE — 94799 UNLISTED PULMONARY SVC/PX: CPT

## 2019-10-11 PROCEDURE — 82728 ASSAY OF FERRITIN: CPT | Performed by: NURSE PRACTITIONER

## 2019-10-11 PROCEDURE — 87040 BLOOD CULTURE FOR BACTERIA: CPT | Performed by: NURSE PRACTITIONER

## 2019-10-11 PROCEDURE — 83690 ASSAY OF LIPASE: CPT | Performed by: NURSE PRACTITIONER

## 2019-10-11 PROCEDURE — 74177 CT ABD & PELVIS W/CONTRAST: CPT

## 2019-10-11 PROCEDURE — 25010000002 ENOXAPARIN PER 10 MG: Performed by: NURSE PRACTITIONER

## 2019-10-11 PROCEDURE — 83615 LACTATE (LD) (LDH) ENZYME: CPT | Performed by: INTERNAL MEDICINE

## 2019-10-11 PROCEDURE — 25010000002 ONDANSETRON PER 1 MG: Performed by: NURSE PRACTITIONER

## 2019-10-11 PROCEDURE — 85610 PROTHROMBIN TIME: CPT | Performed by: NURSE PRACTITIONER

## 2019-10-11 PROCEDURE — 85060 BLOOD SMEAR INTERPRETATION: CPT | Performed by: INTERNAL MEDICINE

## 2019-10-11 PROCEDURE — 87804 INFLUENZA ASSAY W/OPTIC: CPT | Performed by: NURSE PRACTITIONER

## 2019-10-11 PROCEDURE — 81001 URINALYSIS AUTO W/SCOPE: CPT | Performed by: NURSE PRACTITIONER

## 2019-10-11 PROCEDURE — 71250 CT THORAX DX C-: CPT

## 2019-10-11 PROCEDURE — 93005 ELECTROCARDIOGRAM TRACING: CPT | Performed by: NURSE PRACTITIONER

## 2019-10-11 PROCEDURE — 71045 X-RAY EXAM CHEST 1 VIEW: CPT

## 2019-10-11 PROCEDURE — 84145 PROCALCITONIN (PCT): CPT | Performed by: NURSE PRACTITIONER

## 2019-10-11 PROCEDURE — 25010000002 CEFTRIAXONE PER 250 MG: Performed by: NURSE PRACTITIONER

## 2019-10-11 PROCEDURE — 25010000002 MAGNESIUM SULFATE 2 GM/50ML SOLUTION: Performed by: NURSE PRACTITIONER

## 2019-10-11 PROCEDURE — 80053 COMPREHEN METABOLIC PANEL: CPT | Performed by: NURSE PRACTITIONER

## 2019-10-11 PROCEDURE — 84443 ASSAY THYROID STIM HORMONE: CPT | Performed by: NURSE PRACTITIONER

## 2019-10-11 PROCEDURE — 94760 N-INVAS EAR/PLS OXIMETRY 1: CPT

## 2019-10-11 PROCEDURE — 25010000002 METHYLPREDNISOLONE PER 40 MG: Performed by: NURSE PRACTITIONER

## 2019-10-11 PROCEDURE — 99284 EMERGENCY DEPT VISIT MOD MDM: CPT

## 2019-10-11 PROCEDURE — 36415 COLL VENOUS BLD VENIPUNCTURE: CPT | Performed by: NURSE PRACTITIONER

## 2019-10-11 PROCEDURE — 25010000003 HYDROXYZINE PER 25 MG: Performed by: NURSE PRACTITIONER

## 2019-10-11 RX ORDER — SODIUM CHLORIDE 0.9 % (FLUSH) 0.9 %
10 SYRINGE (ML) INJECTION AS NEEDED
Status: DISCONTINUED | OUTPATIENT
Start: 2019-10-11 | End: 2019-10-15 | Stop reason: HOSPADM

## 2019-10-11 RX ORDER — MAGNESIUM SULFATE HEPTAHYDRATE 40 MG/ML
2 INJECTION, SOLUTION INTRAVENOUS ONCE
Status: COMPLETED | OUTPATIENT
Start: 2019-10-11 | End: 2019-10-11

## 2019-10-11 RX ORDER — PAROXETINE 10 MG/1
10 TABLET, FILM COATED ORAL EVERY MORNING
Status: DISCONTINUED | OUTPATIENT
Start: 2019-10-12 | End: 2019-10-15 | Stop reason: HOSPADM

## 2019-10-11 RX ORDER — ACETAMINOPHEN 325 MG/1
650 TABLET ORAL EVERY 4 HOURS PRN
Status: DISCONTINUED | OUTPATIENT
Start: 2019-10-11 | End: 2019-10-15 | Stop reason: HOSPADM

## 2019-10-11 RX ORDER — ONDANSETRON 2 MG/ML
8 INJECTION INTRAMUSCULAR; INTRAVENOUS ONCE
Status: COMPLETED | OUTPATIENT
Start: 2019-10-11 | End: 2019-10-11

## 2019-10-11 RX ORDER — SODIUM CHLORIDE 0.9 % (FLUSH) 0.9 %
10 SYRINGE (ML) INJECTION EVERY 12 HOURS SCHEDULED
Status: DISCONTINUED | OUTPATIENT
Start: 2019-10-11 | End: 2019-10-12

## 2019-10-11 RX ORDER — FAMOTIDINE 10 MG/ML
20 INJECTION, SOLUTION INTRAVENOUS ONCE
Status: COMPLETED | OUTPATIENT
Start: 2019-10-11 | End: 2019-10-11

## 2019-10-11 RX ORDER — ACETAMINOPHEN 650 MG/1
650 SUPPOSITORY RECTAL EVERY 4 HOURS PRN
Status: DISCONTINUED | OUTPATIENT
Start: 2019-10-11 | End: 2019-10-15 | Stop reason: HOSPADM

## 2019-10-11 RX ORDER — DIPHENHYDRAMINE HYDROCHLORIDE 50 MG/ML
50 INJECTION INTRAMUSCULAR; INTRAVENOUS ONCE
Status: COMPLETED | OUTPATIENT
Start: 2019-10-11 | End: 2019-10-11

## 2019-10-11 RX ORDER — ATORVASTATIN CALCIUM 10 MG/1
10 TABLET, FILM COATED ORAL NIGHTLY
Status: DISCONTINUED | OUTPATIENT
Start: 2019-10-11 | End: 2019-10-15 | Stop reason: HOSPADM

## 2019-10-11 RX ORDER — DIPHENHYDRAMINE HYDROCHLORIDE 50 MG/ML
50 INJECTION INTRAMUSCULAR; INTRAVENOUS EVERY 6 HOURS PRN
Status: DISCONTINUED | OUTPATIENT
Start: 2019-10-11 | End: 2019-10-12

## 2019-10-11 RX ORDER — ACETAMINOPHEN 500 MG
1000 TABLET ORAL ONCE
Status: COMPLETED | OUTPATIENT
Start: 2019-10-11 | End: 2019-10-11

## 2019-10-11 RX ORDER — ALUMINA, MAGNESIA, AND SIMETHICONE 2400; 2400; 240 MG/30ML; MG/30ML; MG/30ML
15 SUSPENSION ORAL EVERY 6 HOURS PRN
Status: DISCONTINUED | OUTPATIENT
Start: 2019-10-11 | End: 2019-10-15 | Stop reason: HOSPADM

## 2019-10-11 RX ORDER — HYDROXYZINE HYDROCHLORIDE 25 MG/1
25 TABLET, FILM COATED ORAL 3 TIMES DAILY PRN
Status: DISCONTINUED | OUTPATIENT
Start: 2019-10-11 | End: 2019-10-11

## 2019-10-11 RX ORDER — DIPHENHYDRAMINE HYDROCHLORIDE 50 MG/ML
25 INJECTION INTRAMUSCULAR; INTRAVENOUS EVERY 6 HOURS PRN
Status: DISCONTINUED | OUTPATIENT
Start: 2019-10-11 | End: 2019-10-11 | Stop reason: SDUPTHER

## 2019-10-11 RX ORDER — METHYLPREDNISOLONE SODIUM SUCCINATE 40 MG/ML
40 INJECTION, POWDER, LYOPHILIZED, FOR SOLUTION INTRAMUSCULAR; INTRAVENOUS EVERY 8 HOURS
Status: DISCONTINUED | OUTPATIENT
Start: 2019-10-11 | End: 2019-10-11

## 2019-10-11 RX ORDER — ONDANSETRON 2 MG/ML
4 INJECTION INTRAMUSCULAR; INTRAVENOUS EVERY 4 HOURS PRN
Status: DISCONTINUED | OUTPATIENT
Start: 2019-10-11 | End: 2019-10-15 | Stop reason: HOSPADM

## 2019-10-11 RX ORDER — SODIUM CHLORIDE, SODIUM LACTATE, POTASSIUM CHLORIDE, CALCIUM CHLORIDE 600; 310; 30; 20 MG/100ML; MG/100ML; MG/100ML; MG/100ML
50 INJECTION, SOLUTION INTRAVENOUS CONTINUOUS
Status: DISCONTINUED | OUTPATIENT
Start: 2019-10-11 | End: 2019-10-13

## 2019-10-11 RX ORDER — ACETAMINOPHEN 160 MG/5ML
650 SOLUTION ORAL EVERY 4 HOURS PRN
Status: DISCONTINUED | OUTPATIENT
Start: 2019-10-11 | End: 2019-10-15 | Stop reason: HOSPADM

## 2019-10-11 RX ORDER — FAMOTIDINE 10 MG/ML
20 INJECTION, SOLUTION INTRAVENOUS DAILY
Status: DISCONTINUED | OUTPATIENT
Start: 2019-10-11 | End: 2019-10-12 | Stop reason: ALTCHOICE

## 2019-10-11 RX ORDER — PROMETHAZINE HYDROCHLORIDE 25 MG/ML
12.5 INJECTION, SOLUTION INTRAMUSCULAR; INTRAVENOUS ONCE
Status: COMPLETED | OUTPATIENT
Start: 2019-10-11 | End: 2019-10-11

## 2019-10-11 RX ORDER — HYDROXYZINE HYDROCHLORIDE 50 MG/ML
25 INJECTION, SOLUTION INTRAMUSCULAR ONCE
Status: COMPLETED | OUTPATIENT
Start: 2019-10-11 | End: 2019-10-11

## 2019-10-11 RX ADMIN — ENOXAPARIN SODIUM 40 MG: 40 INJECTION SUBCUTANEOUS at 17:43

## 2019-10-11 RX ADMIN — DIPHENHYDRAMINE HYDROCHLORIDE 50 MG: 50 INJECTION, SOLUTION INTRAMUSCULAR; INTRAVENOUS at 12:48

## 2019-10-11 RX ADMIN — CEFTRIAXONE SODIUM 1 G: 1 INJECTION, POWDER, FOR SOLUTION INTRAMUSCULAR; INTRAVENOUS at 15:37

## 2019-10-11 RX ADMIN — MAGNESIUM GLUCONATE 500 MG ORAL TABLET 1000 MG: 500 TABLET ORAL at 20:35

## 2019-10-11 RX ADMIN — MAGNESIUM SULFATE HEPTAHYDRATE 2 G: 40 INJECTION, SOLUTION INTRAVENOUS at 15:54

## 2019-10-11 RX ADMIN — ATORVASTATIN CALCIUM 10 MG: 10 TABLET, FILM COATED ORAL at 20:35

## 2019-10-11 RX ADMIN — HYDROXYZINE HYDROCHLORIDE 25 MG: 50 INJECTION, SOLUTION INTRAMUSCULAR at 17:36

## 2019-10-11 RX ADMIN — IOPAMIDOL 100 ML: 612 INJECTION, SOLUTION INTRAVENOUS at 13:57

## 2019-10-11 RX ADMIN — FAMOTIDINE 20 MG: 10 INJECTION, SOLUTION INTRAVENOUS at 17:43

## 2019-10-11 RX ADMIN — FAMOTIDINE 20 MG: 10 INJECTION INTRAVENOUS at 12:48

## 2019-10-11 RX ADMIN — SODIUM CHLORIDE 1000 ML: 9 INJECTION, SOLUTION INTRAVENOUS at 13:00

## 2019-10-11 RX ADMIN — DIPHENHYDRAMINE HYDROCHLORIDE 50 MG: 50 INJECTION INTRAMUSCULAR; INTRAVENOUS at 18:58

## 2019-10-11 RX ADMIN — SODIUM CHLORIDE 1000 ML: 9 INJECTION, SOLUTION INTRAVENOUS at 15:37

## 2019-10-11 RX ADMIN — METHYLPREDNISOLONE SODIUM SUCCINATE 40 MG: 40 INJECTION, POWDER, FOR SOLUTION INTRAMUSCULAR; INTRAVENOUS at 17:42

## 2019-10-11 RX ADMIN — ONDANSETRON HYDROCHLORIDE 8 MG: 2 SOLUTION INTRAMUSCULAR; INTRAVENOUS at 12:30

## 2019-10-11 RX ADMIN — HYDROCORTISONE: 25 CREAM TOPICAL at 22:11

## 2019-10-11 RX ADMIN — PROMETHAZINE HYDROCHLORIDE 12.5 MG: 25 INJECTION INTRAMUSCULAR; INTRAVENOUS at 14:24

## 2019-10-11 RX ADMIN — ACETAMINOPHEN 1000 MG: 500 TABLET, FILM COATED ORAL at 15:37

## 2019-10-11 RX ADMIN — SODIUM CHLORIDE, PRESERVATIVE FREE 10 ML: 5 INJECTION INTRAVENOUS at 20:36

## 2019-10-11 RX ADMIN — SODIUM CHLORIDE, POTASSIUM CHLORIDE, SODIUM LACTATE AND CALCIUM CHLORIDE 100 ML/HR: 600; 310; 30; 20 INJECTION, SOLUTION INTRAVENOUS at 17:19

## 2019-10-11 NOTE — H&P
[facility] Medicine Services  HISTORY AND PHYSICAL    Date of Admission: 10/11/2019  Primary Care Physician: Avery Whaley MD    Subjective     Chief Complaint: Rash    History of Present Illness  Ms. Cordero is a pleasant 64-year-old  female who follows Dr. Avery Whaley for primary care.  She has a past medical history significant for depression, Gitelman syndrome, and hyperlipidemia.  The patient states she began feeling unwell on Thursday.  She started to run a low-grade temperature, but felt well enough to come to work on Thursday night.  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  She received her flu shot on Friday night.  On Sunday, she began to feel unwell with nausea, vomiting, and she developed a rash on her entire body.  She has had intermittent nausea and vomiting since Sunday, last vomited this afternoon.  She states that she has had some diarrhea, but this has not been consistent.  She denies abdominal pain.  She states her lips were very swollen.  She has had some shortness of breath, worse with exertion.  She has had a cough at times, with thick, cloudy phlegm.  She denies chest pain.      She has had no dysuria, but does feel that her urine is darker than usual.  She denies tick exposure.  She denies any recent medication changes or new medications other than Levaquin.  She denies sick contacts.  The highest temperature she ran was on Wednesday at 102.8 °F.  She saw her primary care provider on Monday and was placed on Levaquin.  She has not noticed any improvement since that time.  She states she had a chest x-ray, blood cultures, urinalysis, and a strep test.  She has chronic difficulty with her electrolytes due to Gitelman Syndrome.    Review of Systems   Constitutional: Positive for activity change, appetite change, chills, fatigue and fever.   HENT: Negative for congestion, sinus pressure, sinus pain and sore throat.     Respiratory: Positive for cough and shortness of breath.    Cardiovascular: Negative for chest pain and palpitations.   Gastrointestinal: Positive for diarrhea, nausea and vomiting. Negative for abdominal pain.   Genitourinary: Positive for decreased urine volume. Negative for difficulty urinating and dysuria.   Skin: Positive for rash.   Neurological: Negative for dizziness, syncope and light-headedness.      Otherwise complete ROS reviewed and negative except as mentioned in the HPI.    Past Medical History:   Past Medical History:   Diagnosis Date   • Allergic rhinitis    • Colon polyps    • Depression    • Difficulty swallowing solids    • DJD (degenerative joint disease)    • Gitelman syndrome     low magnesium   • Hyperlipidemia    • Obesity    • Osteopenia    • PONV (postoperative nausea and vomiting)      Past Surgical History:  Past Surgical History:   Procedure Laterality Date   • COLONOSCOPY  08/20/2010   • COLONOSCOPY W/ POLYPECTOMY  09/14/2016    2   5 mm sessile polyps removed with hot snare repeat 5 years   • DILATATION AND CURETTAGE     • ENDOSCOPY  09/14/2016    Medium sized HH, LA Grade A esohagitis, Fluid in the middle third of esohagus    • WA TOTAL KNEE ARTHROPLASTY Left 3/9/2018    Procedure: LEFT TOTAL KNEE ARTHROPLASTY;  Surgeon: Orlin Meza MD;  Location: [facility];  Service: Orthopedics   • REPLACEMENT TOTAL KNEE Right      Social History:  reports that she quit smoking about 16 years ago. Her smoking use included cigarettes. She smoked 0.50 packs per day. She has never used smokeless tobacco. She reports that she does not drink alcohol or use drugs.    Family History: family history includes Anorexia nervosa in her mother; Aortic aneurysm in her father; Lymphoma in her paternal grandfather; No Known Problems in her brother, daughter, daughter, maternal aunt, maternal grandmother, paternal aunt, paternal grandmother, sister, and son.       Allergies:  Allergies   Allergen  "Reactions   • Penicillins Swelling and Rash   • Sulfa Antibiotics Rash and Other (See Comments)     Temp. fluctuations      Medications:  Prior to Admission medications    Medication Sig Start Date End Date Taking? Authorizing Provider   aMILoride (MIDAMOR) 5 MG tablet Take 5 mg by mouth Daily.    Sid Vance MD   aMILoride (MIDAMOR) 5 MG tablet Take one tablet by mouth daily with food. 9/26/18   Heber Charles MD   cefdinir (OMNICEF) 300 MG capsule Take 1 capsule by mouth twice daily for 7 days 2/18/19      clindamycin (CLEOCIN) 300 MG capsule Take 2 capsules by mouth 1 hour prior to appointment 7/25/19      levoFLOXacin (LEVAQUIN) 500 MG tablet Take 1 tablet by mouth daily 10/7/19      magnesium oxide (MAGOX) 400 (241.3 Mg) MG tablet tablet Take 1,600 mg by mouth 2 (Two) Times a Day.    Sid Vance MD   ondansetron (ZOFRAN) 4 MG tablet Take 1 tablet by mouth four times a day as needed for nausea 10/7/19      PARoxetine (PAXIL) 10 MG tablet Take 10 mg by mouth Every Morning. 8/7/16   Sid Vance MD   PARoxetine (PAXIL) 10 MG tablet Take one tablet by mouth every day 11/5/18   Avery Whaley MD   PARoxetine (PAXIL) 10 MG tablet TAKE 1 TABLET BY MOUTH EVERY DAY 6/10/19      simvastatin (ZOCOR) 20 MG tablet Take 1 tablet by mouth daily. 7/29/16   Sid Vance MD   simvastatin (ZOCOR) 20 MG tablet Take 1 tablet by mouth once daily 9/23/19        Objective     Vital Signs: /70 (BP Location: Left arm, Patient Position: Lying)   Pulse 109   Temp 100.4 °F (38 °C)   Resp 20   Ht 152.4 cm (60\")   Wt 75.8 kg (167 lb)   SpO2 100%   BMI 32.61 kg/m²   Physical Exam   Constitutional: She is oriented to person, place, and time. She appears well-developed and well-nourished. No distress.   HENT:   Head: Normocephalic and atraumatic.   Neck: Normal range of motion. Neck supple. No JVD present. No tracheal deviation present.   Cardiovascular: Intact distal pulses. " Exam reveals no gallop and no friction rub.   Tachycardic   Pulmonary/Chest: She has no wheezes. She has rales (fine crackle LLL).   Tachypnea, shallow respirations   Abdominal: Soft. Bowel sounds are normal. She exhibits distension. There is no tenderness. There is no guarding.   Musculoskeletal: She exhibits no edema or tenderness.   Neurological: She is alert and oriented to person, place, and time. No cranial nerve deficit.   Skin: Skin is warm and dry. Rash (Diffuse rash noted, sparing the palms and soles of the feet) noted. No erythema.   Psychiatric: She has a normal mood and affect. Her behavior is normal. Judgment and thought content normal.   Vitals reviewed.    Results Reviewed:  Lab Results (last 24 hours)     Procedure Component Value Units Date/Time    Procalcitonin [[id number]] Collected:  10/11/19 1251    Specimen:  Blood Updated:  10/11/19 1716    Lactic Acid, Reflex Timer (This will reflex a repeat order 3-3:15 hours after ordered.) [[id number]] Collected:  10/11/19 1251    Specimen:  Blood Updated:  10/11/19 1630     Extra Tube Hold for add-ons.     Comment: Auto resulted.       Urinalysis With Culture If Indicated - Urine, Catheter In/Out [[id number]]  (Abnormal) Collected:  10/11/19 1536    Specimen:  Urine, Catheter In/Out Updated:  10/11/19 1607     Color, UA Dark Yellow     Appearance, UA Cloudy     pH, UA <=5.0     Specific Gravity, UA >1.030     Glucose, UA Negative     Ketones, UA Negative     Bilirubin, UA Negative     Blood, UA Negative     Protein, UA 30 mg/dL (1+)     Leuk Esterase, UA Negative     Nitrite, UA Negative     Urobilinogen, UA 0.2 E.U./dL    Urinalysis, Microscopic Only - Urine, Catheter In/Out [[id number]]  (Abnormal) Collected:  10/11/19 1536    Specimen:  Urine, Catheter In/Out Updated:  10/11/19 1607     RBC, UA None Seen /HPF      WBC, UA 0-2 /HPF      Bacteria, UA None Seen /HPF      Squamous Epithelial Cells, UA 0-2 /HPF      Hyaline Casts, UA None Seen /LPF       Amorphous Crystals, UA Small/1+ /HPF      Methodology Manual Light Microscopy    Magnesium [[id number]]  (Abnormal) Collected:  10/11/19 1251    Specimen:  Blood Updated:  10/11/19 1531     Magnesium 0.6 mg/dL     [place] Draw [[id number]] Collected:  10/11/19 1251    Specimen:  Blood Updated:  10/11/19 1400    Narrative:       The following orders were created for panel order [place] Draw.  Procedure                               Abnormality         Status                     ---------                               -----------         ------                     Light Blue Top[[id number]]                                   Final result               Green Top (Gel)[[id number]]                                  Final result               Lavender Top[[id number]]                                     Final result               Red Top[[id number]]                                          In process                   Please view results for these tests on the individual orders.    Light Blue Top [[id number]] Collected:  10/11/19 1251    Specimen:  Blood Updated:  10/11/19 1400     Extra Tube hold for add-on     Comment: Auto resulted       Green Top (Gel) [[id number]] Collected:  10/11/19 1251    Specimen:  Blood Updated:  10/11/19 1400     Extra Tube Hold for add-ons.     Comment: Auto resulted.       Lavender Top [[id number]] Collected:  10/11/19 1251    Specimen:  Blood Updated:  10/11/19 1400     Extra Tube hold for add-on     Comment: Auto resulted       Lactic Acid, Plasma [[id number]]  (Abnormal) Collected:  10/11/19 1251    Specimen:  Blood Updated:  10/11/19 1325     Lactate 3.6 mmol/L     Comprehensive Metabolic Panel [[id number]]  (Abnormal) Collected:  10/11/19 1251    Specimen:  Blood Updated:  10/11/19 1322     Glucose 119 mg/dL      BUN 19 mg/dL      Creatinine 1.39 mg/dL      Sodium 141 mmol/L      Potassium 3.6 mmol/L      Chloride 99 mmol/L      CO2 25.0 mmol/L      Calcium 8.2 mg/dL      Total Protein 8.1 g/dL       Albumin 3.30 g/dL      ALT (SGPT) 12 U/L      AST (SGOT) 20 U/L      Alkaline Phosphatase 59 U/L      Total Bilirubin 0.5 mg/dL      eGFR Non African Amer 38 mL/min/1.73      Globulin 4.8 gm/dL      A/G Ratio 0.7 g/dL      BUN/Creatinine Ratio 13.7     Anion Gap 17.0 mmol/L     Narrative:       GFR Normal >60  Chronic Kidney Disease <60  Kidney Failure <15    Lipase [[id number]]  (Normal) Collected:  10/11/19 1251    Specimen:  Blood Updated:  10/11/19 1322     Lipase 40 U/L     Amylase [[id number]]  (Normal) Collected:  10/11/19 1251    Specimen:  Blood Updated:  10/11/19 1322     Amylase 84 U/L     Troponin [[id number]]  (Normal) Collected:  10/11/19 1251    Specimen:  Blood Updated:  10/11/19 1322     Troponin T <0.010 ng/mL     Narrative:       Troponin T Reference Range:  <= 0.03 ng/mL-   Negative for AMI  >0.03 ng/mL-     Abnormal for myocardial necrosis.  Clinicians would have to utilize clinical acumen, EKG, Troponin and serial changes to determine if it is an Acute Myocardial Infarction or myocardial injury due to an underlying chronic condition.     Blood Culture - Blood, Arm, Right [[id number]] Collected:  10/11/19 1253    Specimen:  Blood from Arm, Right Updated:  10/11/19 1313    Blood Culture - Blood, Arm, Left [[id number]] Collected:  10/11/19 1246    Specimen:  Blood from Arm, Left Updated:  10/11/19 1313    Protime-INR [[id number]]  (Abnormal) Collected:  10/11/19 1251    Specimen:  Blood Updated:  10/11/19 1312     Protime 15.0 Seconds      INR 1.14    CBC & Differential [[id number]] Collected:  10/11/19 1251    Specimen:  Blood Updated:  10/11/19 1302    Narrative:       The following orders were created for panel order CBC & Differential.  Procedure                               Abnormality         Status                     ---------                               -----------         ------                     CBC Auto Differential[[id number]]        Abnormal            Final result                  Please view results for these tests on the individual orders.    CBC Auto Differential [[id number]]  (Abnormal) Collected:  10/11/19 1251    Specimen:  Blood Updated:  10/11/19 1302     WBC 12.55 10*3/mm3      RBC 4.91 10*6/mm3      Hemoglobin 12.0 g/dL      Hematocrit 37.5 %      MCV 76.4 fL      MCH 24.4 pg      MCHC 32.0 g/dL      RDW 16.3 %      RDW-SD 44.3 fl      MPV 9.6 fL      Platelets 325 10*3/mm3      Neutrophil % 89.5 %      Lymphocyte % 3.5 %      Monocyte % 4.9 %      Eosinophil % 0.6 %      Basophil % 0.2 %      Immature Grans % 1.3 %      Neutrophils, Absolute 11.24 10*3/mm3      Lymphocytes, Absolute 0.44 10*3/mm3      Monocytes, Absolute 0.62 10*3/mm3      Eosinophils, Absolute 0.07 10*3/mm3      Basophils, Absolute 0.02 10*3/mm3      Immature Grans, Absolute 0.16 10*3/mm3      nRBC 0.0 /100 WBC     Red Top [[id number]] Collected:  10/11/19 1251    Specimen:  Blood Updated:  10/11/19 1258        Imaging Results (last 24 hours)     Procedure Component Value Units Date/Time    CT Abdomen Pelvis With Contrast [[id number]] Collected:  10/11/19 1404     Updated:  10/11/19 1431    Narrative:       Exam: CT ABDOMEN PELVIS W CONTRAST-     Indication: Abdominal pain, vomiting     Comparison: None available.     DOSE LENGTH PRODUCT: 579 mGy cm. Automated exposure control was also  utilized to decrease patient radiation dose.     Findings:     There is a consolidative masslike focus in the medial inferior left  lower lobe that measures 3.3 cm. There is a prominent artery coursing  from the celiac trunk to this masslike focus. No other acute finding in  the lung bases.     No suspicious liver lesion. Gallbladder and biliary tree appear normal.  The pancreas, spleen, and adrenal glands appear unremarkable.     No solid renal lesion. No urolithiasis or hydronephrosis.  Circumferential bladder wall thickening is noted. Uterus and adnexa  appear unremarkable. There findings of pelvic floor relaxation.     Small  hiatal hernia. There is focal asymmetric thickening of the greater  curvature of the stomach. Small diverticula along the transverse colon  is noted. Normal appendix. No ascites or free pelvic fluid. No pelvic  mass organized pelvic collection.     The abdominal aorta is normal in caliber. Borderline enlarged 1.1 cm  gastrohepatic ligament lymph node on image 17. A few enlarged periportal  lymph nodes and retroperitoneal lymph nodes are also noted.     Multilevel degenerative change of the lumbar spine. No suspicious bone  lesion       Impression:          1. Focal thickening of the greater curvature of the stomach.  Differential includes neoplasm, focal gastritis, and artifact from lack  of distention. Multiple enlarged gastrohepatic ligament and  retroperitoneal lymph nodes are likely related to this process. Small  hiatal hernia.     2. 3.3 cm consolidative masslike focus in the posterior medial inferior  left lung with prominent feeding artery. This is indeterminate but may  represent pulmonary sequestration. Other potential etiologies include  chronic inflammatory/infectious process.     3. Circumferential urinary bladder wall thickening. Correlate with  urinalysis for possible cystitis.     4. Findings the pelvic floor relaxation.  This report was finalized on 10/11/2019 14:28 by Dr. Prasad Trevizo MD.    XR Chest 1 View [[id number]] Collected:  10/11/19 1321     Updated:  10/11/19 1326    Narrative:       Exam: XR CHEST 1 VW-     Indication: upper and pain/ generalized weakness     Comparison: 10/07/2019     Findings:     Cardiac silhouette is normal in size.  No consolidation, pleural effusion, or pneumothorax.  Persistent left upper lung platelike atelectasis.  No change in mild central vascular congestion.  No suspicious bone lesion.       Impression:          No acute findings.  This report was finalized on 10/11/2019 13:23 by Dr. Prasad Trevizo MD.        I have personally reviewed and interpreted the  radiology studies and ECG obtained at time of admission.     Assessment / Plan     Assessment:   Active Hospital Problems    Diagnosis   • **SIRS (systemic inflammatory response syndrome) (CMS/HCC)   • Rash- possible viral xanthem   • Gitelman syndrome     low magnesium     • Leukocytosis   • WILDA (acute kidney injury) (CMS/HCC)   • Hypomagnesemia   • Hyperlipidemia     Plan:    1.  Patient meets SIRS criteria without clear source of infection at this time.  Chest x-ray is clear.  Urinalysis is negative.  Feel possibly that the patient has a virus of some type.  Check influenza antigen and respiratory PCR panel.  Check procalcitonin.  2.  CT of the abdomen and pelvis shows a 3.3 cm consolidative masslike focus in the posterior medial inferior left lung with prominent feeding artery.  We will get a CT of the chest without contrast to further characterize.  She has received a dose of Ceftriaxone in the ER.  Hold on any antibiotic therapy at present until results are available.  3.  Schedule IV Solu-Medrol 40 mg every 8 hours.  Daily IV Pepcid.  IV Benadryl as needed for itching.  We will give a one-time dose of intramuscular Vistaril for itching now.  4.  Patient has received 2 L normal saline bolus in the emergency department.  LR at 100 mL/h.  Lactic acid 3.6- await reflex.  5.  GI soft/bland diet as tolerated  6.  She has received 2 g IV magnesium in the emergency department.  Resume her oral magnesium supplementation.  She has chronic difficulty with hypomagnesemia due to Gitelman syndrome.  The highest her magnesium level has reached in recent years is 1.2.  7.  Blood cultures pending  8.  Lovenox for DVT prophylaxis  9.  Workup ongoing  10.  Infectious disease consultation  11.  Labs in AM    Code Status: Full.     I discussed the patients findings and my recommendations with the patient, her sister and brother at bedside, and Dr. Wiliam Luna.    Estimated length of stay: to be determined.    Nadeen Gomez,  ROSENDA   10/11/19   5:19 PM      I personally evaluated and examined the patient in conjunction with ROSENDA Spears and agree with the assessment, treatment plan, and disposition of the patient as recorded by her. My history, exam, and further recommendations are:     Patient was seen and examined.  Rash really pruritic and diffuse.  Not common for any bacterial illnesses.  Suspect viral in origin.  Ordered a STAT CT Chest as there were some concerning findings on the CT Ab/Pelvis.  It shows bulky lymphadenopathy.  LDH and peripheral smear ordered.  Respiratory panel and influenza ordered.  Serial lactates.  IVF.        Klever Luna MD  10/11/19  8:23 PM

## 2019-10-11 NOTE — ED PROVIDER NOTES
"Subjective   Patient is a 64-year-old white female presents emergency department with several different complaints today.  Patient states that she has had epigastric pain with nausea and multiple episodes of vomiting for the past 3 to 4 days.  She denies diarrhea.  She also states that she has had a generalized rash since the past 5 days.  She states the day before she had received an influenza vaccine and then started having a rash the next day.  She states that she was seen by Dr. Whaley's office 3 days ago and was given a steroid injection for the rash.  She states the rash has worsened since that time. She states that she feels very weak as well. She states that the rash is very \"itchy\" she states that today she has felt extremely weak as well         History provided by:  Patient   used: No        Review of Systems   Constitutional: Negative.    HENT: Negative.    Eyes: Negative.    Respiratory: Negative.    Cardiovascular: Negative.    Gastrointestinal:        Patient is a 64-year-old white female presents emergency department with several different complaints today.  Patient states that she has had epigastric pain with nausea and multiple episodes of vomiting for the past 3 to 4 days.  She denies diarrhea.  She also states that she has had a generalized rash since the past 5 days.  She states the day before she had received an influenza vaccine and then started having a rash the next day.  She states that she was seen by Dr. Whaley's office 3 days ago and was given a steroid injection for the rash.  She states the rash has worsened since that time. She states that she feels very weak as well. She states that the rash is very \"itchy\" she states that today she has felt extremely weak as well      Endocrine: Negative.    Genitourinary: Negative.    Musculoskeletal: Negative.    Skin:        Generalized rash for the past week. She states that the rash started the day after receiving her " influenza vaccine.    Allergic/Immunologic: Negative.    Neurological: Negative.    Hematological: Negative.    Psychiatric/Behavioral: Negative.    All other systems reviewed and are negative.      Past Medical History:   Diagnosis Date   • Allergic rhinitis    • Colon polyps    • Depression    • Difficulty swallowing solids    • DJD (degenerative joint disease)    • Gitelman syndrome     low magnesium   • Hyperlipidemia    • Obesity    • Osteopenia    • PONV (postoperative nausea and vomiting)        Allergies   Allergen Reactions   • Penicillins Swelling and Rash   • Sulfa Antibiotics Rash and Other (See Comments)     Temp. fluctuations        Past Surgical History:   Procedure Laterality Date   • COLONOSCOPY  08/20/2010   • COLONOSCOPY W/ POLYPECTOMY  09/14/2016    2   5 mm sessile polyps removed with hot snare repeat 5 years   • DILATATION AND CURETTAGE     • ENDOSCOPY  09/14/2016    Medium sized HH, LA Grade A esohagitis, Fluid in the middle third of esohagus    • ME TOTAL KNEE ARTHROPLASTY Left 3/9/2018    Procedure: LEFT TOTAL KNEE ARTHROPLASTY;  Surgeon: Orlin Meza MD;  Location: [facility];  Service: Orthopedics   • REPLACEMENT TOTAL KNEE Right        Family History   Problem Relation Age of Onset   • No Known Problems Father    • No Known Problems Mother    • No Known Problems Brother    • No Known Problems Sister    • No Known Problems Son    • No Known Problems Daughter    • No Known Problems Daughter    • No Known Problems Maternal Grandmother    • No Known Problems Paternal Grandmother    • No Known Problems Maternal Aunt    • No Known Problems Paternal Aunt    • Breast cancer Neg Hx    • Ovarian cancer Neg Hx    • Colon cancer Neg Hx    • BRCA 1/2 Neg Hx    • Endometrial cancer Neg Hx        Social History     Socioeconomic History   • Marital status:      Spouse name: Not on file   • Number of children: Not on file   • Years of education: Not on file   • Highest education level:  "Not on file   Tobacco Use   • Smoking status: Former Smoker     Packs/day: 0.50     Types: Cigarettes     Last attempt to quit:      Years since quittin.7   • Smokeless tobacco: Never Used   Substance and Sexual Activity   • Alcohol use: No   • Drug use: No   • Sexual activity: No     Birth control/protection: Post-menopausal       Prior to Admission medications    Medication Sig Start Date End Date Taking? Authorizing Provider   aMILoride (MIDAMOR) 5 MG tablet Take 5 mg by mouth Daily.    Sid Vance MD   aMILoride (MIDAMOR) 5 MG tablet Take one tablet by mouth daily with food. 18   Heber Charles MD   cefdinir (OMNICEF) 300 MG capsule Take 1 capsule by mouth twice daily for 7 days 19      clindamycin (CLEOCIN) 300 MG capsule Take 2 capsules by mouth 1 hour prior to appointment 19      levoFLOXacin (LEVAQUIN) 500 MG tablet Take 1 tablet by mouth daily 10/7/19      magnesium oxide (MAGOX) 400 (241.3 Mg) MG tablet tablet Take 1,600 mg by mouth 2 (Two) Times a Day.    Sid Vance MD   ondansetron (ZOFRAN) 4 MG tablet Take 1 tablet by mouth four times a day as needed for nausea 10/7/19      PARoxetine (PAXIL) 10 MG tablet Take 10 mg by mouth Every Morning. 16   iSd Vance MD   PARoxetine (PAXIL) 10 MG tablet Take one tablet by mouth every day 18   Avery Whaley MD   PARoxetine (PAXIL) 10 MG tablet TAKE 1 TABLET BY MOUTH EVERY DAY 6/10/19      simvastatin (ZOCOR) 20 MG tablet Take 1 tablet by mouth daily. 16   Sid Vance MD   simvastatin (ZOCOR) 20 MG tablet Take 1 tablet by mouth once daily 19          /70 (BP Location: Left arm, Patient Position: Lying)   Pulse 109   Temp 100.4 °F (38 °C)   Resp 20   Ht 152.4 cm (60\")   Wt 75.8 kg (167 lb)   SpO2 100%   BMI 32.61 kg/m²     Objective   Physical Exam   Constitutional: She is oriented to person, place, and time. She appears well-developed and " well-nourished.   Pt retching on exam. Appears ill. Non toxic appearing   HENT:   Head: Normocephalic and atraumatic.   Eyes: Conjunctivae and EOM are normal. Pupils are equal, round, and reactive to light.   Neck: Normal range of motion. Neck supple. No tracheal deviation present. No thyromegaly present.   Cardiovascular: Normal rate, regular rhythm, normal heart sounds and intact distal pulses.   Pulmonary/Chest: Effort normal and breath sounds normal. No respiratory distress. She has no wheezes. She has no rales. She exhibits no tenderness.   Abdominal: Soft. Bowel sounds are normal.   abd soft, non distended. Moderate tenderness noted to midepigastric area. No guarding or rebound. bs x 4 quadrants.    Musculoskeletal: Normal range of motion.   Neurological: She is alert and oriented to person, place, and time. She has normal reflexes. No cranial nerve deficit.   Skin: Skin is warm and dry.   Generalized macular papular rash noted to trunk and bilat ues and les.    Psychiatric: She has a normal mood and affect. Her behavior is normal. Judgment and thought content normal.   Nursing note and vitals reviewed.      Procedures         Lab Results (last 24 hours)     Procedure Component Value Units Date/Time    Blood Culture - Blood, Arm, Left [[id number]] Collected:  10/11/19 1246    Specimen:  Blood from Arm, Left Updated:  10/11/19 1313    CBC & Differential [[id number]] Collected:  10/11/19 1251    Specimen:  Blood Updated:  10/11/19 1302    Narrative:       The following orders were created for panel order CBC & Differential.  Procedure                               Abnormality         Status                     ---------                               -----------         ------                     CBC Auto Differential[[id number]]        Abnormal            Final result                 Please view results for these tests on the individual orders.    Comprehensive Metabolic Panel [[id number]]  (Abnormal) Collected:   10/11/19 1251    Specimen:  Blood Updated:  10/11/19 1322     Glucose 119 mg/dL      BUN 19 mg/dL      Creatinine 1.39 mg/dL      Sodium 141 mmol/L      Potassium 3.6 mmol/L      Chloride 99 mmol/L      CO2 25.0 mmol/L      Calcium 8.2 mg/dL      Total Protein 8.1 g/dL      Albumin 3.30 g/dL      ALT (SGPT) 12 U/L      AST (SGOT) 20 U/L      Alkaline Phosphatase 59 U/L      Total Bilirubin 0.5 mg/dL      eGFR Non African Amer 38 mL/min/1.73      Globulin 4.8 gm/dL      A/G Ratio 0.7 g/dL      BUN/Creatinine Ratio 13.7     Anion Gap 17.0 mmol/L     Narrative:       GFR Normal >60  Chronic Kidney Disease <60  Kidney Failure <15    Protime-INR [[id number]]  (Abnormal) Collected:  10/11/19 1251    Specimen:  Blood Updated:  10/11/19 1312     Protime 15.0 Seconds      INR 1.14    Lipase [[id number]]  (Normal) Collected:  10/11/19 1251    Specimen:  Blood Updated:  10/11/19 1322     Lipase 40 U/L     Amylase [[id number]]  (Normal) Collected:  10/11/19 1251    Specimen:  Blood Updated:  10/11/19 1322     Amylase 84 U/L     Troponin [[id number]]  (Normal) Collected:  10/11/19 1251    Specimen:  Blood Updated:  10/11/19 1322     Troponin T <0.010 ng/mL     Narrative:       Troponin T Reference Range:  <= 0.03 ng/mL-   Negative for AMI  >0.03 ng/mL-     Abnormal for myocardial necrosis.  Clinicians would have to utilize clinical acumen, EKG, Troponin and serial changes to determine if it is an Acute Myocardial Infarction or myocardial injury due to an underlying chronic condition.     Lactic Acid, Plasma [[id number]]  (Abnormal) Collected:  10/11/19 1251    Specimen:  Blood Updated:  10/11/19 1325     Lactate 3.6 mmol/L     CBC Auto Differential [[id number]]  (Abnormal) Collected:  10/11/19 1251    Specimen:  Blood Updated:  10/11/19 1302     WBC 12.55 10*3/mm3      RBC 4.91 10*6/mm3      Hemoglobin 12.0 g/dL      Hematocrit 37.5 %      MCV 76.4 fL      MCH 24.4 pg      MCHC 32.0 g/dL      RDW 16.3 %      RDW-SD 44.3 fl      MPV  9.6 fL      Platelets 325 10*3/mm3      Neutrophil % 89.5 %      Lymphocyte % 3.5 %      Monocyte % 4.9 %      Eosinophil % 0.6 %      Basophil % 0.2 %      Immature Grans % 1.3 %      Neutrophils, Absolute 11.24 10*3/mm3      Lymphocytes, Absolute 0.44 10*3/mm3      Monocytes, Absolute 0.62 10*3/mm3      Eosinophils, Absolute 0.07 10*3/mm3      Basophils, Absolute 0.02 10*3/mm3      Immature Grans, Absolute 0.16 10*3/mm3      nRBC 0.0 /100 WBC     Lactic Acid, Reflex Timer (This will reflex a repeat order 3-3:15 hours after ordered.) [[id number]] Collected:  10/11/19 1251    Specimen:  Blood Updated:  10/11/19 1630     Extra Tube Hold for add-ons.     Comment: Auto resulted.       Magnesium [[id number]]  (Abnormal) Collected:  10/11/19 1251    Specimen:  Blood Updated:  10/11/19 1531     Magnesium 0.6 mg/dL     Blood Culture - Blood, Arm, Right [[id number]] Collected:  10/11/19 1253    Specimen:  Blood from Arm, Right Updated:  10/11/19 1313    Urinalysis With Culture If Indicated - Urine, Catheter In/Out [[id number]]  (Abnormal) Collected:  10/11/19 1536    Specimen:  Urine, Catheter In/Out Updated:  10/11/19 1607     Color, UA Dark Yellow     Appearance, UA Cloudy     pH, UA <=5.0     Specific Gravity, UA >1.030     Glucose, UA Negative     Ketones, UA Negative     Bilirubin, UA Negative     Blood, UA Negative     Protein, UA 30 mg/dL (1+)     Leuk Esterase, UA Negative     Nitrite, UA Negative     Urobilinogen, UA 0.2 E.U./dL    Urinalysis, Microscopic Only - Urine, Catheter In/Out [[id number]]  (Abnormal) Collected:  10/11/19 1536    Specimen:  Urine, Catheter In/Out Updated:  10/11/19 1607     RBC, UA None Seen /HPF      WBC, UA 0-2 /HPF      Bacteria, UA None Seen /HPF      Squamous Epithelial Cells, UA 0-2 /HPF      Hyaline Casts, UA None Seen /LPF      Amorphous Crystals, UA Small/1+ /HPF      Methodology Manual Light Microscopy          CT Abdomen Pelvis With Contrast   Final Result       1. Focal thickening of  the greater curvature of the stomach.   Differential includes neoplasm, focal gastritis, and artifact from lack   of distention. Multiple enlarged gastrohepatic ligament and   retroperitoneal lymph nodes are likely related to this process. Small   hiatal hernia.       2. 3.3 cm consolidative masslike focus in the posterior medial inferior   left lung with prominent feeding artery. This is indeterminate but may   represent pulmonary sequestration. Other potential etiologies include   chronic inflammatory/infectious process.       3. Circumferential urinary bladder wall thickening. Correlate with   urinalysis for possible cystitis.       4. Findings the pelvic floor relaxation.   This report was finalized on 10/11/2019 14:28 by Dr. Prasad Trevizo MD.      XR Chest 1 View   Final Result       No acute findings.   This report was finalized on 10/11/2019 13:23 by Dr. Prasad Trevizo MD.      CT Chest Without Contrast    (Results Pending)       ED Course  ED Course as of Oct 11 1709   Fri Oct 11, 2019   1520 Have updated pt and pt family with test results thus far. Advised nursing staff that if pt unable to urinate at this time, to obtain urinalysis per in/out cath. Have reviewed pt and pt care plan with dr bassett- also assessed pt and in agreement with pt care plan  [CW]   1546 Pending urinalysis at this time. Pt's magnesium in 0.6. 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 Have ordered magnesium 2grams iv at this time. Have reviewed results with dr bassett  [CW]   1600 Spoke with dr mckeon. Has accepted for admission.pt has screened positive for sepsis. She has received rocephin and ivf bolus as well. Her lactic acid is 3.6 have discussed the ct scan findings with pt and pt daughter and advised that she will have to have further workup for gi issues. Pt will be admitted soon in stable condition. Dr bassett assessed the rash as well- feels that rash is viral exanthem at this time.  The benadry, steroids, and pepcid have  not improved the rash. Recheck blood pressure manually 130/70  [CW]      ED Course User Index  [CW] Laine Moreno, APRN          MDM  Number of Diagnoses or Management Options  Abnormal abdominal CT scan: new and requires workup  Hypomagnesemia: new and requires workup  Pneumonia due to infectious organism, unspecified laterality, unspecified part of lung: new and requires workup  Renal insufficiency: new and requires workup  Sepsis, due to unspecified organism, unspecified whether acute organ dysfunction present (CMS/HCC): new and requires workup  Volume depletion: new and requires workup     Amount and/or Complexity of Data Reviewed  Clinical lab tests: ordered and reviewed  Tests in the radiology section of CPT®: ordered and reviewed  Decide to obtain previous medical records or to obtain history from someone other than the patient: yes    Patient Progress  Patient progress: stable      Final diagnoses:   Sepsis, due to unspecified organism, unspecified whether acute organ dysfunction present (CMS/HCC)   Pneumonia due to infectious organism, unspecified laterality, unspecified part of lung   Volume depletion   Renal insufficiency   Abnormal abdominal CT scan   Hypomagnesemia          Laine Moreno, APRN  10/11/19 7431

## 2019-10-12 PROBLEM — E66.9 OBESITY (BMI 30-39.9): Status: ACTIVE | Noted: 2019-10-12

## 2019-10-12 PROBLEM — D50.9 MICROCYTIC ANEMIA: Status: ACTIVE | Noted: 2019-10-12

## 2019-10-12 PROBLEM — R59.1 LYMPHADENOPATHY: Status: ACTIVE | Noted: 2019-10-12

## 2019-10-12 PROBLEM — E87.29 HIGH ANION GAP METABOLIC ACIDOSIS: Status: ACTIVE | Noted: 2019-10-12

## 2019-10-12 LAB
ALBUMIN SERPL-MCNC: 3 G/DL (ref 3.5–5.2)
ALBUMIN/GLOB SERPL: 0.7 G/DL
ALP SERPL-CCNC: 53 U/L (ref 39–117)
ALT SERPL W P-5'-P-CCNC: 14 U/L (ref 1–33)
ANION GAP SERPL CALCULATED.3IONS-SCNC: 15 MMOL/L (ref 5–15)
AST SERPL-CCNC: 27 U/L (ref 1–32)
B PARAPERT DNA SPEC QL NAA+PROBE: NOT DETECTED
B PERT DNA SPEC QL NAA+PROBE: NOT DETECTED
BASOPHILS # BLD AUTO: 0.09 10*3/MM3 (ref 0–0.2)
BASOPHILS NFR BLD AUTO: 0.6 % (ref 0–1.5)
BILIRUB SERPL-MCNC: 0.3 MG/DL (ref 0.2–1.2)
BUN BLD-MCNC: 21 MG/DL (ref 8–23)
BUN/CREAT SERPL: 22.3 (ref 7–25)
C PNEUM DNA NPH QL NAA+NON-PROBE: NOT DETECTED
CALCIUM SPEC-SCNC: 7 MG/DL (ref 8.6–10.5)
CHLORIDE SERPL-SCNC: 103 MMOL/L (ref 98–107)
CO2 SERPL-SCNC: 22 MMOL/L (ref 22–29)
CREAT BLD-MCNC: 0.94 MG/DL (ref 0.57–1)
D-LACTATE SERPL-SCNC: 1.6 MMOL/L (ref 0.5–2)
DEPRECATED RDW RBC AUTO: 44.5 FL (ref 37–54)
EOSINOPHIL # BLD AUTO: 0.23 10*3/MM3 (ref 0–0.4)
EOSINOPHIL NFR BLD AUTO: 1.5 % (ref 0.3–6.2)
ERYTHROCYTE [DISTWIDTH] IN BLOOD BY AUTOMATED COUNT: 16.4 % (ref 12.3–15.4)
FLUAV H1 2009 PAND RNA NPH QL NAA+PROBE: NOT DETECTED
FLUAV H1 HA GENE NPH QL NAA+PROBE: NOT DETECTED
FLUAV H3 RNA NPH QL NAA+PROBE: NOT DETECTED
FLUAV SUBTYP SPEC NAA+PROBE: NOT DETECTED
FLUBV RNA ISLT QL NAA+PROBE: NOT DETECTED
GFR SERPL CREATININE-BSD FRML MDRD: 60 ML/MIN/1.73
GLOBULIN UR ELPH-MCNC: 4.5 GM/DL
GLUCOSE BLD-MCNC: 196 MG/DL (ref 65–99)
HADV DNA SPEC NAA+PROBE: NOT DETECTED
HCOV 229E RNA SPEC QL NAA+PROBE: NOT DETECTED
HCOV HKU1 RNA SPEC QL NAA+PROBE: NOT DETECTED
HCOV NL63 RNA SPEC QL NAA+PROBE: NOT DETECTED
HCOV OC43 RNA SPEC QL NAA+PROBE: NOT DETECTED
HCT VFR BLD AUTO: 31.1 % (ref 34–46.6)
HGB BLD-MCNC: 10.2 G/DL (ref 12–15.9)
HMPV RNA NPH QL NAA+NON-PROBE: NOT DETECTED
HPIV1 RNA SPEC QL NAA+PROBE: NOT DETECTED
HPIV2 RNA SPEC QL NAA+PROBE: NOT DETECTED
HPIV3 RNA NPH QL NAA+PROBE: NOT DETECTED
HPIV4 P GENE NPH QL NAA+PROBE: NOT DETECTED
IMM GRANULOCYTES # BLD AUTO: 0.17 10*3/MM3 (ref 0–0.05)
IMM GRANULOCYTES NFR BLD AUTO: 1.1 % (ref 0–0.5)
LYMPHOCYTES # BLD AUTO: 1.2 10*3/MM3 (ref 0.7–3.1)
LYMPHOCYTES NFR BLD AUTO: 8.1 % (ref 19.6–45.3)
M PNEUMO IGG SER IA-ACNC: NOT DETECTED
MAGNESIUM SERPL-MCNC: 1.6 MG/DL (ref 1.6–2.4)
MCH RBC QN AUTO: 24.8 PG (ref 26.6–33)
MCHC RBC AUTO-ENTMCNC: 32.8 G/DL (ref 31.5–35.7)
MCV RBC AUTO: 75.5 FL (ref 79–97)
MONOCYTES # BLD AUTO: 0.71 10*3/MM3 (ref 0.1–0.9)
MONOCYTES NFR BLD AUTO: 4.8 % (ref 5–12)
NEUTROPHILS # BLD AUTO: 12.45 10*3/MM3 (ref 1.7–7)
NEUTROPHILS NFR BLD AUTO: 83.9 % (ref 42.7–76)
NRBC BLD AUTO-RTO: 0 /100 WBC (ref 0–0.2)
PLATELET # BLD AUTO: 293 10*3/MM3 (ref 140–450)
PMV BLD AUTO: 10.2 FL (ref 6–12)
POTASSIUM BLD-SCNC: 3.4 MMOL/L (ref 3.5–5.2)
PROT SERPL-MCNC: 7.5 G/DL (ref 6–8.5)
RBC # BLD AUTO: 4.12 10*6/MM3 (ref 3.77–5.28)
RHINOVIRUS RNA SPEC NAA+PROBE: NOT DETECTED
RSV RNA NPH QL NAA+NON-PROBE: NOT DETECTED
SODIUM BLD-SCNC: 140 MMOL/L (ref 136–145)
WBC NRBC COR # BLD: 14.85 10*3/MM3 (ref 3.4–10.8)

## 2019-10-12 PROCEDURE — 80053 COMPREHEN METABOLIC PANEL: CPT | Performed by: NURSE PRACTITIONER

## 2019-10-12 PROCEDURE — 83735 ASSAY OF MAGNESIUM: CPT | Performed by: NURSE PRACTITIONER

## 2019-10-12 PROCEDURE — 94760 N-INVAS EAR/PLS OXIMETRY 1: CPT

## 2019-10-12 PROCEDURE — 25010000002 DIPHENHYDRAMINE PER 50 MG: Performed by: NURSE PRACTITIONER

## 2019-10-12 PROCEDURE — 94799 UNLISTED PULMONARY SVC/PX: CPT

## 2019-10-12 PROCEDURE — 25010000002 ENOXAPARIN PER 10 MG: Performed by: NURSE PRACTITIONER

## 2019-10-12 PROCEDURE — 83605 ASSAY OF LACTIC ACID: CPT | Performed by: NURSE PRACTITIONER

## 2019-10-12 PROCEDURE — 85025 COMPLETE CBC W/AUTO DIFF WBC: CPT | Performed by: NURSE PRACTITIONER

## 2019-10-12 RX ORDER — LANOLIN ALCOHOL/MO/W.PET/CERES
3 CREAM (GRAM) TOPICAL NIGHTLY
Status: DISCONTINUED | OUTPATIENT
Start: 2019-10-12 | End: 2019-10-15 | Stop reason: HOSPADM

## 2019-10-12 RX ORDER — CETIRIZINE HYDROCHLORIDE 10 MG/1
10 TABLET ORAL DAILY
Status: DISCONTINUED | OUTPATIENT
Start: 2019-10-12 | End: 2019-10-15 | Stop reason: HOSPADM

## 2019-10-12 RX ORDER — TEMAZEPAM 15 MG/1
15 CAPSULE ORAL NIGHTLY PRN
Status: DISCONTINUED | OUTPATIENT
Start: 2019-10-12 | End: 2019-10-15 | Stop reason: HOSPADM

## 2019-10-12 RX ORDER — HYDROXYZINE HYDROCHLORIDE 25 MG/1
25 TABLET, FILM COATED ORAL 3 TIMES DAILY PRN
Status: DISCONTINUED | OUTPATIENT
Start: 2019-10-12 | End: 2019-10-15 | Stop reason: HOSPADM

## 2019-10-12 RX ORDER — FAMOTIDINE 20 MG/1
20 TABLET, FILM COATED ORAL DAILY
Status: DISCONTINUED | OUTPATIENT
Start: 2019-10-13 | End: 2019-10-15 | Stop reason: HOSPADM

## 2019-10-12 RX ORDER — POTASSIUM CHLORIDE 750 MG/1
20 CAPSULE, EXTENDED RELEASE ORAL DAILY
Status: DISCONTINUED | OUTPATIENT
Start: 2019-10-12 | End: 2019-10-14

## 2019-10-12 RX ADMIN — HYDROXYZINE HYDROCHLORIDE 25 MG: 25 TABLET ORAL at 21:53

## 2019-10-12 RX ADMIN — ENOXAPARIN SODIUM 40 MG: 40 INJECTION SUBCUTANEOUS at 17:10

## 2019-10-12 RX ADMIN — FAMOTIDINE 20 MG: 10 INJECTION, SOLUTION INTRAVENOUS at 08:01

## 2019-10-12 RX ADMIN — PAROXETINE 10 MG: 10 TABLET, FILM COATED ORAL at 06:45

## 2019-10-12 RX ADMIN — HYDROXYZINE HYDROCHLORIDE 25 MG: 25 TABLET ORAL at 09:59

## 2019-10-12 RX ADMIN — HYDROCORTISONE: 25 CREAM TOPICAL at 08:01

## 2019-10-12 RX ADMIN — SODIUM CHLORIDE, POTASSIUM CHLORIDE, SODIUM LACTATE AND CALCIUM CHLORIDE 50 ML/HR: 600; 310; 30; 20 INJECTION, SOLUTION INTRAVENOUS at 22:45

## 2019-10-12 RX ADMIN — POTASSIUM CHLORIDE 20 MEQ: 750 CAPSULE, EXTENDED RELEASE ORAL at 09:59

## 2019-10-12 RX ADMIN — SODIUM CHLORIDE, POTASSIUM CHLORIDE, SODIUM LACTATE AND CALCIUM CHLORIDE 100 ML/HR: 600; 310; 30; 20 INJECTION, SOLUTION INTRAVENOUS at 05:26

## 2019-10-12 RX ADMIN — HYDROCORTISONE: 25 CREAM TOPICAL at 21:53

## 2019-10-12 RX ADMIN — ATORVASTATIN CALCIUM 10 MG: 10 TABLET, FILM COATED ORAL at 21:47

## 2019-10-12 RX ADMIN — Medication 3 MG: at 21:48

## 2019-10-12 RX ADMIN — MAGNESIUM GLUCONATE 500 MG ORAL TABLET 1000 MG: 500 TABLET ORAL at 21:47

## 2019-10-12 RX ADMIN — DIPHENHYDRAMINE HYDROCHLORIDE 50 MG: 50 INJECTION INTRAMUSCULAR; INTRAVENOUS at 00:45

## 2019-10-12 RX ADMIN — CETIRIZINE HYDROCHLORIDE 10 MG: 10 TABLET, FILM COATED ORAL at 16:28

## 2019-10-12 RX ADMIN — MAGNESIUM GLUCONATE 500 MG ORAL TABLET 1000 MG: 500 TABLET ORAL at 08:01

## 2019-10-12 NOTE — PROGRESS NOTES
[facility] Medicine Services  INPATIENT PROGRESS NOTE    Length of Stay: 1  Date of Admission: 10/11/2019  Primary Care Physician: Avery Whaley MD    Subjective   Chief Complaint: Follow-up rash   HPI   Patient is resting in bed with son Hernandez at bedside.  She did not sleep well last night, which she feels is secondary to steroid side effect.  She does feel better today.  Her rash is still present, but significantly improved today.  She is much less itchy.  Her shortness of breath is improved.  She is still coughing, but not producing much sputum.  She denies any chest pain.  Her nausea and vomiting have significantly improved and she did tolerate a regular diet last night and this morning.    Review of Systems   All pertinent negatives and positives are as above. All other systems have been reviewed and are negative unless otherwise stated.     Objective    Temp:  [97.6 °F (36.4 °C)-100.4 °F (38 °C)] 97.6 °F (36.4 °C)  Heart Rate:  [] 60  Resp:  [16-20] 16  BP: ()/(44-70) 99/55  Physical Exam   Constitutional: She is oriented to person, place, and time. She appears well-developed and well-nourished. No distress.   Looks much better today   HENT:   Head: Normocephalic and atraumatic.   Neck: Normal range of motion. Neck supple. No JVD present. No tracheal deviation present.   Cardiovascular: Normal rate, regular rhythm and intact distal pulses. Exam reveals no gallop and no friction rub.   Sinus 70-89   Pulmonary/Chest: Effort normal and breath sounds normal. She has no wheezes. She has no rales.   Abdominal: Soft. Bowel sounds are normal. She exhibits no distension. There is no tenderness. There is no guarding.   Musculoskeletal: Normal range of motion. She exhibits no edema or tenderness.   Lymphadenopathy:        Head (right side): Submandibular and occipital adenopathy present.        Head (left side): Submandibular adenopathy present.        Right  cervical: No superficial cervical, no deep cervical and no posterior cervical adenopathy present.       Left cervical: No superficial cervical, no deep cervical and no posterior cervical adenopathy present.     She has axillary adenopathy.        Right: Inguinal adenopathy present.        Left: Inguinal adenopathy present.   Neurological: She is alert and oriented to person, place, and time. No cranial nerve deficit.   Skin: Skin is warm and dry. Rash (Rash much less confluent today, significantly improved) noted.   Psychiatric: She has a normal mood and affect. Her behavior is normal. Judgment and thought content normal.   Vitals reviewed.    Results Review:  I have reviewed the labs, radiology results, and diagnostic studies.    Laboratory Data:   Results from last 7 days   Lab Units 10/12/19  0503 10/11/19  1251   WBC 10*3/mm3 14.85* 12.55*   HEMOGLOBIN g/dL 10.2* 12.0   HEMATOCRIT % 31.1* 37.5   PLATELETS 10*3/mm3 293 325     Results from last 7 days   Lab Units 10/12/19  0503 10/11/19  1251   SODIUM mmol/L 140 141   POTASSIUM mmol/L 3.4* 3.6   CHLORIDE mmol/L 103 99   CO2 mmol/L 22.0 25.0   BUN mg/dL 21 19   CREATININE mg/dL 0.94 1.39*   CALCIUM mg/dL 7.0* 8.2*   BILIRUBIN mg/dL 0.3 0.5   ALK PHOS U/L 53 59   ALT (SGPT) U/L 14 12   AST (SGOT) U/L 27 20   GLUCOSE mg/dL 196* 119*     Radiology Data:   Imaging Results (last 24 hours)     Procedure Component Value Units Date/Time    CT Chest Without Contrast [[id number]] Collected:  10/11/19 1855     Updated:  10/11/19 1907    Narrative:       Exam: CT CHEST WO CONTRAST-     Indication: Abnormal findings on chest CT     Comparison: Same day CT abdomen/pelvis     DOSE LENGTH PRODUCT: 374 mGy cm. Automated exposure control was also  utilized to decrease patient radiation dose.     Findings:     The central airways are clear. No consolidative process. There is  diffuse mild interlobular septal thickening a few areas of patchy  groundglass opacity. No pleural effusion  or pneumothorax.      Again demonstrated is a consolidative 3.4 cm mass in the posterior  medial left lower lobe with some cavitary components. A prominent artery  supplying this mass is demonstrate on same-day abdomen CT with IV  contrast.     Multiple enlarged axillary, supraclavicular, mediastinal, and hilar  lymph nodes are present.     Main pulmonary trunk and thoracic aorta are normal in caliber. Thyroid  is uniform. No pericardial effusion. Findings in the upper abdomen are  described on same date CT head/pelvis.          Impression:          1. Bulky lymphadenopathy in the axilla, supraclavicular regions,  mediastinum, and hilum. Differential includes metastasis and lymphoma.     2. Mild pulmonary edema.     3. Redemonstrated 3.4 cm mass in the posterior medial left lower lung.  This remains indeterminate but is favored to be an area of  bronchopulmonary sequestration. Other potential etiologies include  chronic inflammatory process and less likely, neoplasm.  This report was finalized on 10/11/2019 19:04 by Dr. Prasad Trevizo MD.    CT Abdomen Pelvis With Contrast [[id number]] Collected:  10/11/19 1404     Updated:  10/11/19 1431    Narrative:       Exam: CT ABDOMEN PELVIS W CONTRAST-     Indication: Abdominal pain, vomiting     Comparison: None available.     DOSE LENGTH PRODUCT: 579 mGy cm. Automated exposure control was also  utilized to decrease patient radiation dose.     Findings:     There is a consolidative masslike focus in the medial inferior left  lower lobe that measures 3.3 cm. There is a prominent artery coursing  from the celiac trunk to this masslike focus. No other acute finding in  the lung bases.     No suspicious liver lesion. Gallbladder and biliary tree appear normal.  The pancreas, spleen, and adrenal glands appear unremarkable.     No solid renal lesion. No urolithiasis or hydronephrosis.  Circumferential bladder wall thickening is noted. Uterus and adnexa  appear unremarkable. There  findings of pelvic floor relaxation.     Small hiatal hernia. There is focal asymmetric thickening of the greater  curvature of the stomach. Small diverticula along the transverse colon  is noted. Normal appendix. No ascites or free pelvic fluid. No pelvic  mass organized pelvic collection.     The abdominal aorta is normal in caliber. Borderline enlarged 1.1 cm  gastrohepatic ligament lymph node on image 17. A few enlarged periportal  lymph nodes and retroperitoneal lymph nodes are also noted.     Multilevel degenerative change of the lumbar spine. No suspicious bone  lesion       Impression:          1. Focal thickening of the greater curvature of the stomach.  Differential includes neoplasm, focal gastritis, and artifact from lack  of distention. Multiple enlarged gastrohepatic ligament and  retroperitoneal lymph nodes are likely related to this process. Small  hiatal hernia.     2. 3.3 cm consolidative masslike focus in the posterior medial inferior  left lung with prominent feeding artery. This is indeterminate but may  represent pulmonary sequestration. Other potential etiologies include  chronic inflammatory/infectious process.     3. Circumferential urinary bladder wall thickening. Correlate with  urinalysis for possible cystitis.     4. Findings the pelvic floor relaxation.  This report was finalized on 10/11/2019 14:28 by Dr. Prasad Trevizo MD.    XR Chest 1 View [[id number]] Collected:  10/11/19 1321     Updated:  10/11/19 1326    Narrative:       Exam: XR CHEST 1 VW-     Indication: upper and pain/ generalized weakness     Comparison: 10/07/2019     Findings:     Cardiac silhouette is normal in size.  No consolidation, pleural effusion, or pneumothorax.  Persistent left upper lung platelike atelectasis.  No change in mild central vascular congestion.  No suspicious bone lesion.       Impression:          No acute findings.  This report was finalized on 10/11/2019 13:23 by Dr. Prasad Trevizo MD.        I  have reviewed the patient current medications.     Assessment/Plan     Active Hospital Problems    Diagnosis   • **SIRS (systemic inflammatory response syndrome) (CMS/HCC)   • Lymphadenopathy   • Obesity (BMI 30-39.9)   • Microcytic anemia   • High anion gap metabolic acidosis due to lactic acidosis   • Rash   • Gitelman syndrome, complicated by hypomagnesemia and hypokalemia      low magnesium     • Leukocytosis   • WILDA (acute kidney injury) (CMS/HCC)   • Hypomagnesemia   • Hyperlipidemia     Plan:  1.  CT of the chest and abdomen/pelvis reviewed with multiple areas of bulky lymphadenopathy.  Long discussion with the patient and son regarding possibility of lymphoma.  LDH is mildly elevated.  Peripheral blood smear is pending.  2.  General surgery consult for excisional biopsy to confirm diagnosis.  Will consult hematology/oncology once biopsy results are available.  3.  Renal function has improved.  Nausea is much improved and she is eating and drinking better.  Lactic acidosis has resolved.  Will decrease IV fluid rate to 50 ml/hour.  4.  Continue hydrocortisone cream to rash, IV steroids discontinued last night.  Discontinue IV Benadryl.  Will add Atarax as needed as I feel patient will benefit from both a rash and anxiety standpoint.  5.  Melatonin scheduled nightly.  Restoril as needed for sleep.  6.  Procalcitonin is slightly elevated at 4.17.  White blood cell count 14,000- may be worse secondary to steroid administration.  Influenza antigen negative.  Respiratory panel pending.  Blood cultures pending.  At this point, would lean more toward an immune response triggered by receiving the flu vaccine or viral process rather than bacterial process.  Would continue to monitor off of antibiotic therapy for now.  7.  Activity as tolerated  8.  Labs in AM    Discharge Planning: I expect the patient to be discharged to home in 2-3 days.    Nadeen Gomez, ROSENDA   10/12/19   8:08 AM     I personally evaluated and  examined the patient in conjunction with ROSENDA Spears and agree with the assessment, treatment plan, and disposition of the patient as recorded by her. My history, exam, and further recommendations are:     Patient was seen and examined at bedside.  Plan is to attempt to get a surgical biopsy for diagnosis.  Patient would like to see Dr. Licona.  Strongly suspect that this is going to be a T-cell lymphoma based on the presentation with rash and the distribution and characteristics of the lymph nodes.  However this very well could be a B-cell lymphoma.  Do not see any indication for antibiotics at this time.  We will consult general surgery.  Rash is already improved.  There were several palpable lymph nodes noted on exam, axillary or inguinal may be the best option for the patient.  Added zyrtec for rash and itching.      Klever Luna MD  10/12/19  10:18 AM

## 2019-10-12 NOTE — PLAN OF CARE
Problem: Patient Care Overview  Goal: Plan of Care Review  Outcome: Ongoing (interventions implemented as appropriate)   10/12/19 9482   Coping/Psychosocial   Plan of Care Reviewed With patient   Plan of Care Review   Progress no change   OTHER   Outcome Summary No c/o pain. Hydrocortisone cream for itching. PRN benadryl given. VSS. Safety maintained. Will continue to monitor.       Problem: Infection, Risk/Actual (Adult)  Goal: Identify Related Risk Factors and Signs and Symptoms  Outcome: Ongoing (interventions implemented as appropriate)    Goal: Infection Prevention/Resolution  Outcome: Ongoing (interventions implemented as appropriate)

## 2019-10-12 NOTE — PLAN OF CARE
Problem: Patient Care Overview  Goal: Plan of Care Review  Outcome: Ongoing (interventions implemented as appropriate)   10/12/19 0504   Coping/Psychosocial   Plan of Care Reviewed With patient   Plan of Care Review   Progress no change      10/12/19 0504 10/12/19 1548   Coping/Psychosocial   Plan of Care Reviewed With patient --    Plan of Care Review   Progress no change --    OTHER   Outcome Summary --  VSS, general surgery consult and hematology and oncology consult called. Patient is feeling better today, rash is slightly better. Safety maintained, will continue to monitor.        Problem: Infection, Risk/Actual (Adult)  Goal: Identify Related Risk Factors and Signs and Symptoms  Outcome: Ongoing (interventions implemented as appropriate)   10/12/19 0504   Infection, Risk/Actual (Adult)   Related Risk Factors (Infection, Risk/Actual) treatment plan   Signs and Symptoms (Infection, Risk/Actual) body temperature changes;nausea;vomiting     Goal: Infection Prevention/Resolution  Outcome: Ongoing (interventions implemented as appropriate)

## 2019-10-12 NOTE — PAYOR COMM NOTE
"[id number]  ADMIT INPT 10-11-19  UR PHONE    [phone]    Sammi Quinones (64 y.o. Female)     Date of Birth Social Security Number Address Home Phone MRN    1955  [address] [place] [phone] [id number]    [place] Marital Status          [facility]        Admission Date Admission Type Admitting Provider Attending Provider Department, Room/Bed    10/11/19 Emergency Klever Luna MD Fleming, John Eric, MD [address], 481/1    Discharge Date Discharge Disposition Discharge Destination                       Attending Provider:  Klever Luna MD    Allergies:  Penicillins, Sulfa Antibiotics    Isolation:  None   Infection:  None   Code Status:  CPR    Ht:  152.4 cm (60\")   Wt:  78.4 kg (172 lb 13.5 oz)    Admission Cmt:  None   Principal Problem:  SIRS (systemic inflammatory response syndrome) (CMS/LTAC, located within [facility]) [R65.10]                 Active Insurance as of 10/11/2019     Primary Coverage     Payor Plan Insurance Group Employer/Plan Group    [facility] BLUE CROSS [employer] EMPLOYEE [id number]     Payor Plan Address Payor Plan Phone Number Payor Plan Fax Number Effective Dates    [address] [phone]  1/1/2018 - None Entered    [place] [address]       Subscriber Name Subscriber Birth Date Member ID       SAMMI QUINONES 1955 [id number]                 Emergency Contacts      (Rel.) Home Phone Work Phone Mobile Phone    Sade Lepe (Daughter) [phone] -- --    Hernandez Quinones (Son) [phone] -- --               History & Physical      Klever Luna MD at 10/11/19 [address] [place] [address] Medicine Services  HISTORY AND PHYSICAL    Date of Admission: 10/11/2019  Primary Care Physician: Avery Whaley MD    Subjective     Chief Complaint: Rash    History of Present Illness  Ms. Quinones is a pleasant 64-year-old  female who follows " Dr. Avery Whaley for primary care.  She has a past medical history significant for depression, Gitelman syndrome, and hyperlipidemia.  The patient states she began feeling unwell on Thursday.  She started to run a low-grade temperature, but felt well enough to come to work on Thursday night.  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  She received her flu shot on Friday night.  On Sunday, she began to feel unwell with nausea, vomiting, and she developed a rash on her entire body.  She has had intermittent nausea and vomiting since Sunday, last vomited this afternoon.  She states that she has had some diarrhea, but this has not been consistent.  She denies abdominal pain.  She states her lips were very swollen.  She has had some shortness of breath, worse with exertion.  She has had a cough at times, with thick, cloudy phlegm.  She denies chest pain.      She has had no dysuria, but does feel that her urine is darker than usual.  She denies tick exposure.  She denies any recent medication changes or new medications other than Levaquin.  She denies sick contacts.  The highest temperature she ran was on Wednesday at 102.8 °F.  She saw her primary care provider on Monday and was placed on Levaquin.  She has not noticed any improvement since that time.  She states she had a chest x-ray, blood cultures, urinalysis, and a strep test.  She has chronic difficulty with her electrolytes due to Gitelman Syndrome.    Review of Systems   Constitutional: Positive for activity change, appetite change, chills, fatigue and fever.   HENT: Negative for congestion, sinus pressure, sinus pain and sore throat.    Respiratory: Positive for cough and shortness of breath.    Cardiovascular: Negative for chest pain and palpitations.   Gastrointestinal: Positive for diarrhea, nausea and vomiting. Negative for abdominal pain.   Genitourinary: Positive for decreased urine volume. Negative for difficulty urinating and  dysuria.   Skin: Positive for rash.   Neurological: Negative for dizziness, syncope and light-headedness.      Otherwise complete ROS reviewed and negative except as mentioned in the HPI.    Past Medical History:   Past Medical History:   Diagnosis Date   • Allergic rhinitis    • Colon polyps    • Depression    • Difficulty swallowing solids    • DJD (degenerative joint disease)    • Gitelman syndrome     low magnesium   • Hyperlipidemia    • Obesity    • Osteopenia    • PONV (postoperative nausea and vomiting)      Past Surgical History:  Past Surgical History:   Procedure Laterality Date   • COLONOSCOPY  08/20/2010   • COLONOSCOPY W/ POLYPECTOMY  09/14/2016    2   5 mm sessile polyps removed with hot snare repeat 5 years   • DILATATION AND CURETTAGE     • ENDOSCOPY  09/14/2016    Medium sized HH, LA Grade A esohagitis, Fluid in the middle third of esohagus    • HI TOTAL KNEE ARTHROPLASTY Left 3/9/2018    Procedure: LEFT TOTAL KNEE ARTHROPLASTY;  Surgeon: Orlin Meza MD;  Location: [facility];  Service: Orthopedics   • REPLACEMENT TOTAL KNEE Right      Social History:  reports that she quit smoking about 16 years ago. Her smoking use included cigarettes. She smoked 0.50 packs per day. She has never used smokeless tobacco. She reports that she does not drink alcohol or use drugs.    Family History: family history includes Anorexia nervosa in her mother; Aortic aneurysm in her father; Lymphoma in her paternal grandfather; No Known Problems in her brother, daughter, daughter, maternal aunt, maternal grandmother, paternal aunt, paternal grandmother, sister, and son.       Allergies:  Allergies   Allergen Reactions   • Penicillins Swelling and Rash   • Sulfa Antibiotics Rash and Other (See Comments)     Temp. fluctuations      Medications:  Prior to Admission medications    Medication Sig Start Date End Date Taking? Authorizing Provider   aMILoride (MIDAMOR) 5 MG tablet Take 5 mg by mouth Daily.    Provider,  "MD Sid   aMILoride (MIDAMOR) 5 MG tablet Take one tablet by mouth daily with food. 9/26/18   Heber Charles MD   cefdinir (OMNICEF) 300 MG capsule Take 1 capsule by mouth twice daily for 7 days 2/18/19      clindamycin (CLEOCIN) 300 MG capsule Take 2 capsules by mouth 1 hour prior to appointment 7/25/19      levoFLOXacin (LEVAQUIN) 500 MG tablet Take 1 tablet by mouth daily 10/7/19      magnesium oxide (MAGOX) 400 (241.3 Mg) MG tablet tablet Take 1,600 mg by mouth 2 (Two) Times a Day.    ProviderSid MD   ondansetron (ZOFRAN) 4 MG tablet Take 1 tablet by mouth four times a day as needed for nausea 10/7/19      PARoxetine (PAXIL) 10 MG tablet Take 10 mg by mouth Every Morning. 8/7/16   Sid Vance MD   PARoxetine (PAXIL) 10 MG tablet Take one tablet by mouth every day 11/5/18   Avery Whaley MD   PARoxetine (PAXIL) 10 MG tablet TAKE 1 TABLET BY MOUTH EVERY DAY 6/10/19      simvastatin (ZOCOR) 20 MG tablet Take 1 tablet by mouth daily. 7/29/16   ProviderSid MD   simvastatin (ZOCOR) 20 MG tablet Take 1 tablet by mouth once daily 9/23/19        Objective     Vital Signs: /70 (BP Location: Left arm, Patient Position: Lying)   Pulse 109   Temp 100.4 °F (38 °C)   Resp 20   Ht 152.4 cm (60\")   Wt 75.8 kg (167 lb)   SpO2 100%   BMI 32.61 kg/m²    Physical Exam   Constitutional: She is oriented to person, place, and time. She appears well-developed and well-nourished. No distress.   HENT:   Head: Normocephalic and atraumatic.   Neck: Normal range of motion. Neck supple. No JVD present. No tracheal deviation present.   Cardiovascular: Intact distal pulses. Exam reveals no gallop and no friction rub.   Tachycardic   Pulmonary/Chest: She has no wheezes. She has rales (fine crackle LLL).   Tachypnea, shallow respirations   Abdominal: Soft. Bowel sounds are normal. She exhibits distension. There is no tenderness. There is no guarding.   Musculoskeletal: She " exhibits no edema or tenderness.   Neurological: She is alert and oriented to person, place, and time. No cranial nerve deficit.   Skin: Skin is warm and dry. Rash (Diffuse rash noted, sparing the palms and soles of the feet) noted. No erythema.   Psychiatric: She has a normal mood and affect. Her behavior is normal. Judgment and thought content normal.   Vitals reviewed.    Results Reviewed:  Lab Results (last 24 hours)     Procedure Component Value Units Date/Time    Procalcitonin [[id number]] Collected:  10/11/19 1251    Specimen:  Blood Updated:  10/11/19 1716    Lactic Acid, Reflex Timer (This will reflex a repeat order 3-3:15 hours after ordered.) [[id number]] Collected:  10/11/19 1251    Specimen:  Blood Updated:  10/11/19 1630     Extra Tube Hold for add-ons.     Comment: Auto resulted.       Urinalysis With Culture If Indicated - Urine, Catheter In/Out [[id number]]  (Abnormal) Collected:  10/11/19 1536    Specimen:  Urine, Catheter In/Out Updated:  10/11/19 1607     Color, UA Dark Yellow     Appearance, UA Cloudy     pH, UA <=5.0     Specific Gravity, UA >1.030     Glucose, UA Negative     Ketones, UA Negative     Bilirubin, UA Negative     Blood, UA Negative     Protein, UA 30 mg/dL (1+)     Leuk Esterase, UA Negative     Nitrite, UA Negative     Urobilinogen, UA 0.2 E.U./dL    Urinalysis, Microscopic Only - Urine, Catheter In/Out [[id number]]  (Abnormal) Collected:  10/11/19 1536    Specimen:  Urine, Catheter In/Out Updated:  10/11/19 1607     RBC, UA None Seen /HPF      WBC, UA 0-2 /HPF      Bacteria, UA None Seen /HPF      Squamous Epithelial Cells, UA 0-2 /HPF      Hyaline Casts, UA None Seen /LPF      Amorphous Crystals, UA Small/1+ /HPF      Methodology Manual Light Microscopy    Magnesium [[id number]]  (Abnormal) Collected:  10/11/19 1251    Specimen:  Blood Updated:  10/11/19 1531     Magnesium 0.6 mg/dL     [place] Draw [[id number]] Collected:  10/11/19 1251    Specimen:  Blood Updated:  10/11/19 1400     Narrative:       The following orders were created for panel order Joseph Draw.  Procedure                               Abnormality         Status                     ---------                               -----------         ------                     Light Blue Top[[id number]]                                   Final result               Green Top (Gel)[[id number]]                                  Final result               Lavender Top[[id number]]                                     Final result               Red Top[[id number]]                                          In process                   Please view results for these tests on the individual orders.    Light Blue Top [[id number]] Collected:  10/11/19 1251    Specimen:  Blood Updated:  10/11/19 1400     Extra Tube hold for add-on     Comment: Auto resulted       Green Top (Gel) [[id number]] Collected:  10/11/19 1251    Specimen:  Blood Updated:  10/11/19 1400     Extra Tube Hold for add-ons.     Comment: Auto resulted.       Lavender Top [[id number]] Collected:  10/11/19 1251    Specimen:  Blood Updated:  10/11/19 1400     Extra Tube hold for add-on     Comment: Auto resulted       Lactic Acid, Plasma [[id number]]  (Abnormal) Collected:  10/11/19 1251    Specimen:  Blood Updated:  10/11/19 1325     Lactate 3.6 mmol/L     Comprehensive Metabolic Panel [[id number]]  (Abnormal) Collected:  10/11/19 1251    Specimen:  Blood Updated:  10/11/19 1322     Glucose 119 mg/dL      BUN 19 mg/dL      Creatinine 1.39 mg/dL      Sodium 141 mmol/L      Potassium 3.6 mmol/L      Chloride 99 mmol/L      CO2 25.0 mmol/L      Calcium 8.2 mg/dL      Total Protein 8.1 g/dL      Albumin 3.30 g/dL      ALT (SGPT) 12 U/L      AST (SGOT) 20 U/L      Alkaline Phosphatase 59 U/L      Total Bilirubin 0.5 mg/dL      eGFR Non African Amer 38 mL/min/1.73      Globulin 4.8 gm/dL      A/G Ratio 0.7 g/dL      BUN/Creatinine Ratio 13.7     Anion Gap 17.0 mmol/L     Narrative:       GFR Normal  >60  Chronic Kidney Disease <60  Kidney Failure <15    Lipase [[id number]]  (Normal) Collected:  10/11/19 1251    Specimen:  Blood Updated:  10/11/19 1322     Lipase 40 U/L     Amylase [[id number]]  (Normal) Collected:  10/11/19 1251    Specimen:  Blood Updated:  10/11/19 1322     Amylase 84 U/L     Troponin [[id number]]  (Normal) Collected:  10/11/19 1251    Specimen:  Blood Updated:  10/11/19 1322     Troponin T <0.010 ng/mL     Narrative:       Troponin T Reference Range:  <= 0.03 ng/mL-   Negative for AMI  >0.03 ng/mL-     Abnormal for myocardial necrosis.  Clinicians would have to utilize clinical acumen, EKG, Troponin and serial changes to determine if it is an Acute Myocardial Infarction or myocardial injury due to an underlying chronic condition.     Blood Culture - Blood, Arm, Right [[id number]] Collected:  10/11/19 1253    Specimen:  Blood from Arm, Right Updated:  10/11/19 1313    Blood Culture - Blood, Arm, Left [[id number]] Collected:  10/11/19 1246    Specimen:  Blood from Arm, Left Updated:  10/11/19 1313    Protime-INR [[id number]]  (Abnormal) Collected:  10/11/19 1251    Specimen:  Blood Updated:  10/11/19 1312     Protime 15.0 Seconds      INR 1.14    CBC & Differential [[id number]] Collected:  10/11/19 1251    Specimen:  Blood Updated:  10/11/19 1302    Narrative:       The following orders were created for panel order CBC & Differential.  Procedure                               Abnormality         Status                     ---------                               -----------         ------                     CBC Auto Differential[[id number]]        Abnormal            Final result                 Please view results for these tests on the individual orders.    CBC Auto Differential [[id number]]  (Abnormal) Collected:  10/11/19 1251    Specimen:  Blood Updated:  10/11/19 1302     WBC 12.55 10*3/mm3      RBC 4.91 10*6/mm3      Hemoglobin 12.0 g/dL      Hematocrit 37.5 %      MCV 76.4 fL      MCH 24.4 pg       MCHC 32.0 g/dL      RDW 16.3 %      RDW-SD 44.3 fl      MPV 9.6 fL      Platelets 325 10*3/mm3      Neutrophil % 89.5 %      Lymphocyte % 3.5 %      Monocyte % 4.9 %      Eosinophil % 0.6 %      Basophil % 0.2 %      Immature Grans % 1.3 %      Neutrophils, Absolute 11.24 10*3/mm3      Lymphocytes, Absolute 0.44 10*3/mm3      Monocytes, Absolute 0.62 10*3/mm3      Eosinophils, Absolute 0.07 10*3/mm3      Basophils, Absolute 0.02 10*3/mm3      Immature Grans, Absolute 0.16 10*3/mm3      nRBC 0.0 /100 WBC     Red Top [[id number]] Collected:  10/11/19 1251    Specimen:  Blood Updated:  10/11/19 1258        Imaging Results (last 24 hours)     Procedure Component Value Units Date/Time    CT Abdomen Pelvis With Contrast [[id number]] Collected:  10/11/19 1404     Updated:  10/11/19 1431    Narrative:       Exam: CT ABDOMEN PELVIS W CONTRAST-     Indication: Abdominal pain, vomiting     Comparison: None available.     DOSE LENGTH PRODUCT: 579 mGy cm. Automated exposure control was also  utilized to decrease patient radiation dose.     Findings:     There is a consolidative masslike focus in the medial inferior left  lower lobe that measures 3.3 cm. There is a prominent artery coursing  from the celiac trunk to this masslike focus. No other acute finding in  the lung bases.     No suspicious liver lesion. Gallbladder and biliary tree appear normal.  The pancreas, spleen, and adrenal glands appear unremarkable.     No solid renal lesion. No urolithiasis or hydronephrosis.  Circumferential bladder wall thickening is noted. Uterus and adnexa  appear unremarkable. There findings of pelvic floor relaxation.     Small hiatal hernia. There is focal asymmetric thickening of the greater  curvature of the stomach. Small diverticula along the transverse colon  is noted. Normal appendix. No ascites or free pelvic fluid. No pelvic  mass organized pelvic collection.     The abdominal aorta is normal in caliber. Borderline enlarged 1.1  cm  gastrohepatic ligament lymph node on image 17. A few enlarged periportal  lymph nodes and retroperitoneal lymph nodes are also noted.     Multilevel degenerative change of the lumbar spine. No suspicious bone  lesion       Impression:          1. Focal thickening of the greater curvature of the stomach.  Differential includes neoplasm, focal gastritis, and artifact from lack  of distention. Multiple enlarged gastrohepatic ligament and  retroperitoneal lymph nodes are likely related to this process. Small  hiatal hernia.     2. 3.3 cm consolidative masslike focus in the posterior medial inferior  left lung with prominent feeding artery. This is indeterminate but may  represent pulmonary sequestration. Other potential etiologies include  chronic inflammatory/infectious process.     3. Circumferential urinary bladder wall thickening. Correlate with  urinalysis for possible cystitis.     4. Findings the pelvic floor relaxation.  This report was finalized on 10/11/2019 14:28 by Dr. Prasad Trevizo MD.    XR Chest 1 View [[id number]] Collected:  10/11/19 1321     Updated:  10/11/19 1326    Narrative:       Exam: XR CHEST 1 VW-     Indication: upper and pain/ generalized weakness     Comparison: 10/07/2019     Findings:     Cardiac silhouette is normal in size.  No consolidation, pleural effusion, or pneumothorax.  Persistent left upper lung platelike atelectasis.  No change in mild central vascular congestion.  No suspicious bone lesion.       Impression:          No acute findings.  This report was finalized on 10/11/2019 13:23 by Dr. Prasad Trevizo MD.        I have personally reviewed and interpreted the radiology studies and ECG obtained at time of admission.     Assessment / Plan     Assessment:   Active Hospital Problems    Diagnosis   • **SIRS (systemic inflammatory response syndrome) (CMS/HCC)   • Rash- possible viral xanthem   • Gitelman syndrome     low magnesium     • Leukocytosis   • WILDA (acute kidney  injury) (CMS/HCC)   • Hypomagnesemia   • Hyperlipidemia     Plan:    1.  Patient meets SIRS criteria without clear source of infection at this time.  Chest x-ray is clear.  Urinalysis is negative.  Feel possibly that the patient has a virus of some type.  Check influenza antigen and respiratory PCR panel.  Check procalcitonin.  2.  CT of the abdomen and pelvis shows a 3.3 cm consolidative masslike focus in the posterior medial inferior left lung with prominent feeding artery.  We will get a CT of the chest without contrast to further characterize.  She has received a dose of Ceftriaxone in the ER.  Hold on any antibiotic therapy at present until results are available.  3.  Schedule IV Solu-Medrol 40 mg every 8 hours.  Daily IV Pepcid.  IV Benadryl as needed for itching.  We will give a one-time dose of intramuscular Vistaril for itching now.  4.  Patient has received 2 L normal saline bolus in the emergency department.  LR at 100 mL/h.  Lactic acid 3.6- await reflex.  5.  GI soft/bland diet as tolerated  6.  She has received 2 g IV magnesium in the emergency department.  Resume her oral magnesium supplementation.  She has chronic difficulty with hypomagnesemia due to Gitelman syndrome.  The highest her magnesium level has reached in recent years is 1.2.  7.  Blood cultures pending  8.  Lovenox for DVT prophylaxis  9.  Workup ongoing  10.  Infectious disease consultation  11.  Labs in AM    Code Status: Full.     I discussed the patients findings and my recommendations with the patient, her sister and brother at bedside, and Dr. Wiliam Luna.    Estimated length of stay: to be determined.    ROSENDA Rios   10/11/19   5:19 PM      I personally evaluated and examined the patient in conjunction with ROSENDA Spears and agree with the assessment, treatment plan, and disposition of the patient as recorded by her. My history, exam, and further recommendations are:     Patient was seen and examined.  Rash  "really pruritic and diffuse.  Not common for any bacterial illnesses.  Suspect viral in origin.  Ordered a STAT CT Chest as there were some concerning findings on the CT Ab/Pelvis.  It shows bulky lymphadenopathy.  LDH and peripheral smear ordered.  Respiratory panel and influenza ordered.  Serial lactates.  IVF.        Klever Luna MD  10/11/19  8:23 PM          Electronically signed by Klever Luna MD at 10/11/19 2024          Emergency Department Notes      Laine Moreno, ROSENDA at 10/11/19 1212     Attestation signed by Juan Morgan Jr., MD at 10/11/19 1834          For this patient encounter, I reviewed the NP or PA documentation, treatment plan, and medical decision making. Juan Morgan Jr, MD 10/11/2019 6:34 PM                  Subjective   Patient is a 64-year-old white female presents emergency department with several different complaints today.  Patient states that she has had epigastric pain with nausea and multiple episodes of vomiting for the past 3 to 4 days.  She denies diarrhea.  She also states that she has had a generalized rash since the past 5 days.  She states the day before she had received an influenza vaccine and then started having a rash the next day.  She states that she was seen by Dr. Whaley's office 3 days ago and was given a steroid injection for the rash.  She states the rash has worsened since that time. She states that she feels very weak as well. She states that the rash is very \"itchy\" she states that today she has felt extremely weak as well         History provided by:  Patient   used: No        Review of Systems   Constitutional: Negative.    HENT: Negative.    Eyes: Negative.    Respiratory: Negative.    Cardiovascular: Negative.    Gastrointestinal:        Patient is a 64-year-old white female presents emergency department with several different complaints today.  Patient states that she has had epigastric pain with nausea and " "multiple episodes of vomiting for the past 3 to 4 days.  She denies diarrhea.  She also states that she has had a generalized rash since the past 5 days.  She states the day before she had received an influenza vaccine and then started having a rash the next day.  She states that she was seen by Dr. Whaley's office 3 days ago and was given a steroid injection for the rash.  She states the rash has worsened since that time. She states that she feels very weak as well. She states that the rash is very \"itchy\" she states that today she has felt extremely weak as well      Endocrine: Negative.    Genitourinary: Negative.    Musculoskeletal: Negative.    Skin:        Generalized rash for the past week. She states that the rash started the day after receiving her influenza vaccine.    Allergic/Immunologic: Negative.    Neurological: Negative.    Hematological: Negative.    Psychiatric/Behavioral: Negative.    All other systems reviewed and are negative.      Past Medical History:   Diagnosis Date   • Allergic rhinitis    • Colon polyps    • Depression    • Difficulty swallowing solids    • DJD (degenerative joint disease)    • Gitelman syndrome     low magnesium   • Hyperlipidemia    • Obesity    • Osteopenia    • PONV (postoperative nausea and vomiting)        Allergies   Allergen Reactions   • Penicillins Swelling and Rash   • Sulfa Antibiotics Rash and Other (See Comments)     Temp. fluctuations        Past Surgical History:   Procedure Laterality Date   • COLONOSCOPY  08/20/2010   • COLONOSCOPY W/ POLYPECTOMY  09/14/2016    2   5 mm sessile polyps removed with hot snare repeat 5 years   • DILATATION AND CURETTAGE     • ENDOSCOPY  09/14/2016    Medium sized HH, LA Grade A esohagitis, Fluid in the middle third of esohagus    • [place] TOTAL KNEE ARTHROPLASTY Left 3/9/2018    Procedure: LEFT TOTAL KNEE ARTHROPLASTY;  Surgeon: Orlin Meza MD;  Location: [facility];  Service: Orthopedics   • REPLACEMENT TOTAL KNEE " Right        Family History   Problem Relation Age of Onset   • No Known Problems Father    • No Known Problems Mother    • No Known Problems Brother    • No Known Problems Sister    • No Known Problems Son    • No Known Problems Daughter    • No Known Problems Daughter    • No Known Problems Maternal Grandmother    • No Known Problems Paternal Grandmother    • No Known Problems Maternal Aunt    • No Known Problems Paternal Aunt    • Breast cancer Neg Hx    • Ovarian cancer Neg Hx    • Colon cancer Neg Hx    • BRCA 1/2 Neg Hx    • Endometrial cancer Neg Hx        Social History     Socioeconomic History   • Marital status:      Spouse name: Not on file   • Number of children: Not on file   • Years of education: Not on file   • Highest education level: Not on file   Tobacco Use   • Smoking status: Former Smoker     Packs/day: 0.50     Types: Cigarettes     Last attempt to quit:      Years since quittin.7   • Smokeless tobacco: Never Used   Substance and Sexual Activity   • Alcohol use: No   • Drug use: No   • Sexual activity: No     Birth control/protection: Post-menopausal       Prior to Admission medications    Medication Sig Start Date End Date Taking? Authorizing Provider   aMILoride (MIDAMOR) 5 MG tablet Take 5 mg by mouth Daily.    ProviderSid MD   aMILoride (MIDAMOR) 5 MG tablet Take one tablet by mouth daily with food. 18   Heber Charles MD   cefdinir (OMNICEF) 300 MG capsule Take 1 capsule by mouth twice daily for 7 days 19      clindamycin (CLEOCIN) 300 MG capsule Take 2 capsules by mouth 1 hour prior to appointment 19      levoFLOXacin (LEVAQUIN) 500 MG tablet Take 1 tablet by mouth daily 10/7/19      magnesium oxide (MAGOX) 400 (241.3 Mg) MG tablet tablet Take 1,600 mg by mouth 2 (Two) Times a Day.    ProviderSid MD   ondansetron (ZOFRAN) 4 MG tablet Take 1 tablet by mouth four times a day as needed for nausea 10/7/19      PARoxetine (PAXIL)  "10 MG tablet Take 10 mg by mouth Every Morning. 8/7/16   ProviderSid MD   PARoxetine (PAXIL) 10 MG tablet Take one tablet by mouth every day 11/5/18   Avery Whaley MD   PARoxetine (PAXIL) 10 MG tablet TAKE 1 TABLET BY MOUTH EVERY DAY 6/10/19      simvastatin (ZOCOR) 20 MG tablet Take 1 tablet by mouth daily. 7/29/16   ProviderSid MD   simvastatin (ZOCOR) 20 MG tablet Take 1 tablet by mouth once daily 9/23/19          /70 (BP Location: Left arm, Patient Position: Lying)   Pulse 109   Temp 100.4 °F (38 °C)   Resp 20   Ht 152.4 cm (60\")   Wt 75.8 kg (167 lb)   SpO2 100%   BMI 32.61 kg/m²      Objective   Physical Exam   Constitutional: She is oriented to person, place, and time. She appears well-developed and well-nourished.   Pt retching on exam. Appears ill. Non toxic appearing   HENT:   Head: Normocephalic and atraumatic.   Eyes: Conjunctivae and EOM are normal. Pupils are equal, round, and reactive to light.   Neck: Normal range of motion. Neck supple. No tracheal deviation present. No thyromegaly present.   Cardiovascular: Normal rate, regular rhythm, normal heart sounds and intact distal pulses.   Pulmonary/Chest: Effort normal and breath sounds normal. No respiratory distress. She has no wheezes. She has no rales. She exhibits no tenderness.   Abdominal: Soft. Bowel sounds are normal.   abd soft, non distended. Moderate tenderness noted to midepigastric area. No guarding or rebound. bs x 4 quadrants.    Musculoskeletal: Normal range of motion.   Neurological: She is alert and oriented to person, place, and time. She has normal reflexes. No cranial nerve deficit.   Skin: Skin is warm and dry.   Generalized macular papular rash noted to trunk and bilat ues and les.    Psychiatric: She has a normal mood and affect. Her behavior is normal. Judgment and thought content normal.   Nursing note and vitals reviewed.      Procedures        Lab Results (last 24 hours)     Procedure " Component Value Units Date/Time    Blood Culture - Blood, Arm, Left [[id number]] Collected:  10/11/19 1246    Specimen:  Blood from Arm, Left Updated:  10/11/19 1313    CBC & Differential [[id number]] Collected:  10/11/19 1251    Specimen:  Blood Updated:  10/11/19 1302    Narrative:       The following orders were created for panel order CBC & Differential.  Procedure                               Abnormality         Status                     ---------                               -----------         ------                     CBC Auto Differential[[id number]]        Abnormal            Final result                 Please view results for these tests on the individual orders.    Comprehensive Metabolic Panel [[id number]]  (Abnormal) Collected:  10/11/19 1251    Specimen:  Blood Updated:  10/11/19 1322     Glucose 119 mg/dL      BUN 19 mg/dL      Creatinine 1.39 mg/dL      Sodium 141 mmol/L      Potassium 3.6 mmol/L      Chloride 99 mmol/L      CO2 25.0 mmol/L      Calcium 8.2 mg/dL      Total Protein 8.1 g/dL      Albumin 3.30 g/dL      ALT (SGPT) 12 U/L      AST (SGOT) 20 U/L      Alkaline Phosphatase 59 U/L      Total Bilirubin 0.5 mg/dL      eGFR Non African Amer 38 mL/min/1.73      Globulin 4.8 gm/dL      A/G Ratio 0.7 g/dL      BUN/Creatinine Ratio 13.7     Anion Gap 17.0 mmol/L     Narrative:       GFR Normal >60  Chronic Kidney Disease <60  Kidney Failure <15    Protime-INR [[id number]]  (Abnormal) Collected:  10/11/19 1251    Specimen:  Blood Updated:  10/11/19 1312     Protime 15.0 Seconds      INR 1.14    Lipase [[id number]]  (Normal) Collected:  10/11/19 1251    Specimen:  Blood Updated:  10/11/19 1322     Lipase 40 U/L     Amylase [[id number]]  (Normal) Collected:  10/11/19 1251    Specimen:  Blood Updated:  10/11/19 1322     Amylase 84 U/L     Troponin [[id number]]  (Normal) Collected:  10/11/19 1251    Specimen:  Blood Updated:  10/11/19 1322     Troponin T <0.010 ng/mL     Narrative:       Troponin T  Reference Range:  <= 0.03 ng/mL-   Negative for AMI  >0.03 ng/mL-     Abnormal for myocardial necrosis.  Clinicians would have to utilize clinical acumen, EKG, Troponin and serial changes to determine if it is an Acute Myocardial Infarction or myocardial injury due to an underlying chronic condition.     Lactic Acid, Plasma [[id number]]  (Abnormal) Collected:  10/11/19 1251    Specimen:  Blood Updated:  10/11/19 1325     Lactate 3.6 mmol/L     CBC Auto Differential [[id number]]  (Abnormal) Collected:  10/11/19 1251    Specimen:  Blood Updated:  10/11/19 1302     WBC 12.55 10*3/mm3      RBC 4.91 10*6/mm3      Hemoglobin 12.0 g/dL      Hematocrit 37.5 %      MCV 76.4 fL      MCH 24.4 pg      MCHC 32.0 g/dL      RDW 16.3 %      RDW-SD 44.3 fl      MPV 9.6 fL      Platelets 325 10*3/mm3      Neutrophil % 89.5 %      Lymphocyte % 3.5 %      Monocyte % 4.9 %      Eosinophil % 0.6 %      Basophil % 0.2 %      Immature Grans % 1.3 %      Neutrophils, Absolute 11.24 10*3/mm3      Lymphocytes, Absolute 0.44 10*3/mm3      Monocytes, Absolute 0.62 10*3/mm3      Eosinophils, Absolute 0.07 10*3/mm3      Basophils, Absolute 0.02 10*3/mm3      Immature Grans, Absolute 0.16 10*3/mm3      nRBC 0.0 /100 WBC     Lactic Acid, Reflex Timer (This will reflex a repeat order 3-3:15 hours after ordered.) [[id number]] Collected:  10/11/19 1251    Specimen:  Blood Updated:  10/11/19 1630     Extra Tube Hold for add-ons.     Comment: Auto resulted.       Magnesium [[id number]]  (Abnormal) Collected:  10/11/19 1251    Specimen:  Blood Updated:  10/11/19 1531     Magnesium 0.6 mg/dL     Blood Culture - Blood, Arm, Right [[id number]] Collected:  10/11/19 1253    Specimen:  Blood from Arm, Right Updated:  10/11/19 1313    Urinalysis With Culture If Indicated - Urine, Catheter In/Out [[id number]]  (Abnormal) Collected:  10/11/19 1536    Specimen:  Urine, Catheter In/Out Updated:  10/11/19 6608     Color, UA Dark Yellow     Appearance, UA Cloudy     pH, UA  <=5.0     Specific Gravity, UA >1.030     Glucose, UA Negative     Ketones, UA Negative     Bilirubin, UA Negative     Blood, UA Negative     Protein, UA 30 mg/dL (1+)     Leuk Esterase, UA Negative     Nitrite, UA Negative     Urobilinogen, UA 0.2 E.U./dL    Urinalysis, Microscopic Only - Urine, Catheter In/Out [[id number]]  (Abnormal) Collected:  10/11/19 1536    Specimen:  Urine, Catheter In/Out Updated:  10/11/19 1607     RBC, UA None Seen /HPF      WBC, UA 0-2 /HPF      Bacteria, UA None Seen /HPF      Squamous Epithelial Cells, UA 0-2 /HPF      Hyaline Casts, UA None Seen /LPF      Amorphous Crystals, UA Small/1+ /HPF      Methodology Manual Light Microscopy          CT Abdomen Pelvis With Contrast   Final Result       1. Focal thickening of the greater curvature of the stomach.   Differential includes neoplasm, focal gastritis, and artifact from lack   of distention. Multiple enlarged gastrohepatic ligament and   retroperitoneal lymph nodes are likely related to this process. Small   hiatal hernia.       2. 3.3 cm consolidative masslike focus in the posterior medial inferior   left lung with prominent feeding artery. This is indeterminate but may   represent pulmonary sequestration. Other potential etiologies include   chronic inflammatory/infectious process.       3. Circumferential urinary bladder wall thickening. Correlate with   urinalysis for possible cystitis.       4. Findings the pelvic floor relaxation.   This report was finalized on 10/11/2019 14:28 by Dr. Prasad Trevizo MD.      XR Chest 1 View   Final Result       No acute findings.   This report was finalized on 10/11/2019 13:23 by Dr. Prasad Trevizo MD.      CT Chest Without Contrast    (Results Pending)       ED Course  ED Course as of Oct 11 1709   Fri Oct 11, 2019   1520 Have updated pt and pt family with test results thus far. Advised nursing staff that if pt unable to urinate at this time, to obtain urinalysis per in/out cath. Have reviewed  pt and pt care plan with dr bassett- also assessed pt and in agreement with pt care plan  [CW]   1546 Pending urinalysis at this time. Pt's magnesium in 0.6. 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 Have ordered magnesium 2grams iv at this time. Have reviewed results with dr bassett  [CW]   1600 Spoke with dr mckeon. Has accepted for admission.pt has screened positive for sepsis. She has received rocephin and ivf bolus as well. Her lactic acid is 3.6 have discussed the ct scan findings with pt and pt daughter and advised that she will have to have further workup for gi issues. Pt will be admitted soon in stable condition. Dr bassett assessed the rash as well- feels that rash is viral exanthem at this time.  The benadry, steroids, and pepcid have not improved the rash. Recheck blood pressure manually 130/70  [CW]      ED Course User Index  [CW] Laine Moreno, APRN          MDM  Number of Diagnoses or Management Options  Abnormal abdominal CT scan: new and requires workup  Hypomagnesemia: new and requires workup  Pneumonia due to infectious organism, unspecified laterality, unspecified part of lung: new and requires workup  Renal insufficiency: new and requires workup  Sepsis, due to unspecified organism, unspecified whether acute organ dysfunction present (CMS/HCC): new and requires workup  Volume depletion: new and requires workup     Amount and/or Complexity of Data Reviewed  Clinical lab tests: ordered and reviewed  Tests in the radiology section of CPT®:  ordered and reviewed  Decide to obtain previous medical records or to obtain history from someone other than the patient: yes    Patient Progress  Patient progress: stable      Final diagnoses:   Sepsis, due to unspecified organism, unspecified whether acute organ dysfunction present (CMS/HCC)   Pneumonia due to infectious organism, unspecified laterality, unspecified part of lung   Volume depletion   Renal insufficiency   Abnormal abdominal CT scan    Hypomagnesemia          Laine Moreno, APRN  10/11/19 1709      Electronically signed by Juan Morgan Jr., MD at 10/11/19 1834       Vital Signs (last 2 days)     Date/Time   Temp   Temp src   Pulse   Resp   BP   Patient Position   SpO2    10/12/19 0321   --   --   60   16   99/55   Lying   97    10/11/19 2329   97.6 (36.4)   Oral   68   16   100/51   Lying   96    10/11/19 2107   --   --   82   --   --   --   96    10/11/19 1922   97.9 (36.6)   Oral   80   20   100/50   Lying   95    10/11/19 1723   99.9 (37.7)   Oral   98   20   100/65   Sitting   98    10/11/19 1613   --   --   --   --   130/70   Lying   --    10/11/19 1432   --   --   109   --   101/52   --   100    10/11/19 1427   --   --   109   --   109/48   --   100    10/11/19 1406   --   --   102   --   --   --   99    10/11/19 1405   --   --   98   --   --   --   100    10/11/19 1324   --   --   98   --   --   --   92    10/11/19 1323   --   --   95   --   --   --   99    10/11/19 1322   --   --   95   --   --   --   93    10/11/19 1321   --   --   99   --   --   --   100    10/11/19 1225   100.4 (38)   --   96   20   104/44   --   95            Oxygen Therapy (last 2 days)     Date/Time   SpO2   Device (Oxygen Therapy)   Flow (L/min)   Oxygen Concentration (%)   ETCO2 (mmHg)    10/12/19 0321   97   room air   --   --   --    10/11/19 2329   96   room air   --   --   --    10/11/19 2107   96   room air   --   --   --    10/11/19 2000   --   room air   --   --   --    10/11/19 1922   95   room air   --   --   --    10/11/19 1723   98   room air   --   --   --    10/11/19 1432   100   --   --   --   --    10/11/19 1427   100   --   --   --   --    10/11/19 1406   99   --   --   --   --    10/11/19 1405   100   --   --   --   --    10/11/19 1324   92   --   --   --   --    10/11/19 1323   99   --   --   --   --    10/11/19 1322   93   --   --   --   --    10/11/19 1321   100   --   --   --   --    10/11/19 1225   95   --   --   --   --         "      Lines, Drains & Airways    Active LDAs     Name:   Placement date:   Placement time:   Site:   Days:    Peripheral IV 10/11/19 1227 Right Antecubital   10/11/19    1227    Antecubital   less than 1    Peripheral IV 10/11/19 1557 Left Antecubital   10/11/19    1557    Antecubital   less than 1         Inactive LDAs     None                Hospital Medications (all)       Dose Frequency Start End    acetaminophen (TYLENOL) 160 MG/5ML solution 650 mg 650 mg Every 4 Hours PRN 10/11/2019     Sig - Route: Take 20.3 mL by mouth Every 4 (Four) Hours As Needed for Mild Pain . - Oral    Linked Group 1:  \"Or\" Linked Group Details        acetaminophen (TYLENOL) suppository 650 mg 650 mg Every 4 Hours PRN 10/11/2019     Sig - Route: Insert 1 suppository into the rectum Every 4 (Four) Hours As Needed for Mild Pain . - Rectal    Linked Group 1:  \"Or\" Linked Group Details        acetaminophen (TYLENOL) tablet 1,000 mg 1,000 mg Once 10/11/2019 10/11/2019    Sig - Route: Take 2 tablets by mouth 1 (One) Time. - Oral    Cosign for Ordering: Accepted by Juan Morgan Jr., MD on 10/11/2019  6:36 PM    acetaminophen (TYLENOL) tablet 650 mg 650 mg Every 4 Hours PRN 10/11/2019     Sig - Route: Take 2 tablets by mouth Every 4 (Four) Hours As Needed for Mild Pain  or Fever. - Oral    Linked Group 1:  \"Or\" Linked Group Details        aluminum-magnesium hydroxide-simethicone (MAALOX MAX) 400-400-40 MG/5ML suspension 15 mL 15 mL Every 6 Hours PRN 10/11/2019     Sig - Route: Take 15 mL by mouth Every 6 (Six) Hours As Needed for Indigestion or Heartburn. - Oral    atorvastatin (LIPITOR) tablet 10 mg 10 mg Nightly 10/11/2019     Sig - Route: Take 1 tablet by mouth Every Night. - Oral    cefTRIAXone (ROCEPHIN) 1 g/100 mL 0.9% NS (MBP) 1 g Once 10/11/2019 10/11/2019    Sig - Route: Infuse 100 mL into a venous catheter 1 (One) Time. - Intravenous    Cosign for Ordering: Accepted by Juan Morgan Jr., MD on 10/11/2019  6:36 PM    " diphenhydrAMINE (BENADRYL) injection 50 mg 50 mg Once 10/11/2019 10/11/2019    Sig - Route: Infuse 1 mL into a venous catheter 1 (One) Time. - Intravenous    Cosign for Ordering: Accepted by Juan Morgan Jr., MD on 10/11/2019  1:47 PM    diphenhydrAMINE (BENADRYL) injection 50 mg 50 mg Every 6 Hours PRN 10/11/2019     Sig - Route: Infuse 1 mL into a venous catheter Every 6 (Six) Hours As Needed for Itching. - Intravenous    enoxaparin (LOVENOX) syringe 40 mg 40 mg Every 24 Hours 10/11/2019     Sig - Route: Inject 0.4 mL under the skin into the appropriate area as directed Daily. - Subcutaneous    famotidine (PEPCID) injection 20 mg 20 mg Once 10/11/2019 10/11/2019    Sig - Route: Infuse 2 mL into a venous catheter 1 (One) Time. - Intravenous    Cosign for Ordering: Accepted by Juan Morgan Jr., MD on 10/11/2019  1:47 PM    famotidine (PEPCID) injection 20 mg 20 mg Daily 10/11/2019     Sig - Route: Infuse 2 mL into a venous catheter Daily. - Intravenous    hydrocortisone 2.5 % cream  Every 12 Hours Scheduled 10/11/2019     Sig - Route: Apply  topically to the appropriate area as directed Every 12 (Twelve) Hours. - Topical    hydrOXYzine (VISTARIL) injection 25 mg 25 mg Once 10/11/2019 10/11/2019    Sig - Route: Inject 0.5 mL into the appropriate muscle as directed by prescriber 1 (One) Time. - Intramuscular    iopamidol (ISOVUE-300) 61 % injection 100 mL 100 mL Once in Imaging 10/11/2019 10/11/2019    Sig - Route: Infuse 100 mL into a venous catheter Once. - Intravenous    lactated ringers infusion 100 mL/hr Continuous 10/11/2019     Sig - Route: Infuse 100 mL/hr into a venous catheter Continuous. - Intravenous    magnesium oxide (MAGOX) tablet 1,000 mg 1,000 mg 2 Times Daily 10/11/2019     Sig - Route: Take 2.5 tablets by mouth 2 (Two) Times a Day. - Oral    magnesium sulfate 2g/50 mL (PREMIX) infusion 2 g Once 10/11/2019 10/11/2019    Sig - Route: Infuse 50 mL into a venous catheter 1 (One) Time. -  Intravenous    Cosign for Ordering: Accepted by Juan Morgan Jr., MD on 10/11/2019  6:36 PM    ondansetron (ZOFRAN) injection 4 mg 4 mg Every 4 Hours PRN 10/11/2019     Sig - Route: Infuse 2 mL into a venous catheter Every 4 (Four) Hours As Needed for Nausea or Vomiting. - Intravenous    ondansetron (ZOFRAN) injection 8 mg 8 mg Once 10/11/2019 10/11/2019    Sig - Route: Infuse 4 mL into a venous catheter 1 (One) Time. - Intravenous    Cosign for Ordering: Required by [facility]    PARoxetine (PAXIL) tablet 10 mg 10 mg Every Morning 10/12/2019     Sig - Route: Take 1 tablet by mouth Every Morning. - Oral    promethazine (PHENERGAN) injection 12.5 mg 12.5 mg Once 10/11/2019 10/11/2019    Sig - Route: Infuse 0.5 mL into a venous catheter 1 (One) Time. - Intravenous    Cosign for Ordering: Required by [facility]    sodium chloride 0.9 % bolus 1,000 mL 1,000 mL Once 10/11/2019 10/11/2019    Sig - Route: Infuse 1,000 mL into a venous catheter 1 (One) Time. - Intravenous    Cosign for Ordering: Accepted by Juan Morgan Jr., MD on 10/11/2019  1:47 PM    sodium chloride 0.9 % bolus 1,000 mL 1,000 mL Once 10/11/2019 10/11/2019    Sig - Route: Infuse 1,000 mL into a venous catheter 1 (One) Time. - Intravenous    Cosign for Ordering: Accepted by Juan Morgan Jr., MD on 10/11/2019  6:36 PM    sodium chloride 0.9 % flush 10 mL 10 mL Every 12 Hours Scheduled 10/11/2019     Sig - Route: Infuse 10 mL into a venous catheter Every 12 (Twelve) Hours. - Intravenous    sodium chloride 0.9 % flush 10 mL 10 mL As Needed 10/11/2019     Sig - Route: Infuse 10 mL into a venous catheter As Needed for Line Care. - Intravenous    diphenhydrAMINE (BENADRYL) injection 25 mg (Discontinued) 25 mg Every 6 Hours PRN 10/11/2019 10/11/2019    Sig - Route: Infuse 0.5 mL into a venous catheter Every 6 (Six) Hours As Needed for Itching. - Intravenous    Reason for Discontinue: Duplicate order    hydrOXYzine (ATARAX) tablet 25 mg  (Discontinued) 25 mg 3 Times Daily PRN 10/11/2019 10/11/2019    Sig - Route: Take 1 tablet by mouth 3 (Three) Times a Day As Needed for Itching. - Oral    methylPREDNISolone sodium succinate (SOLU-Medrol) injection 40 mg (Discontinued) 40 mg Every 8 Hours 10/11/2019 10/11/2019    Sig - Route: Infuse 1 mL into a venous catheter Every 8 (Eight) Hours. - Intravenous          Lab Results (last 48 hours)     Procedure Component Value Units Date/Time    Lactic Acid, Plasma [[id number]]  (Normal) Collected:  10/12/19 0640    Specimen:  Blood Updated:  10/12/19 0703     Lactate 1.6 mmol/L     Comprehensive Metabolic Panel [[id number]]  (Abnormal) Collected:  10/12/19 0503    Specimen:  Blood Updated:  10/12/19 0603     Glucose 196 mg/dL      BUN 21 mg/dL      Creatinine 0.94 mg/dL      Sodium 140 mmol/L      Potassium 3.4 mmol/L      Chloride 103 mmol/L      CO2 22.0 mmol/L      Calcium 7.0 mg/dL      Total Protein 7.5 g/dL      Albumin 3.00 g/dL      ALT (SGPT) 14 U/L      AST (SGOT) 27 U/L      Alkaline Phosphatase 53 U/L      Total Bilirubin 0.3 mg/dL      eGFR Non African Amer 60 mL/min/1.73      Globulin 4.5 gm/dL      A/G Ratio 0.7 g/dL      BUN/Creatinine Ratio 22.3     Anion Gap 15.0 mmol/L     Narrative:       GFR Normal >60  Chronic Kidney Disease <60  Kidney Failure <15    Magnesium [[id number]]  (Normal) Collected:  10/12/19 0503    Specimen:  Blood Updated:  10/12/19 0557     Magnesium 1.6 mg/dL     CBC Auto Differential [[id number]]  (Abnormal) Collected:  10/12/19 0503    Specimen:  Blood Updated:  10/12/19 0539     WBC 14.85 10*3/mm3      RBC 4.12 10*6/mm3      Hemoglobin 10.2 g/dL      Hematocrit 31.1 %      MCV 75.5 fL      MCH 24.8 pg      MCHC 32.8 g/dL      RDW 16.4 %      RDW-SD 44.5 fl      MPV 10.2 fL      Platelets 293 10*3/mm3      Neutrophil % 83.9 %      Lymphocyte % 8.1 %      Monocyte % 4.8 %      Eosinophil % 1.5 %      Basophil % 0.6 %      Immature Grans % 1.1 %      Neutrophils, Absolute 12.45  10*3/mm3      Lymphocytes, Absolute 1.20 10*3/mm3      Monocytes, Absolute 0.71 10*3/mm3      Eosinophils, Absolute 0.23 10*3/mm3      Basophils, Absolute 0.09 10*3/mm3      Immature Grans, Absolute 0.17 10*3/mm3      nRBC 0.0 /100 WBC     Respiratory Panel, PCR - Swab, Nasopharynx [[id number]] Collected:  10/11/19 2043    Specimen:  Swab from Nasopharynx Updated:  10/11/19 2047    Lactate Dehydrogenase [[id number]]  (Abnormal) Collected:  10/11/19 1251    Specimen:  Blood Updated:  10/11/19 2041      U/L     Peripheral Blood Smear [[id number]] Collected:  10/11/19 1251    Specimen:  Blood Updated:  10/11/19 2039    Influenza Antigen, Rapid - Swab, Nasopharynx [[id number]]  (Normal) Collected:  10/11/19 1853    Specimen:  Swab from Nasopharynx Updated:  10/11/19 1930     Influenza A Ag, EIA Negative     Influenza B Ag, EIA Negative    Narrative:       Recommend confirmation of negative results by viral culture or molecular assay.    Lactic Acid, Reflex [[id number]]  (Abnormal) Collected:  10/11/19 1852    Specimen:  Blood Updated:  10/11/19 1911     Lactate 3.0 mmol/L     Ferritin [[id number]]  (Normal) Collected:  10/11/19 1251    Specimen:  Blood Updated:  10/11/19 1838     Ferritin 126.90 ng/mL     TSH [[id number]]  (Abnormal) Collected:  10/11/19 1251    Specimen:  Blood Updated:  10/11/19 1838     TSH 4.550 uIU/mL     Procalcitonin [[id number]]  (Abnormal) Collected:  10/11/19 1251    Specimen:  Blood Updated:  10/11/19 1740     Procalcitonin 4.17 ng/mL     Narrative:       As a Marker for Sepsis (Non-Neonates):   1. <0.5 ng/mL represents a low risk of severe sepsis and/or septic shock.  1. >2 ng/mL represents a high risk of severe sepsis and/or septic shock.    As a Marker for Lower Respiratory Tract Infections that require antibiotic therapy:  PCT on Admission     Antibiotic Therapy             6-12 Hrs later  > 0.5                Strongly Recommended            >0.25 - <0.5         Recommended  0.1 - 0.25      "      Discouraged                   Remeasure/reassess PCT  <0.1                 Strongly Discouraged          Remeasure/reassess PCT      As 28 day mortality risk marker: \"Change in Procalcitonin Result\" (> 80 % or <=80 %) if Day 0 (or Day 1) and Day 4 values are available. Refer to [email or web address]   Change in PCT <=80 %   A decrease of PCT levels below or equal to 80 % defines a positive change in PCT test result representing a higher risk for 28-day all-cause mortality of patients diagnosed with severe sepsis or septic shock.  Change in PCT > 80 %   A decrease of PCT levels of more than 80 % defines a negative change in PCT result representing a lower risk for 28-day all-cause mortality of patients diagnosed with severe sepsis or septic shock.                Lactic Acid, Reflex Timer (This will reflex a repeat order 3-3:15 hours after ordered.) [[id number]] Collected:  10/11/19 1251    Specimen:  Blood Updated:  10/11/19 1630     Extra Tube Hold for add-ons.     Comment: Auto resulted.       Urinalysis With Culture If Indicated - Urine, Catheter In/Out [[id number]]  (Abnormal) Collected:  10/11/19 1536    Specimen:  Urine, Catheter In/Out Updated:  10/11/19 1607     Color, UA Dark Yellow     Appearance, UA Cloudy     pH, UA <=5.0     Specific Gravity, UA >1.030     Glucose, UA Negative     Ketones, UA Negative     Bilirubin, UA Negative     Blood, UA Negative     Protein, UA 30 mg/dL (1+)     Leuk Esterase, UA Negative     Nitrite, UA Negative     Urobilinogen, UA 0.2 E.U./dL    Urinalysis, Microscopic Only - Urine, Catheter In/Out [[id number]]  (Abnormal) Collected:  10/11/19 1536    Specimen:  Urine, Catheter In/Out Updated:  10/11/19 1607     RBC, UA None Seen /HPF      WBC, UA 0-2 /HPF      Bacteria, UA None Seen /HPF      Squamous Epithelial Cells, UA 0-2 /HPF      Hyaline Casts, UA None Seen /LPF      Amorphous Crystals, UA Small/1+ /HPF      Methodology Manual Light Microscopy    " Magnesium [[id number]]  (Abnormal) Collected:  10/11/19 1251    Specimen:  Blood Updated:  10/11/19 1531     Magnesium 0.6 mg/dL     [place] [facility] [[id number]] Collected:  10/11/19 1251    Specimen:  Blood Updated:  10/11/19 1400    Narrative:       The following orders were created for panel order [place] [facility].  Procedure                               Abnormality         Status                     ---------                               -----------         ------                     Light Blue Top[[id number]]                                   Final result               Green Top (Gel)[[id number]]                                  Final result               Lavender Top[[id number]]                                     Final result               Red Top[[id number]]                                          In process                   Please view results for these tests on the individual orders.    Light Blue Top [[id number]] Collected:  10/11/19 1251    Specimen:  Blood Updated:  10/11/19 1400     Extra Tube hold for add-on     Comment: Auto resulted       Green Top (Gel) [[id number]] Collected:  10/11/19 1251    Specimen:  Blood Updated:  10/11/19 1400     Extra Tube Hold for add-ons.     Comment: Auto resulted.       Lavender Top [[id number]] Collected:  10/11/19 1251    Specimen:  Blood Updated:  10/11/19 1400     Extra Tube hold for add-on     Comment: Auto resulted       Lactic Acid, Plasma [[id number]]  (Abnormal) Collected:  10/11/19 1251    Specimen:  Blood Updated:  10/11/19 1325     Lactate 3.6 mmol/L     Comprehensive Metabolic Panel [[id number]]  (Abnormal) Collected:  10/11/19 1251    Specimen:  Blood Updated:  10/11/19 1322     Glucose 119 mg/dL      BUN 19 mg/dL      Creatinine 1.39 mg/dL      Sodium 141 mmol/L      Potassium 3.6 mmol/L      Chloride 99 mmol/L      CO2 25.0 mmol/L      Calcium 8.2 mg/dL      Total Protein 8.1 g/dL      Albumin 3.30 g/dL      ALT (SGPT) 12 U/L      AST (SGOT) 20 U/L      Alkaline  Phosphatase 59 U/L      Total Bilirubin 0.5 mg/dL      eGFR Non African Amer 38 mL/min/1.73      Globulin 4.8 gm/dL      A/G Ratio 0.7 g/dL      BUN/Creatinine Ratio 13.7     Anion Gap 17.0 mmol/L     Narrative:       GFR Normal >60  Chronic Kidney Disease <60  Kidney Failure <15    Lipase [[id number]]  (Normal) Collected:  10/11/19 1251    Specimen:  Blood Updated:  10/11/19 1322     Lipase 40 U/L     Amylase [[id number]]  (Normal) Collected:  10/11/19 1251    Specimen:  Blood Updated:  10/11/19 1322     Amylase 84 U/L     Troponin [[id number]]  (Normal) Collected:  10/11/19 1251    Specimen:  Blood Updated:  10/11/19 1322     Troponin T <0.010 ng/mL     Narrative:       Troponin T Reference Range:  <= 0.03 ng/mL-   Negative for AMI  >0.03 ng/mL-     Abnormal for myocardial necrosis.  Clinicians would have to utilize clinical acumen, EKG, Troponin and serial changes to determine if it is an Acute Myocardial Infarction or myocardial injury due to an underlying chronic condition.     Blood Culture - Blood, Arm, Right [[id number]] Collected:  10/11/19 1253    Specimen:  Blood from Arm, Right Updated:  10/11/19 1313    Blood Culture - Blood, Arm, Left [[id number]] Collected:  10/11/19 1246    Specimen:  Blood from Arm, Left Updated:  10/11/19 1313    Protime-INR [[id number]]  (Abnormal) Collected:  10/11/19 1251    Specimen:  Blood Updated:  10/11/19 1312     Protime 15.0 Seconds      INR 1.14    CBC & Differential [[id number]] Collected:  10/11/19 1251    Specimen:  Blood Updated:  10/11/19 1302    Narrative:       The following orders were created for panel order CBC & Differential.  Procedure                               Abnormality         Status                     ---------                               -----------         ------                     CBC Auto Differential[[id number]]        Abnormal            Final result                 Please view results for these tests on the individual orders.    CBC Auto  Differential [[id number]]  (Abnormal) Collected:  10/11/19 1251    Specimen:  Blood Updated:  10/11/19 1302     WBC 12.55 10*3/mm3      RBC 4.91 10*6/mm3      Hemoglobin 12.0 g/dL      Hematocrit 37.5 %      MCV 76.4 fL      MCH 24.4 pg      MCHC 32.0 g/dL      RDW 16.3 %      RDW-SD 44.3 fl      MPV 9.6 fL      Platelets 325 10*3/mm3      Neutrophil % 89.5 %      Lymphocyte % 3.5 %      Monocyte % 4.9 %      Eosinophil % 0.6 %      Basophil % 0.2 %      Immature Grans % 1.3 %      Neutrophils, Absolute 11.24 10*3/mm3      Lymphocytes, Absolute 0.44 10*3/mm3      Monocytes, Absolute 0.62 10*3/mm3      Eosinophils, Absolute 0.07 10*3/mm3      Basophils, Absolute 0.02 10*3/mm3      Immature Grans, Absolute 0.16 10*3/mm3      nRBC 0.0 /100 WBC     Red Top [[id number]] Collected:  10/11/19 1251    Specimen:  Blood Updated:  10/11/19 1258          Lab Results (last 48 hours)     Procedure Component Value Units Date/Time    Lactic Acid, Plasma [[id number]]  (Normal) Collected:  10/12/19 0640    Specimen:  Blood Updated:  10/12/19 0703     Lactate 1.6 mmol/L     Comprehensive Metabolic Panel [[id number]]  (Abnormal) Collected:  10/12/19 0503    Specimen:  Blood Updated:  10/12/19 0603     Glucose 196 mg/dL      BUN 21 mg/dL      Creatinine 0.94 mg/dL      Sodium 140 mmol/L      Potassium 3.4 mmol/L      Chloride 103 mmol/L      CO2 22.0 mmol/L      Calcium 7.0 mg/dL      Total Protein 7.5 g/dL      Albumin 3.00 g/dL      ALT (SGPT) 14 U/L      AST (SGOT) 27 U/L      Alkaline Phosphatase 53 U/L      Total Bilirubin 0.3 mg/dL      eGFR Non African Amer 60 mL/min/1.73      Globulin 4.5 gm/dL      A/G Ratio 0.7 g/dL      BUN/Creatinine Ratio 22.3     Anion Gap 15.0 mmol/L     Narrative:       GFR Normal >60  Chronic Kidney Disease <60  Kidney Failure <15    Magnesium [[id number]]  (Normal) Collected:  10/12/19 0503    Specimen:  Blood Updated:  10/12/19 0557     Magnesium 1.6 mg/dL     CBC Auto Differential [[id number]]  (Abnormal)  Collected:  10/12/19 0503    Specimen:  Blood Updated:  10/12/19 0539     WBC 14.85 10*3/mm3      RBC 4.12 10*6/mm3      Hemoglobin 10.2 g/dL      Hematocrit 31.1 %      MCV 75.5 fL      MCH 24.8 pg      MCHC 32.8 g/dL      RDW 16.4 %      RDW-SD 44.5 fl      MPV 10.2 fL      Platelets 293 10*3/mm3      Neutrophil % 83.9 %      Lymphocyte % 8.1 %      Monocyte % 4.8 %      Eosinophil % 1.5 %      Basophil % 0.6 %      Immature Grans % 1.1 %      Neutrophils, Absolute 12.45 10*3/mm3      Lymphocytes, Absolute 1.20 10*3/mm3      Monocytes, Absolute 0.71 10*3/mm3      Eosinophils, Absolute 0.23 10*3/mm3      Basophils, Absolute 0.09 10*3/mm3      Immature Grans, Absolute 0.17 10*3/mm3      nRBC 0.0 /100 WBC     Respiratory Panel, PCR - Swab, Nasopharynx [[id number]] Collected:  10/11/19 2043    Specimen:  Swab from Nasopharynx Updated:  10/11/19 2047    Lactate Dehydrogenase [[id number]]  (Abnormal) Collected:  10/11/19 1251    Specimen:  Blood Updated:  10/11/19 2041      U/L     Peripheral Blood Smear [[id number]] Collected:  10/11/19 1251    Specimen:  Blood Updated:  10/11/19 2039    Influenza Antigen, Rapid - Swab, Nasopharynx [[id number]]  (Normal) Collected:  10/11/19 1853    Specimen:  Swab from Nasopharynx Updated:  10/11/19 1930     Influenza A Ag, EIA Negative     Influenza B Ag, EIA Negative    Narrative:       Recommend confirmation of negative results by viral culture or molecular assay.    Lactic Acid, Reflex [[id number]]  (Abnormal) Collected:  10/11/19 1852    Specimen:  Blood Updated:  10/11/19 1911     Lactate 3.0 mmol/L     Ferritin [[id number]]  (Normal) Collected:  10/11/19 1251    Specimen:  Blood Updated:  10/11/19 1838     Ferritin 126.90 ng/mL     TSH [[id number]]  (Abnormal) Collected:  10/11/19 1251    Specimen:  Blood Updated:  10/11/19 1838     TSH 4.550 uIU/mL     Procalcitonin [[id number]]  (Abnormal) Collected:  10/11/19 1251    Specimen:  Blood Updated:  10/11/19 8203     Procalcitonin  "4.17 ng/mL     Narrative:       As a Marker for Sepsis (Non-Neonates):   1. <0.5 ng/mL represents a low risk of severe sepsis and/or septic shock.  1. >2 ng/mL represents a high risk of severe sepsis and/or septic shock.    As a Marker for Lower Respiratory Tract Infections that require antibiotic therapy:  PCT on Admission     Antibiotic Therapy             6-12 Hrs later  > 0.5                Strongly Recommended            >0.25 - <0.5         Recommended  0.1 - 0.25           Discouraged                   Remeasure/reassess PCT  <0.1                 Strongly Discouraged          Remeasure/reassess PCT      As 28 day mortality risk marker: \"Change in Procalcitonin Result\" (> 80 % or <=80 %) if Day 0 (or Day 1) and Day 4 values are available. Refer to http://[email or web address]   Change in PCT <=80 %   A decrease of PCT levels below or equal to 80 % defines a positive change in PCT test result representing a higher risk for 28-day all-cause mortality of patients diagnosed with severe sepsis or septic shock.  Change in PCT > 80 %   A decrease of PCT levels of more than 80 % defines a negative change in PCT result representing a lower risk for 28-day all-cause mortality of patients diagnosed with severe sepsis or septic shock.                Lactic Acid, Reflex Timer (This will reflex a repeat order 3-3:15 hours after ordered.) [[id number]] Collected:  10/11/19 1251    Specimen:  Blood Updated:  10/11/19 1630     Extra Tube Hold for add-ons.     Comment: Auto resulted.       Urinalysis With Culture If Indicated - Urine, Catheter In/Out [[id number]]  (Abnormal) Collected:  10/11/19 1536    Specimen:  Urine, Catheter In/Out Updated:  10/11/19 1607     Color, UA Dark Yellow     Appearance, UA Cloudy     pH, UA <=5.0     Specific Gravity, UA >1.030     Glucose, UA Negative     Ketones, UA Negative     Bilirubin, UA Negative     Blood, UA Negative     Protein, UA 30 mg/dL (1+)     Leuk Esterase, UA Negative     " Nitrite, UA Negative     Urobilinogen, UA 0.2 E.U./dL    Urinalysis, Microscopic Only - Urine, Catheter In/Out [[id number]]  (Abnormal) Collected:  10/11/19 1536    Specimen:  Urine, Catheter In/Out Updated:  10/11/19 1607     RBC, UA None Seen /HPF      WBC, UA 0-2 /HPF      Bacteria, UA None Seen /HPF      Squamous Epithelial Cells, UA 0-2 /HPF      Hyaline Casts, UA None Seen /LPF      Amorphous Crystals, UA Small/1+ /HPF      Methodology Manual Light Microscopy    Magnesium [[id number]]  (Abnormal) Collected:  10/11/19 1251    Specimen:  Blood Updated:  10/11/19 1531     Magnesium 0.6 mg/dL     [place] [facility] [[id number]] Collected:  10/11/19 1251    Specimen:  Blood Updated:  10/11/19 1400    Narrative:       The following orders were created for panel order [place] [facility].  Procedure                               Abnormality         Status                     ---------                               -----------         ------                     Light Blue Top[[id number]]                                   Final result               Green Top (Gel)[[id number]]                                  Final result               Lavender Top[[id number]]                                     Final result               Red Top[[id number]]                                          In process                   Please view results for these tests on the individual orders.    Light Blue Top [[id number]] Collected:  10/11/19 1251    Specimen:  Blood Updated:  10/11/19 1400     Extra Tube hold for add-on     Comment: Auto resulted       Green Top (Gel) [[id number]] Collected:  10/11/19 1251    Specimen:  Blood Updated:  10/11/19 1400     Extra Tube Hold for add-ons.     Comment: Auto resulted.       Lavender Top [[id number]] Collected:  10/11/19 1251    Specimen:  Blood Updated:  10/11/19 1400     Extra Tube hold for add-on     Comment: Auto resulted       Lactic Acid, Plasma [[id number]]  (Abnormal) Collected:  10/11/19 1251    Specimen:  Blood  Updated:  10/11/19 1325     Lactate 3.6 mmol/L     Comprehensive Metabolic Panel [[id number]]  (Abnormal) Collected:  10/11/19 1251    Specimen:  Blood Updated:  10/11/19 1322     Glucose 119 mg/dL      BUN 19 mg/dL      Creatinine 1.39 mg/dL      Sodium 141 mmol/L      Potassium 3.6 mmol/L      Chloride 99 mmol/L      CO2 25.0 mmol/L      Calcium 8.2 mg/dL      Total Protein 8.1 g/dL      Albumin 3.30 g/dL      ALT (SGPT) 12 U/L      AST (SGOT) 20 U/L      Alkaline Phosphatase 59 U/L      Total Bilirubin 0.5 mg/dL      eGFR Non African Amer 38 mL/min/1.73      Globulin 4.8 gm/dL      A/G Ratio 0.7 g/dL      BUN/Creatinine Ratio 13.7     Anion Gap 17.0 mmol/L     Narrative:       GFR Normal >60  Chronic Kidney Disease <60  Kidney Failure <15    Lipase [[id number]]  (Normal) Collected:  10/11/19 1251    Specimen:  Blood Updated:  10/11/19 1322     Lipase 40 U/L     Amylase [[id number]]  (Normal) Collected:  10/11/19 1251    Specimen:  Blood Updated:  10/11/19 1322     Amylase 84 U/L     Troponin [[id number]]  (Normal) Collected:  10/11/19 1251    Specimen:  Blood Updated:  10/11/19 1322     Troponin T <0.010 ng/mL     Narrative:       Troponin T Reference Range:  <= 0.03 ng/mL-   Negative for AMI  >0.03 ng/mL-     Abnormal for myocardial necrosis.  Clinicians would have to utilize clinical acumen, EKG, Troponin and serial changes to determine if it is an Acute Myocardial Infarction or myocardial injury due to an underlying chronic condition.     Blood Culture - Blood, Arm, Right [[id number]] Collected:  10/11/19 1253    Specimen:  Blood from Arm, Right Updated:  10/11/19 1313    Blood Culture - Blood, Arm, Left [[id number]] Collected:  10/11/19 1246    Specimen:  Blood from Arm, Left Updated:  10/11/19 1313    Protime-INR [[id number]]  (Abnormal) Collected:  10/11/19 1251    Specimen:  Blood Updated:  10/11/19 1312     Protime 15.0 Seconds      INR 1.14    CBC & Differential [[id number]] Collected:  10/11/19 4785     Specimen:  Blood Updated:  10/11/19 1302    Narrative:       The following orders were created for panel order CBC & Differential.  Procedure                               Abnormality         Status                     ---------                               -----------         ------                     CBC Auto Differential[[id number]]        Abnormal            Final result                 Please view results for these tests on the individual orders.    CBC Auto Differential [[id number]]  (Abnormal) Collected:  10/11/19 1251    Specimen:  Blood Updated:  10/11/19 1302     WBC 12.55 10*3/mm3      RBC 4.91 10*6/mm3      Hemoglobin 12.0 g/dL      Hematocrit 37.5 %      MCV 76.4 fL      MCH 24.4 pg      MCHC 32.0 g/dL      RDW 16.3 %      RDW-SD 44.3 fl      MPV 9.6 fL      Platelets 325 10*3/mm3      Neutrophil % 89.5 %      Lymphocyte % 3.5 %      Monocyte % 4.9 %      Eosinophil % 0.6 %      Basophil % 0.2 %      Immature Grans % 1.3 %      Neutrophils, Absolute 11.24 10*3/mm3      Lymphocytes, Absolute 0.44 10*3/mm3      Monocytes, Absolute 0.62 10*3/mm3      Eosinophils, Absolute 0.07 10*3/mm3      Basophils, Absolute 0.02 10*3/mm3      Immature Grans, Absolute 0.16 10*3/mm3      nRBC 0.0 /100 WBC     Red Top [[id number]] Collected:  10/11/19 1251    Specimen:  Blood Updated:  10/11/19 1258          Orders (last 48 hrs)     Start     Ordered    10/12/19 0700  PARoxetine (PAXIL) tablet 10 mg  Every Morning      10/11/19 1706    10/12/19 0630  Lactic Acid, Plasma  Once      10/12/19 0629    10/12/19 0600  CBC Auto Differential  Morning Draw      10/11/19 1706    10/12/19 0600  Comprehensive Metabolic Panel  Morning Draw      10/11/19 1706    10/12/19 0600  Magnesium  Morning Draw      10/11/19 1706    10/11/19 2230  hydrocortisone 2.5 % cream  Every 12 Hours Scheduled      10/11/19 2131    10/11/19 2100  magnesium oxide (MAGOX) tablet 1,000 mg  2 Times Daily      10/11/19 1706    10/11/19 2100  atorvastatin (LIPITOR)  tablet 10 mg  Nightly      10/11/19 1706    10/11/19 2100  sodium chloride 0.9 % flush 10 mL  Every 12 Hours Scheduled      10/11/19 1706    10/11/19 2020  Peripheral Blood Smear  Once      10/11/19 2019    10/11/19 2014  Lactate Dehydrogenase  Once      10/11/19 2015    10/11/19 2000  Vital Signs  Every 4 Hours      10/11/19 1706    10/11/19 1913  Respiratory Panel, PCR - Swab, Nasopharynx  Once      10/11/19 1706    10/11/19 1845  diphenhydrAMINE (BENADRYL) injection 50 mg  Every 6 Hours PRN      10/11/19 1850    10/11/19 1800  enoxaparin (LOVENOX) syringe 40 mg  Every 24 Hours      10/11/19 1706    10/11/19 1800  lactated ringers infusion  Continuous      10/11/19 1706    10/11/19 1800  famotidine (PEPCID) injection 20 mg  Daily      10/11/19 1706    10/11/19 1800  hydrOXYzine (VISTARIL) injection 25 mg  Once      10/11/19 1706    10/11/19 1800  methylPREDNISolone sodium succinate (SOLU-Medrol) injection 40 mg  Every 8 Hours,   Status:  Discontinued      10/11/19 1706    10/11/19 1800  TSH  Once      10/11/19 1759    10/11/19 1800  Ferritin  Once      10/11/19 1759    10/11/19 1757  Inpatient Infectious Diseases Consult  Once,   Status:  Canceled     Specialty:  Infectious Diseases  Provider:  Monika Woo MD    10/11/19 1756    10/11/19 1745  aluminum-magnesium hydroxide-simethicone (MAALOX MAX) 400-400-40 MG/5ML suspension 15 mL  Every 6 Hours PRN      10/11/19 1745    10/11/19 1707  Influenza A & B, RT PCR - Swab, Nasopharynx  Once,   Status:  Canceled      10/11/19 1706    10/11/19 1707  Intake & Output  Every Shift      10/11/19 1706    10/11/19 1707  Weigh Patient  Once      10/11/19 1706    10/11/19 1707  Oxygen Therapy- Nasal Cannula; Titrate for SPO2: 90% - 95%  Continuous      10/11/19 1706    10/11/19 1707  Insert Peripheral IV  Once      10/11/19 1706    10/11/19 1707  Saline Lock & Maintain IV Access  Continuous      10/11/19 1706    10/11/19 1707  Daily Weights  Daily      10/11/19 1706     10/11/19 1707  Diet Regular; GI Soft / Cope  Diet Effective Now      10/11/19 1706    10/11/19 1707  Procalcitonin  STAT      10/11/19 1706    10/11/19 1707  Influenza Antigen, Rapid - Swab, Nasopharynx  Once      10/11/19 1706    10/11/19 1707  CT Chest Without Contrast  1 Time Imaging      10/11/19 1706    10/11/19 1706  diphenhydrAMINE (BENADRYL) injection 25 mg  Every 6 Hours PRN,   Status:  Discontinued      10/11/19 1706    10/11/19 1706  hydrOXYzine (ATARAX) tablet 25 mg  3 Times Daily PRN,   Status:  Discontinued      10/11/19 1706    10/11/19 1706  acetaminophen (TYLENOL) tablet 650 mg  Every 4 Hours PRN      10/11/19 1706    10/11/19 1706  acetaminophen (TYLENOL) 160 MG/5ML solution 650 mg  Every 4 Hours PRN      10/11/19 1706    10/11/19 1706  acetaminophen (TYLENOL) suppository 650 mg  Every 4 Hours PRN      10/11/19 1706    10/11/19 1706  sodium chloride 0.9 % flush 10 mL  As Needed      10/11/19 1706    10/11/19 1706  ondansetron (ZOFRAN) injection 4 mg  Every 4 Hours PRN      10/11/19 1706    10/11/19 1651  Code Status and Medical Interventions:  Continuous      10/11/19 1702    10/11/19 1631  Lactic Acid, Reflex  STAT      10/11/19 1630    10/11/19 1615  Inpatient Admission  Once      10/11/19 1615    10/11/19 1552  Urinalysis, Microscopic Only - Urine, Clean Catch  Once      10/11/19 1551    10/11/19 1547  magnesium sulfate 2g/50 mL (PREMIX) infusion  Once      10/11/19 1545    10/11/19 1533  Urinalysis With Culture If Indicated - Urine, Catheter In/Out  STAT      10/11/19 1532    10/11/19 1532  cefTRIAXone (ROCEPHIN) 1 g/100 mL 0.9% NS (MBP)  Once      10/11/19 1530    10/11/19 1532  sodium chloride 0.9 % bolus 1,000 mL  Once      10/11/19 1530    10/11/19 1522  acetaminophen (TYLENOL) tablet 1,000 mg  Once      10/11/19 1520    10/11/19 1516  Magnesium  STAT      10/11/19 1516    10/11/19 1410  promethazine (PHENERGAN) injection 12.5 mg  Once      10/11/19 1408    10/11/19 1403  iopamidol  (ISOVUE-300) 61 % injection 100 mL  Once in Imaging      10/11/19 1401    10/11/19 1326  Lactic Acid, Reflex Timer (This will reflex a repeat order 3-3:15 hours after ordered.)  Once      10/11/19 1325    10/11/19 1238  sodium chloride 0.9 % bolus 1,000 mL  Once      10/11/19 1236    10/11/19 1238  famotidine (PEPCID) injection 20 mg  Once      10/11/19 1236    10/11/19 1238  diphenhydrAMINE (BENADRYL) injection 50 mg  Once      10/11/19 1236    10/11/19 1237  Blood Culture - Blood, Blood, Venous Line  Once      10/11/19 1236    10/11/19 1237  Blood Culture - Blood, Blood, Venous Line  Once     Comments:  30 minutes after first collection, or from a different site      10/11/19 1236    10/11/19 1236  Lactic Acid, Plasma  STAT      10/11/19 1236    10/11/19 1235  XR Chest 1 View  1 Time Imaging      10/11/19 1236    10/11/19 1235  ECG 12 Lead  Once      10/11/19 1236    10/11/19 1235  Troponin  Once      10/11/19 1236    10/11/19 1235  Cardiac Monitoring  Once      10/11/19 1236    10/11/19 1235  Pulse Oximetry, Continuous  Continuous      10/11/19 1236    10/11/19 1235  Monitor Blood Pressure  Per Hospital Policy      10/11/19 1236    10/11/19 1234  CBC & Differential  Once      10/11/19 1236    10/11/19 1234  Comprehensive Metabolic Panel  Once      10/11/19 1236    10/11/19 1234  Protime-INR  Once      10/11/19 1236    10/11/19 1234  Lipase  Once      10/11/19 1236    10/11/19 1234  Amylase  STAT      10/11/19 1236    10/11/19 1234  [place] Draw  Once      10/11/19 1236    10/11/19 1234  Urinalysis With Culture If Indicated - Urine, Clean Catch  Once,   Status:  Canceled      10/11/19 1236    10/11/19 1234  CT Abdomen Pelvis With Contrast  1 Time Imaging      10/11/19 1236    10/11/19 1234  CBC Auto Differential  PROCEDURE ONCE      10/11/19 1236    10/11/19 1234  Light Blue Top  PROCEDURE ONCE      10/11/19 1236    10/11/19 1234  Green Top (Gel)  PROCEDURE ONCE      10/11/19 1236    10/11/19 1234  Lavender Top   PROCEDURE ONCE      10/11/19 1236    10/11/19 1234  Red Top  PROCEDURE ONCE      10/11/19 1236    10/11/19 1230  ondansetron (ZOFRAN) injection 8 mg  Once      10/11/19 1228    Unscheduled  Up With Assistance  As Needed      10/11/19 1706    --  SCANNED - TELEMETRY        10/11/19 0000        Ventilator/Non-Invasive Ventilation Settings (From admission, onward)    None          Physician Progress Notes (last 48 hours) (Notes from 10/10/19 0811 through 10/12/19 0811)     No notes of this type exist for this encounter.        Consult Notes (last 48 hours) (Notes from 10/10/19 0811 through 10/12/19 0811)     No notes of this type exist for this encounter.

## 2019-10-13 LAB
ANION GAP SERPL CALCULATED.3IONS-SCNC: 9 MMOL/L (ref 5–15)
ANISOCYTOSIS BLD QL: ABNORMAL
BASOPHILS # BLD MANUAL: 0.12 10*3/MM3 (ref 0–0.2)
BASOPHILS NFR BLD AUTO: 1.1 % (ref 0–1.5)
BUN BLD-MCNC: 17 MG/DL (ref 8–23)
BUN/CREAT SERPL: 23 (ref 7–25)
BURR CELLS BLD QL SMEAR: ABNORMAL
CALCIUM SPEC-SCNC: 7.4 MG/DL (ref 8.6–10.5)
CHLORIDE SERPL-SCNC: 106 MMOL/L (ref 98–107)
CO2 SERPL-SCNC: 29 MMOL/L (ref 22–29)
CREAT BLD-MCNC: 0.74 MG/DL (ref 0.57–1)
DEPRECATED RDW RBC AUTO: 46.9 FL (ref 37–54)
ELLIPTOCYTES BLD QL SMEAR: ABNORMAL
EOSINOPHIL # BLD MANUAL: 0.35 10*3/MM3 (ref 0–0.4)
EOSINOPHIL NFR BLD MANUAL: 3.2 % (ref 0.3–6.2)
ERYTHROCYTE [DISTWIDTH] IN BLOOD BY AUTOMATED COUNT: 16.5 % (ref 12.3–15.4)
GFR SERPL CREATININE-BSD FRML MDRD: 79 ML/MIN/1.73
GLUCOSE BLD-MCNC: 107 MG/DL (ref 65–99)
HCT VFR BLD AUTO: 27.5 % (ref 34–46.6)
HGB BLD-MCNC: 8.7 G/DL (ref 12–15.9)
IRON 24H UR-MRATE: 26 MCG/DL (ref 37–145)
IRON SATN MFR SERPL: 10 % (ref 20–50)
LYMPHOCYTES # BLD MANUAL: 1.05 10*3/MM3 (ref 0.7–3.1)
LYMPHOCYTES NFR BLD MANUAL: 3.2 % (ref 5–12)
LYMPHOCYTES NFR BLD MANUAL: 9.5 % (ref 19.6–45.3)
MCH RBC QN AUTO: 24.6 PG (ref 26.6–33)
MCHC RBC AUTO-ENTMCNC: 31.6 G/DL (ref 31.5–35.7)
MCV RBC AUTO: 77.7 FL (ref 79–97)
MONOCYTES # BLD AUTO: 0.35 10*3/MM3 (ref 0.1–0.9)
NEUTROPHILS # BLD AUTO: 9.1 10*3/MM3 (ref 1.7–7)
NEUTROPHILS NFR BLD MANUAL: 82.1 % (ref 42.7–76)
PLAT MORPH BLD: NORMAL
PLATELET # BLD AUTO: 261 10*3/MM3 (ref 140–450)
PMV BLD AUTO: 10.2 FL (ref 6–12)
POIKILOCYTOSIS BLD QL SMEAR: ABNORMAL
POTASSIUM BLD-SCNC: 3.8 MMOL/L (ref 3.5–5.2)
RBC # BLD AUTO: 3.54 10*6/MM3 (ref 3.77–5.28)
SODIUM BLD-SCNC: 144 MMOL/L (ref 136–145)
TIBC SERPL-MCNC: 258 MCG/DL (ref 298–536)
TRANSFERRIN SERPL-MCNC: 173 MG/DL (ref 200–360)
VARIANT LYMPHS NFR BLD MANUAL: 1.1 % (ref 0–5)
WBC MORPH BLD: NORMAL
WBC NRBC COR # BLD: 11.09 10*3/MM3 (ref 3.4–10.8)

## 2019-10-13 PROCEDURE — 83540 ASSAY OF IRON: CPT | Performed by: INTERNAL MEDICINE

## 2019-10-13 PROCEDURE — 84466 ASSAY OF TRANSFERRIN: CPT | Performed by: INTERNAL MEDICINE

## 2019-10-13 PROCEDURE — 25010000002 IRON SUCROSE PER 1 MG: Performed by: NURSE PRACTITIONER

## 2019-10-13 PROCEDURE — 85025 COMPLETE CBC W/AUTO DIFF WBC: CPT | Performed by: NURSE PRACTITIONER

## 2019-10-13 PROCEDURE — 80048 BASIC METABOLIC PNL TOTAL CA: CPT | Performed by: NURSE PRACTITIONER

## 2019-10-13 PROCEDURE — 85007 BL SMEAR W/DIFF WBC COUNT: CPT | Performed by: NURSE PRACTITIONER

## 2019-10-13 RX ORDER — MULTIPLE VITAMINS W/ MINERALS TAB 9MG-400MCG
1 TAB ORAL DAILY
COMMUNITY

## 2019-10-13 RX ORDER — ONDANSETRON 4 MG/1
4 TABLET, FILM COATED ORAL 4 TIMES DAILY PRN
Status: ON HOLD | COMMUNITY
End: 2020-04-24

## 2019-10-13 RX ORDER — AMILORIDE HYDROCHLORIDE 5 MG/1
5 TABLET ORAL DAILY
Status: ON HOLD | COMMUNITY
End: 2020-04-24

## 2019-10-13 RX ADMIN — MAGNESIUM GLUCONATE 500 MG ORAL TABLET 1000 MG: 500 TABLET ORAL at 08:21

## 2019-10-13 RX ADMIN — MAGNESIUM GLUCONATE 500 MG ORAL TABLET 1000 MG: 500 TABLET ORAL at 20:19

## 2019-10-13 RX ADMIN — Medication 3 MG: at 20:20

## 2019-10-13 RX ADMIN — HYDROXYZINE HYDROCHLORIDE 25 MG: 25 TABLET ORAL at 08:29

## 2019-10-13 RX ADMIN — IRON SUCROSE 200 MG: 20 INJECTION, SOLUTION INTRAVENOUS at 14:06

## 2019-10-13 RX ADMIN — POTASSIUM CHLORIDE 20 MEQ: 750 CAPSULE, EXTENDED RELEASE ORAL at 08:21

## 2019-10-13 RX ADMIN — HYDROXYZINE HYDROCHLORIDE 25 MG: 25 TABLET ORAL at 20:20

## 2019-10-13 RX ADMIN — FAMOTIDINE 20 MG: 20 TABLET, FILM COATED ORAL at 08:21

## 2019-10-13 RX ADMIN — HYDROCORTISONE: 25 CREAM TOPICAL at 08:25

## 2019-10-13 RX ADMIN — ATORVASTATIN CALCIUM 10 MG: 10 TABLET, FILM COATED ORAL at 20:19

## 2019-10-13 RX ADMIN — HYDROCORTISONE: 25 CREAM TOPICAL at 20:21

## 2019-10-13 RX ADMIN — PAROXETINE 10 MG: 10 TABLET, FILM COATED ORAL at 08:21

## 2019-10-13 RX ADMIN — CETIRIZINE HYDROCHLORIDE 10 MG: 10 TABLET, FILM COATED ORAL at 08:21

## 2019-10-13 RX ADMIN — SODIUM CHLORIDE, PRESERVATIVE FREE 10 ML: 5 INJECTION INTRAVENOUS at 20:20

## 2019-10-13 NOTE — PROGRESS NOTES
[facility] Medicine Services  INPATIENT PROGRESS NOTE    Length of Stay: 2  Date of Admission: 10/11/2019  Primary Care Physician: Avery Whaley MD    Subjective   Chief Complaint: follow-up  HPI   Patient resting in bed with family at bedside.  She reports feeling improved overall.  She denies any chest pain or shortness of breath.  She denies abdominal pain, nausea, or vomiting.  She still has some minor itching at times, this is relieved with Atarax.  She is ambulating in the garcia.  She slept well last night.    Review of Systems   All pertinent negatives and positives are as above. All other systems have been reviewed and are negative unless otherwise stated.     Objective    Temp:  [97.5 °F (36.4 °C)-98 °F (36.7 °C)] 97.9 °F (36.6 °C)  Heart Rate:  [55-73] 55  Resp:  [14-18] 16  BP: (111-123)/(58-61) 119/61  Physical Exam  Constitutional: She is oriented to person, place, and time. She appears well-developed and well-nourished. No distress.   Looks much better today   HENT:   Head: Normocephalic and atraumatic.   Neck: Normal range of motion. Neck supple. No JVD present. No tracheal deviation present.   Cardiovascular: Normal rate, regular rhythm and intact distal pulses. Exam reveals no gallop and no friction rub.   Sinus cora- sinus 47-69 with PACs overnight   Pulmonary/Chest: Effort normal and breath sounds normal. She has no wheezes. She has no rales.   Abdominal: Soft. Bowel sounds are normal. She exhibits no distension. There is no tenderness. There is no guarding.   Musculoskeletal: Normal range of motion. She exhibits no edema or tenderness.   Lymphadenopathy:        Head (right side): Submandibular and occipital adenopathy present.        Head (left side): Submandibular adenopathy present.        Right cervical: No superficial cervical, no deep cervical and no posterior cervical adenopathy present.       Left cervical: No superficial cervical, no deep  cervical and no posterior cervical adenopathy present.     She has axillary adenopathy.        Right: Inguinal adenopathy present.        Left: Inguinal adenopathy present.   Neurological: She is alert and oriented to person, place, and time. No cranial nerve deficit.   Skin: Skin is warm and dry. Rash (Rash significantly improved) noted.   Psychiatric: She has a normal mood and affect. Her behavior is normal. Judgment and thought content normal.   Vitals reviewed.     Results Review:  I have reviewed the labs, radiology results, and diagnostic studies.    Laboratory Data:   Results from last 7 days   Lab Units 10/13/19  0222 10/12/19  0503 10/11/19  1251   WBC 10*3/mm3 11.09* 14.85* 12.55*   HEMOGLOBIN g/dL 8.7* 10.2* 12.0   HEMATOCRIT % 27.5* 31.1* 37.5   PLATELETS 10*3/mm3 261 293 325     Results from last 7 days   Lab Units 10/13/19  0222 10/12/19  0503 10/11/19  1251   SODIUM mmol/L 144 140 141   POTASSIUM mmol/L 3.8 3.4* 3.6   CHLORIDE mmol/L 106 103 99   CO2 mmol/L 29.0 22.0 25.0   BUN mg/dL 17 21 19   CREATININE mg/dL 0.74 0.94 1.39*   CALCIUM mg/dL 7.4* 7.0* 8.2*   BILIRUBIN mg/dL  --  0.3 0.5   ALK PHOS U/L  --  53 59   ALT (SGPT) U/L  --  14 12   AST (SGOT) U/L  --  27 20   GLUCOSE mg/dL 107* 196* 119*     I have reviewed the patient current medications.     Assessment/Plan     Active Hospital Problems    Diagnosis   • **SIRS (systemic inflammatory response syndrome) (CMS/HCC)   • Lymphadenopathy   • Obesity (BMI 30-39.9)   • Microcytic anemia   • High anion gap metabolic acidosis due to lactic acidosis   • Rash   • Gitelman syndrome, complicated by hypomagnesemia and hypokalemia    • Leukocytosis   • WILDA (acute kidney injury) (CMS/HCC)   • Hyperlipidemia     Plan:  1.  Appreciate general surgery.  Patient n.p.o. after midnight for excisional axillary lymph node biopsy in AM.  2.  Renal function normalized.  Nausea and vomiting much improved.  Okay to discontinue IV fluids  3.  White blood cell count  improved today at 11,000.  Influenza antigen negative.  Respiratory panel negative.  Blood cultures with no growth at 24 hours.  At this point, would lean more toward an immune response triggered by receiving the flu vaccine or viral process rather than bacterial process.  Would continue to monitor off of antibiotic therapy for now.  4.  We will give 2 doses of IV Venofer for iron deficiency anemia  5.  Okay to discontinue telemetry  6.  Peripheral blood smear pending.  Hematology/oncology consult in AM- Dr. Macario  7.  Labs in AM    Discharge Planning: I expect the patient to be discharged to home in 2-3 days.    ROSENDA Rios   10/13/19   10:31 AM     I personally evaluated and examined the patient in conjunction with ROSENDA Spears and agree with the assessment, treatment plan, and disposition of the patient as recorded by her. My history, exam, and further recommendations are:     Patient seen and examined.  Numerous family membes at bedside.  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  Patient feels well.  Biopsy tomorrow, NPO at midnight.    Klever Luna MD  10/13/19  12:22 PM

## 2019-10-13 NOTE — PLAN OF CARE
Problem: Patient Care Overview  Goal: Plan of Care Review  Outcome: Ongoing (interventions implemented as appropriate)      Problem: Infection, Risk/Actual (Adult)  Goal: Identify Related Risk Factors and Signs and Symptoms  Outcome: Ongoing (interventions implemented as appropriate)

## 2019-10-13 NOTE — CONSULTS
Karen Benitez MD FACS Consult Note    Referring Provider: Heber De Leon, *    Patient Care Team:  Avery Whaley MD as PCP - General  Avery Whaley MD as PCP - Family Medicine    Chief complaint request for lymph node biopsy    Subjective .     History of present illness:  The patient is a 64-year-old female who presents complaining of fever and fatigue.  She denies any nausea, vomiting, change in bowel habits, BRBPR, melena, hematochezia, or weight loss.  CT demonstrated bulky adenopathy of the axillary, supraclavicular, and medistinal regions.  Surgical consultation has been requested for lymph node sampling.      Review of Systems  All systems were reviewed and negative for    Constitution:chills, fevers, night sweats and weight loss, weight gain  Eyes:  double vision, blurriness and loss of vision  ENT:  earaches, hearing loss and hoarseness  Respiratory:  cough, dry, cough, productive, hemoptysis and shortness of air  Cardiovascular:  chest pressure / pain, at rest, chest pressure / pain, on exertion, irregular pulse and palpitations  Gastrointestinal: bright red blood per rectum, change in bowel habits, constipation, diarrhea, heartburn, hematemesis, melena, nausea, pain and vomiting  Genitourinary:  difficulty / inability to void, pain, blood in urine and painful urination  Integument:  itching, rash, redness and swelling  Breast:  lump / mass, nipple discharge and pain  Hematologic / Lymphatic: easy bruising and lymphadenopathy  Musculoskeletal: joint pain, muscle pain and muscle weakness  Neurological: dizziness, loss of consciousness, numbness, vertigo and weakness  Behavioral/Psych: anxiety and depression  Endocrine: diabetes, thyroid disorder      History  Past Medical History:   Diagnosis Date   • Allergic rhinitis    • Colon polyps    • Depression    • Difficulty swallowing solids    • DJD (degenerative joint disease)    • Gitelman syndrome     low magnesium   • Hyperlipidemia     • Obesity    • Osteopenia    • PONV (postoperative nausea and vomiting)    ,   Past Surgical History:   Procedure Laterality Date   • COLONOSCOPY  2010   • COLONOSCOPY W/ POLYPECTOMY  2016    2   5 mm sessile polyps removed with hot snare repeat 5 years   • DILATATION AND CURETTAGE     • ENDOSCOPY  2016    Medium sized HH, LA Grade A esohagitis, Fluid in the middle third of esohagus    • MD TOTAL KNEE ARTHROPLASTY Left 3/9/2018    Procedure: LEFT TOTAL KNEE ARTHROPLASTY;  Surgeon: Orlin Meza MD;  Location: [facility];  Service: Orthopedics   • REPLACEMENT TOTAL KNEE Right    ,   Family History   Problem Relation Age of Onset   • Aortic aneurysm Father    • Anorexia nervosa Mother    • No Known Problems Brother    • No Known Problems Sister    • No Known Problems Son    • No Known Problems Daughter    • No Known Problems Daughter    • No Known Problems Maternal Grandmother    • No Known Problems Paternal Grandmother    • No Known Problems Maternal Aunt    • No Known Problems Paternal Aunt    • Lymphoma Paternal Grandfather    • Breast cancer Neg Hx    • Ovarian cancer Neg Hx    • Colon cancer Neg Hx    • BRCA 1/2 Neg Hx    • Endometrial cancer Neg Hx    ,   Social History     Tobacco Use   • Smoking status: Former Smoker     Packs/day: 0.50     Types: Cigarettes     Last attempt to quit: 2003     Years since quittin.7   • Smokeless tobacco: Never Used   Substance Use Topics   • Alcohol use: No   • Drug use: No   ,   Medications Prior to Admission   Medication Sig Dispense Refill Last Dose   • aMILoride (MIDAMOR) 5 MG tablet Take 5 mg by mouth Daily.   10/10/2019 at Unknown time   • Calcium Carb-Ergocalciferol (CHEWABLE CALCIUM/D PO) Take 1 tablet by mouth Daily.   10/10/2019 at Unknown time   • magnesium oxide (MAGOX) 400 (241.3 Mg) MG tablet tablet Take 2,000 mg by mouth Daily.   10/10/2019 at Unknown time   • Multiple Vitamins-Minerals (MULTIVITAMIN WITH MINERALS) tablet tablet  Take 1 tablet by mouth Daily.   10/10/2019 at Unknown time   • ondansetron (ZOFRAN) 4 MG tablet Take 4 mg by mouth 4 (Four) Times a Day As Needed for Nausea or Vomiting.   Past Month at Unknown time   • simvastatin (ZOCOR) 20 MG tablet Take 1 tablet by mouth daily.  9 10/10/2019 at Unknown time   • PARoxetine (PAXIL) 10 MG tablet Take 10 mg by mouth Every Morning.  5 Taking    and Allergies:  Penicillins and Sulfa antibiotics    Current Facility-Administered Medications:   •  acetaminophen (TYLENOL) tablet 650 mg, 650 mg, Oral, Q4H PRN **OR** acetaminophen (TYLENOL) 160 MG/5ML solution 650 mg, 650 mg, Oral, Q4H PRN **OR** acetaminophen (TYLENOL) suppository 650 mg, 650 mg, Rectal, Q4H PRN, Nadeen Gomez K, APRN  •  aluminum-magnesium hydroxide-simethicone (MAALOX MAX) 400-400-40 MG/5ML suspension 15 mL, 15 mL, Oral, Q6H PRN, Klever Luna MD  •  atorvastatin (LIPITOR) tablet 10 mg, 10 mg, Oral, Nightly, Nadeen Gomez K, APRN, 10 mg at 10/12/19 2147  •  cetirizine (zyrTEC) tablet 10 mg, 10 mg, Oral, Daily, Klever Luna MD, 10 mg at 10/13/19 0821  •  enoxaparin (LOVENOX) syringe 40 mg, 40 mg, Subcutaneous, Q24H, Nadeen Gomez APRN, 40 mg at 10/12/19 1710  •  famotidine (PEPCID) tablet 20 mg, 20 mg, Oral, Daily, Klever Luna MD, 20 mg at 10/13/19 0821  •  hydrocortisone 2.5 % cream, , Topical, Q12H, Gavin York DO  •  hydrOXYzine (ATARAX) tablet 25 mg, 25 mg, Oral, TID PRN, Nadeen Gomez K, APRN, 25 mg at 10/13/19 0829  •  iron sucrose (VENOFER) 200 mg in sodium chloride 0.9 % 100 mL IVPB, 200 mg, Intravenous, Q24H, Woeltz, Nadeen K, APRN, 200 mg at 10/13/19 1406  •  magnesium oxide (MAGOX) tablet 1,000 mg, 1,000 mg, Oral, BID, Woeltz, Nadeen K, APRN, 1,000 mg at 10/13/19 0821  •  melatonin tablet 3 mg, 3 mg, Oral, Nightly, Woeltz, Nadeen K, APRN, 3 mg at 10/12/19 7642  •  ondansetron (ZOFRAN) injection 4 mg, 4 mg, Intravenous, Q4H PRN, Woeltz, Nadeen K, APRN  •   PARoxetine (PAXIL) tablet 10 mg, 10 mg, Oral, QAM, Woeltz, Nadeen K, APRN, 10 mg at 10/13/19 0821  •  potassium chloride (MICRO-K) CR capsule 20 mEq, 20 mEq, Oral, Daily, Woeltz, Nadeen K, APRN, 20 mEq at 10/13/19 0821  •  sodium chloride 0.9 % flush 10 mL, 10 mL, Intravenous, PRN, Woeltz, Nadeen K, APRN  •  temazepam (RESTORIL) capsule 15 mg, 15 mg, Oral, Nightly PRN, Woeltz, Nadeen K, APRN    Objective     Vital Signs   Temp:  [97.7 °F (36.5 °C)-98 °F (36.7 °C)] 98 °F (36.7 °C)  Heart Rate:  [55-69] 60  Resp:  [14-18] 18  BP: (111-123)/(58-64) 118/64    Physical Exam:  General appearance - alert, well appearing, and in no distress  Mental status - alert, oriented to person, place, and time  Neck - supple, no significant adenopathy  Chest - clear to auscultation, no wheezes, rales or rhonchi, symmetric air entry  Heart - normal rate, regular rhythm, normal S1, S2, no murmurs, rubs, clicks or gallops  Abdomen - soft, nontender, nondistended, no masses or organomegaly  Neurological - alert, oriented, normal speech, no focal findings or movement disorder noted  Musculoskeletal - no joint tenderness, deformity or swelling  Extremities - peripheral pulses normal, no pedal edema, no clubbing or cyanosis    Results Review:    Lab Results (last 24 hours)     Procedure Component Value Units Date/Time    Blood Culture - Blood, Arm, Right [[id number]] Collected:  10/11/19 1253    Specimen:  Blood from Arm, Right Updated:  10/13/19 1315     Blood Culture No growth at 2 days    Blood Culture - Blood, Arm, Left [[id number]] Collected:  10/11/19 1246    Specimen:  Blood from Arm, Left Updated:  10/13/19 1315     Blood Culture No growth at 2 days    Manual Differential [[id number]]  (Abnormal) Collected:  10/13/19 0222    Specimen:  Blood Updated:  10/13/19 0335     Neutrophil % 82.1 %      Lymphocyte % 9.5 %      Monocyte % 3.2 %      Eosinophil % 3.2 %      Basophil % 1.1 %      Atypical Lymphocyte % 1.1 %      Neutrophils  Absolute 9.10 10*3/mm3      Lymphocytes Absolute 1.05 10*3/mm3      Monocytes Absolute 0.35 10*3/mm3      Eosinophils Absolute 0.35 10*3/mm3      Basophils Absolute 0.12 10*3/mm3      Anisocytosis Slight/1+     Crenated RBC's Slight/1+     Elliptocytes Slight/1+     Poikilocytes Slight/1+     WBC Morphology Normal     Platelet Morphology Normal    Basic Metabolic Panel [[id number]]  (Abnormal) Collected:  10/13/19 0222    Specimen:  Blood Updated:  10/13/19 0312     Glucose 107 mg/dL      BUN 17 mg/dL      Creatinine 0.74 mg/dL      Sodium 144 mmol/L      Potassium 3.8 mmol/L      Chloride 106 mmol/L      CO2 29.0 mmol/L      Calcium 7.4 mg/dL      eGFR Non African Amer 79 mL/min/1.73      BUN/Creatinine Ratio 23.0     Anion Gap 9.0 mmol/L     Narrative:       GFR Normal >60  Chronic Kidney Disease <60  Kidney Failure <15    Iron Profile [[id number]]  (Abnormal) Collected:  10/13/19 0222    Specimen:  Blood Updated:  10/13/19 0307     Iron 26 mcg/dL      Iron Saturation 10 %      Transferrin 173 mg/dL      TIBC 258 mcg/dL     CBC & Differential [[id number]] Collected:  10/13/19 0222    Specimen:  Blood Updated:  10/13/19 0253    Narrative:       The following orders were created for panel order CBC & Differential.  Procedure                               Abnormality         Status                     ---------                               -----------         ------                     CBC Auto Differential[[id number]]        Abnormal            Final result                 Please view results for these tests on the individual orders.    CBC Auto Differential [[id number]]  (Abnormal) Collected:  10/13/19 0222    Specimen:  Blood Updated:  10/13/19 0253     WBC 11.09 10*3/mm3      RBC 3.54 10*6/mm3      Hemoglobin 8.7 g/dL      Hematocrit 27.5 %      MCV 77.7 fL      MCH 24.6 pg      MCHC 31.6 g/dL      RDW 16.5 %      RDW-SD 46.9 fl      MPV 10.2 fL      Platelets 261 10*3/mm3         Imaging Results (last 24 hours)      ** No results found for the last 24 hours. **                Assessment/Plan       Generalized lymphadenopathy.  She will undergo right axillary lymph node biopsy.  The risks, benefits, complications, and possible alternatives were discussed with the patient who agreed to proceed.      Karen Benitez MD  10/13/19  5:26 PM

## 2019-10-14 ENCOUNTER — ANESTHESIA (OUTPATIENT)
Dept: PERIOP | Facility: HOSPITAL | Age: 64
End: 2019-10-14

## 2019-10-14 ENCOUNTER — ANESTHESIA EVENT (OUTPATIENT)
Dept: PERIOP | Facility: HOSPITAL | Age: 64
End: 2019-10-14

## 2019-10-14 LAB
ANION GAP SERPL CALCULATED.3IONS-SCNC: 13 MMOL/L (ref 5–15)
BASOPHILS # BLD MANUAL: 0.1 10*3/MM3 (ref 0–0.2)
BASOPHILS NFR BLD AUTO: 1 % (ref 0–1.5)
BUN BLD-MCNC: 13 MG/DL (ref 8–23)
BUN/CREAT SERPL: 21.7 (ref 7–25)
CALCIUM SPEC-SCNC: 8.4 MG/DL (ref 8.6–10.5)
CHLORIDE SERPL-SCNC: 100 MMOL/L (ref 98–107)
CO2 SERPL-SCNC: 29 MMOL/L (ref 22–29)
CREAT BLD-MCNC: 0.6 MG/DL (ref 0.57–1)
CYTOLOGIST CVX/VAG CYTO: NORMAL
DEPRECATED RDW RBC AUTO: 45.7 FL (ref 37–54)
ERYTHROCYTE [DISTWIDTH] IN BLOOD BY AUTOMATED COUNT: 16.5 % (ref 12.3–15.4)
GFR SERPL CREATININE-BSD FRML MDRD: 101 ML/MIN/1.73
GIANT PLATELETS: ABNORMAL
GLUCOSE BLD-MCNC: 103 MG/DL (ref 65–99)
HCT VFR BLD AUTO: 30.3 % (ref 34–46.6)
HGB BLD-MCNC: 9.8 G/DL (ref 12–15.9)
LYMPHOCYTES # BLD MANUAL: 2.74 10*3/MM3 (ref 0.7–3.1)
LYMPHOCYTES NFR BLD MANUAL: 27 % (ref 19.6–45.3)
LYMPHOCYTES NFR BLD MANUAL: 8 % (ref 5–12)
MCH RBC QN AUTO: 24.8 PG (ref 26.6–33)
MCHC RBC AUTO-ENTMCNC: 32.3 G/DL (ref 31.5–35.7)
MCV RBC AUTO: 76.7 FL (ref 79–97)
MONOCYTES # BLD AUTO: 0.81 10*3/MM3 (ref 0.1–0.9)
NEUTROPHILS # BLD AUTO: 5.79 10*3/MM3 (ref 1.7–7)
NEUTROPHILS NFR BLD MANUAL: 57 % (ref 42.7–76)
PATH INTERP BLD-IMP: NORMAL
PLATELET # BLD AUTO: 341 10*3/MM3 (ref 140–450)
PMV BLD AUTO: 10.1 FL (ref 6–12)
POIKILOCYTOSIS BLD QL SMEAR: ABNORMAL
POLYCHROMASIA BLD QL SMEAR: ABNORMAL
POTASSIUM BLD-SCNC: 4.2 MMOL/L (ref 3.5–5.2)
RBC # BLD AUTO: 3.95 10*6/MM3 (ref 3.77–5.28)
SODIUM BLD-SCNC: 142 MMOL/L (ref 136–145)
VARIANT LYMPHS NFR BLD MANUAL: 7 % (ref 0–5)
WBC MORPH BLD: NORMAL
WBC NRBC COR # BLD: 10.15 10*3/MM3 (ref 3.4–10.8)

## 2019-10-14 PROCEDURE — 88305 TISSUE EXAM BY PATHOLOGIST: CPT | Performed by: SPECIALIST

## 2019-10-14 PROCEDURE — 85025 COMPLETE CBC W/AUTO DIFF WBC: CPT | Performed by: NURSE PRACTITIONER

## 2019-10-14 PROCEDURE — 81261 IGH GENE REARRANGE AMP METH: CPT

## 2019-10-14 PROCEDURE — 25010000002 IRON SUCROSE PER 1 MG: Performed by: NURSE PRACTITIONER

## 2019-10-14 PROCEDURE — 87206 SMEAR FLUORESCENT/ACID STAI: CPT | Performed by: INTERNAL MEDICINE

## 2019-10-14 PROCEDURE — 25010000002 PROPOFOL 10 MG/ML EMULSION: Performed by: NURSE ANESTHETIST, CERTIFIED REGISTERED

## 2019-10-14 PROCEDURE — 88342 IMHCHEM/IMCYTCHM 1ST ANTB: CPT

## 2019-10-14 PROCEDURE — 88325 CONSLTJ COMPRE RVW REC REPRT: CPT

## 2019-10-14 PROCEDURE — 87075 CULTR BACTERIA EXCEPT BLOOD: CPT | Performed by: INTERNAL MEDICINE

## 2019-10-14 PROCEDURE — 87176 TISSUE HOMOGENIZATION CULTR: CPT | Performed by: INTERNAL MEDICINE

## 2019-10-14 PROCEDURE — 87070 CULTURE OTHR SPECIMN AEROBIC: CPT | Performed by: INTERNAL MEDICINE

## 2019-10-14 PROCEDURE — 80048 BASIC METABOLIC PNL TOTAL CA: CPT | Performed by: NURSE PRACTITIONER

## 2019-10-14 PROCEDURE — 07B53ZX EXCISION OF RIGHT AXILLARY LYMPHATIC, PERCUTANEOUS APPROACH, DIAGNOSTIC: ICD-10-PCS | Performed by: SPECIALIST

## 2019-10-14 PROCEDURE — 25010000002 SUCCINYLCHOLINE PER 20 MG: Performed by: NURSE ANESTHETIST, CERTIFIED REGISTERED

## 2019-10-14 PROCEDURE — 94760 N-INVAS EAR/PLS OXIMETRY 1: CPT

## 2019-10-14 PROCEDURE — 88334 PATH CONSLTJ SURG CYTO XM EA: CPT | Performed by: SPECIALIST

## 2019-10-14 PROCEDURE — 25010000002 ONDANSETRON PER 1 MG: Performed by: NURSE ANESTHETIST, CERTIFIED REGISTERED

## 2019-10-14 PROCEDURE — 25010000002 FENTANYL CITRATE (PF) 100 MCG/2ML SOLUTION: Performed by: NURSE ANESTHETIST, CERTIFIED REGISTERED

## 2019-10-14 PROCEDURE — 88341 IMHCHEM/IMCYTCHM EA ADD ANTB: CPT

## 2019-10-14 PROCEDURE — 88184 FLOWCYTOMETRY/ TC 1 MARKER: CPT

## 2019-10-14 PROCEDURE — 25010000002 FENTANYL CITRATE (PF) 100 MCG/2ML SOLUTION: Performed by: ANESTHESIOLOGY

## 2019-10-14 PROCEDURE — 87205 SMEAR GRAM STAIN: CPT | Performed by: INTERNAL MEDICINE

## 2019-10-14 PROCEDURE — 87102 FUNGUS ISOLATION CULTURE: CPT | Performed by: INTERNAL MEDICINE

## 2019-10-14 PROCEDURE — 85007 BL SMEAR W/DIFF WBC COUNT: CPT | Performed by: NURSE PRACTITIONER

## 2019-10-14 PROCEDURE — 25010000002 DEXAMETHASONE PER 1 MG: Performed by: NURSE ANESTHETIST, CERTIFIED REGISTERED

## 2019-10-14 PROCEDURE — 25010000002 DEXAMETHASONE PER 1 MG: Performed by: ANESTHESIOLOGY

## 2019-10-14 PROCEDURE — 88185 FLOWCYTOMETRY/TC ADD-ON: CPT

## 2019-10-14 PROCEDURE — 25010000002 MIDAZOLAM PER 1 MG: Performed by: ANESTHESIOLOGY

## 2019-10-14 PROCEDURE — 87116 MYCOBACTERIA CULTURE: CPT | Performed by: INTERNAL MEDICINE

## 2019-10-14 PROCEDURE — 94799 UNLISTED PULMONARY SVC/PX: CPT

## 2019-10-14 RX ORDER — ACETAMINOPHEN 500 MG
1000 TABLET ORAL ONCE
Status: COMPLETED | OUTPATIENT
Start: 2019-10-14 | End: 2019-10-14

## 2019-10-14 RX ORDER — HYDROCODONE BITARTRATE AND ACETAMINOPHEN 5; 325 MG/1; MG/1
1 TABLET ORAL EVERY 4 HOURS PRN
Status: DISCONTINUED | OUTPATIENT
Start: 2019-10-14 | End: 2019-10-15 | Stop reason: HOSPADM

## 2019-10-14 RX ORDER — LIDOCAINE HYDROCHLORIDE 20 MG/ML
INJECTION, SOLUTION INFILTRATION; PERINEURAL AS NEEDED
Status: DISCONTINUED | OUTPATIENT
Start: 2019-10-14 | End: 2019-10-14 | Stop reason: SURG

## 2019-10-14 RX ORDER — MAGNESIUM HYDROXIDE 1200 MG/15ML
LIQUID ORAL AS NEEDED
Status: DISCONTINUED | OUTPATIENT
Start: 2019-10-14 | End: 2019-10-14 | Stop reason: HOSPADM

## 2019-10-14 RX ORDER — FENTANYL CITRATE 50 UG/ML
INJECTION, SOLUTION INTRAMUSCULAR; INTRAVENOUS AS NEEDED
Status: DISCONTINUED | OUTPATIENT
Start: 2019-10-14 | End: 2019-10-14 | Stop reason: SURG

## 2019-10-14 RX ORDER — CLINDAMYCIN PHOSPHATE 600 MG/50ML
600 INJECTION INTRAVENOUS ONCE
Status: DISCONTINUED | OUTPATIENT
Start: 2019-10-14 | End: 2019-10-15

## 2019-10-14 RX ORDER — FENTANYL CITRATE 50 UG/ML
25 INJECTION, SOLUTION INTRAMUSCULAR; INTRAVENOUS AS NEEDED
Status: DISCONTINUED | OUTPATIENT
Start: 2019-10-14 | End: 2019-10-14 | Stop reason: HOSPADM

## 2019-10-14 RX ORDER — GLYCOPYRROLATE 0.2 MG/ML
INJECTION INTRAMUSCULAR; INTRAVENOUS AS NEEDED
Status: DISCONTINUED | OUTPATIENT
Start: 2019-10-14 | End: 2019-10-14 | Stop reason: SURG

## 2019-10-14 RX ORDER — CLINDAMYCIN PHOSPHATE 600 MG/50ML
600 INJECTION INTRAVENOUS
Status: COMPLETED | OUTPATIENT
Start: 2019-10-14 | End: 2019-10-14

## 2019-10-14 RX ORDER — SUCCINYLCHOLINE CHLORIDE 20 MG/ML
INJECTION INTRAMUSCULAR; INTRAVENOUS AS NEEDED
Status: DISCONTINUED | OUTPATIENT
Start: 2019-10-14 | End: 2019-10-14 | Stop reason: SURG

## 2019-10-14 RX ORDER — FLUMAZENIL 0.1 MG/ML
0.2 INJECTION INTRAVENOUS AS NEEDED
Status: DISCONTINUED | OUTPATIENT
Start: 2019-10-14 | End: 2019-10-14 | Stop reason: HOSPADM

## 2019-10-14 RX ORDER — OXYCODONE AND ACETAMINOPHEN 10; 325 MG/1; MG/1
1 TABLET ORAL ONCE AS NEEDED
Status: DISCONTINUED | OUTPATIENT
Start: 2019-10-14 | End: 2019-10-14 | Stop reason: HOSPADM

## 2019-10-14 RX ORDER — SODIUM CHLORIDE, SODIUM LACTATE, POTASSIUM CHLORIDE, CALCIUM CHLORIDE 600; 310; 30; 20 MG/100ML; MG/100ML; MG/100ML; MG/100ML
100 INJECTION, SOLUTION INTRAVENOUS CONTINUOUS
Status: DISCONTINUED | OUTPATIENT
Start: 2019-10-14 | End: 2019-10-14

## 2019-10-14 RX ORDER — MIDAZOLAM HYDROCHLORIDE 1 MG/ML
1 INJECTION INTRAMUSCULAR; INTRAVENOUS
Status: DISCONTINUED | OUTPATIENT
Start: 2019-10-14 | End: 2019-10-14 | Stop reason: HOSPADM

## 2019-10-14 RX ORDER — MORPHINE SULFATE 2 MG/ML
2 INJECTION, SOLUTION INTRAMUSCULAR; INTRAVENOUS
Status: DISCONTINUED | OUTPATIENT
Start: 2019-10-14 | End: 2019-10-14 | Stop reason: HOSPADM

## 2019-10-14 RX ORDER — LABETALOL HYDROCHLORIDE 5 MG/ML
5 INJECTION, SOLUTION INTRAVENOUS
Status: DISCONTINUED | OUTPATIENT
Start: 2019-10-14 | End: 2019-10-14 | Stop reason: HOSPADM

## 2019-10-14 RX ORDER — PROPOFOL 10 MG/ML
VIAL (ML) INTRAVENOUS AS NEEDED
Status: DISCONTINUED | OUTPATIENT
Start: 2019-10-14 | End: 2019-10-14 | Stop reason: SURG

## 2019-10-14 RX ORDER — MIDAZOLAM HYDROCHLORIDE 1 MG/ML
2 INJECTION INTRAMUSCULAR; INTRAVENOUS
Status: DISCONTINUED | OUTPATIENT
Start: 2019-10-14 | End: 2019-10-14 | Stop reason: HOSPADM

## 2019-10-14 RX ORDER — DEXAMETHASONE SODIUM PHOSPHATE 4 MG/ML
INJECTION, SOLUTION INTRA-ARTICULAR; INTRALESIONAL; INTRAMUSCULAR; INTRAVENOUS; SOFT TISSUE AS NEEDED
Status: DISCONTINUED | OUTPATIENT
Start: 2019-10-14 | End: 2019-10-14 | Stop reason: SURG

## 2019-10-14 RX ORDER — CALCIUM CARBONATE 200(500)MG
2 TABLET,CHEWABLE ORAL EVERY 6 HOURS PRN
Status: DISCONTINUED | OUTPATIENT
Start: 2019-10-14 | End: 2019-10-15 | Stop reason: HOSPADM

## 2019-10-14 RX ORDER — BUPIVACAINE HYDROCHLORIDE AND EPINEPHRINE 2.5; 5 MG/ML; UG/ML
INJECTION, SOLUTION INFILTRATION; PERINEURAL AS NEEDED
Status: DISCONTINUED | OUTPATIENT
Start: 2019-10-14 | End: 2019-10-14 | Stop reason: HOSPADM

## 2019-10-14 RX ORDER — ONDANSETRON 2 MG/ML
INJECTION INTRAMUSCULAR; INTRAVENOUS AS NEEDED
Status: DISCONTINUED | OUTPATIENT
Start: 2019-10-14 | End: 2019-10-14 | Stop reason: SURG

## 2019-10-14 RX ORDER — SODIUM CHLORIDE 0.9 % (FLUSH) 0.9 %
3 SYRINGE (ML) INJECTION EVERY 12 HOURS SCHEDULED
Status: DISCONTINUED | OUTPATIENT
Start: 2019-10-14 | End: 2019-10-14 | Stop reason: HOSPADM

## 2019-10-14 RX ORDER — DEXAMETHASONE SODIUM PHOSPHATE 4 MG/ML
4 INJECTION, SOLUTION INTRA-ARTICULAR; INTRALESIONAL; INTRAMUSCULAR; INTRAVENOUS; SOFT TISSUE ONCE AS NEEDED
Status: COMPLETED | OUTPATIENT
Start: 2019-10-14 | End: 2019-10-14

## 2019-10-14 RX ORDER — SODIUM CHLORIDE 0.9 % (FLUSH) 0.9 %
3-10 SYRINGE (ML) INJECTION AS NEEDED
Status: DISCONTINUED | OUTPATIENT
Start: 2019-10-14 | End: 2019-10-14 | Stop reason: HOSPADM

## 2019-10-14 RX ORDER — HYDRALAZINE HYDROCHLORIDE 20 MG/ML
5 INJECTION INTRAMUSCULAR; INTRAVENOUS
Status: DISCONTINUED | OUTPATIENT
Start: 2019-10-14 | End: 2019-10-14 | Stop reason: HOSPADM

## 2019-10-14 RX ORDER — METOCLOPRAMIDE HYDROCHLORIDE 5 MG/ML
5 INJECTION INTRAMUSCULAR; INTRAVENOUS
Status: DISCONTINUED | OUTPATIENT
Start: 2019-10-14 | End: 2019-10-14 | Stop reason: HOSPADM

## 2019-10-14 RX ORDER — PHENYLEPHRINE HCL IN 0.9% NACL 0.8MG/10ML
SYRINGE (ML) INTRAVENOUS AS NEEDED
Status: DISCONTINUED | OUTPATIENT
Start: 2019-10-14 | End: 2019-10-14 | Stop reason: SURG

## 2019-10-14 RX ORDER — ONDANSETRON 2 MG/ML
4 INJECTION INTRAMUSCULAR; INTRAVENOUS AS NEEDED
Status: DISCONTINUED | OUTPATIENT
Start: 2019-10-14 | End: 2019-10-14 | Stop reason: HOSPADM

## 2019-10-14 RX ORDER — NALOXONE HCL 0.4 MG/ML
0.04 VIAL (ML) INJECTION AS NEEDED
Status: DISCONTINUED | OUTPATIENT
Start: 2019-10-14 | End: 2019-10-14 | Stop reason: HOSPADM

## 2019-10-14 RX ORDER — IPRATROPIUM BROMIDE AND ALBUTEROL SULFATE 2.5; .5 MG/3ML; MG/3ML
3 SOLUTION RESPIRATORY (INHALATION) ONCE AS NEEDED
Status: DISCONTINUED | OUTPATIENT
Start: 2019-10-14 | End: 2019-10-14 | Stop reason: HOSPADM

## 2019-10-14 RX ADMIN — DEXAMETHASONE SODIUM PHOSPHATE 4 MG: 4 INJECTION, SOLUTION INTRAMUSCULAR; INTRAVENOUS at 13:41

## 2019-10-14 RX ADMIN — SUCCINYLCHOLINE CHLORIDE 160 MG: 20 INJECTION, SOLUTION INTRAMUSCULAR; INTRAVENOUS at 13:41

## 2019-10-14 RX ADMIN — SODIUM CHLORIDE, POTASSIUM CHLORIDE, SODIUM LACTATE AND CALCIUM CHLORIDE 100 ML/HR: 600; 310; 30; 20 INJECTION, SOLUTION INTRAVENOUS at 13:12

## 2019-10-14 RX ADMIN — ONDANSETRON HYDROCHLORIDE 4 MG: 2 SOLUTION INTRAMUSCULAR; INTRAVENOUS at 13:41

## 2019-10-14 RX ADMIN — MIDAZOLAM HYDROCHLORIDE 2 MG: 1 INJECTION, SOLUTION INTRAMUSCULAR; INTRAVENOUS at 13:12

## 2019-10-14 RX ADMIN — ATORVASTATIN CALCIUM 10 MG: 10 TABLET, FILM COATED ORAL at 20:34

## 2019-10-14 RX ADMIN — IRON SUCROSE 200 MG: 20 INJECTION, SOLUTION INTRAVENOUS at 10:52

## 2019-10-14 RX ADMIN — FENTANYL CITRATE 100 MCG: 50 INJECTION, SOLUTION INTRAMUSCULAR; INTRAVENOUS at 13:41

## 2019-10-14 RX ADMIN — CALCIUM CARBONATE 2 TABLET: 500 TABLET, CHEWABLE ORAL at 22:07

## 2019-10-14 RX ADMIN — LIDOCAINE HYDROCHLORIDE 100 MG: 20 INJECTION, SOLUTION INFILTRATION; PERINEURAL at 13:41

## 2019-10-14 RX ADMIN — MAGNESIUM GLUCONATE 500 MG ORAL TABLET 1000 MG: 500 TABLET ORAL at 20:34

## 2019-10-14 RX ADMIN — DEXAMETHASONE SODIUM PHOSPHATE 4 MG: 4 INJECTION, SOLUTION INTRAMUSCULAR; INTRAVENOUS at 13:12

## 2019-10-14 RX ADMIN — Medication 80 MCG: at 13:55

## 2019-10-14 RX ADMIN — Medication 3 MG: at 20:34

## 2019-10-14 RX ADMIN — CLINDAMYCIN PHOSPHATE 600 MG: 12 INJECTION, SOLUTION INTRAVENOUS at 13:37

## 2019-10-14 RX ADMIN — GLYCOPYRROLATE 0.2 MG: 0.2 INJECTION, SOLUTION INTRAMUSCULAR; INTRAVENOUS at 13:52

## 2019-10-14 RX ADMIN — HYDROCODONE BITARTRATE AND ACETAMINOPHEN 1 TABLET: 5; 325 TABLET ORAL at 21:01

## 2019-10-14 RX ADMIN — FENTANYL CITRATE 25 MCG: 50 INJECTION INTRAMUSCULAR; INTRAVENOUS at 14:40

## 2019-10-14 RX ADMIN — ACETAMINOPHEN 1000 MG: 500 TABLET, FILM COATED ORAL at 13:11

## 2019-10-14 RX ADMIN — PROPOFOL 200 MG: 10 INJECTION, EMULSION INTRAVENOUS at 13:41

## 2019-10-14 NOTE — ANESTHESIA POSTPROCEDURE EVALUATION
"Patient: Sammi Cordero    Procedure Summary     Date:  10/14/19 Room / Location:   [facility] OR 14 /  [facility] OR    Anesthesia Start:  1337 Anesthesia Stop:  1421    Procedure:  AXILLARY LYMPH NODE BIOPSY/EXCISION (Right ) Diagnosis:       SIRS (systemic inflammatory response syndrome) (CMS/HCC)      (SIRS (systemic inflammatory response syndrome) (CMS/HCC) [R65.10])    Surgeon:  Karen Benitez MD Provider:  Tyron Silvestre CRNA    Anesthesia Type:  general ASA Status:  3          Anesthesia Type: general  Last vitals  BP   129/57 (10/14/19 1517)   Temp   97.5 °F (36.4 °C) (10/14/19 1517)   Pulse   67 (10/14/19 1517)   Resp   14 (10/14/19 1517)     SpO2   97 % (10/14/19 1517)     Post Anesthesia Care and Evaluation    PONV Status: none  Comments: Patient d/c from PACU prior to anes eval based on Asya score.  Please see RN notes for details of d/c criteria.    Blood pressure 129/57, pulse 67, temperature 97.5 °F (36.4 °C), temperature source Oral, resp. rate 14, height 152.4 cm (60\"), weight 79.2 kg (174 lb 9.6 oz), SpO2 97 %, not currently breastfeeding.          "

## 2019-10-14 NOTE — PROGRESS NOTES
[facility] Medicine Services  INPATIENT PROGRESS NOTE    Length of Stay: 3  Date of Admission: 10/11/2019  Primary Care Physician: Avery Whaley MD    Subjective   Chief Complaint: Follow-up  HPI   Patient resting in bed with numerous family members at bedside.  She reports continued improvement overall.  She denies any chest pain or shortness of breath.  She does have a slight headache, which she relates to not having had caffeine this morning.  She denies any abdominal pain.  She does have some minor nausea this morning, she feels is from not being able to eat breakfast.  She is still having some itching at times, but states this is much improved in comparison to admission.  She slept well after taking melatonin last night.    Review of Systems   All pertinent negatives and positives are as above. All other systems have been reviewed and are negative unless otherwise stated.     Objective    Temp:  [97.7 °F (36.5 °C)-98.8 °F (37.1 °C)] 98.8 °F (37.1 °C)  Heart Rate:  [55-95] 60  Resp:  [16-18] 16  BP: (108-130)/(48-64) 108/48  Physical Exam  Constitutional: She is oriented to person, place, and time. She appears well-developed and well-nourished. No distress.   HENT:   Head: Normocephalic and atraumatic.   Neck: Normal range of motion. Neck supple. No JVD present. No tracheal deviation present.   Cardiovascular: Normal rate, regular rhythm and intact distal pulses. Exam reveals no gallop and no friction rub.   Pulmonary/Chest: Effort normal and breath sounds normal. She has no wheezes. She has no rales.   Abdominal: Soft. Bowel sounds are normal. She exhibits no distension. There is no tenderness. There is no guarding.   Musculoskeletal: Normal range of motion. She exhibits no edema or tenderness.   Lymphadenopathy:        Head (right side): Submandibular and occipital adenopathy present.        Head (left side): Submandibular adenopathy present.        Right cervical:  No superficial cervical, no deep cervical and no posterior cervical adenopathy present.       Left cervical: No superficial cervical, no deep cervical and no posterior cervical adenopathy present.     She has axillary adenopathy.        Right: Inguinal adenopathy present.        Left: Inguinal adenopathy present.   Neurological: She is alert and oriented to person, place, and time. No cranial nerve deficit.   Skin: Skin is warm and dry. Rash (Rash significantly improved-faint to the left wrist and distal lower extremities) noted.   Psychiatric: She has a normal mood and affect. Her behavior is normal. Judgment and thought content normal.   Vitals reviewed.    Results Review:  I have reviewed the labs, radiology results, and diagnostic studies.    Laboratory Data:   Results from last 7 days   Lab Units 10/14/19  0513 10/13/19  0222 10/12/19  0503   WBC 10*3/mm3 10.15 11.09* 14.85*   HEMOGLOBIN g/dL 9.8* 8.7* 10.2*   HEMATOCRIT % 30.3* 27.5* 31.1*   PLATELETS 10*3/mm3 341 261 293        Results from last 7 days   Lab Units 10/14/19  0513 10/13/19  0222 10/12/19  0503 10/11/19  1251   SODIUM mmol/L 142 144 140 141   POTASSIUM mmol/L 4.2 3.8 3.4* 3.6   CHLORIDE mmol/L 100 106 103 99   CO2 mmol/L 29.0 29.0 22.0 25.0   BUN mg/dL 13 17 21 19   CREATININE mg/dL 0.60 0.74 0.94 1.39*   CALCIUM mg/dL 8.4* 7.4* 7.0* 8.2*   BILIRUBIN mg/dL  --   --  0.3 0.5   ALK PHOS U/L  --   --  53 59   ALT (SGPT) U/L  --   --  14 12   AST (SGOT) U/L  --   --  27 20   GLUCOSE mg/dL 103* 107* 196* 119*     I have reviewed the patient current medications.     Assessment/Plan     Active Hospital Problems    Diagnosis   • **SIRS (systemic inflammatory response syndrome) (CMS/HCC)   • Lymphadenopathy   • Obesity (BMI 30-39.9)   • Iron deficiency anemia   • High anion gap metabolic acidosis due to lactic acidosis   • Rash   • Gitelman syndrome, complicated by hypomagnesemia and hypokalemia    • Leukocytosis   • WILDA (acute kidney injury) (CMS/HCC)    • Hyperlipidemia     Plan:  1.  Patient is for excisional axillary lymph node biopsy today per Dr. Karen Benitez.  2.  Await surgical pathology.  Peripheral blood smear pending.  Dr. Macario will be available to see the patient tomorrow morning.  3.  Venofer, day 2/2  4.   White blood cell count has normalized.  Influenza antigen negative.  Respiratory panel negative.  Blood cultures with no growth at 24 hours.  At this point, would lean more toward an immune response triggered by receiving the flu vaccine or viral process rather than bacterial process.  Would continue to monitor off of antibiotic therapy for now.  5.  Labs in a.m.  6.  If cleared by oncology and general surgery, the patient may be able to discharge home as soon as tomorrow.    Discharge Planning: I expect the patient to be discharged to home in 1-2 days.    ROSENDA Rios   10/14/19   10:53 AM  I personally evaluated and examined the patient in conjunction with ROSENDA Martinez and agree with the assessment, treatment plan, and disposition of the patient as recorded by her. My history, exam, and further recommendations are:   She had excisional biopsy of axillary LN today Oct 14.  Pleasant woman who presented with nausea, vomiting, rash, ? Night sweats  Vitals:    10/14/19 1517   BP: 129/57   Pulse: 67   Resp: 14   Temp: 97.5 °F (36.4 °C)   SpO2: 97%   ct of chest showed bulky LN in axilla, mediastinum , hilum and supraclavicular region  Await histopath report/  Home when ok from other    Saeid Walls MD  10/14/19  5:10 PM

## 2019-10-14 NOTE — ANESTHESIA PROCEDURE NOTES
Airway  Urgency: elective    Date/Time: 10/14/2019 1:42 PM  Airway not difficult    General Information and Staff    Patient location during procedure: OR  CRNA: SHARONDA Samuel CRNA    Indications and Patient Condition  Indications for airway management: airway protection    Preoxygenated: yes  MILS maintained throughout  Mask difficulty assessment: 1 - vent by mask    Final Airway Details  Final airway type: endotracheal airway      Successful airway: ETT  Cuffed: yes   Successful intubation technique: direct laryngoscopy  Facilitating devices/methods: intubating stylet  Endotracheal tube insertion site: oral  Blade: Childers  Blade size: 2  ETT size (mm): 7.5  Cormack-Lehane Classification: grade I - full view of glottis  Placement verified by: chest auscultation and capnometry   Cuff volume (mL): 5  Measured from: lips  ETT/EBT  to lips (cm): 21  Number of attempts at approach: 1  Assessment: lips, teeth, and gum same as pre-op and atraumatic intubation

## 2019-10-14 NOTE — PLAN OF CARE
Problem: Patient Care Overview  Goal: Plan of Care Review  Outcome: Ongoing (interventions implemented as appropriate)   10/14/19 0256   Coping/Psychosocial   Plan of Care Reviewed With patient   Plan of Care Review   Progress no change   OTHER   Outcome Summary pt had biopsy today. no c/o pain since returning. waiting on lab results. family at bedside. vss.        Problem: Infection, Risk/Actual (Adult)  Goal: Identify Related Risk Factors and Signs and Symptoms  Outcome: Ongoing (interventions implemented as appropriate)    Goal: Infection Prevention/Resolution  Outcome: Ongoing (interventions implemented as appropriate)

## 2019-10-14 NOTE — PLAN OF CARE
Problem: Patient Care Overview  Goal: Plan of Care Review  Outcome: Ongoing (interventions implemented as appropriate)      Problem: Infection, Risk/Actual (Adult)  Goal: Identify Related Risk Factors and Signs and Symptoms  Outcome: Ongoing (interventions implemented as appropriate)        10/14/19 0520   Coping/Psychosocial   Plan of Care Reviewed With patient   Plan of Care Review   Progress no change   OTHER   Outcome Summary No c/o pain. PRN hydrocortisone cream and atarax given for itching. NPO since midnight.VSS. Safety maintained. Will continue to monitor.

## 2019-10-14 NOTE — ANESTHESIA PREPROCEDURE EVALUATION
Anesthesia Evaluation     Patient summary reviewed   no history of anesthetic complications:  NPO Solid Status: > 8 hours  NPO Liquid Status: > 8 hours           Airway   Mallampati: I  TM distance: <3 FB  Neck ROM: full  No difficulty expected  Dental - normal exam     Pulmonary - normal exam   (+) a smoker Former,   (-) asthma, sleep apnea  Cardiovascular - normal exam  Exercise tolerance: good (4-7 METS)    ECG reviewed    (+) hyperlipidemia,   (-) hypertension      Neuro/Psych  (-) seizures, TIA, CVA  GI/Hepatic/Renal/Endo    (+) obesity,  GERD,    (-) liver disease, no renal disease, diabetes    ROS Comment: CT:  IMPRESSION:     1. Bulky lymphadenopathy in the axilla, supraclavicular regions, mediastinum, and hilum. Differential includes metastasis and lymphoma.     2. Mild pulmonary edema.     3. Redemonstrated 3.4 cm mass in the posterior medial left lower lung.This remains indeterminate but is favored to be an area of bronchopulmonary sequestration. Other potential etiologies include chronic inflammatory process and less likely, neoplasm.    Musculoskeletal     Abdominal    Substance History      OB/GYN          Other                          Anesthesia Plan    ASA 3     general     intravenous induction   Anesthetic plan, all risks, benefits, and alternatives have been provided, discussed and informed consent has been obtained with: patient.

## 2019-10-14 NOTE — OP NOTE
Preoperative diagnosis:  Generalized lymphadenopathy  Postoperative diagnosis:  Same  Procedure;  Right axillary lymph node biopsy  Surgeon:  Karen Benitez MD  Anesthesia: get loc  Ebl:  Minimal  Ivf:  See anesthesia  Indications:the patient is a 64-year-old female who presents for right axillary lymph node biopsy.  The risks, benefits, complications, and possible alternatives were discussed with the patient who agreed to proceed.  Description of procedure:the patient was laid supine. The right axilla was prepped and draped. An incision was created.  bovie was used to dissect the subcutaneous tissues.  Blunt dissection was used to identify an enlarged lymph node.  It was removed with bovie. The wound was copiously irrigated with sterile saline. The skin was closed with 3-0 and 4-0 vicyrl. The sponge, needle, and instrument counts were correct.  Complications:none  Disposition:  Good to pacu  Findings:  Mildly enlarged, nonmatted right axilla lymph nodes

## 2019-10-15 VITALS
WEIGHT: 172.4 LBS | HEIGHT: 60 IN | RESPIRATION RATE: 18 BRPM | OXYGEN SATURATION: 93 % | TEMPERATURE: 97.7 F | BODY MASS INDEX: 33.85 KG/M2 | DIASTOLIC BLOOD PRESSURE: 68 MMHG | HEART RATE: 77 BPM | SYSTOLIC BLOOD PRESSURE: 128 MMHG

## 2019-10-15 LAB
ANION GAP SERPL CALCULATED.3IONS-SCNC: 11 MMOL/L (ref 5–15)
BASOPHILS # BLD MANUAL: 0.16 10*3/MM3 (ref 0–0.2)
BASOPHILS NFR BLD AUTO: 1 % (ref 0–1.5)
BUN BLD-MCNC: 21 MG/DL (ref 8–23)
BUN/CREAT SERPL: 32.3 (ref 7–25)
CALCIUM SPEC-SCNC: 9.3 MG/DL (ref 8.6–10.5)
CHLORIDE SERPL-SCNC: 96 MMOL/L (ref 98–107)
CLUMPED PLATELETS: PRESENT
CO2 SERPL-SCNC: 33 MMOL/L (ref 22–29)
CREAT BLD-MCNC: 0.65 MG/DL (ref 0.57–1)
DEPRECATED RDW RBC AUTO: 46.5 FL (ref 37–54)
ERYTHROCYTE [DISTWIDTH] IN BLOOD BY AUTOMATED COUNT: 16.3 % (ref 12.3–15.4)
GFR SERPL CREATININE-BSD FRML MDRD: 92 ML/MIN/1.73
GLUCOSE BLD-MCNC: 198 MG/DL (ref 65–99)
HCT VFR BLD AUTO: 34.3 % (ref 34–46.6)
HGB BLD-MCNC: 10.9 G/DL (ref 12–15.9)
HYPOCHROMIA BLD QL: ABNORMAL
LYMPHOCYTES # BLD MANUAL: 2.06 10*3/MM3 (ref 0.7–3.1)
LYMPHOCYTES NFR BLD MANUAL: 13 % (ref 19.6–45.3)
LYMPHOCYTES NFR BLD MANUAL: 6 % (ref 5–12)
MCH RBC QN AUTO: 24.8 PG (ref 26.6–33)
MCHC RBC AUTO-ENTMCNC: 31.8 G/DL (ref 31.5–35.7)
MCV RBC AUTO: 78 FL (ref 79–97)
MICROCYTES BLD QL: ABNORMAL
MONOCYTES # BLD AUTO: 0.95 10*3/MM3 (ref 0.1–0.9)
NEUTROPHILS # BLD AUTO: 11.89 10*3/MM3 (ref 1.7–7)
NEUTROPHILS NFR BLD MANUAL: 73 % (ref 42.7–76)
NEUTS BAND NFR BLD MANUAL: 2 % (ref 0–5)
NEUTS VAC BLD QL SMEAR: ABNORMAL
PLASMA CELL PREC NFR BLD MANUAL: 3 % (ref 0–0)
PLATELET # BLD AUTO: 408 10*3/MM3 (ref 140–450)
PMV BLD AUTO: 10 FL (ref 6–12)
POLYCHROMASIA BLD QL SMEAR: ABNORMAL
POTASSIUM BLD-SCNC: 4.3 MMOL/L (ref 3.5–5.2)
RBC # BLD AUTO: 4.4 10*6/MM3 (ref 3.77–5.28)
SODIUM BLD-SCNC: 140 MMOL/L (ref 136–145)
VARIANT LYMPHS NFR BLD MANUAL: 2 % (ref 0–5)
WBC NRBC COR # BLD: 15.85 10*3/MM3 (ref 3.4–10.8)

## 2019-10-15 PROCEDURE — 80048 BASIC METABOLIC PNL TOTAL CA: CPT | Performed by: NURSE PRACTITIONER

## 2019-10-15 PROCEDURE — 85007 BL SMEAR W/DIFF WBC COUNT: CPT | Performed by: NURSE PRACTITIONER

## 2019-10-15 PROCEDURE — 85025 COMPLETE CBC W/AUTO DIFF WBC: CPT | Performed by: NURSE PRACTITIONER

## 2019-10-15 PROCEDURE — 99222 1ST HOSP IP/OBS MODERATE 55: CPT | Performed by: INTERNAL MEDICINE

## 2019-10-15 RX ADMIN — PAROXETINE 10 MG: 10 TABLET, FILM COATED ORAL at 06:07

## 2019-10-15 RX ADMIN — FAMOTIDINE 20 MG: 20 TABLET, FILM COATED ORAL at 08:17

## 2019-10-15 RX ADMIN — MAGNESIUM GLUCONATE 500 MG ORAL TABLET 1000 MG: 500 TABLET ORAL at 08:17

## 2019-10-15 RX ADMIN — CETIRIZINE HYDROCHLORIDE 10 MG: 10 TABLET, FILM COATED ORAL at 08:17

## 2019-10-15 NOTE — DISCHARGE SUMMARY
[facility] Medicine Services  DISCHARGE SUMMARY       Date of Admission: 10/11/2019  Date of Discharge:  10/15/2019  Primary Care Physician: Avery Whaley MD    Presenting Problem/History of Present Illness:  Sepsis, due to unspecified organism, unspecified whether acute organ dysfunction present (CMS/[facility]) [A41.9]     Final Discharge Diagnoses:  Active Hospital Problems    Diagnosis   • **SIRS (systemic inflammatory response syndrome) (CMS/[facility])   • Lymphadenopathy   • Obesity (BMI 30-39.9)   • Iron deficiency anemia   • High anion gap metabolic acidosis due to lactic acidosis   • Rash   • Gitelman syndrome, complicated by hypomagnesemia and hypokalemia    • Leukocytosis   • WILDA (acute kidney injury) (CMS/[facility])   • Hyperlipidemia     Consults:   Dr. Macario with Hematology and Oncology.   Dr. Benitez with general surgery.    Procedures Performed:     Narrative:     Exam: CT CHEST WO CONTRAST-     Indication: Abnormal findings on chest CT     Comparison: Same day CT abdomen/pelvis     DOSE LENGTH PRODUCT: 374 mGy cm. Automated exposure control was also  utilized to decrease patient radiation dose.     Findings:     The central airways are clear. No consolidative process. There is  diffuse mild interlobular septal thickening a few areas of patchy  groundglass opacity. No pleural effusion or pneumothorax.      Again demonstrated is a consolidative 3.4 cm mass in the posterior  medial left lower lobe with some cavitary components. A prominent artery  supplying this mass is demonstrate on same-day abdomen CT with IV  contrast.     Multiple enlarged axillary, supraclavicular, mediastinal, and hilar  lymph nodes are present.     Main pulmonary trunk and thoracic aorta are normal in caliber. Thyroid  is uniform. No pericardial effusion. Findings in the upper abdomen are  described on same date CT head/pelvis.         Impression:        1. Bulky lymphadenopathy in the axilla,  supraclavicular regions,  mediastinum, and hilum. Differential includes metastasis and lymphoma.     2. Mild pulmonary edema.     3. Redemonstrated 3.4 cm mass in the posterior medial left lower lung.  This remains indeterminate but is favored to be an area of  bronchopulmonary sequestration. Other potential etiologies include  chronic inflammatory process and less likely, neoplasm.  This report was finalized on 10/11/2019 19:04 by Dr. Prasad Trevizo MD.       Exam: CT ABDOMEN PELVIS W CONTRAST-     Indication: Abdominal pain, vomiting     Comparison: None available.     DOSE LENGTH PRODUCT: 579 mGy cm. Automated exposure control was also  utilized to decrease patient radiation dose.     Findings:     There is a consolidative masslike focus in the medial inferior left  lower lobe that measures 3.3 cm. There is a prominent artery coursing  from the celiac trunk to this masslike focus. No other acute finding in  the lung bases.     No suspicious liver lesion. Gallbladder and biliary tree appear normal.  The pancreas, spleen, and adrenal glands appear unremarkable.     No solid renal lesion. No urolithiasis or hydronephrosis.  Circumferential bladder wall thickening is noted. Uterus and adnexa  appear unremarkable. There findings of pelvic floor relaxation.     Small hiatal hernia. There is focal asymmetric thickening of the greater  curvature of the stomach. Small diverticula along the transverse colon  is noted. Normal appendix. No ascites or free pelvic fluid. No pelvic  mass organized pelvic collection.     The abdominal aorta is normal in caliber. Borderline enlarged 1.1 cm  gastrohepatic ligament lymph node on image 17. A few enlarged periportal  lymph nodes and retroperitoneal lymph nodes are also noted.     Multilevel degenerative change of the lumbar spine. No suspicious bone  lesion      Impression:        1. Focal thickening of the greater curvature of the stomach.  Differential includes neoplasm, focal  gastritis, and artifact from lack  of distention. Multiple enlarged gastrohepatic ligament and  retroperitoneal lymph nodes are likely related to this process. Small  hiatal hernia.     2. 3.3 cm consolidative masslike focus in the posterior medial inferior  left lung with prominent feeding artery. This is indeterminate but may  represent pulmonary sequestration. Other potential etiologies include  chronic inflammatory/infectious process.     3. Circumferential urinary bladder wall thickening. Correlate with  urinalysis for possible cystitis.     4. Findings the pelvic floor relaxation.  This report was finalized on 10/11/2019 14:28 by Dr. Prasad Trevizo MD.       Pertinent Test Results:   Lab Results (last 72 hours)     Procedure Component Value Units Date/Time    AFB Culture - Tissue, Back [[id number]] Collected:  10/14/19 1824    Specimen:  Tissue from Back Updated:  10/15/19 1401     AFB Stain No acid fast bacilli seen on direct smear      No acid fast bacilli seen on concentrated smear    Tissue / Bone Culture - Tissue, Arm, Right [[id number]] Collected:  10/14/19 1824    Specimen:  Tissue from Arm, Right Updated:  10/15/19 1327     Tissue Culture No growth     Gram Stain Moderate (3+) WBCs seen      No organisms seen    Blood Culture - Blood, Arm, Right [[id number]] Collected:  10/11/19 1253    Specimen:  Blood from Arm, Right Updated:  10/15/19 1315     Blood Culture No growth at 4 days    Blood Culture - Blood, Arm, Left [[id number]] Collected:  10/11/19 1246    Specimen:  Blood from Arm, Left Updated:  10/15/19 1315     Blood Culture No growth at 4 days    Basic Metabolic Panel [[id number]]  (Abnormal) Collected:  10/15/19 0503    Specimen:  Blood Updated:  10/15/19 0644     Glucose 198 mg/dL      BUN 21 mg/dL      Creatinine 0.65 mg/dL      Sodium 140 mmol/L      Potassium 4.3 mmol/L      Chloride 96 mmol/L      CO2 33.0 mmol/L      Calcium 9.3 mg/dL      eGFR Non African Amer 92 mL/min/1.73      BUN/Creatinine  Ratio 32.3     Anion Gap 11.0 mmol/L     Narrative:       GFR Normal >60  Chronic Kidney Disease <60  Kidney Failure <15    Manual Differential [[id number]]  (Abnormal) Collected:  10/15/19 0503    Specimen:  Blood Updated:  10/15/19 0638     Neutrophil % 73.0 %      Lymphocyte % 13.0 %      Monocyte % 6.0 %      Basophil % 1.0 %      Bands %  2.0 %      Atypical Lymphocyte % 2.0 %      Plasma Cells % 3.0 %      Neutrophils Absolute 11.89 10*3/mm3      Lymphocytes Absolute 2.06 10*3/mm3      Monocytes Absolute 0.95 10*3/mm3      Basophils Absolute 0.16 10*3/mm3      Hypochromia Slight/1+     Microcytes Slight/1+     Polychromasia Slight/1+     Vacuolated Neutrophils Slight/1+     Clumped Platelets Present    CBC & Differential [[id number]] Collected:  10/15/19 0503    Specimen:  Blood Updated:  10/15/19 0611    Narrative:       The following orders were created for panel order CBC & Differential.  Procedure                               Abnormality         Status                     ---------                               -----------         ------                     CBC Auto Differential[[id number]]        Abnormal            Final result                 Please view results for these tests on the individual orders.    CBC Auto Differential [[id number]]  (Abnormal) Collected:  10/15/19 0503    Specimen:  Blood Updated:  10/15/19 0611     WBC 15.85 10*3/mm3      RBC 4.40 10*6/mm3      Hemoglobin 10.9 g/dL      Hematocrit 34.3 %      MCV 78.0 fL      MCH 24.8 pg      MCHC 31.8 g/dL      RDW 16.3 %      RDW-SD 46.5 fl      MPV 10.0 fL      Platelets 408 10*3/mm3     Flow Cytometry [[id number]] Collected:  10/14/19 1354    Specimen:  Tissue from Lymph Node Updated:  10/14/19 2215    Fungus Culture - Tissue, Back [[id number]] Collected:  10/14/19 1824    Specimen:  Tissue from Back Updated:  10/14/19 1824    Anaerobic Culture - Tissue, Arm, Right [[id number]] Collected:  10/14/19 1824    Specimen:  Tissue from Arm, Right  Updated:  10/14/19 1824    Tissue Pathology Exam [[id number]] Collected:  10/14/19 1354    Specimen:  Tissue from Lymph Node Updated:  10/14/19 1427    Peripheral Blood Smear [[id number]] Collected:  10/11/19 1251    Specimen:  Blood Updated:  10/14/19 1331     Performed by: Sarah Catherine M.D.     Pathologist Interpretation Neutrophils 87%  Bands 8%  Lymphocytes 1%  Monocytes 3%  Eosinophils 1%  Nucleated red blood cells 1  Adequate platelets    Occasional vacuolated neutrophils identified    Microcytosis and hypochromasia    CBC & Differential [[id number]] Collected:  10/14/19 0513    Specimen:  Blood Updated:  10/14/19 0638    Narrative:       The following orders were created for panel order CBC & Differential.  Procedure                               Abnormality         Status                     ---------                               -----------         ------                     CBC Auto Differential[[id number]]        Abnormal            Final result                 Please view results for these tests on the individual orders.    CBC Auto Differential [[id number]]  (Abnormal) Collected:  10/14/19 0513    Specimen:  Blood Updated:  10/14/19 0638     WBC 10.15 10*3/mm3      RBC 3.95 10*6/mm3      Hemoglobin 9.8 g/dL      Hematocrit 30.3 %      MCV 76.7 fL      MCH 24.8 pg      MCHC 32.3 g/dL      RDW 16.5 %      RDW-SD 45.7 fl      MPV 10.1 fL      Platelets 341 10*3/mm3     Manual Differential [[id number]]  (Abnormal) Collected:  10/14/19 0513    Specimen:  Blood Updated:  10/14/19 0638     Neutrophil % 57.0 %      Lymphocyte % 27.0 %      Monocyte % 8.0 %      Basophil % 1.0 %      Atypical Lymphocyte % 7.0 %      Neutrophils Absolute 5.79 10*3/mm3      Lymphocytes Absolute 2.74 10*3/mm3      Monocytes Absolute 0.81 10*3/mm3      Basophils Absolute 0.10 10*3/mm3      Poikilocytes Slight/1+     Polychromasia Slight/1+     WBC Morphology Normal     Giant Platelets Slight/1+    Basic Metabolic Panel [[id number]]   (Abnormal) Collected:  10/14/19 0513    Specimen:  Blood Updated:  10/14/19 0626     Glucose 103 mg/dL      BUN 13 mg/dL      Creatinine 0.60 mg/dL      Sodium 142 mmol/L      Potassium 4.2 mmol/L      Chloride 100 mmol/L      CO2 29.0 mmol/L      Calcium 8.4 mg/dL      eGFR Non African Amer 101 mL/min/1.73      BUN/Creatinine Ratio 21.7     Anion Gap 13.0 mmol/L     Narrative:       GFR Normal >60  Chronic Kidney Disease <60  Kidney Failure <15    Manual Differential [[id number]]  (Abnormal) Collected:  10/13/19 0222    Specimen:  Blood Updated:  10/13/19 0335     Neutrophil % 82.1 %      Lymphocyte % 9.5 %      Monocyte % 3.2 %      Eosinophil % 3.2 %      Basophil % 1.1 %      Atypical Lymphocyte % 1.1 %      Neutrophils Absolute 9.10 10*3/mm3      Lymphocytes Absolute 1.05 10*3/mm3      Monocytes Absolute 0.35 10*3/mm3      Eosinophils Absolute 0.35 10*3/mm3      Basophils Absolute 0.12 10*3/mm3      Anisocytosis Slight/1+     Crenated RBC's Slight/1+     Elliptocytes Slight/1+     Poikilocytes Slight/1+     WBC Morphology Normal     Platelet Morphology Normal    Basic Metabolic Panel [[id number]]  (Abnormal) Collected:  10/13/19 0222    Specimen:  Blood Updated:  10/13/19 0312     Glucose 107 mg/dL      BUN 17 mg/dL      Creatinine 0.74 mg/dL      Sodium 144 mmol/L      Potassium 3.8 mmol/L      Chloride 106 mmol/L      CO2 29.0 mmol/L      Calcium 7.4 mg/dL      eGFR Non African Amer 79 mL/min/1.73      BUN/Creatinine Ratio 23.0     Anion Gap 9.0 mmol/L     Narrative:       GFR Normal >60  Chronic Kidney Disease <60  Kidney Failure <15    Iron Profile [[id number]]  (Abnormal) Collected:  10/13/19 0222    Specimen:  Blood Updated:  10/13/19 0307     Iron 26 mcg/dL      Iron Saturation 10 %      Transferrin 173 mg/dL      TIBC 258 mcg/dL     CBC & Differential [[id number]] Collected:  10/13/19 0222    Specimen:  Blood Updated:  10/13/19 0253    Narrative:       The following orders were created for panel order CBC  "& Differential.  Procedure                               Abnormality         Status                     ---------                               -----------         ------                     CBC Auto Differential[[id number]]        Abnormal            Final result                 Please view results for these tests on the individual orders.    CBC Auto Differential [[id number]]  (Abnormal) Collected:  10/13/19 0222    Specimen:  Blood Updated:  10/13/19 0253     WBC 11.09 10*3/mm3      RBC 3.54 10*6/mm3      Hemoglobin 8.7 g/dL      Hematocrit 27.5 %      MCV 77.7 fL      MCH 24.6 pg      MCHC 31.6 g/dL      RDW 16.5 %      RDW-SD 46.9 fl      MPV 10.2 fL      Platelets 261 10*3/mm3           Chief Complaint on Day of Discharge: \"I am ready to go home.\"    Hospital Course:  The patient is a 64 y.o. female who presented to [facility] with complaints of rash. She has a past medical history significant for depression, Gitelman syndrome, and hyperlipidemia.  Patient reports she started to run a low-grade temperature on Thursday, October 3rd.  On Friday night during her work shift, she received a flu shot.  On Sunday, she began to feel unwell with nausea, vomiting, and had subsequently developed a rash on her entire body.  She had intermittent nausea and vomiting.  She states she has had some diarrhea, but this has not been consistent.  She denies chest pain and abdominal pain.  She does report some shortness of breath, worse with exertion.  She states her lips were very swollen.  On Monday, October 7, she saw her primary care provider Dr. Whaley and was placed on Levaquin.  She had not noticed any improvement since that time and ran a temperature as high as 102.8 °F on Wednesday, October 9.    Upon evaluation in the emergency room she had a chest x-ray, blood cultures, urinalysis, and strep test. She was found to have a WBC of 12.5, procalcitonin of 4.17, and LDH of 360. Patient met SIRS criteria without clear " source of infection and acute kidney injury. She was also found to have lactic acidosis. She received a 2 L normal saline bolus. Chest x-ray with no acute findings. She was admitted to the hospitalist service for further evaluation and management.  She was scheduled Solu-Medrol, Pepcid, Benadryl and given a one-time dose of IM Vistaril.  She was given gentle intravenous continuous fluid. She does have difficulty with electrolyte imbalances due to Gitelman syndrome, she was given intravenous magnesium and resumed on her oral magnesium supplementation.     CT of the chest and abdomen/pelvis reviewed with multiple areas of bulky lymphadenopathy;  3.3 cm masslike focus posterior medial inferior left lung. She was seen in consultation by Dr. Benitez, with general surgery for excisional biopsy to confirm diagnosis. On October 14, she had an excisional right axillary lymph node biopsy by Dr. Benitez.     Intravenous steroids and intravenous Benadryl were subsequently discontinued. She was given hydrocortisone cream to apply to rash and Atarax as needed. Melatonin was scheduled nightly and Restoril as needed for sleep. Influenza antigen was negative and blood cultures with no growth. Respiratory panel negative. Her renal function has improved. She was given intravenous Venofer for iron deficiency anemia. Her rash has significantly improved and is completely gone.  She denies nausea, vomiting, abdominal pain, shortness of breath, and chest pain.  She has remained on room air and afebrile.    Dr. Macario with Hematology/Oncology was consulted, suspect lymphoma.  Pathology report pending, she can be followed outpatient and work-up once pathology is known.  She is now stable for discharge.  She is to follow-up with her primary care provider, Dr. Whaley in 1 week.  She is to follow-up with Dr. Macario, hematology/oncology per office recommendations. She is to follow-up with Dr. Benitez, general surgery in 2 weeks.  She is to return  "for any worsening symptoms.    Condition on Discharge:  Stable.    Physical Exam on Discharge:  /68 (BP Location: Left arm, Patient Position: Lying)   Pulse 77   Temp 97.7 °F (36.5 °C) (Oral)   Resp 18   Ht 152.4 cm (60\")   Wt 78.2 kg (172 lb 6.4 oz)   SpO2 93%   BMI 33.67 kg/m²   Physical Exam   Constitutional: She is oriented to person, place, and time. She appears well-developed and well-nourished.   HENT:   Head: Normocephalic and atraumatic.   Mouth/Throat: Oropharynx is clear and moist.   Eyes: Conjunctivae and EOM are normal. Pupils are equal, round, and reactive to light. No scleral icterus.   Neck: Neck supple. No JVD present.   Left neck adenopathy.   Cardiovascular: Normal rate, regular rhythm, normal heart sounds and intact distal pulses. Exam reveals no gallop and no friction rub.   No murmur heard.  Pulmonary/Chest: Effort normal and breath sounds normal. She has no wheezes. She has no rales.   Abdominal: Soft. Bowel sounds are normal. She exhibits no distension and no mass. There is no tenderness. There is no rebound and no guarding.   Musculoskeletal: Normal range of motion. She exhibits no edema.   Lymphadenopathy:     She has no cervical adenopathy.   Neurological: She is alert and oriented to person, place, and time. No cranial nerve deficit.   Skin: Skin is warm and dry.   Rash has completely resolved.   Psychiatric: She has a normal mood and affect. Her behavior is normal.   Nursing note and vitals reviewed.    Discharge Disposition:  Home or Self Care    Discharge Medications:     Discharge Medications      Continue These Medications      Instructions Start Date   aMILoride 5 MG tablet  Commonly known as:  MIDAMOR   5 mg, Oral, Daily      CHEWABLE CALCIUM/D PO   1 tablet, Oral, Daily      magnesium oxide 400 (241.3 Mg) MG tablet tablet  Commonly known as:  MAGOX   2,000 mg, Oral, Daily      multivitamin with minerals tablet tablet   1 tablet, Oral, Daily      ondansetron 4 MG " tablet  Commonly known as:  ZOFRAN   4 mg, Oral, 4 Times Daily PRN      PARoxetine 10 MG tablet  Commonly known as:  PAXIL   10 mg, Oral, Every Morning      simvastatin 20 MG tablet  Commonly known as:  ZOCOR   1 tablet, Oral, Daily           Discharge Diet:   Diet Instructions     Diet: Regular; Thin      Discharge Diet:  Regular    Fluid Consistency:  Thin        Activity at Discharge:   Activity Instructions     Activity as Tolerated          Discharge Care Plan/Instructions:   1. Return to emergency room for worsening symptoms.    Follow-up Appointments:   Future Appointments   Date Time Provider Department Center   10/28/2019 10:45 AM Armand Macario MD [facility]   Follow up with Dr. Benitez, general surgery in 2 weeks.  Follow up with primary care provider, Dr. Whaley in 1 week.    Test Results Pending at Discharge: None.    ROSENDA Ceron  10/15/19  6:59 PM    Time: 40 minutes.    This is a late entry on the day of hr admission.  I personally evaluated and examined the patient in conjunction with Amber HERZOG and agree with the assessment, treatment plan, and disposition of the patient as recorded by her. My history, exam, and further recommendations are:     She is doing better  Right armpit biospy site is clean and dry.  She is doing well  Awaiting pathology.  F/u with Dr. Macario and Emmanuel in outpatient  Hemodynamically stable and appropriate for discharge      Saeid Walls MD  10/16/19  10:57 PM

## 2019-10-15 NOTE — PLAN OF CARE
Problem: Patient Care Overview  Goal: Plan of Care Review  Outcome: Ongoing (interventions implemented as appropriate)   10/15/19 0406   Coping/Psychosocial   Plan of Care Reviewed With patient   Plan of Care Review   Progress no change   OTHER   Outcome Summary Patient c/o pain in incisional site. PRN medication given with relief. PRN tums given for heart burn. VSS. Safety maintained. Will continue to monitor.        Problem: Infection, Risk/Actual (Adult)  Goal: Identify Related Risk Factors and Signs and Symptoms  Outcome: Outcome(s) achieved Date Met: 10/15/19    Goal: Infection Prevention/Resolution  Outcome: Ongoing (interventions implemented as appropriate)

## 2019-10-15 NOTE — CONSULTS
[facility]ematology [facility]  CONSULT NOTE    PATIENT NAME:  Sammi Cordero  YOB: 1955  PATIENT MRN:  [id number]    Date of Admission:  10/11/2019  Consultation Date:  10/15/2019  Referring Provider: Heber De Leon, *    Subjective     Reason for Consultation: Adenopathies, suspect lymphoma.    History of present illness: Sammi Cordero is a pleasant 64 y.o.  female who is seen for consultation due to adenopathies.  She is seen with sister, 2 daughters and her son.  She is a reliable historian.    She noted fever after flu vaccine.    She developed cough, shortness of breathing ,nausea, vomiting and fatigue.      Her temperature went up to 102.8 and was given Levaquin.    The patient was admitted 10/11/2019 due to worsening symptoms.    Her CBC remarkable for WBC of 12.5, MCV 76.4 and ANC 11.2.  CMP remarkable for albumin 3.3, calcium 8.2, and GFR 38 mL/min.  Ferritin 126.9 , procalcitonin 4.17, and .  Urinalysis 1+ protein.  Blood cultures 10/11/2019 no growth for 3 days.    CT chest 10/11/2019 showed bulky adenopathy in the axilla, supraclavicular regions, mediastinum and hilum.  Re-demonstrated 3.4 cm mass in the posterior medial left lower lung indeterminate.    CT abdomen pelvis 10/11/2019 showed thickening greater curvature of the stomach.  Multiple enlarged gastrohepatic ligament and retroperitoneal lymph nodes.  3.3 cm masslike focus posterior medial inferior left lung.  This is indeterminate but may represent pulmonary sequestration.  Circumferential bladder wall thickening.  Chest x-ray 10/11/2019 no acute findings.    CBC 10/12/2019 remarkable for WBC of 14.8, hemoglobin 10.2, hematocrit 31.1, MCV 75.5, and ANC 12.4.  CMP remarkable for potassium 3.4.  GFR at 60 mL/min.    Iron profile 10/13/2019 reveal a 10% saturation, iron 26, and TIBC 258.  He was given Venofer.    CBC 10/15/2019 revealed a WBC 15.8, ANC 11.8, hemoglobin 10.9, MCV  78, and platelet of 408.    The patient had undergone right axillary node biopsy 10/14/2019 by Dr. Karen Benitez.  Pathology report pending.    With the above background, she is seen.      Past Medical History:  Past Medical History:   Diagnosis Date   • Allergic rhinitis    • Colon polyps    • Depression    • Difficulty swallowing solids    • DJD (degenerative joint disease)    • Gitelman syndrome     low magnesium   • Hyperlipidemia    • Obesity    • Osteopenia    • PONV (postoperative nausea and vomiting)      Prior Surgeries:  Past Surgical History:   Procedure Laterality Date   • AXILLARY LYMPH NODE BIOPSY/EXCISION Right 10/14/2019    Procedure: AXILLARY LYMPH NODE BIOPSY/EXCISION;  Surgeon: Karen Benitez MD;  Location: [facility] OR;  Service: General   • COLONOSCOPY  08/20/2010   • COLONOSCOPY W/ POLYPECTOMY  09/14/2016    2   5 mm sessile polyps removed with hot snare repeat 5 years   • DILATATION AND CURETTAGE     • ENDOSCOPY  09/14/2016    Medium sized HH, LA Grade A esohagitis, Fluid in the middle third of esohagus    • ME TOTAL KNEE ARTHROPLASTY Left 3/9/2018    Procedure: LEFT TOTAL KNEE ARTHROPLASTY;  Surgeon: Orlin Meza MD;  Location: [facility] OR;  Service: Orthopedics   • REPLACEMENT TOTAL KNEE Right         Allergies:Penicillins and Sulfa antibiotics  PTA Meds:  Medications Prior to Admission   Medication Sig Dispense Refill Last Dose   • aMILoride (MIDAMOR) 5 MG tablet Take 5 mg by mouth Daily.   10/10/2019 at Unknown time   • Calcium Carb-Ergocalciferol (CHEWABLE CALCIUM/D PO) Take 1 tablet by mouth Daily.   10/10/2019 at Unknown time   • magnesium oxide (MAGOX) 400 (241.3 Mg) MG tablet tablet Take 2,000 mg by mouth Daily.   10/10/2019 at Unknown time   • Multiple Vitamins-Minerals (MULTIVITAMIN WITH MINERALS) tablet tablet Take 1 tablet by mouth Daily.   10/10/2019 at Unknown time   • ondansetron (ZOFRAN) 4 MG tablet Take 4 mg by mouth 4 (Four) Times a Day As Needed for Nausea or  Vomiting.   Past Month at Unknown time   • simvastatin (ZOCOR) 20 MG tablet Take 1 tablet by mouth daily.  9 10/10/2019 at Unknown time   • PARoxetine (PAXIL) 10 MG tablet Take 10 mg by mouth Every Morning.  5 Taking      Current Meds:   Current Facility-Administered Medications   Medication Dose Route Frequency Provider Last Rate Last Dose   • acetaminophen (TYLENOL) tablet 650 mg  650 mg Oral Q4H PRN Nadeen Gomez, APRN        Or   • acetaminophen (TYLENOL) 160 MG/5ML solution 650 mg  650 mg Oral Q4H PRN Nadeen Gomez, APRN        Or   • acetaminophen (TYLENOL) suppository 650 mg  650 mg Rectal Q4H PRN Nadeen Gomez, APRN       • aluminum-magnesium hydroxide-simethicone (MAALOX MAX) 400-400-40 MG/5ML suspension 15 mL  15 mL Oral Q6H PRN Klever Luna MD       • atorvastatin (LIPITOR) tablet 10 mg  10 mg Oral Nightly Nadeen Gomez APRN   10 mg at 10/14/19 2034   • calcium carbonate (TUMS) chewable tablet 500 mg (200 mg elemental)  2 tablet Oral Q6H PRN Ole Noble MD   2 tablet at 10/14/19 2207   • cetirizine (zyrTEC) tablet 10 mg  10 mg Oral Daily Klever Luna MD   10 mg at 10/13/19 0821   • clindamycin (CLEOCIN) 600 mg in dextrose 5% 50 mL IVPB (premix)  600 mg Intravenous Once Karen Benitez MD       • enoxaparin (LOVENOX) syringe 40 mg  40 mg Subcutaneous Q24H Nadeen Gomez APRN   40 mg at 10/12/19 1710   • famotidine (PEPCID) tablet 20 mg  20 mg Oral Daily Klever Luna MD   20 mg at 10/13/19 0821   • HYDROcodone-acetaminophen (NORCO) 5-325 MG per tablet 1 tablet  1 tablet Oral Q4H PRN Karen Benitez MD   1 tablet at 10/14/19 2101   • hydrocortisone 2.5 % cream   Topical Q12H Gavin York DO       • hydrOXYzine (ATARAX) tablet 25 mg  25 mg Oral TID PRN MónicaeltzNadeen K, APRN   25 mg at 10/13/19 2020   • magnesium oxide (MAGOX) tablet 1,000 mg  1,000 mg Oral BID WoeltNadeen rosas K, APRN   1,000 mg at 10/14/19 2034   • melatonin tablet 3 mg  3  mg Oral Nightly Woeltz, Nadeen K, APRN   3 mg at 10/14/19 2034   • morphine injection 4 mg  4 mg Intravenous Q4H PRN Karen Benitez MD       • ondansetron (ZOFRAN) injection 4 mg  4 mg Intravenous Q4H PRN Woeltz, Nadeen K, APRN       • PARoxetine (PAXIL) tablet 10 mg  10 mg Oral QAM Woeltz, Nadeen K, APRN   10 mg at 10/15/19 0607   • sodium chloride 0.9 % flush 10 mL  10 mL Intravenous PRN Woeltz, Nadeen K, APRN   10 mL at 10/13/19 2020   • temazepam (RESTORIL) capsule 15 mg  15 mg Oral Nightly PRN Woeltz, Nadeen K, APRN           Family History:family history includes Anorexia nervosa in her mother; Aortic aneurysm in her father; Lymphoma in her paternal grandfather; No Known Problems in her brother, daughter, daughter, maternal aunt, maternal grandmother, paternal aunt, paternal grandmother, sister, and son.   Social History: reports that she quit smoking about 16 years ago. Her smoking use included cigarettes. She smoked 0.50 packs per day. She has never used smokeless tobacco. She reports that she does not drink alcohol or use drugs.    Review of Systems  Review of Systems   Constitutional: Positive for fatigue. Negative for chills, diaphoresis and fever.   HENT: Negative for mouth sores and trouble swallowing.    Eyes: Negative for redness and visual disturbance.   Respiratory: Negative for cough, shortness of breath and wheezing.    Cardiovascular: Negative for chest pain, palpitations and leg swelling.   Gastrointestinal: Negative for abdominal pain, nausea and vomiting.   Endocrine: Negative for polydipsia and polyphagia.   Genitourinary: Negative for difficulty urinating and flank pain.   Musculoskeletal: Negative for joint swelling and neck pain.   Skin: Positive for pallor.   Neurological: Negative for dizziness, speech difficulty and light-headedness.   Hematological: Positive for adenopathy. Does not bruise/bleed easily.   Psychiatric/Behavioral: Negative for agitation and confusion.          Objective      Vital Signs   Temp:  [97.5 °F (36.4 °C)-98.8 °F (37.1 °C)] 98.4 °F (36.9 °C)  Heart Rate:  [] 75  Resp:  [14-20] 16  BP: ()/(44-71) 145/71    Physical Exam   Constitutional: She is oriented to person, place, and time. She appears well-developed and well-nourished. No distress.   HENT:   Head: Normocephalic and atraumatic.   Eyes: No scleral icterus.   Neck: No JVD present.   Left neck adenopathy approximately 1 cm.   Cardiovascular: Normal rate and regular rhythm.   No murmur heard.  Pulmonary/Chest: Effort normal and breath sounds normal. She has no wheezes.   Abdominal: Soft. Bowel sounds are normal. She exhibits no distension. There is no tenderness.   Musculoskeletal: She exhibits no edema.   Neurological: She is alert and oriented to person, place, and time.   Skin: Skin is warm. She is not diaphoretic. There is pallor.   Psychiatric: She has a normal mood and affect. Her behavior is normal. Judgment and thought content normal.   Nursing note and vitals reviewed.       Results from last 7 days   Lab Units 10/15/19  0503 10/14/19  0513 10/13/19  0222   WBC 10*3/mm3 15.85* 10.15 11.09*   HEMOGLOBIN g/dL 10.9* 9.8* 8.7*   HEMATOCRIT % 34.3 30.3* 27.5*   PLATELETS 10*3/mm3 408 341 261        Results from last 7 days   Lab Units 10/15/19  0503 10/14/19  0513 10/13/19  0222 10/12/19  0503 10/11/19  1251   SODIUM mmol/L 140 142 144 140 141   POTASSIUM mmol/L 4.3 4.2 3.8 3.4* 3.6   CHLORIDE mmol/L 96* 100 106 103 99   CO2 mmol/L 33.0* 29.0 29.0 22.0 25.0   BUN mg/dL 21 13 17 21 19   CREATININE mg/dL 0.65 0.60 0.74 0.94 1.39*   CALCIUM mg/dL 9.3 8.4* 7.4* 7.0* 8.2*   BILIRUBIN mg/dL  --   --   --  0.3 0.5   ALK PHOS U/L  --   --   --  53 59   ALT (SGPT) U/L  --   --   --  14 12   AST (SGOT) U/L  --   --   --  27 20   GLUCOSE mg/dL 198* 103* 107* 196* 119*         Culture Data:   Blood Culture   Date Value Ref Range Status   10/11/2019 No growth at 3 days  Preliminary   10/11/2019 No  growth at 3 days  Preliminary       Radiology:   Imaging Results (last 7 days)     Procedure Component Value Units Date/Time    CT Chest Without Contrast [[id number]] Collected:  10/11/19 1855     Updated:  10/11/19 1907    Narrative:       Exam: CT CHEST WO CONTRAST-     Indication: Abnormal findings on chest CT     Comparison: Same day CT abdomen/pelvis     DOSE LENGTH PRODUCT: 374 mGy cm. Automated exposure control was also  utilized to decrease patient radiation dose.     Findings:     The central airways are clear. No consolidative process. There is  diffuse mild interlobular septal thickening a few areas of patchy  groundglass opacity. No pleural effusion or pneumothorax.      Again demonstrated is a consolidative 3.4 cm mass in the posterior  medial left lower lobe with some cavitary components. A prominent artery  supplying this mass is demonstrate on same-day abdomen CT with IV  contrast.     Multiple enlarged axillary, supraclavicular, mediastinal, and hilar  lymph nodes are present.     Main pulmonary trunk and thoracic aorta are normal in caliber. Thyroid  is uniform. No pericardial effusion. Findings in the upper abdomen are  described on same date CT head/pelvis.          Impression:          1. Bulky lymphadenopathy in the axilla, supraclavicular regions,  mediastinum, and hilum. Differential includes metastasis and lymphoma.     2. Mild pulmonary edema.     3. Redemonstrated 3.4 cm mass in the posterior medial left lower lung.  This remains indeterminate but is favored to be an area of  bronchopulmonary sequestration. Other potential etiologies include  chronic inflammatory process and less likely, neoplasm.  This report was finalized on 10/11/2019 19:04 by Dr. Prasad Trevizo MD.    CT Abdomen Pelvis With Contrast [[id number]] Collected:  10/11/19 1404     Updated:  10/11/19 1431    Narrative:       Exam: CT ABDOMEN PELVIS W CONTRAST-     Indication: Abdominal pain, vomiting     Comparison: None  available.     DOSE LENGTH PRODUCT: 579 mGy cm. Automated exposure control was also  utilized to decrease patient radiation dose.     Findings:     There is a consolidative masslike focus in the medial inferior left  lower lobe that measures 3.3 cm. There is a prominent artery coursing  from the celiac trunk to this masslike focus. No other acute finding in  the lung bases.     No suspicious liver lesion. Gallbladder and biliary tree appear normal.  The pancreas, spleen, and adrenal glands appear unremarkable.     No solid renal lesion. No urolithiasis or hydronephrosis.  Circumferential bladder wall thickening is noted. Uterus and adnexa  appear unremarkable. There findings of pelvic floor relaxation.     Small hiatal hernia. There is focal asymmetric thickening of the greater  curvature of the stomach. Small diverticula along the transverse colon  is noted. Normal appendix. No ascites or free pelvic fluid. No pelvic  mass organized pelvic collection.     The abdominal aorta is normal in caliber. Borderline enlarged 1.1 cm  gastrohepatic ligament lymph node on image 17. A few enlarged periportal  lymph nodes and retroperitoneal lymph nodes are also noted.     Multilevel degenerative change of the lumbar spine. No suspicious bone  lesion       Impression:          1. Focal thickening of the greater curvature of the stomach.  Differential includes neoplasm, focal gastritis, and artifact from lack  of distention. Multiple enlarged gastrohepatic ligament and  retroperitoneal lymph nodes are likely related to this process. Small  hiatal hernia.     2. 3.3 cm consolidative masslike focus in the posterior medial inferior  left lung with prominent feeding artery. This is indeterminate but may  represent pulmonary sequestration. Other potential etiologies include  chronic inflammatory/infectious process.     3. Circumferential urinary bladder wall thickening. Correlate with  urinalysis for possible cystitis.     4. Findings  the pelvic floor relaxation.  This report was finalized on 10/11/2019 14:28 by Dr. Prasad Trevizo MD.    XR Chest 1 View [[id number]] Collected:  10/11/19 1321     Updated:  10/11/19 1326    Narrative:       Exam: XR CHEST 1 VW-     Indication: upper and pain/ generalized weakness     Comparison: 10/07/2019     Findings:     Cardiac silhouette is normal in size.  No consolidation, pleural effusion, or pneumothorax.  Persistent left upper lung platelike atelectasis.  No change in mild central vascular congestion.  No suspicious bone lesion.       Impression:          No acute findings.  This report was finalized on 10/11/2019 13:23 by Dr. Prasad Trevizo MD.          Pathology Results:   Lab Results   Component Value Date    PATHINTERP  10/11/2019     Neutrophils 87%  Bands 8%  Lymphocytes 1%  Monocytes 3%  Eosinophils 1%  Nucleated red blood cells 1  Adequate platelets    Occasional vacuolated neutrophils identified    Microcytosis and hypochromasia              Assessment/Plan        ASSESSMENT:   1.  Adenopathies, no unifying diagnosis.  Suspect lymphoma.  2.  Microcytic anemia from iron deficiency.  3.  Iron deficiency post Venofer 10/13/2019.  4.  Hyperlipidemia.  5.  Gitelman syndrome, complicated by hypomagnesemia and hypokalemia   6.  3.4 cm mass in the posterior medial left lower lung indeterminate.    PLAN:   1.   regarding the reason for consultation.  Entertaining lymphoma due to the symptoms.  2.  Await pathology report to direct staging and therapy.  3.  Plan for outpatient work-up once pathology is known.  4.  Clinic appointment arrange.  5.  May discharge from oncology.  6.  Further recommendations pending.    I discussed the patients findings and my recommendations with patient and family.  They verbalized understanding.    Armand Macario MD  10/15/19  7:07 AM    Time: Face-to-Face time on this encounter as defined by the American Medical Association in the 2018 Current Procedural Terminology  codebook is 50 minutes.      Thank you for allowing me to participate in the care of Ms. Sammi Cordero

## 2019-10-16 ENCOUNTER — READMISSION MANAGEMENT (OUTPATIENT)
Dept: CALL CENTER | Facility: HOSPITAL | Age: 64
End: 2019-10-16

## 2019-10-16 LAB
BACTERIA SPEC AEROBE CULT: NORMAL
BACTERIA SPEC AEROBE CULT: NORMAL

## 2019-10-16 NOTE — PAYOR COMM NOTE
"10/16/19 [facility] [phone]      Sammi Quinones (64 y.o. Female)     Date of Birth Social Security Number Address Home Phone MRN    1955  [address] [place] [id number] [phone] [id number]    Latter day Marital Status          Religion of [facility]        Admission Date Admission Type Admitting Provider Attending Provider Department, Room/Bed    10/11/19 Emergency Saeid Walls MD  [facility] 4C, 481/1    Discharge Date Discharge Disposition Discharge Destination        10/15/2019 Home or Self Care              Attending Provider:  (none)   Allergies:  Penicillins, Sulfa Antibiotics    Isolation:  None   Infection:  None   Code Status:  Prior    Ht:  152.4 cm (60\")   Wt:  78.2 kg (172 lb 6.4 oz)    Admission Cmt:  None   Principal Problem:  SIRS (systemic inflammatory response syndrome) (CMS/[facility]) [R65.10]                 Active Insurance as of 10/11/2019     Primary Coverage     Payor Plan Insurance Group Employer/Plan Group    [employer] [facility] [employer] [facility] [employer] [facility] [employer] EM[employer] [id number]     Payor Plan Address Payor Plan Phone Number Payor Plan Fax Number Effective Dates    [address] [phone]  1/1/2018 - None Entered    [place] [address]       Subscriber Name Subscriber Birth Date Member ID       SAMMI QUINONES 1955 [id number]                 Emergency Contacts      (Rel.) Home Phone Work Phone Mobile Phone    Sade Lepe (Daughter) [phone] -- --    GilHernandez (Son) [phone] -- --        Amber Barnes APRN   Nurse Practitioner   Hospitalist   Discharge Summary   Incomplete   Date of Service:  10/15/19 1625   Creation Time:  10/15/19 1858            Incomplete             Show:Clear all  [x]Manual[x]Template[x]Copied    Added by:  [x]Amber Barnes APRN    []Sebastian for details       [facility]  DISCHARGE SUMMARY         Date of Admission: " 10/11/2019  Date of Discharge:  10/15/2019  Primary Care Physician: Avery Whaley MD     Presenting Problem/History of Present Illness:  Sepsis, due to unspecified organism, unspecified whether acute organ dysfunction present (CMS/[facility]) [A41.9]       Final Discharge Diagnoses:      Active Hospital Problems     Diagnosis   • **SIRS (systemic inflammatory response syndrome) (CMS/[facility])   • Lymphadenopathy   • Obesity (BMI 30-39.9)   • Iron deficiency anemia   • High anion gap metabolic acidosis due to lactic acidosis   • Rash   • Gitelman syndrome, complicated by hypomagnesemia and hypokalemia    • Leukocytosis   • WILDA (acute kidney injury) (CMS/[facility])   • Hyperlipidemia      Consults:   Dr. Macario with Hematology and Oncology.   Dr. Benitez with general surgery.     Procedures Performed:      Narrative:     Exam: CT CHEST WO CONTRAST-     Indication: Abnormal findings on chest CT     Comparison: Same day CT abdomen/pelvis     DOSE LENGTH PRODUCT: 374 mGy cm. Automated exposure control was also  utilized to decrease patient radiation dose.     Findings:     The central airways are clear. No consolidative process. There is  diffuse mild interlobular septal thickening a few areas of patchy  groundglass opacity. No pleural effusion or pneumothorax.      Again demonstrated is a consolidative 3.4 cm mass in the posterior  medial left lower lobe with some cavitary components. A prominent artery  supplying this mass    is demonstrate on same-day abdomen CT with IV  contrast.     Multiple enlarged axillary, supraclavicular, mediastinal, and hilar  lymph nodes are present.     Main pulmonary trunk and thoracic aorta are normal in caliber. Thyroid  is uniform. No pericardial effusion. Findings in the upper abdomen are  described on same date CT head/pelvis.         Impression:        1. Bulky lymphadenopathy in the axilla, supraclavicular regions,  mediastinum, and hilum. Differential includes metastasis and lymphoma.     2.  "Mild pulmonary edema.     3. Redemonstrated 3.4 cm mass in the posterior medial left lower lung.  This remains indeterminate but is favored to be an area of  bronchopulmonary sequestration. Other potential etiologies include  chronic inflammatory process and less likely, neoplasm.  This report was finalized on 10/11/2019 19:04 by Dr. Prasad Trevizo MD.      Impression:        1. Focal thickening of the greater curvature of the stomach.  Differential includes neoplasm, focal gastritis, and artifact from lack  of distention. Multiple enlarged gastrohepatic ligament and  retroperitoneal lymph nodes are likely related to this process. Small  hiatal hernia.     2. 3.3 cm consolidative masslike focus in the posterior medial inferior  left lung with prominent feeding artery. This is indeterminate but may  represent pulmonary sequestration. Other potential etiologies include  chronic inflammatory/infectious process.     3. Circumferential urinary bladder wall thickening. Correlate with  urinalysis for possible cystitis.     4. Findings the pelvic floor relaxation.  This report was finalized on 10/11/2019 14:28 by Dr. Prasad Trevizo MD.      Condition on Discharge:  Stable.     Physical Exam on Discharge:  /68 (BP Location: Left arm, Patient Position: Lying)   Pulse 77   Temp 97.7 °F (36.5 °C) (Oral)   Resp 18   Ht 152.4 cm (60\")   Wt 78.2 kg (172 lb 6.4 oz)   SpO2 93%   BMI 33.67 kg/m²   Physical Exam   Constitutional: She is oriented to person, place, and time. She appears well-developed and well-nourished.   HENT:   Head: Normocephalic and atraumatic.   Mouth/Throat: Oropharynx is clear and moist.   Eyes: Conjunctivae and EOM are normal. Pupils are equal, round, and reactive to light. No scleral icterus.   Neck: Neck supple. No JVD present.   Left neck adenopathy.   Cardiovascular: Normal rate, regular rhythm, normal heart sounds and intact distal pulses. Exam reveals no gallop and no friction rub.   No " murmur heard.  Pulmonary/Chest: Effort normal and breath sounds normal. She has no wheezes. She has no rales.   Abdominal: Soft. Bowel sounds are normal. She exhibits no distension and no mass. There is no tenderness. There is no rebound and no guarding.   Musculoskeletal: Normal range of motion. She exhibits no edema.   Lymphadenopathy:     She has no cervical adenopathy.   Neurological: She is alert and oriented to person, place, and time. No cranial nerve deficit.   Skin: Skin is warm and dry.   Rash has completely resolved.   Psychiatric: She has a normal mood and affect. Her behavior is normal.   Nursing note and vitals reviewed.     Discharge Disposition:  Home or Self Care     Discharge Medications:           Discharge Medications                                Discharge Diet:       Diet Instructions      Diet: Regular; Thin       Discharge Diet:  Regular     Fluid Consistency:  Thin          Activity at Discharge:       Activity Instructions      Activity as Tolerated            Discharge Care Plan/Instructions: Return to emergency room if symptoms reoccur.     Follow-up Appointments:          Future Appointments   Date Time Provider Department Center   10/28/2019 10:45 AM Armand Macario MD [facility]   Follow up with Dr. Benitez, general surgery in 2 weeks.  Follow up with primary care provider, Dr. Whaley in 1 week.     Test Results Pending at Discharge: None.     Amber Barnes, ROSENDA  10/15/19  6:59 PM     Time: 40 minutes.         Discharge Order (From admission, onward)    Start     Ordered    10/15/19 1350  Discharge patient  Once     Expected Discharge Date:  10/15/19    Discharge Disposition:  Home or Self Care    Physician of Record for Attribution - Please select from Treatment Team:  SANTOSH CARMONA [[id number]]    Review needed by CMO to determine Physician of Record:  No       Question Answer Comment   Physician of Record for Attribution - Please select from Treatment Team SANTOSH CARMONA     Review needed by CMO to determine Physician of Record No        10/15/19 7286

## 2019-10-16 NOTE — OUTREACH NOTE
Prep Survey      Responses   Facility patient discharged from?  [facility]   Is patient eligible?  Yes   Discharge diagnosis  SIRS,  axillary lymph node biopsy/excision,  lymphadenopathy,  leukocytosis,  WILDA,  HLD,  iron deficiency anemia   Does the patient have one of the following disease processes/diagnoses(primary or secondary)?  Sepsis   Does the patient have Home health ordered?  No   Is there a DME ordered?  No   Comments regarding appointments  see AVS   Prep survey completed?  Yes          Sangeeta Sharp RN

## 2019-10-17 ENCOUNTER — APPOINTMENT (OUTPATIENT)
Dept: LAB | Facility: HOSPITAL | Age: 64
End: 2019-10-17

## 2019-10-17 ENCOUNTER — TRANSCRIBE ORDERS (OUTPATIENT)
Dept: ADMINISTRATIVE | Facility: HOSPITAL | Age: 64
End: 2019-10-17

## 2019-10-17 DIAGNOSIS — K21.9 GASTROESOPHAGEAL REFLUX DISEASE WITHOUT ESOPHAGITIS: ICD-10-CM

## 2019-10-17 DIAGNOSIS — R91.8 LUNG MASS: ICD-10-CM

## 2019-10-17 DIAGNOSIS — R21 RASH AND OTHER NONSPECIFIC SKIN ERUPTION: Primary | ICD-10-CM

## 2019-10-17 DIAGNOSIS — R53.83 OTHER FATIGUE: ICD-10-CM

## 2019-10-17 LAB
BACTERIA SPEC AEROBE CULT: NORMAL
GRAM STN SPEC: NORMAL
GRAM STN SPEC: NORMAL
HETEROPH AB SER QL LA: NEGATIVE

## 2019-10-17 PROCEDURE — 85027 COMPLETE CBC AUTOMATED: CPT | Performed by: INTERNAL MEDICINE

## 2019-10-17 PROCEDURE — 83735 ASSAY OF MAGNESIUM: CPT | Performed by: INTERNAL MEDICINE

## 2019-10-17 PROCEDURE — 80053 COMPREHEN METABOLIC PANEL: CPT | Performed by: INTERNAL MEDICINE

## 2019-10-17 PROCEDURE — 84439 ASSAY OF FREE THYROXINE: CPT | Performed by: INTERNAL MEDICINE

## 2019-10-17 PROCEDURE — 82607 VITAMIN B-12: CPT | Performed by: INTERNAL MEDICINE

## 2019-10-17 PROCEDURE — 81003 URINALYSIS AUTO W/O SCOPE: CPT | Performed by: INTERNAL MEDICINE

## 2019-10-17 PROCEDURE — 86308 HETEROPHILE ANTIBODY SCREEN: CPT | Performed by: INTERNAL MEDICINE

## 2019-10-17 PROCEDURE — 36415 COLL VENOUS BLD VENIPUNCTURE: CPT | Performed by: INTERNAL MEDICINE

## 2019-10-17 PROCEDURE — 84443 ASSAY THYROID STIM HORMONE: CPT | Performed by: INTERNAL MEDICINE

## 2019-10-18 ENCOUNTER — READMISSION MANAGEMENT (OUTPATIENT)
Dept: CALL CENTER | Facility: HOSPITAL | Age: 64
End: 2019-10-18

## 2019-10-18 LAB
ALBUMIN SERPL-MCNC: 4 G/DL (ref 3.5–5.2)
ALBUMIN/GLOB SERPL: 0.6 G/DL
ALP SERPL-CCNC: 66 U/L (ref 39–117)
ALT SERPL W P-5'-P-CCNC: 19 U/L (ref 1–33)
ANION GAP SERPL CALCULATED.3IONS-SCNC: 13.3 MMOL/L (ref 5–15)
AST SERPL-CCNC: 17 U/L (ref 1–32)
BILIRUB SERPL-MCNC: 0.5 MG/DL (ref 0.2–1.2)
BILIRUB UR QL STRIP: NEGATIVE
BUN BLD-MCNC: 20 MG/DL (ref 8–23)
BUN/CREAT SERPL: 21.5 (ref 7–25)
CALCIUM SPEC-SCNC: 9.5 MG/DL (ref 8.6–10.5)
CHLORIDE SERPL-SCNC: 93 MMOL/L (ref 98–107)
CLARITY UR: ABNORMAL
CO2 SERPL-SCNC: 27.7 MMOL/L (ref 22–29)
COLOR UR: YELLOW
CREAT BLD-MCNC: 0.93 MG/DL (ref 0.57–1)
DEPRECATED RDW RBC AUTO: 44.3 FL (ref 37–54)
ERYTHROCYTE [DISTWIDTH] IN BLOOD BY AUTOMATED COUNT: 17.1 % (ref 12.3–15.4)
GFR SERPL CREATININE-BSD FRML MDRD: 61 ML/MIN/1.73
GLOBULIN UR ELPH-MCNC: 6.3 GM/DL
GLUCOSE BLD-MCNC: 113 MG/DL (ref 65–99)
GLUCOSE UR STRIP-MCNC: NEGATIVE MG/DL
HCT VFR BLD AUTO: 43.6 % (ref 34–46.6)
HGB BLD-MCNC: 13.9 G/DL (ref 12–15.9)
HGB UR QL STRIP.AUTO: NEGATIVE
KETONES UR QL STRIP: NEGATIVE
LEUKOCYTE ESTERASE UR QL STRIP.AUTO: NEGATIVE
MAGNESIUM SERPL-MCNC: 1 MG/DL (ref 1.6–2.4)
MCH RBC QN AUTO: 24.7 PG (ref 26.6–33)
MCHC RBC AUTO-ENTMCNC: 31.9 G/DL (ref 31.5–35.7)
MCV RBC AUTO: 77.6 FL (ref 79–97)
NITRITE UR QL STRIP: NEGATIVE
PH UR STRIP.AUTO: 6 [PH] (ref 5–8)
PLATELET # BLD AUTO: 604 10*3/MM3 (ref 140–450)
PMV BLD AUTO: 9.8 FL (ref 6–12)
POTASSIUM BLD-SCNC: 4.4 MMOL/L (ref 3.5–5.2)
PROT SERPL-MCNC: 10.3 G/DL (ref 6–8.5)
PROT UR QL STRIP: NEGATIVE
RBC # BLD AUTO: 5.62 10*6/MM3 (ref 3.77–5.28)
SODIUM BLD-SCNC: 134 MMOL/L (ref 136–145)
SP GR UR STRIP: 1.02 (ref 1–1.03)
T4 FREE SERPL-MCNC: 1.55 NG/DL (ref 0.93–1.7)
TSH SERPL DL<=0.05 MIU/L-ACNC: 5.43 UIU/ML (ref 0.27–4.2)
UROBILINOGEN UR QL STRIP: ABNORMAL
VIT B12 BLD-MCNC: 1012 PG/ML (ref 211–946)
WBC NRBC COR # BLD: 21.21 10*3/MM3 (ref 3.4–10.8)

## 2019-10-18 NOTE — OUTREACH NOTE
Sepsis Week 1 Survey      Responses   Facility patient discharged from?  [facility]   Does the patient have one of the following disease processes/diagnoses(primary or secondary)?  Sepsis   Is there a successful TCM telephone encounter documented?  No   Week 1 attempt successful?  Yes   Call start time  1445   Call end time  1448   Is patient permission given to speak with other caregiver?  Yes   List who call center can speak with  sister, son or daughters, Sade, Hernandez, Jaswinder   Meds reviewed with patient/caregiver?  Yes   Is the patient having any side effects they believe may be caused by any medication additions or changes?  No   Does the patient have all medications related to this admission filled (includes all antibiotics, inhalers, nebulizers,steroids,etc.)  Yes   Is the patient taking all medications as directed (includes completed medication regime)?  Yes   Does the patient have a primary care provider?   Yes   Comments regarding PCP  Dr. Whaley 10/17   Does the patient have an appointment with their PCP within 7 days of discharge?  Yes   Has the patient kept scheduled appointments due by today?  Yes   Has home health visited the patient within 72 hours of discharge?  N/A   Psychosocial issues?  No   Comments  just weakness   Did the patient receive a copy of their discharge instructions?  Yes   Nursing interventions  Reviewed instructions with patient, Educated on MyChart   What is the patient's perception of their health status since discharge?  Improving   Is the patient/caregiver able to teach back Sepsis?  S - Shivering,fever or very cold, E - Extreme pain or generalized discomfort (worst ever,especially abdomen), P - Pale or discolored skin, S - Sleepy, difficult to arouse,confused, I -   I feel like I might die-a feeling of hopelessness, S - Short of breath   Nursing interventions  Nurse provided reassurance to patient, Nurse provided patient education   Is patient/caregiver able to teach back steps  to recovery at home?  Set small, achievable goals for return to baseline health, Rest and regain strength, Talk about feelings with family/friends, Record milestones and struggles in a journal, Learn about sepsis(sepsis.org), Eat a balanced diet, Exercise as tolerated, Make a list of questions for PCP appoinment   Is the patient/caregiver able to teach back signs and symptoms of worsening condition:  Fever, Hyperthermia, Rapid heart rate (>90), Shortness of breath/rapid respiratory rate, Altered mental status(confusion/coma), Edema, High blood glucose without diabetes   Is the patient/caregiver able to teach back the hierarchy of who to call/visit for symptoms/problems? PCP, Specialist, Home health nurse, Urgent Care, ED, 911  Yes   Week 1 call completed?  Yes          Barbara Rojas RN

## 2019-10-19 ENCOUNTER — NURSE TRIAGE (OUTPATIENT)
Dept: CALL CENTER | Facility: HOSPITAL | Age: 64
End: 2019-10-19

## 2019-10-19 LAB — BACTERIA SPEC ANAEROBE CULT: NORMAL

## 2019-10-19 NOTE — TELEPHONE ENCOUNTER
"  Caller states that her sister was discharged on Friday and she is not feeling well.  She is eating, drinking, and voiding wnl.  She is still weak and her temp is 100.2.  Caller states that they are just being cautious.  Recommend continuing to monitor patient and to call back with further questions or problems.  Monitor temperature and weakness.  Reason for Disposition  • Health Information question, no triage required and triager able to answer question    Additional Information  • Negative: [1] Caller is not with the adult (patient) AND [2] reporting urgent symptoms  • Negative: Lab result questions  • Negative: Medication questions  • Negative: Caller cannot be reached by phone  • Negative: Caller has already spoken to PCP or another triager  • Negative: RN needs further essential information from caller in order to complete triage  • Negative: Requesting regular office appointment  • Negative: [1] Caller requesting NON-URGENT health information AND [2] PCP's office is the best resource  • Negative: General information question, no triage required and triager able to answer question    Answer Assessment - Initial Assessment Questions  1. REASON FOR CALL or QUESTION: \"What is your reason for calling today?\" or \"How can I best help you?\" or \"What question do you have that I can help answer?\"      My sister was discharged on 10/15 and we have some questions.    Protocols used: INFORMATION ONLY CALL-ADULT-      "

## 2019-10-21 NOTE — PROGRESS NOTES
[facility] ON[facility] ONCOLOGY  [address] [place] [address] [address]  [facility] [address]  [phone]    Patient Name: Sammi Cordero  Encounter Date: 10/28/2019  YOB: 1955  Patient Number: [id number]      REASON FOR FOLLOW-UP:  Sammi Cordero is a pleasant 64 y.o.  female who is seen on follow up for benign adenopathies.  She is seen with sister, 2 daughters and her son.  She is a reliable historian.    DIAGNOSTIC ABNORMALITIES:  CT chest 10/11/2019. Bulky lymphadenopathy in the axilla, supraclavicular regions, mediastinum, and hilum. Differential includes metastasis and lymphoma. Mild pulmonary edema. Redemonstrated 3.4 cm mass in the posterior medial left lower lung. This remains indeterminate but is favored to be an area of  bronchopulmonary sequestration. Other potential etiologies include chronic inflammatory process and less likely, neoplasm.     CT abdomen 10/11/2019.  Focal thickening of the greater curvature of the stomach. Differential includes neoplasm, focal gastritis, and artifact from lack of distention. Multiple enlarged gastrohepatic ligament and  retroperitoneal lymph nodes are likely related to this process. Small hiatal hernia.  3.3 cm consolidative masslike focus in the posterior medial inferior left lung with prominent feeding artery. This is indeterminate but may represent pulmonary sequestration. Other potential etiologies include chronic inflammatory/infectious process.  Circumferential urinary bladder wall thickening. Correlate with urinalysis for possible cystitis.  Findings the pelvic floor relaxation.      PREVIOUS INTERVENTIONS: None.      No history exists.       PAST MEDICAL HISTORY:  ALLERGIES:  Allergies   Allergen Reactions   • Penicillins Swelling and Rash   • Sulfa Antibiotics Rash and Other (See Comments)     Temp. fluctuations      CURRENT MEDICATIONS:  Outpatient Encounter Medications as of 10/28/2019   Medication Sig  Dispense Refill   • aMILoride (MIDAMOR) 5 MG tablet Take 5 mg by mouth Daily.     • Calcium Carb-Ergocalciferol (CHEWABLE CALCIUM/D PO) Take 1 tablet by mouth Daily.     • famotidine (PEPCID) 20 MG tablet 1 tablet by mouth daily 30 tablet 3   • magnesium oxide (MAGOX) 400 (241.3 Mg) MG tablet tablet Take 2,000 mg by mouth Daily.     • Multiple Vitamins-Minerals (MULTIVITAMIN WITH MINERALS) tablet tablet Take 1 tablet by mouth Daily.     • ondansetron (ZOFRAN) 4 MG tablet Take 4 mg by mouth 4 (Four) Times a Day As Needed for Nausea or Vomiting.     • PARoxetine (PAXIL) 10 MG tablet Take 10 mg by mouth Every Morning.  5   • simvastatin (ZOCOR) 20 MG tablet Take 1 tablet by mouth daily.  9     No facility-administered encounter medications on file as of 10/28/2019.      ADULT ILLNESSES:  Patient Active Problem List   Diagnosis Code   • Left knee DJD M17.12   • Hypoglycemia E16.2   • Hyperlipidemia E78.5   • Hypomagnesemia E83.42   • SIRS (systemic inflammatory response syndrome) (CMS/HCC) R65.10   • Rash R21   • Gitelman syndrome, complicated by hypomagnesemia and hypokalemia  E83.42, E87.6   • Leukocytosis D72.829   • WILDA (acute kidney injury) (CMS/HCC) N17.9   • Lymphadenopathy R59.1   • Obesity (BMI 30-39.9) E66.9   • Iron deficiency anemia D50.9   • High anion gap metabolic acidosis due to lactic acidosis E87.2     SURGERIES:  Past Surgical History:   Procedure Laterality Date   • AXILLARY LYMPH NODE BIOPSY/EXCISION Right 10/14/2019    Procedure: AXILLARY LYMPH NODE BIOPSY/EXCISION;  Surgeon: Karen Benitez MD;  Location: [facility] OR;  Service: General   • COLONOSCOPY  08/20/2010   • COLONOSCOPY W/ POLYPECTOMY  09/14/2016    2   5 mm sessile polyps removed with hot snare repeat 5 years   • DILATATION AND CURETTAGE     • ENDOSCOPY  09/14/2016    Medium sized HH, LA Grade A esohagitis, Fluid in the middle third of esohagus    • NY TOTAL KNEE ARTHROPLASTY Left 3/9/2018    Procedure: LEFT TOTAL KNEE ARTHROPLASTY;   Surgeon: Orlin Meza MD;  Location: [facility];  Service: Orthopedics   • REPLACEMENT TOTAL KNEE Right      HEALTH MAINTENANCE ITEMS:  Health Maintenance Due   Topic Date Due   • TDAP/TD VACCINES (1 - Tdap) 1975   • ZOSTER VACCINE (1 of 2) 2006   • ANNUAL PHYSICAL  2017   • LIPID PANEL  2018   • HEPATITIS C SCREENING  2018   • COLONOSCOPY  2018   • INFLUENZA VACCINE  2019       <no information>  Last Completed Colonoscopy       Status Date      COLONOSCOPY No completions recorded        There is no immunization history for the selected administration types on file for this patient.  Last Completed Mammogram       Status Date      MAMMOGRAM Done 2019 MAMMO SCREENING DIGITAL TOMOSYNTHESIS BILATERAL W CAD     Patient has more history with this topic...            FAMILY HISTORY:  Family History   Problem Relation Age of Onset   • Aortic aneurysm Father    • Anorexia nervosa Mother    • No Known Problems Brother    • No Known Problems Sister    • No Known Problems Son    • No Known Problems Daughter    • No Known Problems Daughter    • No Known Problems Maternal Grandmother    • No Known Problems Paternal Grandmother    • No Known Problems Maternal Aunt    • No Known Problems Paternal Aunt    • Lymphoma Paternal Grandfather    • Breast cancer Neg Hx    • Ovarian cancer Neg Hx    • Colon cancer Neg Hx    • BRCA 1/2 Neg Hx    • Endometrial cancer Neg Hx      SOCIAL HISTORY:  Social History     Socioeconomic History   • Marital status:      Spouse name: Not on file   • Number of children: Not on file   • Years of education: Not on file   • Highest education level: Not on file   Tobacco Use   • Smoking status: Former Smoker     Packs/day: 0.50     Types: Cigarettes     Last attempt to quit: 2003     Years since quittin.8   • Smokeless tobacco: Never Used   Substance and Sexual Activity   • Alcohol use: No   • Drug use: No   • Sexual activity: No     Birth  "control/protection: Post-menopausal       REVIEW OF SYSTEMS:    Review of Systems   Constitutional: Positive for fatigue. Negative for chills and diaphoresis.   HENT: Negative for congestion, mouth sores, nosebleeds and trouble swallowing.    Eyes: Negative for pain, redness and visual disturbance.   Respiratory: Negative for cough, shortness of breath and wheezing.    Cardiovascular: Negative for chest pain and leg swelling.   Gastrointestinal: Negative for abdominal distention, abdominal pain, blood in stool, constipation, diarrhea, nausea and vomiting.   Endocrine: Negative for cold intolerance and heat intolerance.   Genitourinary: Negative for difficulty urinating, dysuria, flank pain, frequency and hematuria.   Musculoskeletal: Negative for arthralgias, back pain, gait problem and neck pain.   Skin: Negative for pallor.   Allergic/Immunologic: Negative for food allergies.   Neurological: Negative for dizziness, seizures, syncope, facial asymmetry, numbness and headaches.   Hematological: Negative for adenopathy. Does not bruise/bleed easily.   Psychiatric/Behavioral: Negative for agitation, confusion and hallucinations. The patient is not nervous/anxious.        VITAL SIGNS: /72   Pulse 73   Temp 98.5 °F (36.9 °C)   Resp 18   Ht 152.4 cm (60\")   Wt 75.3 kg (166 lb)   SpO2 95%   Breastfeeding? No   BMI 32.42 kg/m²   Pain Score    10/28/19 1051   PainSc: 0-No pain       PHYSICAL EXAMINATION:     Physical Exam   Constitutional: She is oriented to person, place, and time. She appears well-developed and well-nourished. No distress.   HENT:   Head: Normocephalic and atraumatic.   Eyes: EOM are normal. Pupils are equal, round, and reactive to light. No scleral icterus.   Neck: Trachea normal. Neck supple. No JVD present.   Cardiovascular: Normal rate, regular rhythm and normal pulses.   No murmur heard.  Pulmonary/Chest: Effort normal and breath sounds normal. She has no wheezes. She has no rhonchi. She " has no rales. Chest wall is not dull to percussion.   Abdominal: Soft. Normal appearance and bowel sounds are normal. There is no tenderness. There is no rebound and no guarding.   Musculoskeletal: She exhibits no edema.   Lymphadenopathy:     She has no cervical adenopathy.     She has no axillary adenopathy.        Right: No inguinal and no supraclavicular adenopathy present.        Left: No inguinal and no supraclavicular adenopathy present.   Steri strips, right axilla.      Neurological: She is alert and oriented to person, place, and time. She has normal strength. No sensory deficit.   Skin: Skin is warm and dry. She is not diaphoretic. No pallor.   Psychiatric: She has a normal mood and affect. Her behavior is normal. Judgment and thought content normal.   Vitals reviewed.      LABS    Lab Results - Last 18 Months   Lab Units 10/17/19  1659 10/15/19  0503 10/14/19  0513 10/13/19  0222 10/12/19  0503 10/11/19  1251   HEMOGLOBIN g/dL 13.9 10.9* 9.8* 8.7* 10.2* 12.0   HEMATOCRIT % 43.6 34.3 30.3* 27.5* 31.1* 37.5   MCV fL 77.6* 78.0* 76.7* 77.7* 75.5* 76.4*   WBC 10*3/mm3 21.21* 15.85* 10.15 11.09* 14.85* 12.55*   RDW % 17.1* 16.3* 16.5* 16.5* 16.4* 16.3*   MPV fL 9.8 10.0 10.1 10.2 10.2 9.6   PLATELETS 10*3/mm3 604* 408 341 261 293 325   IMM GRAN % %  --   --   --   --  1.1* 1.3*   NEUTROS ABS 10*3/mm3  --  11.89* 5.79 9.10* 12.45* 11.24*   LYMPHS ABS 10*3/mm3  --   --   --   --  1.20 0.44*   MONOS ABS 10*3/mm3  --   --   --   --  0.71 0.62   EOS ABS 10*3/mm3  --   --   --  0.35 0.23 0.07   BASOS ABS 10*3/mm3  --  0.16 0.10 0.12 0.09 0.02   IMMATURE GRANS (ABS) 10*3/mm3  --   --   --   --  0.17* 0.16*   NRBC /100 WBC  --   --   --   --  0.0 0.0   NEUTROPHIL % %  --  73.0 57.0 82.1*  --   --    MONOCYTES % %  --  6.0 8.0 3.2*  --   --    BASOPHIL % %  --  1.0 1.0 1.1  --   --    ATYP LYMPH % %  --  2.0 7.0* 1.1  --   --    ANISOCYTOSIS   --   --   --  Slight/1+  --   --    GIANT PLT   --   --  Slight/1+  --   --    --        Lab Results - Last 18 Months   Lab Units 10/17/19  1659 10/15/19  0503 10/14/19  0513 10/13/19  0222 10/12/19  0503 10/11/19  1251 08/14/19  0736 01/29/19  0822 10/08/18  1144   GLUCOSE mg/dL 113* 198* 103* 107* 196* 119* 122* 123* 108*   SODIUM mmol/L 134* 140 142 144 140 141 138 138 140   POTASSIUM mmol/L 4.4 4.3 4.2 3.8 3.4* 3.6 3.5 3.7 4.1   CO2 mmol/L 27.7 33.0* 29.0 29.0 22.0 25.0 30.0 31.0 31.0   CHLORIDE mmol/L 93* 96* 100 106 103 99 97* 95* 99   ANION GAP mmol/L 13.3 11.0 13.0 9.0 15.0 17.0* 11.0 12.0 10.0   CREATININE mg/dL 0.93 0.65 0.60 0.74 0.94 1.39* 0.58 0.60 0.67   BUN mg/dL 20 21 13 17 21 19 16 21 26*   BUN / CREAT RATIO  21.5 32.3* 21.7 23.0 22.3 13.7 27.6* 35.0* 38.8*   CALCIUM mg/dL 9.5 9.3 8.4* 7.4* 7.0* 8.2* 8.6 9.1 9.2   EGFR IF NONAFRICN AM mL/min/1.73 61 92 101 79 60* 38* 105 101 89   ALK PHOS U/L 66  --   --   --  53 59 76  --   --    TOTAL PROTEIN g/dL 10.3*  --   --   --  7.5 8.1 7.9  --   --    ALT (SGPT) U/L 19  --   --   --  14 12 20  --   --    AST (SGOT) U/L 17  --   --   --  27 20 28  --   --    BILIRUBIN mg/dL 0.5  --   --   --  0.3 0.5 0.6  --   --    ALBUMIN g/dL 4.00  --   --   --  3.00* 3.30* 4.10 4.40 4.10   GLOBULIN gm/dL 6.3  --   --   --  4.5 4.8 3.8  --   --        Lab Results - Last 18 Months   Lab Units 10/11/19  1251   LDH U/L 360*       Lab Results - Last 18 Months   Lab Units 10/17/19  1659 10/13/19  0222 10/11/19  1251   IRON mcg/dL  --  26*  --    TIBC mcg/dL  --  258*  --    IRON SATURATION %  --  10*  --    FERRITIN ng/mL  --   --  126.90   TSH uIU/mL 5.430*  --  4.550*       Sammi MARY Cordero reports a pain score of 0.     Patient's Body mass index is 32.42 kg/m². BMI is above normal parameters. Recommendations include: referral to primary care.      ASSESSMENT:   1.  Adenopathies, benign.  No unifying diagnosis.   2.  Microcytic anemia from iron deficiency, history of.  3.  Iron deficiency post Venofer 10/13/2019.  4.  Hyperlipidemia.  5.  Gitelman  syndrome, complicated by hypomagnesemia and hypokalemia   6.  3.4 cm mass in the posterior medial left lower lung indeterminate from bronchopulmonary sequestration.  7.  Obesity BMI 32.4.        PLAN:   1.   Re:  Pathology report 10/21/2019. [place] reactive lymphoid hyperplasia, follicular pattern. No abscesses or granulomata identified. No Cesar-Candelario cells identified.  No histologic evidence of metastatic malignancy.  2.   Re:  Negative fungus culture and negative AFB.   3.   Re:  Negative mononucleosis screen.     4.  Case presented at tumor board 10/24/2019.  Benign pathology.  Her specimen was referred to [facility] for second opinion.  CT scans also reviewed.    5.  Stable for observation.   6.  Continue current medications.  7.  Plan of care discussed with patient and family.  Understanding expressed.  Patient agreeable to proceed.  8.  Continue ongoing management per primary care physician.  9.  Refer to PCP for obesity.   10.  Return to office in 3 months with pre office CT chest, abdomen, and pelvis, CBC with differential, and CMP.         Time: Face-to-Face time on this encounter as defined by the American Medical Association in the 2019 Current Procedural Terminology codebook is 38 minutes.      MD Karen Lieberman MD Jonathan Wilkerson, MD

## 2019-10-25 ENCOUNTER — READMISSION MANAGEMENT (OUTPATIENT)
Dept: CALL CENTER | Facility: HOSPITAL | Age: 64
End: 2019-10-25

## 2019-10-25 NOTE — OUTREACH NOTE
Sepsis Week 2 Survey      Responses   Facility patient discharged from?  [facility]   Does the patient have one of the following disease processes/diagnoses(primary or secondary)?  Sepsis   Week 2 attempt successful?  No   Unsuccessful attempts  Attempt 1          Soniya Felton RN

## 2019-10-28 ENCOUNTER — OFFICE VISIT (OUTPATIENT)
Dept: ONCOLOGY | Facility: CLINIC | Age: 64
End: 2019-10-28

## 2019-10-28 VITALS
TEMPERATURE: 98.5 F | HEIGHT: 60 IN | HEART RATE: 73 BPM | OXYGEN SATURATION: 95 % | DIASTOLIC BLOOD PRESSURE: 72 MMHG | SYSTOLIC BLOOD PRESSURE: 128 MMHG | RESPIRATION RATE: 18 BRPM | BODY MASS INDEX: 32.59 KG/M2 | WEIGHT: 166 LBS

## 2019-10-28 DIAGNOSIS — D50.8 IRON DEFICIENCY ANEMIA SECONDARY TO INADEQUATE DIETARY IRON INTAKE: ICD-10-CM

## 2019-10-28 DIAGNOSIS — R59.9 ENLARGED LYMPH NODES: Primary | ICD-10-CM

## 2019-10-28 DIAGNOSIS — R59.1 LYMPHADENOPATHY: Primary | ICD-10-CM

## 2019-10-28 DIAGNOSIS — D64.9 ANEMIA, UNSPECIFIED TYPE: Primary | ICD-10-CM

## 2019-10-28 PROCEDURE — 99214 OFFICE O/P EST MOD 30 MIN: CPT | Performed by: INTERNAL MEDICINE

## 2019-10-29 ENCOUNTER — READMISSION MANAGEMENT (OUTPATIENT)
Dept: CALL CENTER | Facility: HOSPITAL | Age: 64
End: 2019-10-29

## 2019-10-29 NOTE — OUTREACH NOTE
Sepsis Week 2 Survey      Responses   Facility patient discharged from?  [place] [facility]   Does the patient have one of the following disease processes/diagnoses(primary or secondary)?  Sepsis   Week 2 attempt successful?  Yes   Call start time  1528   Call end time  1532   Discharge diagnosis  SIRS,  axillary lymph node biopsy/excision,  lymphadenopathy,  leukocytosis,  WILDA,  HLD,  iron deficiency anemia   Is patient permission given to speak with other caregiver?  Yes   List who call center can speak with  daughter, Sade   Person spoke with today (if not patient) and relationship  Sade   Is the patient taking all medications as directed (includes completed medication regime)?  Yes   Does the patient have a primary care provider?   Yes   Comments regarding PCP  Dr. Whaley 10/17   Has the patient kept scheduled appointments due by today?  Yes   Has home health visited the patient within 72 hours of discharge?  N/A   Psychosocial issues?  No   Did the patient receive a copy of their discharge instructions?  Yes   What is the patient's perception of their health status since discharge?  Improving   Nursing interventions  Nurse provided patient education   Is the patient/caregiver able to teach back Sepsis?  S - Shivering,fever or very cold   Is the patient/caregiver able to teach back signs and symptoms of worsening condition:  Fever   Is the patient/caregiver able to teach back the hierarchy of who to call/visit for symptoms/problems? PCP, Specialist, Home health nurse, Urgent Care, ED, 911  Yes   Additional teach back comments  Daughter will encourage patient to be seen by physician with any worsening symptoms. Daughter states patient is improving, getting stronger.    Week 2 call completed?  Yes          Oneyda Merritt RN

## 2019-11-07 ENCOUNTER — READMISSION MANAGEMENT (OUTPATIENT)
Dept: CALL CENTER | Facility: HOSPITAL | Age: 64
End: 2019-11-07

## 2019-11-07 NOTE — OUTREACH NOTE
Sepsis Week 3 Survey      Responses   Facility patient discharged from?  [facility]   Does the patient have one of the following disease processes/diagnoses(primary or secondary)?  Sepsis   Week 3 attempt successful?  Yes   Call start time  1035   Call end time  1037   Discharge diagnosis  SIRS,  axillary lymph node biopsy/excision,  lymphadenopathy,  leukocytosis,  WILDA,  HLD,  iron deficiency anemia   Meds reviewed with patient/caregiver?  Yes   Is the patient having any side effects they believe may be caused by any medication additions or changes?  No   Does the patient have all medications related to this admission filled (includes all antibiotics, inhalers, nebulizers,steroids,etc.)  Yes   Is the patient taking all medications as directed (includes completed medication regime)?  Yes   Does the patient have a primary care provider?   Yes   Does the patient have an appointment with their PCP within 7 days of discharge?  Yes   Has the patient kept scheduled appointments due by today?  Yes   Has home health visited the patient within 72 hours of discharge?  N/A   Psychosocial issues?  No   Comments  Patient reports she is feeling much better. No fever, chills, nausea or vomiting. No questions or concerns.    Did the patient receive a copy of their discharge instructions?  Yes   Nursing interventions  Reviewed instructions with patient, Educated on MyChart   What is the patient's perception of their health status since discharge?  Improving   Nursing interventions  Nurse provided patient education   Is the patient/caregiver able to teach back Sepsis?  S - Shivering,fever or very cold, E - Extreme pain or generalized discomfort (worst ever,especially abdomen)   Nursing interventions  Nurse provided reassurance to patient   Is patient/caregiver able to teach back steps to recovery at home?  Set small, achievable goals for return to baseline health, Rest and regain strength, Talk about feelings with family/friends, Record  milestones and struggles in a journal, Learn about sepsis(sepsis.org), Eat a balanced diet, Exercise as tolerated, Make a list of questions for PCP appoinment   Is the patient/caregiver able to teach back signs and symptoms of worsening condition:  Fever   Is the patient/caregiver able to teach back the hierarchy of who to call/visit for symptoms/problems? PCP, Specialist, Home health nurse, Urgent Care, ED, 911  Yes   Week 3 call completed?  Yes          Lex Jimenez RN

## 2019-11-11 ENCOUNTER — TRANSCRIBE ORDERS (OUTPATIENT)
Dept: ADMINISTRATIVE | Facility: HOSPITAL | Age: 64
End: 2019-11-11

## 2019-11-11 DIAGNOSIS — R10.11 ABDOMINAL PAIN, RIGHT UPPER QUADRANT: Primary | ICD-10-CM

## 2019-11-12 ENCOUNTER — HOSPITAL ENCOUNTER (OUTPATIENT)
Dept: ULTRASOUND IMAGING | Facility: HOSPITAL | Age: 64
Discharge: HOME | End: 2019-11-12
Admitting: NURSE PRACTITIONER

## 2019-11-12 DIAGNOSIS — R10.11 ABDOMINAL PAIN, RIGHT UPPER QUADRANT: ICD-10-CM

## 2019-11-12 PROCEDURE — 76705 ECHO EXAM OF ABDOMEN: CPT

## 2019-11-15 ENCOUNTER — READMISSION MANAGEMENT (OUTPATIENT)
Dept: CALL CENTER | Facility: HOSPITAL | Age: 64
End: 2019-11-15

## 2019-11-15 ENCOUNTER — HOSPITAL ENCOUNTER (OUTPATIENT)
Dept: NUCLEAR MEDICINE | Facility: HOSPITAL | Age: 64
Discharge: HOME | End: 2019-11-15

## 2019-11-15 DIAGNOSIS — R10.11 ABDOMINAL PAIN, RIGHT UPPER QUADRANT: ICD-10-CM

## 2019-11-15 PROCEDURE — 0 TECHNETIUM TC 99M MEBROFENIN KIT: Performed by: NURSE PRACTITIONER

## 2019-11-15 PROCEDURE — 78226 HEPATOBILIARY SYSTEM IMAGING: CPT

## 2019-11-15 PROCEDURE — A9537 TC99M MEBROFENIN: HCPCS | Performed by: NURSE PRACTITIONER

## 2019-11-15 RX ORDER — KIT FOR THE PREPARATION OF TECHNETIUM TC 99M MEBROFENIN 45 MG/10ML
1 INJECTION, POWDER, LYOPHILIZED, FOR SOLUTION INTRAVENOUS
Status: COMPLETED | OUTPATIENT
Start: 2019-11-15 | End: 2019-11-15

## 2019-11-15 RX ADMIN — MEBROFENIN 1 DOSE: 45 INJECTION, POWDER, LYOPHILIZED, FOR SOLUTION INTRAVENOUS at 08:17

## 2019-11-15 NOTE — OUTREACH NOTE
Sepsis Week 4 Survey      Responses   Facility patient discharged from?  [place]   Does the patient have one of the following disease processes/diagnoses(primary or secondary)?  Sepsis   Week 4 attempt successful?  No          Oneyda Merritt RN

## 2019-11-19 ENCOUNTER — TRANSCRIBE ORDERS (OUTPATIENT)
Dept: ADMINISTRATIVE | Facility: HOSPITAL | Age: 64
End: 2019-11-19

## 2019-11-19 ENCOUNTER — APPOINTMENT (OUTPATIENT)
Dept: LAB | Facility: HOSPITAL | Age: 64
End: 2019-11-19

## 2019-11-19 DIAGNOSIS — R53.83 OTHER FATIGUE: ICD-10-CM

## 2019-11-19 DIAGNOSIS — R59.0 LOCALIZED ENLARGED LYMPH NODES: Primary | ICD-10-CM

## 2019-11-19 LAB
ALBUMIN SERPL-MCNC: 3.3 G/DL (ref 3.5–5.2)
ALBUMIN/GLOB SERPL: 0.7 G/DL
ALP SERPL-CCNC: 86 U/L (ref 39–117)
ALT SERPL W P-5'-P-CCNC: 19 U/L (ref 1–33)
ANION GAP SERPL CALCULATED.3IONS-SCNC: 16.6 MMOL/L (ref 5–15)
AST SERPL-CCNC: 14 U/L (ref 1–32)
BILIRUB SERPL-MCNC: 0.4 MG/DL (ref 0.2–1.2)
BUN BLD-MCNC: 26 MG/DL (ref 8–23)
BUN/CREAT SERPL: 21.7 (ref 7–25)
CALCIUM SPEC-SCNC: 9.6 MG/DL (ref 8.6–10.5)
CHLORIDE SERPL-SCNC: 93 MMOL/L (ref 98–107)
CO2 SERPL-SCNC: 25.4 MMOL/L (ref 22–29)
CREAT BLD-MCNC: 1.2 MG/DL (ref 0.57–1)
DEPRECATED RDW RBC AUTO: 50.9 FL (ref 37–54)
ERYTHROCYTE [DISTWIDTH] IN BLOOD BY AUTOMATED COUNT: 18.5 % (ref 12.3–15.4)
GFR SERPL CREATININE-BSD FRML MDRD: 45 ML/MIN/1.73
GLOBULIN UR ELPH-MCNC: 4.6 GM/DL
GLUCOSE BLD-MCNC: 117 MG/DL (ref 65–99)
HCT VFR BLD AUTO: 35.4 % (ref 34–46.6)
HGB BLD-MCNC: 11.2 G/DL (ref 12–15.9)
MAGNESIUM SERPL-MCNC: 1.2 MG/DL (ref 1.6–2.4)
MCH RBC QN AUTO: 24.2 PG (ref 26.6–33)
MCHC RBC AUTO-ENTMCNC: 31.6 G/DL (ref 31.5–35.7)
MCV RBC AUTO: 76.6 FL (ref 79–97)
PLATELET # BLD AUTO: 214 10*3/MM3 (ref 140–450)
PMV BLD AUTO: 11 FL (ref 6–12)
POTASSIUM BLD-SCNC: 4.2 MMOL/L (ref 3.5–5.2)
PROT SERPL-MCNC: 7.9 G/DL (ref 6–8.5)
RBC # BLD AUTO: 4.62 10*6/MM3 (ref 3.77–5.28)
SODIUM BLD-SCNC: 135 MMOL/L (ref 136–145)
WBC NRBC COR # BLD: 12.67 10*3/MM3 (ref 3.4–10.8)

## 2019-11-19 PROCEDURE — 80053 COMPREHEN METABOLIC PANEL: CPT | Performed by: INTERNAL MEDICINE

## 2019-11-19 PROCEDURE — 86645 CMV ANTIBODY IGM: CPT | Performed by: INTERNAL MEDICINE

## 2019-11-19 PROCEDURE — 36415 COLL VENOUS BLD VENIPUNCTURE: CPT | Performed by: INTERNAL MEDICINE

## 2019-11-19 PROCEDURE — 85027 COMPLETE CBC AUTOMATED: CPT | Performed by: INTERNAL MEDICINE

## 2019-11-19 PROCEDURE — 86644 CMV ANTIBODY: CPT | Performed by: INTERNAL MEDICINE

## 2019-11-19 PROCEDURE — 83735 ASSAY OF MAGNESIUM: CPT | Performed by: INTERNAL MEDICINE

## 2019-11-20 LAB
CMV IGG SERPL IA-ACNC: 6.6 U/ML (ref 0–0.59)
CMV IGM SERPL IA-ACNC: <30 AU/ML (ref 0–29.9)

## 2019-11-22 ENCOUNTER — APPOINTMENT (OUTPATIENT)
Dept: LAB | Facility: HOSPITAL | Age: 64
End: 2019-11-22

## 2019-11-22 ENCOUNTER — HOSPITAL ENCOUNTER (OUTPATIENT)
Dept: GENERAL RADIOLOGY | Facility: HOSPITAL | Age: 64
Discharge: HOME | End: 2019-11-22
Admitting: INTERNAL MEDICINE

## 2019-11-22 ENCOUNTER — TRANSCRIBE ORDERS (OUTPATIENT)
Dept: ADMINISTRATIVE | Facility: HOSPITAL | Age: 64
End: 2019-11-22

## 2019-11-22 ENCOUNTER — NURSE TRIAGE (OUTPATIENT)
Dept: CALL CENTER | Facility: HOSPITAL | Age: 64
End: 2019-11-22

## 2019-11-22 DIAGNOSIS — B34.9 VIRAL SYNDROME: Primary | ICD-10-CM

## 2019-11-22 LAB
ALBUMIN SERPL-MCNC: 3.3 G/DL (ref 3.5–5.2)
ALBUMIN/GLOB SERPL: 0.8 G/DL
ALP SERPL-CCNC: 66 U/L (ref 39–117)
ALT SERPL W P-5'-P-CCNC: 13 U/L (ref 1–33)
ANION GAP SERPL CALCULATED.3IONS-SCNC: 14.9 MMOL/L (ref 5–15)
AST SERPL-CCNC: 14 U/L (ref 1–32)
BILIRUB SERPL-MCNC: 0.5 MG/DL (ref 0.2–1.2)
BUN BLD-MCNC: 19 MG/DL (ref 8–23)
BUN/CREAT SERPL: 15.8 (ref 7–25)
CALCIUM SPEC-SCNC: 8.7 MG/DL (ref 8.6–10.5)
CHLORIDE SERPL-SCNC: 93 MMOL/L (ref 98–107)
CO2 SERPL-SCNC: 28.1 MMOL/L (ref 22–29)
CREAT BLD-MCNC: 1.2 MG/DL (ref 0.57–1)
CRP SERPL-MCNC: 17.58 MG/DL (ref 0–0.5)
DEPRECATED RDW RBC AUTO: 49.2 FL (ref 37–54)
ERYTHROCYTE [DISTWIDTH] IN BLOOD BY AUTOMATED COUNT: 18.4 % (ref 12.3–15.4)
ERYTHROCYTE [SEDIMENTATION RATE] IN BLOOD: 51 MM/HR (ref 0–30)
GFR SERPL CREATININE-BSD FRML MDRD: 45 ML/MIN/1.73
GLOBULIN UR ELPH-MCNC: 4.4 GM/DL
GLUCOSE BLD-MCNC: 96 MG/DL (ref 65–99)
HCT VFR BLD AUTO: 32.6 % (ref 34–46.6)
HGB BLD-MCNC: 11.1 G/DL (ref 12–15.9)
MCH RBC QN AUTO: 25.6 PG (ref 26.6–33)
MCHC RBC AUTO-ENTMCNC: 34 G/DL (ref 31.5–35.7)
MCV RBC AUTO: 75.1 FL (ref 79–97)
PLATELET # BLD AUTO: 232 10*3/MM3 (ref 140–450)
PMV BLD AUTO: 10.7 FL (ref 6–12)
POTASSIUM BLD-SCNC: 3.5 MMOL/L (ref 3.5–5.2)
PROT SERPL-MCNC: 7.7 G/DL (ref 6–8.5)
RBC # BLD AUTO: 4.34 10*6/MM3 (ref 3.77–5.28)
SODIUM BLD-SCNC: 136 MMOL/L (ref 136–145)
WBC NRBC COR # BLD: 10.72 10*3/MM3 (ref 3.4–10.8)

## 2019-11-22 PROCEDURE — 36415 COLL VENOUS BLD VENIPUNCTURE: CPT | Performed by: INTERNAL MEDICINE

## 2019-11-22 PROCEDURE — 86140 C-REACTIVE PROTEIN: CPT | Performed by: INTERNAL MEDICINE

## 2019-11-22 PROCEDURE — 85027 COMPLETE CBC AUTOMATED: CPT | Performed by: INTERNAL MEDICINE

## 2019-11-22 PROCEDURE — 85652 RBC SED RATE AUTOMATED: CPT | Performed by: INTERNAL MEDICINE

## 2019-11-22 PROCEDURE — 71046 X-RAY EXAM CHEST 2 VIEWS: CPT

## 2019-11-22 PROCEDURE — 80053 COMPREHEN METABOLIC PANEL: CPT | Performed by: INTERNAL MEDICINE

## 2019-11-22 NOTE — TELEPHONE ENCOUNTER
Caller states that her sister has had multiple health issues in the last few months and she is calling with her permission and on her behalf.  Patient has had a fever since Tuesday ranging from 100 to 103.  Temp 103 this morning.  Patient also has a rash that had cleared but now is back on her lower extremities.  Caller asking if we can make an appt for her this am with her PCP.  Explained that we are unable to but that Dr. Whaley usually keeps a few slots open for urgent issues.  She will call when office opens this am.    Reason for Disposition  • Fever present > 3 days (72 hours)    Additional Information  • Negative: Shock suspected (e.g., cold/pale/clammy skin, too weak to stand, low BP, rapid pulse)  • Negative: Difficult to awaken or acting confused (e.g., disoriented, slurred speech)  • Negative: [1] Difficulty breathing AND [2] bluish lips, tongue or face  • Negative: New onset rash with multiple purple (or blood-colored) spots or dots  • Negative: Sounds like a life-threatening emergency to the triager  • Negative: Fever in a cancer patient who is currently (or recently) receiving chemotherapy or radiation therapy, or cancer patient who has metastatic or end-stage cancer and is receiving palliative care  • Negative: Pregnant  • Negative: Fever onset within 24 hours of receiving vaccine  • Negative: [1] Fever AND [2] within 21 days of Ebola EXPOSURE  • Negative: Other symptom is present, see that guideline  (e.g., symptoms of cough, runny nose, sore throat, earache, abdominal pain, diarrhea, vomiting)  • Negative: [1] Headache AND [2] stiff neck (can't touch chin to chest)  • Negative: Difficulty breathing  • Negative: IV drug abuse  • Negative: [1] Drinking very little AND [2] dehydration suspected (e.g., no urine > 12 hours, very dry mouth, very lightheaded)  • Negative: Patient sounds very sick or weak to the triager  (Exception: mild weakness and hasn't taken fever medicine)  • Negative: Fever > 104 F  "(40 C)  • Negative: [1] Fever > 101 F (38.3 C) AND [2] age > 60  • Negative: [1] Fever > 100.0 F (37.8 C) AND [2] bedridden (e.g., nursing home patient, CVA, chronic illness, recovering from surgery)  • Negative: [1] Fever > 100.0 F (37.8 C) AND [2] indwelling urinary catheter (e.g., Roach, Coude)  • Negative: [1] Fever > 100.0 F (37.8 C) AND [2] has port (portacath), central line, or PICC line  • Negative: [1] Fever > 100.0 F (37.8 C) AND [2] diabetes mellitus or weak immune system (e.g., HIV positive, cancer chemo, splenectomy, chronic steroids)  • Negative: [1] Fever > 100.0 F (37.8 C) AND [2] surgery in the last month  • Negative: Transplant patient (e.g., kidney, liver, lung, heart)    Answer Assessment - Initial Assessment Questions  1. TEMPERATURE: \"What is the most recent temperature?\"  \"How was it measured?\"       103 oral  2. ONSET: \"When did the fever start?\"       Tuesday  3. SYMPTOMS: \"Do you have any other symptoms besides the fever?\"  (e.g., colds, headache, sore throat, earache, cough, rash, diarrhea, vomiting, abdominal pain)      rash  4. CAUSE: If there are no symptoms, ask: \"What do you think is causing the fever?\"       Not sure  5. CONTACTS: \"Does anyone else in the family have an infection?\"      no  6. TREATMENT: \"What have you done so far to treat this fever?\" (e.g., medications)      tylenol  7. IMMUNOCOMPROMISE: \"Do you have of the following: diabetes, HIV positive, splenectomy, cancer chemotherapy, chronic steroid treatment, transplant patient, etc.\"      Have had several health issues  8. PREGNANCY: \"Is there any chance you are pregnant?\" \"When was your last menstrual period?\"      no  9. TRAVEL: \"Have you traveled out of the country in the last month?\" (e.g., travel history, exposures)      no    Protocols used: FEVER-ADULT-AH      "

## 2019-11-24 ENCOUNTER — APPOINTMENT (OUTPATIENT)
Dept: CT IMAGING | Facility: HOSPITAL | Age: 64
End: 2019-11-24

## 2019-11-24 ENCOUNTER — APPOINTMENT (OUTPATIENT)
Dept: GENERAL RADIOLOGY | Facility: HOSPITAL | Age: 64
End: 2019-11-24

## 2019-11-24 ENCOUNTER — HOSPITAL ENCOUNTER (INPATIENT)
Facility: HOSPITAL | Age: 64
LOS: 3 days | Discharge: HOME | End: 2019-11-27
Attending: INTERNAL MEDICINE | Admitting: SPECIALIST

## 2019-11-24 DIAGNOSIS — I95.9 HYPOTENSION, UNSPECIFIED HYPOTENSION TYPE: Primary | ICD-10-CM

## 2019-11-24 DIAGNOSIS — R59.1 LYMPHADENOPATHY: ICD-10-CM

## 2019-11-24 PROBLEM — R79.89 ELEVATED D-DIMER: Status: ACTIVE | Noted: 2019-11-24

## 2019-11-24 PROBLEM — E87.6 HYPOKALEMIA: Status: ACTIVE | Noted: 2019-11-24

## 2019-11-24 PROBLEM — E87.1 HYPONATREMIA: Status: ACTIVE | Noted: 2019-11-24

## 2019-11-24 PROBLEM — R65.10 SIRS (SYSTEMIC INFLAMMATORY RESPONSE SYNDROME): Status: ACTIVE | Noted: 2019-11-24

## 2019-11-24 PROBLEM — R82.90 ABNORMAL URINALYSIS: Status: ACTIVE | Noted: 2019-11-24

## 2019-11-24 LAB
ALBUMIN SERPL-MCNC: 3.1 G/DL (ref 3.5–5.2)
ALBUMIN/GLOB SERPL: 0.7 G/DL
ALP SERPL-CCNC: 79 U/L (ref 39–117)
ALT SERPL W P-5'-P-CCNC: 14 U/L (ref 1–33)
AMYLASE SERPL-CCNC: 32 U/L (ref 28–100)
ANION GAP SERPL CALCULATED.3IONS-SCNC: 14 MMOL/L (ref 5–15)
APTT PPP: 30.5 SECONDS (ref 24.1–35)
AST SERPL-CCNC: 19 U/L (ref 1–32)
BACTERIA UR QL AUTO: ABNORMAL /HPF
BASOPHILS # BLD AUTO: 0.03 10*3/MM3 (ref 0–0.2)
BASOPHILS NFR BLD AUTO: 0.2 % (ref 0–1.5)
BILIRUB SERPL-MCNC: 0.5 MG/DL (ref 0.2–1.2)
BILIRUB UR QL STRIP: NEGATIVE
BUN BLD-MCNC: 15 MG/DL (ref 8–23)
BUN/CREAT SERPL: 17.4 (ref 7–25)
CALCIUM SPEC-SCNC: 8.4 MG/DL (ref 8.6–10.5)
CHLORIDE SERPL-SCNC: 91 MMOL/L (ref 98–107)
CLARITY UR: ABNORMAL
CO2 SERPL-SCNC: 27 MMOL/L (ref 22–29)
COLOR UR: YELLOW
CREAT BLD-MCNC: 0.86 MG/DL (ref 0.57–1)
D DIMER PPP FEU-MCNC: 3.09 MG/L (FEU) (ref 0–0.5)
D-LACTATE SERPL-SCNC: 1.7 MMOL/L (ref 0.5–2)
D-LACTATE SERPL-SCNC: 2.1 MMOL/L (ref 0.5–2)
DEPRECATED RDW RBC AUTO: 51.9 FL (ref 37–54)
EOSINOPHIL # BLD AUTO: 0.08 10*3/MM3 (ref 0–0.4)
EOSINOPHIL NFR BLD AUTO: 0.7 % (ref 0.3–6.2)
ERYTHROCYTE [DISTWIDTH] IN BLOOD BY AUTOMATED COUNT: 19.3 % (ref 12.3–15.4)
FLUAV AG NPH QL: NEGATIVE
FLUBV AG NPH QL IA: NEGATIVE
GFR SERPL CREATININE-BSD FRML MDRD: 67 ML/MIN/1.73
GLOBULIN UR ELPH-MCNC: 4.3 GM/DL
GLUCOSE BLD-MCNC: 112 MG/DL (ref 65–99)
GLUCOSE UR STRIP-MCNC: NEGATIVE MG/DL
HCT VFR BLD AUTO: 33.8 % (ref 34–46.6)
HGB BLD-MCNC: 11.2 G/DL (ref 12–15.9)
HGB UR QL STRIP.AUTO: NEGATIVE
HOLD SPECIMEN: NORMAL
HYALINE CASTS UR QL AUTO: ABNORMAL /LPF
IMM GRANULOCYTES # BLD AUTO: 0.14 10*3/MM3 (ref 0–0.05)
IMM GRANULOCYTES NFR BLD AUTO: 1.1 % (ref 0–0.5)
INR PPP: 1.27 (ref 0.91–1.09)
KETONES UR QL STRIP: ABNORMAL
LDH SERPL-CCNC: 380 U/L (ref 135–214)
LEUKOCYTE ESTERASE UR QL STRIP.AUTO: NEGATIVE
LIPASE SERPL-CCNC: 13 U/L (ref 13–60)
LYMPHOCYTES # BLD AUTO: 0.47 10*3/MM3 (ref 0.7–3.1)
LYMPHOCYTES NFR BLD AUTO: 3.8 % (ref 19.6–45.3)
MCH RBC QN AUTO: 24.8 PG (ref 26.6–33)
MCHC RBC AUTO-ENTMCNC: 33.1 G/DL (ref 31.5–35.7)
MCV RBC AUTO: 74.8 FL (ref 79–97)
MONOCYTES # BLD AUTO: 0.45 10*3/MM3 (ref 0.1–0.9)
MONOCYTES NFR BLD AUTO: 3.7 % (ref 5–12)
NEUTROPHILS # BLD AUTO: 11.04 10*3/MM3 (ref 1.7–7)
NEUTROPHILS NFR BLD AUTO: 90.5 % (ref 42.7–76)
NITRITE UR QL STRIP: NEGATIVE
NRBC BLD AUTO-RTO: 0 /100 WBC (ref 0–0.2)
NT-PROBNP SERPL-MCNC: 1679 PG/ML (ref 5–900)
PH UR STRIP.AUTO: 5.5 [PH] (ref 5–8)
PLATELET # BLD AUTO: 238 10*3/MM3 (ref 140–450)
PMV BLD AUTO: 9.7 FL (ref 6–12)
POTASSIUM BLD-SCNC: 3.3 MMOL/L (ref 3.5–5.2)
PROT SERPL-MCNC: 7.4 G/DL (ref 6–8.5)
PROT UR QL STRIP: ABNORMAL
PROTHROMBIN TIME: 16.3 SECONDS (ref 11.9–14.6)
RBC # BLD AUTO: 4.52 10*6/MM3 (ref 3.77–5.28)
RBC # UR: ABNORMAL /HPF
REF LAB TEST METHOD: ABNORMAL
SODIUM BLD-SCNC: 132 MMOL/L (ref 136–145)
SP GR UR STRIP: >1.03 (ref 1–1.03)
SQUAMOUS #/AREA URNS HPF: ABNORMAL /HPF
TRANS CELLS #/AREA URNS HPF: ABNORMAL /HPF
TROPONIN T SERPL-MCNC: <0.01 NG/ML (ref 0–0.03)
UROBILINOGEN UR QL STRIP: ABNORMAL
WBC NRBC COR # BLD: 12.21 10*3/MM3 (ref 3.4–10.8)
WBC UR QL AUTO: ABNORMAL /HPF
WHOLE BLOOD HOLD SPECIMEN: NORMAL
WHOLE BLOOD HOLD SPECIMEN: NORMAL

## 2019-11-24 PROCEDURE — 85610 PROTHROMBIN TIME: CPT | Performed by: NURSE PRACTITIONER

## 2019-11-24 PROCEDURE — 71045 X-RAY EXAM CHEST 1 VIEW: CPT

## 2019-11-24 PROCEDURE — 80053 COMPREHEN METABOLIC PANEL: CPT | Performed by: NURSE PRACTITIONER

## 2019-11-24 PROCEDURE — 83690 ASSAY OF LIPASE: CPT | Performed by: NURSE PRACTITIONER

## 2019-11-24 PROCEDURE — 85730 THROMBOPLASTIN TIME PARTIAL: CPT | Performed by: NURSE PRACTITIONER

## 2019-11-24 PROCEDURE — 82150 ASSAY OF AMYLASE: CPT | Performed by: NURSE PRACTITIONER

## 2019-11-24 PROCEDURE — 71275 CT ANGIOGRAPHY CHEST: CPT

## 2019-11-24 PROCEDURE — 87040 BLOOD CULTURE FOR BACTERIA: CPT | Performed by: NURSE PRACTITIONER

## 2019-11-24 PROCEDURE — 25010000002 CEFTRIAXONE PER 250 MG: Performed by: INTERNAL MEDICINE

## 2019-11-24 PROCEDURE — 74177 CT ABD & PELVIS W/CONTRAST: CPT

## 2019-11-24 PROCEDURE — 94760 N-INVAS EAR/PLS OXIMETRY 1: CPT

## 2019-11-24 PROCEDURE — 81001 URINALYSIS AUTO W/SCOPE: CPT | Performed by: NURSE PRACTITIONER

## 2019-11-24 PROCEDURE — 25010000002 ENOXAPARIN PER 10 MG: Performed by: INTERNAL MEDICINE

## 2019-11-24 PROCEDURE — 93010 ELECTROCARDIOGRAM REPORT: CPT | Performed by: INTERNAL MEDICINE

## 2019-11-24 PROCEDURE — 93005 ELECTROCARDIOGRAM TRACING: CPT | Performed by: NURSE PRACTITIONER

## 2019-11-24 PROCEDURE — 25010000002 HYDROMORPHONE PER 4 MG: Performed by: NURSE PRACTITIONER

## 2019-11-24 PROCEDURE — 0 IOPAMIDOL PER 1 ML: Performed by: NURSE PRACTITIONER

## 2019-11-24 PROCEDURE — 85379 FIBRIN DEGRADATION QUANT: CPT | Performed by: NURSE PRACTITIONER

## 2019-11-24 PROCEDURE — P9612 CATHETERIZE FOR URINE SPEC: HCPCS

## 2019-11-24 PROCEDURE — 83615 LACTATE (LD) (LDH) ENZYME: CPT | Performed by: INTERNAL MEDICINE

## 2019-11-24 PROCEDURE — 25810000003 SODIUM CHLORIDE 0.9 % WITH KCL 20 MEQ 20-0.9 MEQ/L-% SOLUTION: Performed by: INTERNAL MEDICINE

## 2019-11-24 PROCEDURE — 94799 UNLISTED PULMONARY SVC/PX: CPT

## 2019-11-24 PROCEDURE — 99285 EMERGENCY DEPT VISIT HI MDM: CPT

## 2019-11-24 PROCEDURE — 83880 ASSAY OF NATRIURETIC PEPTIDE: CPT | Performed by: NURSE PRACTITIONER

## 2019-11-24 PROCEDURE — 87086 URINE CULTURE/COLONY COUNT: CPT | Performed by: INTERNAL MEDICINE

## 2019-11-24 PROCEDURE — 84484 ASSAY OF TROPONIN QUANT: CPT | Performed by: NURSE PRACTITIONER

## 2019-11-24 PROCEDURE — 87804 INFLUENZA ASSAY W/OPTIC: CPT | Performed by: NURSE PRACTITIONER

## 2019-11-24 PROCEDURE — 85025 COMPLETE CBC W/AUTO DIFF WBC: CPT | Performed by: NURSE PRACTITIONER

## 2019-11-24 PROCEDURE — 25010000002 ONDANSETRON PER 1 MG: Performed by: NURSE PRACTITIONER

## 2019-11-24 PROCEDURE — 83605 ASSAY OF LACTIC ACID: CPT | Performed by: NURSE PRACTITIONER

## 2019-11-24 RX ORDER — HYDROMORPHONE HYDROCHLORIDE 1 MG/ML
0.5 INJECTION, SOLUTION INTRAMUSCULAR; INTRAVENOUS; SUBCUTANEOUS ONCE
Status: COMPLETED | OUTPATIENT
Start: 2019-11-24 | End: 2019-11-24

## 2019-11-24 RX ORDER — ONDANSETRON 2 MG/ML
4 INJECTION INTRAMUSCULAR; INTRAVENOUS EVERY 6 HOURS PRN
Status: DISCONTINUED | OUTPATIENT
Start: 2019-11-24 | End: 2019-11-27 | Stop reason: HOSPADM

## 2019-11-24 RX ORDER — SODIUM CHLORIDE 0.9 % (FLUSH) 0.9 %
10 SYRINGE (ML) INJECTION EVERY 12 HOURS SCHEDULED
Status: DISCONTINUED | OUTPATIENT
Start: 2019-11-24 | End: 2019-11-27 | Stop reason: HOSPADM

## 2019-11-24 RX ORDER — ACETAMINOPHEN 325 MG/1
650 TABLET ORAL EVERY 6 HOURS PRN
Status: DISCONTINUED | OUTPATIENT
Start: 2019-11-24 | End: 2019-11-27 | Stop reason: HOSPADM

## 2019-11-24 RX ORDER — ONDANSETRON 2 MG/ML
4 INJECTION INTRAMUSCULAR; INTRAVENOUS ONCE
Status: COMPLETED | OUTPATIENT
Start: 2019-11-24 | End: 2019-11-24

## 2019-11-24 RX ORDER — ATORVASTATIN CALCIUM 10 MG/1
10 TABLET, FILM COATED ORAL NIGHTLY
Status: DISCONTINUED | OUTPATIENT
Start: 2019-11-24 | End: 2019-11-27 | Stop reason: HOSPADM

## 2019-11-24 RX ORDER — PAROXETINE 10 MG/1
10 TABLET, FILM COATED ORAL DAILY
Status: DISCONTINUED | OUTPATIENT
Start: 2019-11-25 | End: 2019-11-26

## 2019-11-24 RX ORDER — SODIUM CHLORIDE 0.9 % (FLUSH) 0.9 %
10 SYRINGE (ML) INJECTION AS NEEDED
Status: DISCONTINUED | OUTPATIENT
Start: 2019-11-24 | End: 2019-11-27 | Stop reason: HOSPADM

## 2019-11-24 RX ORDER — DIPHENHYDRAMINE HYDROCHLORIDE, ZINC ACETATE 2; .1 G/100G; G/100G
CREAM TOPICAL 3 TIMES DAILY PRN
Status: DISCONTINUED | OUTPATIENT
Start: 2019-11-24 | End: 2019-11-27 | Stop reason: HOSPADM

## 2019-11-24 RX ORDER — DIPHENHYDRAMINE HYDROCHLORIDE 50 MG/ML
25 INJECTION INTRAMUSCULAR; INTRAVENOUS ONCE
Status: DISCONTINUED | OUTPATIENT
Start: 2019-11-24 | End: 2019-11-27 | Stop reason: HOSPADM

## 2019-11-24 RX ORDER — SODIUM CHLORIDE AND POTASSIUM CHLORIDE 150; 900 MG/100ML; MG/100ML
100 INJECTION, SOLUTION INTRAVENOUS CONTINUOUS
Status: DISCONTINUED | OUTPATIENT
Start: 2019-11-24 | End: 2019-11-27 | Stop reason: HOSPADM

## 2019-11-24 RX ADMIN — CEFTRIAXONE SODIUM 1 G: 1 INJECTION, POWDER, FOR SOLUTION INTRAMUSCULAR; INTRAVENOUS at 17:22

## 2019-11-24 RX ADMIN — ONDANSETRON HYDROCHLORIDE 4 MG: 2 SOLUTION INTRAMUSCULAR; INTRAVENOUS at 11:10

## 2019-11-24 RX ADMIN — ACETAMINOPHEN 650 MG: 325 TABLET, FILM COATED ORAL at 19:00

## 2019-11-24 RX ADMIN — SODIUM CHLORIDE 500 ML: 9 INJECTION, SOLUTION INTRAVENOUS at 15:49

## 2019-11-24 RX ADMIN — SODIUM CHLORIDE 500 ML: 9 INJECTION, SOLUTION INTRAVENOUS at 11:11

## 2019-11-24 RX ADMIN — ENOXAPARIN SODIUM 40 MG: 40 INJECTION SUBCUTANEOUS at 18:58

## 2019-11-24 RX ADMIN — SODIUM CHLORIDE 1000 ML: 9 INJECTION, SOLUTION INTRAVENOUS at 17:23

## 2019-11-24 RX ADMIN — DIPHENHYDRAMINE HYDROCHLORIDE, ZINC ACETATE: 2; .1 CREAM TOPICAL at 19:46

## 2019-11-24 RX ADMIN — POTASSIUM CHLORIDE AND SODIUM CHLORIDE 100 ML/HR: 900; 150 INJECTION, SOLUTION INTRAVENOUS at 18:58

## 2019-11-24 RX ADMIN — ALUMINUM HYDROXIDE, MAGNESIUM HYDROXIDE, SIMETHICONE 30 ML: 400; 400; 40 SUSPENSION ORAL at 23:02

## 2019-11-24 RX ADMIN — ATORVASTATIN CALCIUM 10 MG: 10 TABLET, FILM COATED ORAL at 20:19

## 2019-11-24 RX ADMIN — IOPAMIDOL 125 ML: 755 INJECTION, SOLUTION INTRAVENOUS at 13:24

## 2019-11-24 RX ADMIN — HYDROMORPHONE HYDROCHLORIDE 0.5 MG: 1 INJECTION, SOLUTION INTRAMUSCULAR; INTRAVENOUS; SUBCUTANEOUS at 11:15

## 2019-11-25 ENCOUNTER — APPOINTMENT (OUTPATIENT)
Dept: ULTRASOUND IMAGING | Facility: HOSPITAL | Age: 64
End: 2019-11-25

## 2019-11-25 ENCOUNTER — NURSE TRIAGE (OUTPATIENT)
Dept: CALL CENTER | Facility: HOSPITAL | Age: 64
End: 2019-11-25

## 2019-11-25 LAB
ALBUMIN SERPL-MCNC: 2.6 G/DL (ref 3.5–5.2)
ALBUMIN/GLOB SERPL: 0.7 G/DL
ALP SERPL-CCNC: 75 U/L (ref 39–117)
ALT SERPL W P-5'-P-CCNC: 14 U/L (ref 1–33)
ANION GAP SERPL CALCULATED.3IONS-SCNC: 11 MMOL/L (ref 5–15)
ANISOCYTOSIS BLD QL: ABNORMAL
AST SERPL-CCNC: 16 U/L (ref 1–32)
BACTERIA SPEC AEROBE CULT: NO GROWTH
BILIRUB SERPL-MCNC: 0.3 MG/DL (ref 0.2–1.2)
BUN BLD-MCNC: 15 MG/DL (ref 8–23)
BUN/CREAT SERPL: 15.8 (ref 7–25)
CALCIUM SPEC-SCNC: 6.9 MG/DL (ref 8.6–10.5)
CHLORIDE SERPL-SCNC: 102 MMOL/L (ref 98–107)
CO2 SERPL-SCNC: 23 MMOL/L (ref 22–29)
CREAT BLD-MCNC: 0.95 MG/DL (ref 0.57–1)
DEPRECATED RDW RBC AUTO: 52.3 FL (ref 37–54)
EOSINOPHIL # BLD MANUAL: 1.13 10*3/MM3 (ref 0–0.4)
EOSINOPHIL NFR BLD MANUAL: 7.2 % (ref 0.3–6.2)
ERYTHROCYTE [DISTWIDTH] IN BLOOD BY AUTOMATED COUNT: 19.6 % (ref 12.3–15.4)
FERRITIN SERPL-MCNC: 843.3 NG/ML (ref 13–150)
FUNGUS WND CULT: NORMAL
GFR SERPL CREATININE-BSD FRML MDRD: 59 ML/MIN/1.73
GIANT PLATELETS: ABNORMAL
GLOBULIN UR ELPH-MCNC: 3.5 GM/DL
GLUCOSE BLD-MCNC: 113 MG/DL (ref 65–99)
HCT VFR BLD AUTO: 29.7 % (ref 34–46.6)
HGB BLD-MCNC: 9.8 G/DL (ref 12–15.9)
LDH SERPL-CCNC: 399 U/L (ref 135–214)
LYMPHOCYTES # BLD MANUAL: 0.64 10*3/MM3 (ref 0.7–3.1)
LYMPHOCYTES NFR BLD MANUAL: 4.1 % (ref 19.6–45.3)
MAGNESIUM SERPL-MCNC: 1.2 MG/DL (ref 1.6–2.4)
MCH RBC QN AUTO: 24.6 PG (ref 26.6–33)
MCHC RBC AUTO-ENTMCNC: 33 G/DL (ref 31.5–35.7)
MCV RBC AUTO: 74.4 FL (ref 79–97)
MICROCYTES BLD QL: ABNORMAL
MYCOBACTERIUM SPEC CULT: NORMAL
MYELOCYTES NFR BLD MANUAL: 1 % (ref 0–0)
NEUTROPHILS # BLD AUTO: 13.05 10*3/MM3 (ref 1.7–7)
NEUTROPHILS NFR BLD MANUAL: 83.5 % (ref 42.7–76)
NIGHT BLUE STAIN TISS: NORMAL
NIGHT BLUE STAIN TISS: NORMAL
PHOSPHATE SERPL-MCNC: 2.6 MG/DL (ref 2.5–4.5)
PLATELET # BLD AUTO: 216 10*3/MM3 (ref 140–450)
PMV BLD AUTO: 9.5 FL (ref 6–12)
POTASSIUM BLD-SCNC: 3.8 MMOL/L (ref 3.5–5.2)
PROT SERPL-MCNC: 6.1 G/DL (ref 6–8.5)
RBC # BLD AUTO: 3.99 10*6/MM3 (ref 3.77–5.28)
SODIUM BLD-SCNC: 136 MMOL/L (ref 136–145)
VARIANT LYMPHS NFR BLD MANUAL: 4.1 % (ref 0–5)
WBC MORPH BLD: NORMAL
WBC NRBC COR # BLD: 15.63 10*3/MM3 (ref 3.4–10.8)

## 2019-11-25 PROCEDURE — 85025 COMPLETE CBC W/AUTO DIFF WBC: CPT | Performed by: INTERNAL MEDICINE

## 2019-11-25 PROCEDURE — 83615 LACTATE (LD) (LDH) ENZYME: CPT | Performed by: INTERNAL MEDICINE

## 2019-11-25 PROCEDURE — 85007 BL SMEAR W/DIFF WBC COUNT: CPT | Performed by: INTERNAL MEDICINE

## 2019-11-25 PROCEDURE — 84100 ASSAY OF PHOSPHORUS: CPT | Performed by: INTERNAL MEDICINE

## 2019-11-25 PROCEDURE — 86645 CMV ANTIBODY IGM: CPT | Performed by: INTERNAL MEDICINE

## 2019-11-25 PROCEDURE — 80053 COMPREHEN METABOLIC PANEL: CPT | Performed by: INTERNAL MEDICINE

## 2019-11-25 PROCEDURE — 82728 ASSAY OF FERRITIN: CPT | Performed by: INTERNAL MEDICINE

## 2019-11-25 PROCEDURE — 25010000002 CEFTRIAXONE PER 250 MG: Performed by: INTERNAL MEDICINE

## 2019-11-25 PROCEDURE — 99254 IP/OBS CNSLTJ NEW/EST MOD 60: CPT | Performed by: INTERNAL MEDICINE

## 2019-11-25 PROCEDURE — 94799 UNLISTED PULMONARY SVC/PX: CPT

## 2019-11-25 PROCEDURE — 25010000002 ENOXAPARIN PER 10 MG: Performed by: INTERNAL MEDICINE

## 2019-11-25 PROCEDURE — 93970 EXTREMITY STUDY: CPT

## 2019-11-25 PROCEDURE — 82607 VITAMIN B-12: CPT | Performed by: INTERNAL MEDICINE

## 2019-11-25 PROCEDURE — 82746 ASSAY OF FOLIC ACID SERUM: CPT | Performed by: INTERNAL MEDICINE

## 2019-11-25 PROCEDURE — 83735 ASSAY OF MAGNESIUM: CPT | Performed by: INTERNAL MEDICINE

## 2019-11-25 PROCEDURE — 93970 EXTREMITY STUDY: CPT | Performed by: SURGERY

## 2019-11-25 PROCEDURE — 86644 CMV ANTIBODY: CPT | Performed by: INTERNAL MEDICINE

## 2019-11-25 PROCEDURE — 25810000003 SODIUM CHLORIDE 0.9 % WITH KCL 20 MEQ 20-0.9 MEQ/L-% SOLUTION: Performed by: INTERNAL MEDICINE

## 2019-11-25 PROCEDURE — 94760 N-INVAS EAR/PLS OXIMETRY 1: CPT

## 2019-11-25 RX ORDER — TRIAMCINOLONE ACETONIDE 0.1 %
PASTE (GRAM) DENTAL 2 TIMES DAILY
Status: DISCONTINUED | OUTPATIENT
Start: 2019-11-25 | End: 2019-11-27 | Stop reason: HOSPADM

## 2019-11-25 RX ORDER — ECHINACEA PURPUREA EXTRACT 125 MG
1 TABLET ORAL AS NEEDED
Status: DISCONTINUED | OUTPATIENT
Start: 2019-11-25 | End: 2019-11-27 | Stop reason: HOSPADM

## 2019-11-25 RX ADMIN — CEFTRIAXONE SODIUM 1 G: 1 INJECTION, POWDER, FOR SOLUTION INTRAMUSCULAR; INTRAVENOUS at 17:30

## 2019-11-25 RX ADMIN — ALUMINUM HYDROXIDE, MAGNESIUM HYDROXIDE, SIMETHICONE 30 ML: 400; 400; 40 SUSPENSION ORAL at 19:32

## 2019-11-25 RX ADMIN — ACETAMINOPHEN 650 MG: 325 TABLET, FILM COATED ORAL at 17:01

## 2019-11-25 RX ADMIN — Medication 1 SPRAY: at 04:56

## 2019-11-25 RX ADMIN — ATORVASTATIN CALCIUM 10 MG: 10 TABLET, FILM COATED ORAL at 21:42

## 2019-11-25 RX ADMIN — SODIUM CHLORIDE, PRESERVATIVE FREE 10 ML: 5 INJECTION INTRAVENOUS at 08:45

## 2019-11-25 RX ADMIN — ENOXAPARIN SODIUM 40 MG: 40 INJECTION SUBCUTANEOUS at 17:32

## 2019-11-25 RX ADMIN — ALUMINUM HYDROXIDE, MAGNESIUM HYDROXIDE, SIMETHICONE 30 ML: 400; 400; 40 SUSPENSION ORAL at 07:12

## 2019-11-25 RX ADMIN — POTASSIUM CHLORIDE AND SODIUM CHLORIDE 100 ML/HR: 900; 150 INJECTION, SOLUTION INTRAVENOUS at 04:54

## 2019-11-25 RX ADMIN — SODIUM CHLORIDE, PRESERVATIVE FREE 10 ML: 5 INJECTION INTRAVENOUS at 21:42

## 2019-11-25 RX ADMIN — PAROXETINE 10 MG: 10 TABLET, FILM COATED ORAL at 08:45

## 2019-11-25 RX ADMIN — TRIAMCINOLONE ACETONIDE: 1 PASTE TOPICAL at 21:45

## 2019-11-25 NOTE — TELEPHONE ENCOUNTER
"I called VERITO Mitchell who is on call for Dr. Whaley. He will pass this request on to Dr. Whaley.     Reason for Disposition  • Call about patient who is currently hospitalized    Additional Information  • Negative: Lab calling with strep throat test results and triager can call in prescription  • Negative: Lab calling with urinalysis test results and triager can call in prescription  • Negative: Medication questions  • Negative: ED call to PCP  • Negative: Physician call to PCP    Answer Assessment - Initial Assessment Questions  1. REASON FOR CALL or QUESTION: \"What is your reason for calling today?\" or \"How can I best  help you?\" or \"What question do you have that I can help answer?\"      Want to discuss biopsy having done tomorrow and results of CMV.   2. CALLER: Document the source of call. (e.g., laboratory, patient).      Son is the caller    Protocols used: PCP CALL - NO TRIAGE-ADULT-      "

## 2019-11-26 ENCOUNTER — ANESTHESIA EVENT (OUTPATIENT)
Dept: PERIOP | Facility: HOSPITAL | Age: 64
End: 2019-11-26

## 2019-11-26 ENCOUNTER — ANESTHESIA (OUTPATIENT)
Dept: PERIOP | Facility: HOSPITAL | Age: 64
End: 2019-11-26

## 2019-11-26 LAB
ANISOCYTOSIS BLD QL: ABNORMAL
BASOPHILS # BLD MANUAL: 0.16 10*3/MM3 (ref 0–0.2)
BASOPHILS NFR BLD AUTO: 1 % (ref 0–1.5)
CMV IGG SERPL IA-ACNC: 3.1 U/ML (ref 0–0.59)
CMV IGM SERPL IA-ACNC: <30 AU/ML (ref 0–29.9)
DEPRECATED RDW RBC AUTO: 53.8 FL (ref 37–54)
ELLIPTOCYTES BLD QL SMEAR: ABNORMAL
EOSINOPHIL # BLD MANUAL: 2.04 10*3/MM3 (ref 0–0.4)
EOSINOPHIL NFR BLD MANUAL: 13 % (ref 0.3–6.2)
ERYTHROCYTE [DISTWIDTH] IN BLOOD BY AUTOMATED COUNT: 19.9 % (ref 12.3–15.4)
FOLATE SERPL-MCNC: 6.6 NG/ML (ref 4.78–24.2)
HCT VFR BLD AUTO: 27.7 % (ref 34–46.6)
HGB BLD-MCNC: 9.2 G/DL (ref 12–15.9)
IRON 24H UR-MRATE: 40 MCG/DL (ref 37–145)
IRON SATN MFR SERPL: 20 % (ref 20–50)
LYMPHOCYTES # BLD MANUAL: 0.79 10*3/MM3 (ref 0.7–3.1)
LYMPHOCYTES NFR BLD MANUAL: 3 % (ref 5–12)
LYMPHOCYTES NFR BLD MANUAL: 5 % (ref 19.6–45.3)
MCH RBC QN AUTO: 24.7 PG (ref 26.6–33)
MCHC RBC AUTO-ENTMCNC: 33.2 G/DL (ref 31.5–35.7)
MCV RBC AUTO: 74.3 FL (ref 79–97)
MONOCYTES # BLD AUTO: 0.47 10*3/MM3 (ref 0.1–0.9)
NEUTROPHILS # BLD AUTO: 9.9 10*3/MM3 (ref 1.7–7)
NEUTROPHILS NFR BLD MANUAL: 55 % (ref 42.7–76)
NEUTS BAND NFR BLD MANUAL: 8 % (ref 0–5)
NEUTS VAC BLD QL SMEAR: ABNORMAL
PLASMA CELL PREC NFR BLD MANUAL: 1 % (ref 0–0)
PLAT MORPH BLD: NORMAL
PLATELET # BLD AUTO: 251 10*3/MM3 (ref 140–450)
PMV BLD AUTO: 9.5 FL (ref 6–12)
RBC # BLD AUTO: 3.73 10*6/MM3 (ref 3.77–5.28)
SMUDGE CELLS BLD QL SMEAR: ABNORMAL
TIBC SERPL-MCNC: 195 MCG/DL (ref 298–536)
TRANSFERRIN SERPL-MCNC: 131 MG/DL (ref 200–360)
VARIANT LYMPHS NFR BLD MANUAL: 14 % (ref 0–5)
VIT B12 BLD-MCNC: 793 PG/ML (ref 211–946)
WBC NRBC COR # BLD: 15.72 10*3/MM3 (ref 3.4–10.8)

## 2019-11-26 PROCEDURE — 83540 ASSAY OF IRON: CPT | Performed by: INTERNAL MEDICINE

## 2019-11-26 PROCEDURE — 25010000002 ONDANSETRON PER 1 MG: Performed by: NURSE ANESTHETIST, CERTIFIED REGISTERED

## 2019-11-26 PROCEDURE — 25010000002 DEXAMETHASONE PER 1 MG: Performed by: NURSE ANESTHETIST, CERTIFIED REGISTERED

## 2019-11-26 PROCEDURE — 07B60ZX EXCISION OF LEFT AXILLARY LYMPHATIC, OPEN APPROACH, DIAGNOSTIC: ICD-10-PCS | Performed by: SPECIALIST

## 2019-11-26 PROCEDURE — 25010000002 CEFTRIAXONE PER 250 MG: Performed by: INTERNAL MEDICINE

## 2019-11-26 PROCEDURE — 88185 FLOWCYTOMETRY/TC ADD-ON: CPT

## 2019-11-26 PROCEDURE — 85007 BL SMEAR W/DIFF WBC COUNT: CPT | Performed by: INTERNAL MEDICINE

## 2019-11-26 PROCEDURE — 25010000002 ENOXAPARIN PER 10 MG: Performed by: INTERNAL MEDICINE

## 2019-11-26 PROCEDURE — 88342 IMHCHEM/IMCYTCHM 1ST ANTB: CPT

## 2019-11-26 PROCEDURE — 88305 TISSUE EXAM BY PATHOLOGIST: CPT | Performed by: SPECIALIST

## 2019-11-26 PROCEDURE — 81261 IGH GENE REARRANGE AMP METH: CPT

## 2019-11-26 PROCEDURE — 85025 COMPLETE CBC W/AUTO DIFF WBC: CPT | Performed by: INTERNAL MEDICINE

## 2019-11-26 PROCEDURE — 99232 SBSQ HOSP IP/OBS MODERATE 35: CPT | Performed by: INTERNAL MEDICINE

## 2019-11-26 PROCEDURE — 25010000002 FENTANYL CITRATE (PF) 100 MCG/2ML SOLUTION: Performed by: NURSE ANESTHETIST, CERTIFIED REGISTERED

## 2019-11-26 PROCEDURE — 88184 FLOWCYTOMETRY/ TC 1 MARKER: CPT

## 2019-11-26 PROCEDURE — 25010000002 DEXAMETHASONE PER 1 MG: Performed by: ANESTHESIOLOGY

## 2019-11-26 PROCEDURE — 25010000002 PROPOFOL 10 MG/ML EMULSION: Performed by: NURSE ANESTHETIST, CERTIFIED REGISTERED

## 2019-11-26 PROCEDURE — 81342 TRG GENE REARRANGEMENT ANAL: CPT

## 2019-11-26 PROCEDURE — 25010000002 MIDAZOLAM PER 1 MG: Performed by: ANESTHESIOLOGY

## 2019-11-26 PROCEDURE — 84466 ASSAY OF TRANSFERRIN: CPT | Performed by: INTERNAL MEDICINE

## 2019-11-26 PROCEDURE — 88334 PATH CONSLTJ SURG CYTO XM EA: CPT | Performed by: SPECIALIST

## 2019-11-26 RX ORDER — DEXAMETHASONE SODIUM PHOSPHATE 4 MG/ML
4 INJECTION, SOLUTION INTRA-ARTICULAR; INTRALESIONAL; INTRAMUSCULAR; INTRAVENOUS; SOFT TISSUE ONCE AS NEEDED
Status: COMPLETED | OUTPATIENT
Start: 2019-11-26 | End: 2019-11-26

## 2019-11-26 RX ORDER — SODIUM CHLORIDE 0.9 % (FLUSH) 0.9 %
3 SYRINGE (ML) INJECTION EVERY 12 HOURS SCHEDULED
Status: DISCONTINUED | OUTPATIENT
Start: 2019-11-26 | End: 2019-11-26 | Stop reason: HOSPADM

## 2019-11-26 RX ORDER — MORPHINE SULFATE 2 MG/ML
2 INJECTION, SOLUTION INTRAMUSCULAR; INTRAVENOUS
Status: DISCONTINUED | OUTPATIENT
Start: 2019-11-26 | End: 2019-11-26 | Stop reason: HOSPADM

## 2019-11-26 RX ORDER — MAGNESIUM HYDROXIDE 1200 MG/15ML
LIQUID ORAL AS NEEDED
Status: DISCONTINUED | OUTPATIENT
Start: 2019-11-26 | End: 2019-11-26 | Stop reason: HOSPADM

## 2019-11-26 RX ORDER — OXYCODONE AND ACETAMINOPHEN 10; 325 MG/1; MG/1
1 TABLET ORAL ONCE AS NEEDED
Status: DISCONTINUED | OUTPATIENT
Start: 2019-11-26 | End: 2019-11-26 | Stop reason: HOSPADM

## 2019-11-26 RX ORDER — ONDANSETRON 2 MG/ML
INJECTION INTRAMUSCULAR; INTRAVENOUS AS NEEDED
Status: DISCONTINUED | OUTPATIENT
Start: 2019-11-26 | End: 2019-11-26 | Stop reason: SURG

## 2019-11-26 RX ORDER — FENTANYL CITRATE 50 UG/ML
25 INJECTION, SOLUTION INTRAMUSCULAR; INTRAVENOUS
Status: DISCONTINUED | OUTPATIENT
Start: 2019-11-26 | End: 2019-11-26 | Stop reason: HOSPADM

## 2019-11-26 RX ORDER — NALOXONE HCL 0.4 MG/ML
0.04 VIAL (ML) INJECTION AS NEEDED
Status: DISCONTINUED | OUTPATIENT
Start: 2019-11-26 | End: 2019-11-26 | Stop reason: HOSPADM

## 2019-11-26 RX ORDER — PAROXETINE 10 MG/1
10 TABLET, FILM COATED ORAL DAILY
Status: DISCONTINUED | OUTPATIENT
Start: 2019-11-26 | End: 2019-11-27 | Stop reason: HOSPADM

## 2019-11-26 RX ORDER — OXYCODONE HYDROCHLORIDE AND ACETAMINOPHEN 5; 325 MG/1; MG/1
1 TABLET ORAL EVERY 4 HOURS PRN
Status: DISCONTINUED | OUTPATIENT
Start: 2019-11-26 | End: 2019-11-27 | Stop reason: HOSPADM

## 2019-11-26 RX ORDER — FENTANYL CITRATE 50 UG/ML
INJECTION, SOLUTION INTRAMUSCULAR; INTRAVENOUS AS NEEDED
Status: DISCONTINUED | OUTPATIENT
Start: 2019-11-26 | End: 2019-11-26 | Stop reason: SURG

## 2019-11-26 RX ORDER — HYDRALAZINE HYDROCHLORIDE 20 MG/ML
5 INJECTION INTRAMUSCULAR; INTRAVENOUS
Status: DISCONTINUED | OUTPATIENT
Start: 2019-11-26 | End: 2019-11-26 | Stop reason: HOSPADM

## 2019-11-26 RX ORDER — MIDAZOLAM HYDROCHLORIDE 1 MG/ML
1 INJECTION INTRAMUSCULAR; INTRAVENOUS
Status: DISCONTINUED | OUTPATIENT
Start: 2019-11-26 | End: 2019-11-26 | Stop reason: HOSPADM

## 2019-11-26 RX ORDER — MIDAZOLAM HYDROCHLORIDE 1 MG/ML
2 INJECTION INTRAMUSCULAR; INTRAVENOUS
Status: DISCONTINUED | OUTPATIENT
Start: 2019-11-26 | End: 2019-11-26 | Stop reason: HOSPADM

## 2019-11-26 RX ORDER — SODIUM CHLORIDE 0.9 % (FLUSH) 0.9 %
3-10 SYRINGE (ML) INJECTION AS NEEDED
Status: DISCONTINUED | OUTPATIENT
Start: 2019-11-26 | End: 2019-11-26 | Stop reason: HOSPADM

## 2019-11-26 RX ORDER — SCOLOPAMINE TRANSDERMAL SYSTEM 1 MG/1
1 PATCH, EXTENDED RELEASE TRANSDERMAL CONTINUOUS
Status: DISCONTINUED | OUTPATIENT
Start: 2019-11-26 | End: 2019-11-26

## 2019-11-26 RX ORDER — PHENYLEPHRINE HCL IN 0.9% NACL 0.8MG/10ML
SYRINGE (ML) INTRAVENOUS AS NEEDED
Status: DISCONTINUED | OUTPATIENT
Start: 2019-11-26 | End: 2019-11-26 | Stop reason: SURG

## 2019-11-26 RX ORDER — IPRATROPIUM BROMIDE AND ALBUTEROL SULFATE 2.5; .5 MG/3ML; MG/3ML
3 SOLUTION RESPIRATORY (INHALATION) ONCE AS NEEDED
Status: DISCONTINUED | OUTPATIENT
Start: 2019-11-26 | End: 2019-11-26 | Stop reason: HOSPADM

## 2019-11-26 RX ORDER — FENTANYL CITRATE 50 UG/ML
25 INJECTION, SOLUTION INTRAMUSCULAR; INTRAVENOUS AS NEEDED
Status: DISCONTINUED | OUTPATIENT
Start: 2019-11-26 | End: 2019-11-26 | Stop reason: HOSPADM

## 2019-11-26 RX ORDER — FLUMAZENIL 0.1 MG/ML
0.2 INJECTION INTRAVENOUS AS NEEDED
Status: DISCONTINUED | OUTPATIENT
Start: 2019-11-26 | End: 2019-11-26 | Stop reason: HOSPADM

## 2019-11-26 RX ORDER — LIDOCAINE HYDROCHLORIDE 20 MG/ML
INJECTION, SOLUTION INFILTRATION; PERINEURAL AS NEEDED
Status: DISCONTINUED | OUTPATIENT
Start: 2019-11-26 | End: 2019-11-26 | Stop reason: SURG

## 2019-11-26 RX ORDER — DEXTROSE MONOHYDRATE 25 G/50ML
12.5 INJECTION, SOLUTION INTRAVENOUS AS NEEDED
Status: DISCONTINUED | OUTPATIENT
Start: 2019-11-26 | End: 2019-11-26 | Stop reason: HOSPADM

## 2019-11-26 RX ORDER — METOCLOPRAMIDE HYDROCHLORIDE 5 MG/ML
5 INJECTION INTRAMUSCULAR; INTRAVENOUS
Status: DISCONTINUED | OUTPATIENT
Start: 2019-11-26 | End: 2019-11-26 | Stop reason: HOSPADM

## 2019-11-26 RX ORDER — BUPIVACAINE HYDROCHLORIDE AND EPINEPHRINE 2.5; 5 MG/ML; UG/ML
INJECTION, SOLUTION INFILTRATION; PERINEURAL AS NEEDED
Status: DISCONTINUED | OUTPATIENT
Start: 2019-11-26 | End: 2019-11-26 | Stop reason: HOSPADM

## 2019-11-26 RX ORDER — SODIUM CHLORIDE, SODIUM LACTATE, POTASSIUM CHLORIDE, CALCIUM CHLORIDE 600; 310; 30; 20 MG/100ML; MG/100ML; MG/100ML; MG/100ML
9 INJECTION, SOLUTION INTRAVENOUS CONTINUOUS
Status: DISCONTINUED | OUTPATIENT
Start: 2019-11-26 | End: 2019-11-26

## 2019-11-26 RX ORDER — DEXAMETHASONE SODIUM PHOSPHATE 4 MG/ML
INJECTION, SOLUTION INTRA-ARTICULAR; INTRALESIONAL; INTRAMUSCULAR; INTRAVENOUS; SOFT TISSUE AS NEEDED
Status: DISCONTINUED | OUTPATIENT
Start: 2019-11-26 | End: 2019-11-26 | Stop reason: SURG

## 2019-11-26 RX ORDER — LABETALOL HYDROCHLORIDE 5 MG/ML
5 INJECTION, SOLUTION INTRAVENOUS
Status: DISCONTINUED | OUTPATIENT
Start: 2019-11-26 | End: 2019-11-26 | Stop reason: HOSPADM

## 2019-11-26 RX ORDER — PROPOFOL 10 MG/ML
VIAL (ML) INTRAVENOUS AS NEEDED
Status: DISCONTINUED | OUTPATIENT
Start: 2019-11-26 | End: 2019-11-26 | Stop reason: SURG

## 2019-11-26 RX ORDER — ONDANSETRON 2 MG/ML
4 INJECTION INTRAMUSCULAR; INTRAVENOUS AS NEEDED
Status: DISCONTINUED | OUTPATIENT
Start: 2019-11-26 | End: 2019-11-26 | Stop reason: HOSPADM

## 2019-11-26 RX ADMIN — PAROXETINE 10 MG: 10 TABLET, FILM COATED ORAL at 13:24

## 2019-11-26 RX ADMIN — TRIAMCINOLONE ACETONIDE: 1 PASTE TOPICAL at 20:59

## 2019-11-26 RX ADMIN — CEFTRIAXONE SODIUM 1 G: 1 INJECTION, POWDER, FOR SOLUTION INTRAMUSCULAR; INTRAVENOUS at 17:55

## 2019-11-26 RX ADMIN — MIDAZOLAM 2 MG: 1 INJECTION INTRAMUSCULAR; INTRAVENOUS at 08:13

## 2019-11-26 RX ADMIN — FENTANYL CITRATE 50 MCG: 50 INJECTION, SOLUTION INTRAMUSCULAR; INTRAVENOUS at 08:51

## 2019-11-26 RX ADMIN — SCOPALAMINE 1 PATCH: 1 PATCH, EXTENDED RELEASE TRANSDERMAL at 08:12

## 2019-11-26 RX ADMIN — Medication 160 MCG: at 09:01

## 2019-11-26 RX ADMIN — FENTANYL CITRATE 50 MCG: 50 INJECTION, SOLUTION INTRAMUSCULAR; INTRAVENOUS at 08:56

## 2019-11-26 RX ADMIN — DEXAMETHASONE SODIUM PHOSPHATE 4 MG: 4 INJECTION, SOLUTION INTRAMUSCULAR; INTRAVENOUS at 08:12

## 2019-11-26 RX ADMIN — ENOXAPARIN SODIUM 40 MG: 40 INJECTION SUBCUTANEOUS at 17:56

## 2019-11-26 RX ADMIN — SODIUM CHLORIDE, PRESERVATIVE FREE 10 ML: 5 INJECTION INTRAVENOUS at 21:00

## 2019-11-26 RX ADMIN — ATORVASTATIN CALCIUM 10 MG: 10 TABLET, FILM COATED ORAL at 20:58

## 2019-11-26 RX ADMIN — PROPOFOL 180 MG: 10 INJECTION, EMULSION INTRAVENOUS at 08:48

## 2019-11-26 RX ADMIN — ONDANSETRON HYDROCHLORIDE 4 MG: 2 SOLUTION INTRAMUSCULAR; INTRAVENOUS at 09:01

## 2019-11-26 RX ADMIN — DEXAMETHASONE SODIUM PHOSPHATE 4 MG: 4 INJECTION, SOLUTION INTRAMUSCULAR; INTRAVENOUS at 09:01

## 2019-11-26 RX ADMIN — LIDOCAINE HYDROCHLORIDE 60 MG: 20 INJECTION, SOLUTION INFILTRATION; PERINEURAL at 08:48

## 2019-11-26 NOTE — ANESTHESIA POSTPROCEDURE EVALUATION
Patient: Sammi Cordero    Procedure Summary     Date:  11/26/19 Room / Location:   [facility]  [facility]    Anesthesia Start:  0845 Anesthesia Stop:  0919    Procedure:  AXILLARY LYMPH NODE BIOPSY/EXCISION (Left ) Diagnosis:       Lymphadenopathy      (Lymphadenopathy [R59.1])    Surgeon:  Karne Benitez MD Provider:  Roberth Wayne CRNA    Anesthesia Type:  general ASA Status:  2          Anesthesia Type: general  Last vitals  BP   113/44 (11/26/19 1500)   Temp   98.8 °F (37.1 °C) (11/26/19 1500)   Pulse   82 (11/26/19 1500)   Resp   16 (11/26/19 1500)     SpO2   96 % (11/26/19 1500)     Post Anesthesia Care and Evaluation    Patient location during evaluation: PACU  Level of consciousness: awake  Pain management: adequate  Airway patency: patent  Anesthetic complications: No anesthetic complications  PONV Status: none  Cardiovascular status: acceptable  Respiratory status: acceptable  Hydration status: acceptable

## 2019-11-26 NOTE — ANESTHESIA PREPROCEDURE EVALUATION
Anesthesia Evaluation     Patient summary reviewed   history of anesthetic complications:  NPO Solid Status: > 8 hours             Airway   Mallampati: II  TM distance: >3 FB  Neck ROM: full  Dental      Pulmonary    (-) COPD, asthma, sleep apnea, not a smoker  Cardiovascular   Exercise tolerance: excellent (>7 METS)    ECG reviewed    (+) hyperlipidemia,   (-) pacemaker, past MI, angina, cardiac stents      Neuro/Psych  (-) seizures, TIA, CVA  GI/Hepatic/Renal/Endo    (+) obesity,  GERD,    (-) liver disease, no renal disease, diabetes    Musculoskeletal     Abdominal    Substance History      OB/GYN          Other        ROS/Med Hx Other: Suspected lymphoma                  Anesthesia Plan    ASA 2     general     intravenous induction     Anesthetic plan, all risks, benefits, and alternatives have been provided, discussed and informed consent has been obtained with: patient.

## 2019-11-27 VITALS
RESPIRATION RATE: 16 BRPM | WEIGHT: 170.9 LBS | HEIGHT: 60 IN | SYSTOLIC BLOOD PRESSURE: 114 MMHG | BODY MASS INDEX: 33.55 KG/M2 | DIASTOLIC BLOOD PRESSURE: 51 MMHG | TEMPERATURE: 97.8 F | OXYGEN SATURATION: 98 % | HEART RATE: 56 BPM

## 2019-11-27 PROBLEM — R11.2 NAUSEA & VOMITING: Status: ACTIVE | Noted: 2019-11-27

## 2019-11-27 PROBLEM — E86.0 DEHYDRATION: Status: ACTIVE | Noted: 2019-11-27

## 2019-11-27 LAB
ANISOCYTOSIS BLD QL: ABNORMAL
DEPRECATED RDW RBC AUTO: 53.4 FL (ref 37–54)
EOSINOPHIL # BLD MANUAL: 1.16 10*3/MM3 (ref 0–0.4)
EOSINOPHIL NFR BLD MANUAL: 6.1 % (ref 0.3–6.2)
ERYTHROCYTE [DISTWIDTH] IN BLOOD BY AUTOMATED COUNT: 19.9 % (ref 12.3–15.4)
GIANT PLATELETS: ABNORMAL
HCT VFR BLD AUTO: 25.2 % (ref 34–46.6)
HGB BLD-MCNC: 8.3 G/DL (ref 12–15.9)
HYPOCHROMIA BLD QL: ABNORMAL
LDH SERPL-CCNC: 285 U/L (ref 135–214)
LYMPHOCYTES # BLD MANUAL: 4.23 10*3/MM3 (ref 0.7–3.1)
LYMPHOCYTES NFR BLD MANUAL: 22.2 % (ref 19.6–45.3)
LYMPHOCYTES NFR BLD MANUAL: 6.1 % (ref 5–12)
MCH RBC QN AUTO: 24.6 PG (ref 26.6–33)
MCHC RBC AUTO-ENTMCNC: 32.9 G/DL (ref 31.5–35.7)
MCV RBC AUTO: 74.6 FL (ref 79–97)
MICROCYTES BLD QL: ABNORMAL
MONOCYTES # BLD AUTO: 1.16 10*3/MM3 (ref 0.1–0.9)
NEUTROPHILS # BLD AUTO: 10.97 10*3/MM3 (ref 1.7–7)
NEUTROPHILS NFR BLD MANUAL: 56.6 % (ref 42.7–76)
NEUTS BAND NFR BLD MANUAL: 1 % (ref 0–5)
OVALOCYTES BLD QL SMEAR: ABNORMAL
PLATELET # BLD AUTO: 279 10*3/MM3 (ref 140–450)
PMV BLD AUTO: 9.9 FL (ref 6–12)
POIKILOCYTOSIS BLD QL SMEAR: ABNORMAL
POLYCHROMASIA BLD QL SMEAR: ABNORMAL
RBC # BLD AUTO: 3.38 10*6/MM3 (ref 3.77–5.28)
VARIANT LYMPHS NFR BLD MANUAL: 8.1 % (ref 0–5)
WBC MORPH BLD: NORMAL
WBC NRBC COR # BLD: 19.06 10*3/MM3 (ref 3.4–10.8)

## 2019-11-27 PROCEDURE — 83615 LACTATE (LD) (LDH) ENZYME: CPT | Performed by: INTERNAL MEDICINE

## 2019-11-27 PROCEDURE — 85025 COMPLETE CBC W/AUTO DIFF WBC: CPT | Performed by: INTERNAL MEDICINE

## 2019-11-27 PROCEDURE — 99232 SBSQ HOSP IP/OBS MODERATE 35: CPT | Performed by: INTERNAL MEDICINE

## 2019-11-27 PROCEDURE — 85007 BL SMEAR W/DIFF WBC COUNT: CPT | Performed by: INTERNAL MEDICINE

## 2019-11-27 RX ORDER — ECHINACEA PURPUREA EXTRACT 125 MG
1 TABLET ORAL AS NEEDED
Qty: 30 ML | Refills: 0 | Status: SHIPPED | OUTPATIENT
Start: 2019-11-27 | End: 2019-11-30

## 2019-11-27 RX ORDER — DIPHENHYDRAMINE HYDROCHLORIDE, ZINC ACETATE 2; .1 G/100G; G/100G
CREAM TOPICAL 3 TIMES DAILY PRN
Qty: 28 G | Refills: 0 | Status: ON HOLD | OUTPATIENT
Start: 2019-11-27 | End: 2020-04-23

## 2019-11-27 RX ORDER — OXYCODONE HYDROCHLORIDE AND ACETAMINOPHEN 5; 325 MG/1; MG/1
1 TABLET ORAL EVERY 4 HOURS PRN
Qty: 15 TABLET | Refills: 0 | Status: SHIPPED | OUTPATIENT
Start: 2019-11-27 | End: 2019-12-06

## 2019-11-27 RX ORDER — TRIAMCINOLONE ACETONIDE 0.1 %
PASTE (GRAM) DENTAL 2 TIMES DAILY
Qty: 5 G | Refills: 0 | Status: SHIPPED | OUTPATIENT
Start: 2019-11-27 | End: 2019-11-30

## 2019-11-27 RX ADMIN — PAROXETINE 10 MG: 10 TABLET, FILM COATED ORAL at 08:56

## 2019-11-28 ENCOUNTER — READMISSION MANAGEMENT (OUTPATIENT)
Dept: CALL CENTER | Facility: HOSPITAL | Age: 64
End: 2019-11-28

## 2019-11-28 NOTE — OUTREACH NOTE
Prep Survey      Responses   Facility patient discharged from?  [facility]   Is patient eligible?  Yes   Discharge diagnosis  SIRS, s/p axillary lymph node biopsy/excision,hypokalemia, abnormal UA, elevated D-dimer, hyponatremia, lymphadenopathy, rash of hands.     Does the patient have one of the following disease processes/diagnoses(primary or secondary)?  Other   Does the patient have Home health ordered?  No   Is there a DME ordered?  No   Comments regarding appointments  See AVS   Prep survey completed?  Yes          Oneyda Aragon RN

## 2019-11-29 LAB
BACTERIA SPEC AEROBE CULT: NORMAL
BACTERIA SPEC AEROBE CULT: NORMAL

## 2019-12-03 ENCOUNTER — LAB (OUTPATIENT)
Dept: LAB | Facility: HOSPITAL | Age: 64
End: 2019-12-03

## 2019-12-03 ENCOUNTER — TRANSCRIBE ORDERS (OUTPATIENT)
Dept: ADMINISTRATIVE | Facility: HOSPITAL | Age: 64
End: 2019-12-03

## 2019-12-03 ENCOUNTER — READMISSION MANAGEMENT (OUTPATIENT)
Dept: CALL CENTER | Facility: HOSPITAL | Age: 64
End: 2019-12-03

## 2019-12-03 DIAGNOSIS — N39.0 URINARY TRACT INFECTION WITHOUT HEMATURIA, SITE UNSPECIFIED: ICD-10-CM

## 2019-12-03 DIAGNOSIS — D64.9 ANEMIA, UNSPECIFIED TYPE: ICD-10-CM

## 2019-12-03 DIAGNOSIS — E87.1 HYPOSMOLALITY SYNDROME: Primary | ICD-10-CM

## 2019-12-03 DIAGNOSIS — E87.1 HYPOSMOLALITY SYNDROME: ICD-10-CM

## 2019-12-03 LAB
ALBUMIN SERPL-MCNC: 4 G/DL (ref 3.5–5)
ALBUMIN/GLOB SERPL: 0.8 G/DL (ref 1.1–2.5)
ALP SERPL-CCNC: 84 U/L (ref 24–120)
ALT SERPL W P-5'-P-CCNC: 16 U/L (ref 0–54)
ANION GAP SERPL CALCULATED.3IONS-SCNC: 10 MMOL/L (ref 4–13)
AST SERPL-CCNC: 23 U/L (ref 7–45)
AUTO MIXED CELLS #: 3.2 10*3/MM3 (ref 0.1–2.6)
AUTO MIXED CELLS %: 22.5 % (ref 0.1–24)
BILIRUB SERPL-MCNC: 0.6 MG/DL (ref 0.1–1)
BILIRUB UR QL STRIP: NEGATIVE
BUN BLD-MCNC: 22 MG/DL (ref 5–21)
BUN/CREAT SERPL: 28.9
CALCIUM SPEC-SCNC: 8.5 MG/DL (ref 8.4–10.4)
CHLORIDE SERPL-SCNC: 98 MMOL/L (ref 98–110)
CLARITY UR: CLEAR
CO2 SERPL-SCNC: 29 MMOL/L (ref 24–31)
COLOR UR: YELLOW
CREAT BLD-MCNC: 0.76 MG/DL (ref 0.5–1.4)
ERYTHROCYTE [DISTWIDTH] IN BLOOD BY AUTOMATED COUNT: 20.9 % (ref 12.3–15.4)
GFR SERPL CREATININE-BSD FRML MDRD: 77 ML/MIN/1.73
GLOBULIN UR ELPH-MCNC: 5 GM/DL
GLUCOSE BLD-MCNC: 108 MG/DL (ref 70–100)
GLUCOSE UR STRIP-MCNC: NEGATIVE MG/DL
HCT VFR BLD AUTO: 34.2 % (ref 34–46.6)
HGB BLD-MCNC: 11.2 G/DL (ref 12–15.9)
HGB UR QL STRIP.AUTO: NEGATIVE
KETONES UR QL STRIP: NEGATIVE
LEUKOCYTE ESTERASE UR QL STRIP.AUTO: NEGATIVE
LYMPHOCYTES # BLD AUTO: 4.3 10*3/MM3 (ref 0.7–3.1)
LYMPHOCYTES NFR BLD AUTO: 29.9 % (ref 19.6–45.3)
MCH RBC QN AUTO: 25.6 PG (ref 26.6–33)
MCHC RBC AUTO-ENTMCNC: 32.7 G/DL (ref 31.5–35.7)
MCV RBC AUTO: 78.3 FL (ref 79–97)
NEUTROPHILS # BLD AUTO: 6.9 10*3/MM3 (ref 1.7–7)
NEUTROPHILS NFR BLD AUTO: 47.6 % (ref 42.7–76)
NITRITE UR QL STRIP: NEGATIVE
PH UR STRIP.AUTO: 6 [PH] (ref 5–8)
PLATELET # BLD AUTO: 460 10*3/MM3 (ref 140–450)
PMV BLD AUTO: 7.9 FL (ref 6–12)
POTASSIUM BLD-SCNC: 4.3 MMOL/L (ref 3.5–5.3)
PROT SERPL-MCNC: 9 G/DL (ref 6.3–8.7)
PROT UR QL STRIP: NEGATIVE
RBC # BLD AUTO: 4.37 10*6/MM3 (ref 3.77–5.28)
SODIUM BLD-SCNC: 137 MMOL/L (ref 135–145)
SP GR UR STRIP: 1.02 (ref 1–1.03)
UROBILINOGEN UR QL STRIP: NORMAL
WBC NRBC COR # BLD: 14.4 10*3/MM3 (ref 3.4–10.8)

## 2019-12-03 PROCEDURE — 36415 COLL VENOUS BLD VENIPUNCTURE: CPT | Performed by: NURSE PRACTITIONER

## 2019-12-03 PROCEDURE — 85025 COMPLETE CBC W/AUTO DIFF WBC: CPT | Performed by: NURSE PRACTITIONER

## 2019-12-03 PROCEDURE — 81003 URINALYSIS AUTO W/O SCOPE: CPT

## 2019-12-03 PROCEDURE — 80053 COMPREHEN METABOLIC PANEL: CPT | Performed by: NURSE PRACTITIONER

## 2019-12-03 NOTE — OUTREACH NOTE
Medical Week 1 Survey      Responses   Facility patient discharged from?  [facility]   Does the patient have one of the following disease processes/diagnoses(primary or secondary)?  Other   Is there a successful TCM telephone encounter documented?  No   Week 1 attempt successful?  Yes   Call start time  1224   Call end time  1229   Discharge diagnosis  SIRS, s/p axillary lymph node biopsy/excision,hypokalemia, abnormal UA, elevated D-dimer, hyponatremia, lymphadenopathy, rash of hands.     Meds reviewed with patient/caregiver?  Yes   Is the patient having any side effects they believe may be caused by any medication additions or changes?  No   Is the patient taking all medications as directed (includes completed medication regime)?  Yes   Does the patient have a primary care provider?   Yes   Does the patient have an appointment with their PCP within 7 days of discharge?  Yes   Comments regarding PCP  Dr. Jovana Dejesus on 12/03/2019   Has the patient kept scheduled appointments due by today?  N/A   Has home health visited the patient within 72 hours of discharge?  N/A   Comments  Patient reports she is weak, but improving.    Did the patient receive a copy of their discharge instructions?  Yes   Nursing interventions  Reviewed instructions with patient   What is the patient's perception of their health status since discharge?  Improving   Is the patient/caregiver able to teach back signs and symptoms related to disease process for when to call PCP?  Yes   Is the patient/caregiver able to teach back signs and symptoms related to disease process for when to call 911?  Yes   Is the patient/caregiver able to teach back the hierarchy of who to call/visit for symptoms/problems? PCP, Specialist, Home health nurse, Urgent Care, ED, 911  Yes   Week 1 call completed?  Yes          Lex Jimenez RN

## 2019-12-10 ENCOUNTER — READMISSION MANAGEMENT (OUTPATIENT)
Dept: CALL CENTER | Facility: HOSPITAL | Age: 64
End: 2019-12-10

## 2019-12-10 NOTE — OUTREACH NOTE
Medical Week 2 Survey      Responses   Facility patient discharged from?  [facility]   Does the patient have one of the following disease processes/diagnoses(primary or secondary)?  Other   Week 2 attempt successful?  Yes   Call start time  1100   Discharge diagnosis  SIRS, s/p axillary lymph node biopsy/excision,hypokalemia, abnormal UA, elevated D-dimer, hyponatremia, lymphadenopathy, rash of hands.     Call end time  1103   Meds reviewed with patient/caregiver?  Yes   Is the patient taking all medications as directed (includes completed medication regime)?  Yes   Comments regarding PCP  Dr. Jovana Dejesus on 12/03/2019   Has the patient kept scheduled appointments due by today?  Yes   Has home health visited the patient within 72 hours of discharge?  N/A   What is the patient's perception of their health status since discharge?  Improving   Week 2 Call Completed?  Yes          Ilana Garcias RN

## 2019-12-11 ENCOUNTER — TELEPHONE (OUTPATIENT)
Dept: ONCOLOGY | Facility: CLINIC | Age: 64
End: 2019-12-11

## 2019-12-11 NOTE — TELEPHONE ENCOUNTER
Received call from patient requesting results of her recent Lymph node biopsy performed on 11/26/19, reviewed patients chart, results are still pending.   Spoke to Randall in Pathology Dept she reviewed patients chart, noted specimen was not sent out until 12/2/19 due to holiday weekend (Thanksgiving) to [facility], she will call regarding pending results and notify me once she has report.

## 2019-12-16 ENCOUNTER — TELEPHONE (OUTPATIENT)
Dept: ONCOLOGY | Facility: CLINIC | Age: 64
End: 2019-12-16

## 2019-12-16 NOTE — TELEPHONE ENCOUNTER
MICHAEL:  Received call from Dr Sifuentes Pathologist , she called to report that Lymph node obtained by Dr Benitez for biopsy & path was sent to [facility] and they had accidentally sent the block back unprocessed.  Pathology discovered this incident and has sent the block back out (over nite) 12/11/19 and the results should be back some time next week, she notes abnormal T-cells but unsure of exact diagnosis at this time, waiting on results.

## 2019-12-17 ENCOUNTER — TELEPHONE (OUTPATIENT)
Dept: ONCOLOGY | Facility: CLINIC | Age: 64
End: 2019-12-17

## 2019-12-18 ENCOUNTER — TELEPHONE (OUTPATIENT)
Dept: ONCOLOGY | Facility: CLINIC | Age: 64
End: 2019-12-18

## 2019-12-18 NOTE — TELEPHONE ENCOUNTER
Received message from patient questioning if we had heard any news regarding her lymph node bx results.     Spoke with Randall in Pathology she reports that she has spoken with [facility] regarding results of the specimen. She was told from the time the received the requested tissue block on 12/13/19 it takes 5-7 business days, not counting weekends for the biopsy to be processed and sent back to the facility.   This information was relayed was left on patients personal phone and to please call if she has additional information request.

## 2019-12-19 ENCOUNTER — READMISSION MANAGEMENT (OUTPATIENT)
Dept: CALL CENTER | Facility: HOSPITAL | Age: 64
End: 2019-12-19

## 2019-12-19 NOTE — OUTREACH NOTE
Medical Week 3 Survey      Responses   Facility patient discharged from?  [facility]   Does the patient have one of the following disease processes/diagnoses(primary or secondary)?  Other   Week 3 attempt successful?  Yes   Call start time  1102   Call end time  1103   Discharge diagnosis  SIRS, s/p axillary lymph node biopsy/excision,hypokalemia, abnormal UA, elevated D-dimer, hyponatremia, lymphadenopathy, rash of hands.     Meds reviewed with patient/caregiver?  Yes   Is the patient taking all medications as directed (includes completed medication regime)?  Yes   Has the patient kept scheduled appointments due by today?  Yes   Psychosocial issues?  No   What is the patient's perception of their health status since discharge?  Improving   If the patient is a current smoker, are they able to teach back resources for cessation?  -- [Pt is a nonsmoker]   Week 3 Call Completed?  Yes          Perla Krishnan RN

## 2019-12-23 ENCOUNTER — TELEPHONE (OUTPATIENT)
Dept: ONCOLOGY | Facility: CLINIC | Age: 64
End: 2019-12-23

## 2019-12-23 NOTE — TELEPHONE ENCOUNTER
Dr. Estephania Sifuentes called, recent biopsy negative for malignancy.  She had reviewed case with her colleagues. She will be followed along.  This could be reactive or will declare itself as a malignancy in the future.

## 2019-12-29 RX ORDER — AMILORIDE HYDROCHLORIDE 5 MG/1
TABLET ORAL
Qty: 90 TABLET | Refills: 2 | OUTPATIENT
Start: 2019-12-29

## 2019-12-30 ENCOUNTER — TELEPHONE (OUTPATIENT)
Dept: ONCOLOGY | Facility: CLINIC | Age: 64
End: 2019-12-30

## 2019-12-30 LAB
CYTO UR: NORMAL
LAB AP CASE REPORT: NORMAL
LAB AP DIAGNOSIS COMMENT: NORMAL
PATH REPORT.FINAL DX SPEC: NORMAL
PATH REPORT.GROSS SPEC: NORMAL

## 2019-12-30 NOTE — TELEPHONE ENCOUNTER
----- Message from Armand Macario MD sent at 12/30/2019  2:48 PM CST -----  Inform patient, node biopsy negative.  Continue observation.

## 2020-01-27 ENCOUNTER — HOSPITAL ENCOUNTER (OUTPATIENT)
Dept: CT IMAGING | Facility: HOSPITAL | Age: 65
Discharge: HOME | End: 2020-01-27
Admitting: INTERNAL MEDICINE

## 2020-01-27 ENCOUNTER — LAB (OUTPATIENT)
Dept: LAB | Facility: HOSPITAL | Age: 65
End: 2020-01-27

## 2020-01-27 DIAGNOSIS — R59.9 ENLARGED LYMPH NODES: ICD-10-CM

## 2020-01-27 DIAGNOSIS — D64.9 ANEMIA, UNSPECIFIED TYPE: ICD-10-CM

## 2020-01-27 LAB
ALBUMIN SERPL-MCNC: 4.3 G/DL (ref 3.5–5.2)
ALBUMIN/GLOB SERPL: 1 G/DL
ALP SERPL-CCNC: 81 U/L (ref 39–117)
ALT SERPL W P-5'-P-CCNC: 12 U/L (ref 1–33)
ANION GAP SERPL CALCULATED.3IONS-SCNC: 12 MMOL/L (ref 5–15)
AST SERPL-CCNC: 15 U/L (ref 1–32)
BASOPHILS # BLD AUTO: 0.03 10*3/MM3 (ref 0–0.2)
BASOPHILS NFR BLD AUTO: 0.4 % (ref 0–1.5)
BILIRUB SERPL-MCNC: 0.5 MG/DL (ref 0.2–1.2)
BUN BLD-MCNC: 18 MG/DL (ref 8–23)
BUN/CREAT SERPL: 29 (ref 7–25)
CALCIUM SPEC-SCNC: 9.5 MG/DL (ref 8.6–10.5)
CHLORIDE SERPL-SCNC: 97 MMOL/L (ref 98–107)
CO2 SERPL-SCNC: 29 MMOL/L (ref 22–29)
CREAT BLD-MCNC: 0.62 MG/DL (ref 0.57–1)
CREAT BLDA-MCNC: 0.8 MG/DL (ref 0.6–1.3)
DEPRECATED RDW RBC AUTO: 47.2 FL (ref 37–54)
EOSINOPHIL # BLD AUTO: 0.45 10*3/MM3 (ref 0–0.4)
EOSINOPHIL NFR BLD AUTO: 6 % (ref 0.3–6.2)
ERYTHROCYTE [DISTWIDTH] IN BLOOD BY AUTOMATED COUNT: 16.2 % (ref 12.3–15.4)
GFR SERPL CREATININE-BSD FRML MDRD: 97 ML/MIN/1.73
GLOBULIN UR ELPH-MCNC: 4.3 GM/DL
GLUCOSE BLD-MCNC: 133 MG/DL (ref 65–99)
HCT VFR BLD AUTO: 39.9 % (ref 34–46.6)
HGB BLD-MCNC: 13.4 G/DL (ref 12–15.9)
IMM GRANULOCYTES # BLD AUTO: 0.02 10*3/MM3 (ref 0–0.05)
IMM GRANULOCYTES NFR BLD AUTO: 0.3 % (ref 0–0.5)
LYMPHOCYTES # BLD AUTO: 0.52 10*3/MM3 (ref 0.7–3.1)
LYMPHOCYTES NFR BLD AUTO: 6.9 % (ref 19.6–45.3)
MCH RBC QN AUTO: 27.2 PG (ref 26.6–33)
MCHC RBC AUTO-ENTMCNC: 33.6 G/DL (ref 31.5–35.7)
MCV RBC AUTO: 80.9 FL (ref 79–97)
MONOCYTES # BLD AUTO: 0.71 10*3/MM3 (ref 0.1–0.9)
MONOCYTES NFR BLD AUTO: 9.4 % (ref 5–12)
NEUTROPHILS # BLD AUTO: 5.81 10*3/MM3 (ref 1.7–7)
NEUTROPHILS NFR BLD AUTO: 77 % (ref 42.7–76)
NRBC BLD AUTO-RTO: 0 /100 WBC (ref 0–0.2)
PLATELET # BLD AUTO: 267 10*3/MM3 (ref 140–450)
PMV BLD AUTO: 10.2 FL (ref 6–12)
POTASSIUM BLD-SCNC: 3.8 MMOL/L (ref 3.5–5.2)
PROT SERPL-MCNC: 8.6 G/DL (ref 6–8.5)
RBC # BLD AUTO: 4.93 10*6/MM3 (ref 3.77–5.28)
SODIUM BLD-SCNC: 138 MMOL/L (ref 136–145)
WBC NRBC COR # BLD: 7.54 10*3/MM3 (ref 3.4–10.8)

## 2020-01-27 PROCEDURE — 74177 CT ABD & PELVIS W/CONTRAST: CPT

## 2020-01-27 PROCEDURE — 25010000002 IOPAMIDOL 61 % SOLUTION: Performed by: INTERNAL MEDICINE

## 2020-01-27 PROCEDURE — 71260 CT THORAX DX C+: CPT

## 2020-01-27 PROCEDURE — 85025 COMPLETE CBC W/AUTO DIFF WBC: CPT | Performed by: INTERNAL MEDICINE

## 2020-01-27 PROCEDURE — 80053 COMPREHEN METABOLIC PANEL: CPT | Performed by: INTERNAL MEDICINE

## 2020-01-27 PROCEDURE — 36415 COLL VENOUS BLD VENIPUNCTURE: CPT

## 2020-01-27 PROCEDURE — 82565 ASSAY OF CREATININE: CPT

## 2020-01-27 RX ADMIN — IOPAMIDOL 100 ML: 612 INJECTION, SOLUTION INTRAVENOUS at 08:59

## 2020-01-27 RX ADMIN — IOPAMIDOL 50 ML: 612 INJECTION, SOLUTION INTRAVENOUS at 08:59

## 2020-02-03 ENCOUNTER — OFFICE VISIT (OUTPATIENT)
Dept: ONCOLOGY | Facility: CLINIC | Age: 65
End: 2020-02-03

## 2020-02-03 VITALS
OXYGEN SATURATION: 94 % | WEIGHT: 161 LBS | HEIGHT: 60 IN | BODY MASS INDEX: 31.61 KG/M2 | DIASTOLIC BLOOD PRESSURE: 76 MMHG | RESPIRATION RATE: 18 BRPM | TEMPERATURE: 98.4 F | HEART RATE: 60 BPM | SYSTOLIC BLOOD PRESSURE: 148 MMHG

## 2020-02-03 DIAGNOSIS — R59.1 LYMPHADENOPATHY: Primary | ICD-10-CM

## 2020-02-03 PROCEDURE — 99214 OFFICE O/P EST MOD 30 MIN: CPT | Performed by: INTERNAL MEDICINE

## 2020-02-04 LAB
CYTO UR: NORMAL
LAB AP CASE REPORT: NORMAL
PATH REPORT.FINAL DX SPEC: NORMAL
PATH REPORT.GROSS SPEC: NORMAL

## 2020-02-19 ENCOUNTER — OFFICE VISIT (OUTPATIENT)
Dept: GASTROENTEROLOGY | Facility: CLINIC | Age: 65
End: 2020-02-19

## 2020-02-19 VITALS
BODY MASS INDEX: 32.79 KG/M2 | WEIGHT: 167 LBS | HEART RATE: 72 BPM | DIASTOLIC BLOOD PRESSURE: 78 MMHG | HEIGHT: 60 IN | OXYGEN SATURATION: 97 % | SYSTOLIC BLOOD PRESSURE: 136 MMHG

## 2020-02-19 DIAGNOSIS — R10.13 DYSPEPSIA: ICD-10-CM

## 2020-02-19 DIAGNOSIS — E66.9 OBESITY, UNSPECIFIED OBESITY SEVERITY, UNSPECIFIED OBESITY TYPE: ICD-10-CM

## 2020-02-19 DIAGNOSIS — R93.5 ABNORMAL CT OF THE ABDOMEN: Primary | ICD-10-CM

## 2020-02-19 DIAGNOSIS — Z78.9 NONSMOKER: ICD-10-CM

## 2020-02-19 PROCEDURE — 99214 OFFICE O/P EST MOD 30 MIN: CPT | Performed by: CLINICAL NURSE SPECIALIST

## 2020-02-19 NOTE — PROGRESS NOTES
Sammi Cordero  1955 2/19/2020  Chief Complaint   Patient presents with   • GI Problem     abnormal CT/last endo 09/2016     Subjective   HPI  Sammi Cordero is a 64 y.o. female who presents with a complaint of abnormal CT of the abdomen. Stomach showed underdistention vs mild gastric wall thickening overall apearance similar to the CT from 2019. She has no associated symptoms. She has been followed by Dr Macario for lymphadenopathy with benign bx results x2. No abdominal pain. No wt loss. No nausea or vomiting. No fever. Good appetite now. She was since in October last year.   She is on Pepcid. This manages her reflux and indigestion which she had ongoing for months. This is better.    Past Medical History:   Diagnosis Date   • Allergic rhinitis    • Colon polyps    • Depression    • Difficulty swallowing solids    • DJD (degenerative joint disease)    • Gitelman syndrome     low magnesium   • Hyperlipidemia    • Obesity    • Osteopenia    • PONV (postoperative nausea and vomiting)      Past Surgical History:   Procedure Laterality Date   • AXILLARY LYMPH NODE BIOPSY/EXCISION Right 10/14/2019    Procedure: AXILLARY LYMPH NODE BIOPSY/EXCISION;  Surgeon: Karen Benitez MD;  Location: [facility] OR;  Service: General   • AXILLARY LYMPH NODE BIOPSY/EXCISION Left 11/26/2019    Procedure: AXILLARY LYMPH NODE BIOPSY/EXCISION;  Surgeon: Karen Benitez MD;  Location: [facility] OR;  Service: General   • COLONOSCOPY  08/20/2010   • COLONOSCOPY W/ POLYPECTOMY  09/14/2016    2   5 mm sessile polyps removed with hot snare repeat 5 years   • DILATATION AND CURETTAGE     • ENDOSCOPY  09/14/2016    Medium sized HH, LA Grade A esohagitis, Fluid in the middle third of esohagus    • JOINT REPLACEMENT     • LA TOTAL KNEE ARTHROPLASTY Left 3/9/2018    Procedure: LEFT TOTAL KNEE ARTHROPLASTY;  Surgeon: Orlin Meza MD;  Location: [facility] OR;  Service: Orthopedics   • REPLACEMENT TOTAL KNEE Right        Outpatient  Medications Marked as Taking for the 20 encounter (Office Visit) with Annmarie Martinez APRN   Medication Sig Dispense Refill   • aMILoride (MIDAMOR) 5 MG tablet Take 5 mg by mouth Daily.     • aMILoride (MIDAMOR) 5 MG tablet Take one tablet by mouth daily with food.    90 tablet 3   • Calcium Carb-Ergocalciferol (CHEWABLE CALCIUM/D PO) Take 1 tablet by mouth Daily.     • famotidine (PEPCID) 20 MG tablet Take 1 tablet by mouth daily 30 tablet 3   • magnesium oxide (MAGOX) 400 (241.3 Mg) MG tablet tablet Take 2,000 mg by mouth Daily.     • Multiple Vitamins-Minerals (MULTIVITAMIN WITH MINERALS) tablet tablet Take 1 tablet by mouth Daily.     • PARoxetine (PAXIL) 10 MG tablet Take 10 mg by mouth Every Morning.  5   • PARoxetine (PAXIL) 10 MG tablet TAKE 1 TABLET BY MOUTH EVERY DAY 90 tablet 1   • simvastatin (ZOCOR) 20 MG tablet Take 1 tablet by mouth daily.  9   • simvastatin (ZOCOR) 20 MG tablet Take 1 tablet by mouth once daily 30 tablet 5     Allergies   Allergen Reactions   • Penicillins Swelling and Rash   • Sulfa Antibiotics Rash and Other (See Comments)     Temp. fluctuations      Social History     Socioeconomic History   • Marital status:      Spouse name: Not on file   • Number of children: Not on file   • Years of education: Not on file   • Highest education level: Not on file   Tobacco Use   • Smoking status: Former Smoker     Packs/day: 0.50     Types: Cigarettes     Last attempt to quit:      Years since quittin.1   • Smokeless tobacco: Never Used   Substance and Sexual Activity   • Alcohol use: No   • Drug use: No   • Sexual activity: Never     Birth control/protection: Post-menopausal     Family History   Problem Relation Age of Onset   • Aortic aneurysm Father    • Anorexia nervosa Mother    • No Known Problems Brother    • No Known Problems Sister    • No Known Problems Son    • No Known Problems Daughter    • No Known Problems Daughter    • No Known Problems Maternal  Grandmother    • No Known Problems Paternal Grandmother    • No Known Problems Maternal Aunt    • No Known Problems Paternal Aunt    • Lymphoma Paternal Grandfather    • Breast cancer Neg Hx    • Ovarian cancer Neg Hx    • Colon cancer Neg Hx    • BRCA 1/2 Neg Hx    • Endometrial cancer Neg Hx    • Colon polyps Neg Hx      Health Maintenance   Topic Date Due   • TDAP/TD VACCINES (1 - Tdap) 04/04/1967   • ANNUAL PHYSICAL  05/05/2017   • ZOSTER VACCINE (2 of 3) 08/19/2017   • LIPID PANEL  02/22/2018   • HEPATITIS C SCREENING  02/27/2018   • COLONOSCOPY  02/27/2018   • PAP SMEAR  08/22/2021   • MAMMOGRAM  08/28/2021   • DXA SCAN  08/28/2021   • INFLUENZA VACCINE  Completed     Review of Systems   Constitutional: Negative for activity change, appetite change, chills, diaphoresis, fatigue, fever and unexpected weight change.   HENT: Negative for ear pain, hearing loss, mouth sores, sore throat, trouble swallowing and voice change.    Eyes: Negative.    Respiratory: Negative for cough, choking, shortness of breath and wheezing.    Cardiovascular: Negative for chest pain and palpitations.   Gastrointestinal: Negative for abdominal pain, blood in stool, constipation, diarrhea, nausea and vomiting.   Endocrine: Negative for cold intolerance and heat intolerance.   Genitourinary: Negative for decreased urine volume, dysuria, frequency, hematuria and urgency.   Musculoskeletal: Negative for back pain, gait problem and myalgias.   Skin: Negative for color change, pallor and rash.   Allergic/Immunologic: Negative for food allergies and immunocompromised state.   Neurological: Negative for dizziness, tremors, seizures, syncope, weakness, light-headedness, numbness and headaches.   Hematological: Negative for adenopathy. Does not bruise/bleed easily.   Psychiatric/Behavioral: Negative for agitation and confusion. The patient is not nervous/anxious.    All other systems reviewed and are negative.    Objective   Vitals:    02/19/20  "1323   BP: 136/78   Pulse: 72   SpO2: 97%   Weight: 75.8 kg (167 lb)   Height: 152.4 cm (60\")     Body mass index is 32.61 kg/m².  Physical Exam   Constitutional: She is oriented to person, place, and time. She appears well-developed and well-nourished.   HENT:   Head: Normocephalic and atraumatic.   Eyes: Pupils are equal, round, and reactive to light.   Neck: Normal range of motion. Neck supple. No tracheal deviation present.   Cardiovascular: Normal rate, regular rhythm and normal heart sounds. Exam reveals no gallop and no friction rub.   No murmur heard.  Pulmonary/Chest: Effort normal and breath sounds normal. No respiratory distress. She has no wheezes. She has no rales. She exhibits no tenderness.   Abdominal: Soft. Bowel sounds are normal. She exhibits no distension. There is no hepatosplenomegaly. There is no tenderness. There is no rigidity, no rebound and no guarding.   Musculoskeletal: Normal range of motion. She exhibits no edema, tenderness or deformity.   Neurological: She is alert and oriented to person, place, and time. She has normal reflexes.   Skin: Skin is warm and dry. No rash noted. No pallor.   Psychiatric: She has a normal mood and affect. Her behavior is normal. Judgment and thought content normal.     Assessment/Plan   Sammi was seen today for gi problem.    Diagnoses and all orders for this visit:    Abnormal CT of the abdomen  Comments:  gastric wall thickening  Orders:  -     Case Request; Standing  -     Follow Anesthesia Guidelines / Standing Orders; Future  -     Obtain Informed Consent; Future  -     Case Request    Dyspepsia    Nonsmoker    Obesity, unspecified obesity severity, unspecified obesity type    continue Pepcid    ESOPHAGOGASTRODUODENOSCOPY WITH ANESTHESIA (N/A)  Part of this note may be an electronic transcription/translation of spoken language to printed text using the Dragon Dictation System.  Body mass index is 32.61 kg/m².  No follow-ups on file.    Patient's Body " mass index is 32.61 kg/m². BMI is above normal parameters. Recommendations include: nutrition counseling.      All risks, benefits, alternatives, and indications of colonoscopy and/or Endoscopy procedure have been discussed with the patient. Risks to include perforation of the colon requiring possible surgery or colostomy, risk of bleeding from biopsies or removal of colon tissue, possibility of missing a colon polyp or cancer, or adverse drug reaction.  Benefits to include the diagnosis and management of disease of the colon and rectum. Alternatives to include barium enema, radiographic evaluation, lab testing or no intervention. Pt verbalizes understanding and agrees.     Annmarie Martinez, APRN  2/19/2020  2:14 PM      Obesity, Adult  Obesity is the condition of having too much total body fat. Being overweight or obese means that your weight is greater than what is considered healthy for your body size. Obesity is determined by a measurement called BMI. BMI is an estimate of body fat and is calculated from height and weight. For adults, a BMI of 30 or higher is considered obese.  Obesity can eventually lead to other health concerns and major illnesses, including:  · Stroke.  · Coronary artery disease (CAD).  · Type 2 diabetes.  · Some types of cancer, including cancers of the colon, breast, uterus, and gallbladder.  · Osteoarthritis.  · High blood pressure (hypertension).  · High cholesterol.  · Sleep apnea.  · Gallbladder stones.  · Infertility problems.  What are the causes?  The main cause of obesity is taking in (consuming) more calories than your body uses for energy. Other factors that contribute to this condition may include:  · Being born with genes that make you more likely to become obese.  · Having a medical condition that causes obesity. These conditions include:  ¨ Hypothyroidism.  ¨ Polycystic ovarian syndrome (PCOS).  ¨ Binge-eating disorder.  ¨ Cushing syndrome.  · Taking certain medicines, such  as steroids, antidepressants, and seizure medicines.  · Not being physically active (sedentary lifestyle).  · Living where there are limited places to exercise safely or buy healthy foods.  · Not getting enough sleep.  What increases the risk?  The following factors may increase your risk of this condition:  · Having a family history of obesity.  · Being a woman of -American descent.  · Being a man of  descent.  What are the signs or symptoms?  Having excessive body fat is the main symptom of this condition.  How is this diagnosed?  This condition may be diagnosed based on:  · Your symptoms.  · Your medical history.  · A physical exam. Your health care provider may measure:  ¨ Your BMI. If you are an adult with a BMI between 25 and less than 30, you are considered overweight. If you are an adult with a BMI of 30 or higher, you are considered obese.  ¨ The distances around your hips and your waist (circumferences). These may be compared to each other to help diagnose your condition.  ¨ Your skinfold thickness. Your health care provider may gently pinch a fold of your skin and measure it.  How is this treated?  Treatment for this condition often includes changing your lifestyle. Treatment may include some or all of the following:  · Dietary changes. Work with your health care provider and a dietitian to set a weight-loss goal that is healthy and reasonable for you. Dietary changes may include eating:  ¨ Smaller portions. A portion size is the amount of a particular food that is healthy for you to eat at one time. This varies from person to person.  ¨ Low-calorie or low-fat options.  ¨ More whole grains, fruits, and vegetables.  · Regular physical activity. This may include aerobic activity (cardio) and strength training.  · Medicine to help you lose weight. Your health care provider may prescribe medicine if you are unable to lose 1 pound a week after 6 weeks of eating more healthily and doing more  physical activity.  · Surgery. Surgical options may include gastric banding and gastric bypass. Surgery may be done if:  ¨ Other treatments have not helped to improve your condition.  ¨ You have a BMI of 40 or higher.  ¨ You have life-threatening health problems related to obesity.  Follow these instructions at home:     Eating and drinking     · Follow recommendations from your health care provider about what you eat and drink. Your health care provider may advise you to:  ¨ Limit fast foods, sweets, and processed snack foods.  ¨ Choose low-fat options, such as low-fat milk instead of whole milk.  ¨ Eat 5 or more servings of fruits or vegetables every day.  ¨ Eat at home more often. This gives you more control over what you eat.  ¨ Choose healthy foods when you eat out.  ¨ Learn what a healthy portion size is.  ¨ Keep low-fat snacks on hand.  ¨ Avoid sugary drinks, such as soda, fruit juice, iced tea sweetened with sugar, and flavored milk.  ¨ Eat a healthy breakfast.  · Drink enough water to keep your urine clear or pale yellow.  · Do not go without eating for long periods of time (do not fast) or follow a fad diet. Fasting and fad diets can be unhealthy and even dangerous.  Physical Activity   · Exercise regularly, as told by your health care provider. Ask your health care provider what types of exercise are safe for you and how often you should exercise.  · Warm up and stretch before being active.  · Cool down and stretch after being active.  · Rest between periods of activity.  Lifestyle   · Limit the time that you spend in front of your TV, computer, or video game system.  · Find ways to reward yourself that do not involve food.  · Limit alcohol intake to no more than 1 drink a day for nonpregnant women and 2 drinks a day for men. One drink equals 12 oz of beer, 5 oz of wine, or 1½ oz of hard liquor.  General instructions   · Keep a weight loss journal to keep track of the food you eat and how much you  exercise you get.  · Take over-the-counter and prescription medicines only as told by your health care provider.  · Take vitamins and supplements only as told by your health care provider.  · Consider joining a support group. Your health care provider may be able to recommend a support group.  · Keep all follow-up visits as told by your health care provider. This is important.  Contact a health care provider if:  · You are unable to meet your weight loss goal after 6 weeks of dietary and lifestyle changes.  This information is not intended to replace advice given to you by your health care provider. Make sure you discuss any questions you have with your health care provider.  Document Released: 01/25/2006 Document Revised: 05/22/2017 Document Reviewed: 10/05/2016  [employer] Interactive Patient Education © 2017 [employer]      If you smoke or use tobacco, 4 minutes reading provided  Steps to Quit Smoking  Smoking tobacco can be harmful to your health and can affect almost every organ in your body. Smoking puts you, and those around you, at risk for developing many serious chronic diseases. Quitting smoking is difficult, but it is one of the best things that you can do for your health. It is never too late to quit.  What are the benefits of quitting smoking?  When you quit smoking, you lower your risk of developing serious diseases and conditions, such as:  · Lung cancer or lung disease, such as COPD.  · Heart disease.  · Stroke.  · Heart attack.  · Infertility.  · Osteoporosis and bone fractures.  Additionally, symptoms such as coughing, wheezing, and shortness of breath may get better when you quit. You may also find that you get sick less often because your body is stronger at fighting off colds and infections. If you are pregnant, quitting smoking can help to reduce your chances of having a baby of low birth weight.  How do I get ready to quit?  When you decide to quit smoking, create a plan to make sure that  you are successful. Before you quit:  · Pick a date to quit. Set a date within the next two weeks to give you time to prepare.  · Write down the reasons why you are quitting. Keep this list in places where you will see it often, such as on your bathroom mirror or in your car or wallet.  · Identify the people, places, things, and activities that make you want to smoke (triggers) and avoid them. Make sure to take these actions:  ¨ Throw away all cigarettes at home, at work, and in your car.  ¨ Throw away smoking accessories, such as ashtrays and lighters.  ¨ Clean your car and make sure to empty the ashtray.  ¨ Clean your home, including curtains and carpets.  · Tell your family, friends, and coworkers that you are quitting. Support from your loved ones can make quitting easier.  · Talk with your health care provider about your options for quitting smoking.  · Find out what treatment options are covered by your health insurance.  What strategies can I use to quit smoking?  Talk with your healthcare provider about different strategies to quit smoking. Some strategies include:  · Quitting smoking altogether instead of gradually lessening how much you smoke over a period of time. Research shows that quitting “cold turkey” is more successful than gradually quitting.  · Attending in-person counseling to help you build problem-solving skills. You are more likely to have success in quitting if you attend several counseling sessions. Even short sessions of 10 minutes can be effective.  · Finding resources and support systems that can help you to quit smoking and remain smoke-free after you quit. These resources are most helpful when you use them often. They can include:  ¨ Online chats with a counselor.  ¨ Telephone quitlines.  ¨ Printed self-help materials.  ¨ Support groups or group counseling.  ¨ Text messaging programs.  ¨ Mobile phone applications.  · Taking medicines to help you quit smoking. (If you are pregnant or  breastfeeding, talk with your health care provider first.) Some medicines contain nicotine and some do not. Both types of medicines help with cravings, but the medicines that include nicotine help to relieve withdrawal symptoms. Your health care provider may recommend:  ¨ Nicotine patches, gum, or lozenges.  ¨ Nicotine inhalers or sprays.  ¨ Non-nicotine medicine that is taken by mouth.  Talk with your health care provider about combining strategies, such as taking medicines while you are also receiving in-person counseling. Using these two strategies together makes you more likely to succeed in quitting than if you used either strategy on its own.  If you are pregnant or breastfeeding, talk with your health care provider about finding counseling or other support strategies to quit smoking. Do not take medicine to help you quit smoking unless told to do so by your health care provider.  What things can I do to make it easier to quit?  Quitting smoking might feel overwhelming at first, but there is a lot that you can do to make it easier. Take these important actions:  · Reach out to your family and friends and ask that they support and encourage you during this time. Call telephone quitlines, reach out to support groups, or work with a counselor for support.  · Ask people who smoke to avoid smoking around you.  · Avoid places that trigger you to smoke, such as bars, parties, or smoke-break areas at work.  · Spend time around people who do not smoke.  · Lessen stress in your life, because stress can be a smoking trigger for some people. To lessen stress, try:  ¨ Exercising regularly.  ¨ Deep-breathing exercises.  ¨ Yoga.  ¨ Meditating.  ¨ Performing a body scan. This involves closing your eyes, scanning your body from head to toe, and noticing which parts of your body are particularly tense. Purposefully relax the muscles in those areas.  · Download or purchase mobile phone or tablet apps (applications) that can help  you stick to your quit plan by providing reminders, tips, and encouragement. There are many free apps, such as QuitGuide from the CDC (Centers for Disease Control and Prevention). You can find other support for quitting smoking (smoking cessation) through smokefree.gov and other websites.  How will I feel when I quit smoking?  Within the first 24 hours of quitting smoking, you may start to feel some withdrawal symptoms. These symptoms are usually most noticeable 2-3 days after quitting, but they usually do not last beyond 2-3 weeks. Changes or symptoms that you might experience include:  · Mood swings.  · Restlessness, anxiety, or irritation.  · Difficulty concentrating.  · Dizziness.  · Strong cravings for sugary foods in addition to nicotine.  · Mild weight gain.  · Constipation.  · Nausea.  · Coughing or a sore throat.  · Changes in how your medicines work in your body.  · A depressed mood.  · Difficulty sleeping (insomnia).  After the first 2-3 weeks of quitting, you may start to notice more positive results, such as:  · Improved sense of smell and taste.  · Decreased coughing and sore throat.  · Slower heart rate.  · Lower blood pressure.  · Clearer skin.  · The ability to breathe more easily.  · Fewer sick days.  Quitting smoking is very challenging for most people. Do not get discouraged if you are not successful the first time. Some people need to make many attempts to quit before they achieve long-term success. Do your best to stick to your quit plan, and talk with your health care provider if you have any questions or concerns.  This information is not intended to replace advice given to you by your health care provider. Make sure you discuss any questions you have with your health care provider.  Document Released: 12/12/2002 Document Revised: 08/15/2017 Document Reviewed: 05/03/2016  [employer] Interactive Patient Education © 2017 [employer]

## 2020-02-25 ENCOUNTER — HOSPITAL ENCOUNTER (OUTPATIENT)
Facility: HOSPITAL | Age: 65
Setting detail: HOSPITAL OUTPATIENT SURGERY
Discharge: HOME | End: 2020-02-25
Attending: INTERNAL MEDICINE | Admitting: INTERNAL MEDICINE

## 2020-02-25 ENCOUNTER — ANESTHESIA EVENT (OUTPATIENT)
Dept: GASTROENTEROLOGY | Facility: HOSPITAL | Age: 65
End: 2020-02-25

## 2020-02-25 ENCOUNTER — ANESTHESIA (OUTPATIENT)
Dept: GASTROENTEROLOGY | Facility: HOSPITAL | Age: 65
End: 2020-02-25

## 2020-02-25 VITALS
BODY MASS INDEX: 32 KG/M2 | WEIGHT: 163 LBS | HEIGHT: 60 IN | HEART RATE: 69 BPM | DIASTOLIC BLOOD PRESSURE: 72 MMHG | SYSTOLIC BLOOD PRESSURE: 112 MMHG | OXYGEN SATURATION: 97 % | TEMPERATURE: 98.1 F | RESPIRATION RATE: 16 BRPM

## 2020-02-25 DIAGNOSIS — R93.5 ABNORMAL CT OF THE ABDOMEN: ICD-10-CM

## 2020-02-25 LAB — UREASE TISS QL: POSITIVE

## 2020-02-25 PROCEDURE — 25010000002 PROPOFOL 10 MG/ML EMULSION: Performed by: NURSE ANESTHETIST, CERTIFIED REGISTERED

## 2020-02-25 PROCEDURE — 43239 EGD BIOPSY SINGLE/MULTIPLE: CPT | Performed by: INTERNAL MEDICINE

## 2020-02-25 PROCEDURE — 87081 CULTURE SCREEN ONLY: CPT | Performed by: INTERNAL MEDICINE

## 2020-02-25 PROCEDURE — 88305 TISSUE EXAM BY PATHOLOGIST: CPT | Performed by: INTERNAL MEDICINE

## 2020-02-25 RX ORDER — SODIUM CHLORIDE 9 MG/ML
500 INJECTION, SOLUTION INTRAVENOUS CONTINUOUS PRN
Status: DISCONTINUED | OUTPATIENT
Start: 2020-02-25 | End: 2020-02-25 | Stop reason: HOSPADM

## 2020-02-25 RX ORDER — PROPOFOL 10 MG/ML
VIAL (ML) INTRAVENOUS AS NEEDED
Status: DISCONTINUED | OUTPATIENT
Start: 2020-02-25 | End: 2020-02-25 | Stop reason: SURG

## 2020-02-25 RX ORDER — SODIUM CHLORIDE 0.9 % (FLUSH) 0.9 %
10 SYRINGE (ML) INJECTION AS NEEDED
Status: DISCONTINUED | OUTPATIENT
Start: 2020-02-25 | End: 2020-02-25 | Stop reason: HOSPADM

## 2020-02-25 RX ADMIN — SODIUM CHLORIDE 500 ML: 9 INJECTION, SOLUTION INTRAVENOUS at 08:24

## 2020-02-25 RX ADMIN — LIDOCAINE HYDROCHLORIDE 100 MG: 20 INJECTION, SOLUTION INTRAVENOUS at 10:14

## 2020-02-25 RX ADMIN — PROPOFOL 70 MG: 10 INJECTION, EMULSION INTRAVENOUS at 10:14

## 2020-02-25 NOTE — ANESTHESIA POSTPROCEDURE EVALUATION
Patient: Sammi Cordero    Procedure Summary     Date:  02/25/20 Room / Location:  [facility] ENDOSCOPY 5 / [facility] END[facility]    Anesthesia Start:  1000 Anesthesia Stop:  1019    Procedure:  ESOPHAGOGASTRODUODENOSCOPY WITH ANESTHESIA (N/A ) Diagnosis:       Abnormal CT of the abdomen      (Abnormal CT of the abdomen [R93.5])    Surgeon:  Isaac Murrell MD Provider:  Cristhian Vale CRNA    Anesthesia Type:  MAC ASA Status:  2          Anesthesia Type: MAC    Vitals  No vitals data found for the desired time range.          Post Anesthesia Care and Evaluation    Patient location during evaluation: PHASE II  Patient participation: complete - patient participated  Level of consciousness: awake  Pain score: 0  Pain management: adequate  Airway patency: patent  Anesthetic complications: No anesthetic complications  PONV Status: none  Cardiovascular status: acceptable  Respiratory status: acceptable  Hydration status: acceptable

## 2020-02-25 NOTE — ANESTHESIA PREPROCEDURE EVALUATION
Anesthesia Evaluation     Patient summary reviewed   no history of anesthetic complications:  NPO Solid Status: > 8 hours             Airway   Mallampati: II  TM distance: >3 FB  Neck ROM: full  Dental      Pulmonary - negative pulmonary ROS   Cardiovascular   Exercise tolerance: excellent (>7 METS)    (+) hypertension, hyperlipidemia,       Neuro/Psych- negative ROS  GI/Hepatic/Renal/Endo    (+) obesity,  GERD,      Musculoskeletal     Abdominal    Substance History      OB/GYN          Other                        Anesthesia Plan    ASA 2     MAC       Anesthetic plan, all risks, benefits, and alternatives have been provided, discussed and informed consent has been obtained with: patient.

## 2020-03-10 ENCOUNTER — TELEPHONE (OUTPATIENT)
Dept: GASTROENTEROLOGY | Facility: CLINIC | Age: 65
End: 2020-03-10

## 2020-03-10 NOTE — TELEPHONE ENCOUNTER
Tell her we will be sending her some antibiotics to take and then will check a stool test to confirm it has bee treated

## 2020-03-10 NOTE — TELEPHONE ENCOUNTER
Pt called in because she saw on MyChart that she was positive for H Pylori. She would like to know how to proceed.

## 2020-03-12 ENCOUNTER — TELEPHONE (OUTPATIENT)
Dept: GASTROENTEROLOGY | Facility: CLINIC | Age: 65
End: 2020-03-12

## 2020-03-12 NOTE — TELEPHONE ENCOUNTER
I called patient and told her that she is positive for H Pylori and called her in a Pylera Pack and Omeprazole 20 mg twice a day x 14 days to [facility] told her I will contact her in 2 months for a recheck with Stool test.

## 2020-03-19 ENCOUNTER — TRANSCRIBE ORDERS (OUTPATIENT)
Dept: ADMINISTRATIVE | Facility: HOSPITAL | Age: 65
End: 2020-03-19

## 2020-03-19 ENCOUNTER — LAB (OUTPATIENT)
Dept: LAB | Facility: HOSPITAL | Age: 65
End: 2020-03-19

## 2020-03-19 DIAGNOSIS — R73.9 BLOOD GLUCOSE ELEVATED: ICD-10-CM

## 2020-03-19 DIAGNOSIS — E83.42 HYPOMAGNESEMIA: ICD-10-CM

## 2020-03-19 DIAGNOSIS — E78.00 PURE HYPERCHOLESTEROLEMIA: Primary | ICD-10-CM

## 2020-03-19 DIAGNOSIS — E78.00 PURE HYPERCHOLESTEROLEMIA: ICD-10-CM

## 2020-03-19 LAB
ALBUMIN SERPL-MCNC: 4 G/DL (ref 3.5–5.2)
ALBUMIN/GLOB SERPL: 1.5 G/DL
ALP SERPL-CCNC: 49 U/L (ref 39–117)
ALT SERPL W P-5'-P-CCNC: 10 U/L (ref 1–33)
ANION GAP SERPL CALCULATED.3IONS-SCNC: 9.9 MMOL/L (ref 5–15)
AST SERPL-CCNC: 14 U/L (ref 1–32)
BILIRUB SERPL-MCNC: 0.3 MG/DL (ref 0.2–1.2)
BUN BLD-MCNC: 18 MG/DL (ref 8–23)
BUN/CREAT SERPL: 25.4 (ref 7–25)
CALCIUM SPEC-SCNC: 8.5 MG/DL (ref 8.6–10.5)
CHLORIDE SERPL-SCNC: 100 MMOL/L (ref 98–107)
CO2 SERPL-SCNC: 26.1 MMOL/L (ref 22–29)
CREAT BLD-MCNC: 0.71 MG/DL (ref 0.57–1)
DEPRECATED RDW RBC AUTO: 44 FL (ref 37–54)
ERYTHROCYTE [DISTWIDTH] IN BLOOD BY AUTOMATED COUNT: 15 % (ref 12.3–15.4)
GFR SERPL CREATININE-BSD FRML MDRD: 83 ML/MIN/1.73
GLOBULIN UR ELPH-MCNC: 2.6 GM/DL
GLUCOSE BLD-MCNC: 127 MG/DL (ref 65–99)
HCT VFR BLD AUTO: 34.3 % (ref 34–46.6)
HGB BLD-MCNC: 11.6 G/DL (ref 12–15.9)
MAGNESIUM SERPL-MCNC: 1.2 MG/DL (ref 1.6–2.4)
MCH RBC QN AUTO: 27.4 PG (ref 26.6–33)
MCHC RBC AUTO-ENTMCNC: 33.8 G/DL (ref 31.5–35.7)
MCV RBC AUTO: 80.9 FL (ref 79–97)
PLATELET # BLD AUTO: 140 10*3/MM3 (ref 140–450)
PMV BLD AUTO: 11.6 FL (ref 6–12)
POTASSIUM BLD-SCNC: 3.6 MMOL/L (ref 3.5–5.2)
PROT SERPL-MCNC: 6.6 G/DL (ref 6–8.5)
RBC # BLD AUTO: 4.24 10*6/MM3 (ref 3.77–5.28)
SODIUM BLD-SCNC: 136 MMOL/L (ref 136–145)
WBC NRBC COR # BLD: 6.26 10*3/MM3 (ref 3.4–10.8)

## 2020-03-19 PROCEDURE — 36415 COLL VENOUS BLD VENIPUNCTURE: CPT

## 2020-03-19 PROCEDURE — 80053 COMPREHEN METABOLIC PANEL: CPT | Performed by: INTERNAL MEDICINE

## 2020-03-19 PROCEDURE — 83735 ASSAY OF MAGNESIUM: CPT | Performed by: INTERNAL MEDICINE

## 2020-03-19 PROCEDURE — 85027 COMPLETE CBC AUTOMATED: CPT | Performed by: INTERNAL MEDICINE

## 2020-04-23 ENCOUNTER — APPOINTMENT (OUTPATIENT)
Dept: CT IMAGING | Facility: HOSPITAL | Age: 65
End: 2020-04-23

## 2020-04-23 ENCOUNTER — HOSPITAL ENCOUNTER (INPATIENT)
Facility: HOSPITAL | Age: 65
LOS: 6 days | Discharge: HOME | End: 2020-04-29
Attending: EMERGENCY MEDICINE | Admitting: INTERNAL MEDICINE

## 2020-04-23 DIAGNOSIS — N17.9 AKI (ACUTE KIDNEY INJURY) (HCC): Primary | ICD-10-CM

## 2020-04-23 DIAGNOSIS — R10.13 EPIGASTRIC PAIN: ICD-10-CM

## 2020-04-23 DIAGNOSIS — R31.9 HEMATURIA, UNSPECIFIED TYPE: ICD-10-CM

## 2020-04-23 DIAGNOSIS — R11.2 NAUSEA AND VOMITING, INTRACTABILITY OF VOMITING NOT SPECIFIED, UNSPECIFIED VOMITING TYPE: ICD-10-CM

## 2020-04-23 PROBLEM — I10 BENIGN ESSENTIAL HTN: Status: ACTIVE | Noted: 2020-04-23

## 2020-04-23 PROBLEM — R19.7 DIARRHEA: Status: ACTIVE | Noted: 2020-04-23

## 2020-04-23 LAB
ALBUMIN SERPL-MCNC: 4.3 G/DL (ref 3.5–5.2)
ALBUMIN/GLOB SERPL: 1.2 G/DL
ALP SERPL-CCNC: 76 U/L (ref 39–117)
ALT SERPL W P-5'-P-CCNC: 27 U/L (ref 1–33)
ANION GAP SERPL CALCULATED.3IONS-SCNC: 14 MMOL/L (ref 5–15)
AST SERPL-CCNC: 28 U/L (ref 1–32)
BACTERIA UR QL AUTO: ABNORMAL /HPF
BASOPHILS # BLD AUTO: 0.05 10*3/MM3 (ref 0–0.2)
BASOPHILS NFR BLD AUTO: 0.5 % (ref 0–1.5)
BILIRUB SERPL-MCNC: 0.3 MG/DL (ref 0.2–1.2)
BILIRUB UR QL STRIP: NEGATIVE
BUN BLD-MCNC: 61 MG/DL (ref 8–23)
BUN/CREAT SERPL: 15.2 (ref 7–25)
CALCIUM SPEC-SCNC: 9.7 MG/DL (ref 8.6–10.5)
CHLORIDE SERPL-SCNC: 95 MMOL/L (ref 98–107)
CLARITY UR: CLEAR
CO2 SERPL-SCNC: 30 MMOL/L (ref 22–29)
COLOR UR: YELLOW
CREAT BLD-MCNC: 4.02 MG/DL (ref 0.57–1)
DEPRECATED RDW RBC AUTO: 45.2 FL (ref 37–54)
EOSINOPHIL # BLD AUTO: 0.29 10*3/MM3 (ref 0–0.4)
EOSINOPHIL NFR BLD AUTO: 3.2 % (ref 0.3–6.2)
ERYTHROCYTE [DISTWIDTH] IN BLOOD BY AUTOMATED COUNT: 15 % (ref 12.3–15.4)
GFR SERPL CREATININE-BSD FRML MDRD: 11 ML/MIN/1.73
GFR SERPL CREATININE-BSD FRML MDRD: ABNORMAL ML/MIN/{1.73_M2}
GLOBULIN UR ELPH-MCNC: 3.7 GM/DL
GLUCOSE BLD-MCNC: 109 MG/DL (ref 65–99)
GLUCOSE UR STRIP-MCNC: NEGATIVE MG/DL
HCT VFR BLD AUTO: 34.5 % (ref 34–46.6)
HGB BLD-MCNC: 11.4 G/DL (ref 12–15.9)
HGB UR QL STRIP.AUTO: ABNORMAL
HYALINE CASTS UR QL AUTO: ABNORMAL /LPF
IMM GRANULOCYTES # BLD AUTO: 0.06 10*3/MM3 (ref 0–0.05)
IMM GRANULOCYTES NFR BLD AUTO: 0.7 % (ref 0–0.5)
KETONES UR QL STRIP: NEGATIVE
LEUKOCYTE ESTERASE UR QL STRIP.AUTO: NEGATIVE
LYMPHOCYTES # BLD AUTO: 2.26 10*3/MM3 (ref 0.7–3.1)
LYMPHOCYTES NFR BLD AUTO: 24.6 % (ref 19.6–45.3)
MCH RBC QN AUTO: 27.5 PG (ref 26.6–33)
MCHC RBC AUTO-ENTMCNC: 33 G/DL (ref 31.5–35.7)
MCV RBC AUTO: 83.3 FL (ref 79–97)
MONOCYTES # BLD AUTO: 1.02 10*3/MM3 (ref 0.1–0.9)
MONOCYTES NFR BLD AUTO: 11.1 % (ref 5–12)
NEUTROPHILS # BLD AUTO: 5.5 10*3/MM3 (ref 1.7–7)
NEUTROPHILS NFR BLD AUTO: 59.9 % (ref 42.7–76)
NITRITE UR QL STRIP: NEGATIVE
NRBC BLD AUTO-RTO: 0 /100 WBC (ref 0–0.2)
PH UR STRIP.AUTO: 8.5 [PH] (ref 5–8)
PLATELET # BLD AUTO: 209 10*3/MM3 (ref 140–450)
PMV BLD AUTO: 9.8 FL (ref 6–12)
POTASSIUM BLD-SCNC: 4.8 MMOL/L (ref 3.5–5.2)
PROT SERPL-MCNC: 8 G/DL (ref 6–8.5)
PROT UR QL STRIP: NEGATIVE
RBC # BLD AUTO: 4.14 10*6/MM3 (ref 3.77–5.28)
RBC # UR: ABNORMAL /HPF
REF LAB TEST METHOD: ABNORMAL
SODIUM BLD-SCNC: 139 MMOL/L (ref 136–145)
SP GR UR STRIP: 1.01 (ref 1–1.03)
SQUAMOUS #/AREA URNS HPF: ABNORMAL /HPF
UROBILINOGEN UR QL STRIP: ABNORMAL
WBC NRBC COR # BLD: 9.18 10*3/MM3 (ref 3.4–10.8)
WBC UR QL AUTO: ABNORMAL /HPF

## 2020-04-23 PROCEDURE — 25010000002 HEPARIN (PORCINE) PER 1000 UNITS: Performed by: INTERNAL MEDICINE

## 2020-04-23 PROCEDURE — P9612 CATHETERIZE FOR URINE SPEC: HCPCS

## 2020-04-23 PROCEDURE — 25010000002 ONDANSETRON PER 1 MG: Performed by: EMERGENCY MEDICINE

## 2020-04-23 PROCEDURE — 85025 COMPLETE CBC W/AUTO DIFF WBC: CPT | Performed by: EMERGENCY MEDICINE

## 2020-04-23 PROCEDURE — 99283 EMERGENCY DEPT VISIT LOW MDM: CPT

## 2020-04-23 PROCEDURE — 80053 COMPREHEN METABOLIC PANEL: CPT | Performed by: EMERGENCY MEDICINE

## 2020-04-23 PROCEDURE — 81001 URINALYSIS AUTO W/SCOPE: CPT | Performed by: EMERGENCY MEDICINE

## 2020-04-23 PROCEDURE — 25010000002 ONDANSETRON PER 1 MG: Performed by: INTERNAL MEDICINE

## 2020-04-23 PROCEDURE — 74176 CT ABD & PELVIS W/O CONTRAST: CPT

## 2020-04-23 RX ORDER — SODIUM CHLORIDE 9 MG/ML
75 INJECTION, SOLUTION INTRAVENOUS CONTINUOUS
Status: DISCONTINUED | OUTPATIENT
Start: 2020-04-23 | End: 2020-04-28

## 2020-04-23 RX ORDER — SODIUM CHLORIDE 0.9 % (FLUSH) 0.9 %
10 SYRINGE (ML) INJECTION EVERY 12 HOURS SCHEDULED
Status: DISCONTINUED | OUTPATIENT
Start: 2020-04-23 | End: 2020-04-29 | Stop reason: HOSPADM

## 2020-04-23 RX ORDER — PAROXETINE 10 MG/1
10 TABLET, FILM COATED ORAL DAILY
Status: DISCONTINUED | OUTPATIENT
Start: 2020-04-24 | End: 2020-04-29 | Stop reason: HOSPADM

## 2020-04-23 RX ORDER — ACETAMINOPHEN 325 MG/1
650 TABLET ORAL EVERY 4 HOURS PRN
Status: DISCONTINUED | OUTPATIENT
Start: 2020-04-23 | End: 2020-04-29 | Stop reason: HOSPADM

## 2020-04-23 RX ORDER — ATORVASTATIN CALCIUM 10 MG/1
10 TABLET, FILM COATED ORAL NIGHTLY
Status: DISCONTINUED | OUTPATIENT
Start: 2020-04-23 | End: 2020-04-29 | Stop reason: HOSPADM

## 2020-04-23 RX ORDER — SODIUM CHLORIDE 0.9 % (FLUSH) 0.9 %
10 SYRINGE (ML) INJECTION AS NEEDED
Status: DISCONTINUED | OUTPATIENT
Start: 2020-04-23 | End: 2020-04-29 | Stop reason: HOSPADM

## 2020-04-23 RX ORDER — HEPARIN SODIUM 5000 [USP'U]/ML
5000 INJECTION, SOLUTION INTRAVENOUS; SUBCUTANEOUS EVERY 8 HOURS SCHEDULED
Status: DISCONTINUED | OUTPATIENT
Start: 2020-04-23 | End: 2020-04-27

## 2020-04-23 RX ORDER — ONDANSETRON 2 MG/ML
4 INJECTION INTRAMUSCULAR; INTRAVENOUS EVERY 6 HOURS PRN
Status: DISCONTINUED | OUTPATIENT
Start: 2020-04-23 | End: 2020-04-29 | Stop reason: HOSPADM

## 2020-04-23 RX ORDER — ONDANSETRON 2 MG/ML
4 INJECTION INTRAMUSCULAR; INTRAVENOUS ONCE
Status: COMPLETED | OUTPATIENT
Start: 2020-04-23 | End: 2020-04-23

## 2020-04-23 RX ORDER — PANTOPRAZOLE SODIUM 40 MG/1
40 TABLET, DELAYED RELEASE ORAL
Status: DISCONTINUED | OUTPATIENT
Start: 2020-04-24 | End: 2020-04-29 | Stop reason: HOSPADM

## 2020-04-23 RX ADMIN — SODIUM CHLORIDE 100 ML/HR: 9 INJECTION, SOLUTION INTRAVENOUS at 18:26

## 2020-04-23 RX ADMIN — SODIUM CHLORIDE, PRESERVATIVE FREE 10 ML: 5 INJECTION INTRAVENOUS at 21:07

## 2020-04-23 RX ADMIN — SODIUM CHLORIDE, POTASSIUM CHLORIDE, SODIUM LACTATE AND CALCIUM CHLORIDE 1000 ML: 600; 310; 30; 20 INJECTION, SOLUTION INTRAVENOUS at 14:42

## 2020-04-23 RX ADMIN — ONDANSETRON HYDROCHLORIDE 4 MG: 2 SOLUTION INTRAMUSCULAR; INTRAVENOUS at 14:42

## 2020-04-23 RX ADMIN — ONDANSETRON HYDROCHLORIDE 4 MG: 2 SOLUTION INTRAMUSCULAR; INTRAVENOUS at 23:56

## 2020-04-23 RX ADMIN — HEPARIN SODIUM 5000 UNITS: 5000 INJECTION, SOLUTION INTRAVENOUS; SUBCUTANEOUS at 21:07

## 2020-04-23 RX ADMIN — ATORVASTATIN CALCIUM 10 MG: 10 TABLET, FILM COATED ORAL at 21:07

## 2020-04-24 ENCOUNTER — ANESTHESIA EVENT (OUTPATIENT)
Dept: GASTROENTEROLOGY | Facility: HOSPITAL | Age: 65
End: 2020-04-24

## 2020-04-24 ENCOUNTER — APPOINTMENT (OUTPATIENT)
Dept: GENERAL RADIOLOGY | Facility: HOSPITAL | Age: 65
End: 2020-04-24

## 2020-04-24 ENCOUNTER — ANESTHESIA (OUTPATIENT)
Dept: GASTROENTEROLOGY | Facility: HOSPITAL | Age: 65
End: 2020-04-24

## 2020-04-24 PROBLEM — R11.2 NAUSEA AND VOMITING: Status: ACTIVE | Noted: 2020-04-23

## 2020-04-24 LAB
ANION GAP SERPL CALCULATED.3IONS-SCNC: 15 MMOL/L (ref 5–15)
BUN BLD-MCNC: 55 MG/DL (ref 8–23)
BUN/CREAT SERPL: 14.1 (ref 7–25)
CALCIUM SPEC-SCNC: 9.2 MG/DL (ref 8.6–10.5)
CEA SERPL-MCNC: 2.07 NG/ML
CHLORIDE SERPL-SCNC: 102 MMOL/L (ref 98–107)
CO2 SERPL-SCNC: 25 MMOL/L (ref 22–29)
CREAT BLD-MCNC: 3.91 MG/DL (ref 0.57–1)
DEPRECATED RDW RBC AUTO: 45.1 FL (ref 37–54)
ERYTHROCYTE [DISTWIDTH] IN BLOOD BY AUTOMATED COUNT: 14.9 % (ref 12.3–15.4)
GFR SERPL CREATININE-BSD FRML MDRD: 12 ML/MIN/1.73
GFR SERPL CREATININE-BSD FRML MDRD: ABNORMAL ML/MIN/{1.73_M2}
GLUCOSE BLD-MCNC: 115 MG/DL (ref 65–99)
HCT VFR BLD AUTO: 32.1 % (ref 34–46.6)
HGB BLD-MCNC: 10.5 G/DL (ref 12–15.9)
MAGNESIUM SERPL-MCNC: 2.1 MG/DL (ref 1.6–2.4)
MCH RBC QN AUTO: 27.3 PG (ref 26.6–33)
MCHC RBC AUTO-ENTMCNC: 32.7 G/DL (ref 31.5–35.7)
MCV RBC AUTO: 83.6 FL (ref 79–97)
PHOSPHATE SERPL-MCNC: 5.8 MG/DL (ref 2.5–4.5)
PLATELET # BLD AUTO: 227 10*3/MM3 (ref 140–450)
PMV BLD AUTO: 9.8 FL (ref 6–12)
POTASSIUM BLD-SCNC: 4.5 MMOL/L (ref 3.5–5.2)
RBC # BLD AUTO: 3.84 10*6/MM3 (ref 3.77–5.28)
SODIUM BLD-SCNC: 142 MMOL/L (ref 136–145)
WBC NRBC COR # BLD: 8.96 10*3/MM3 (ref 3.4–10.8)

## 2020-04-24 PROCEDURE — 25010000002 ONDANSETRON PER 1 MG: Performed by: INTERNAL MEDICINE

## 2020-04-24 PROCEDURE — 83735 ASSAY OF MAGNESIUM: CPT | Performed by: INTERNAL MEDICINE

## 2020-04-24 PROCEDURE — 25010000002 HEPARIN (PORCINE) PER 1000 UNITS: Performed by: INTERNAL MEDICINE

## 2020-04-24 PROCEDURE — 82607 VITAMIN B-12: CPT | Performed by: INTERNAL MEDICINE

## 2020-04-24 PROCEDURE — 82378 CARCINOEMBRYONIC ANTIGEN: CPT | Performed by: INTERNAL MEDICINE

## 2020-04-24 PROCEDURE — 87081 CULTURE SCREEN ONLY: CPT | Performed by: INTERNAL MEDICINE

## 2020-04-24 PROCEDURE — 84100 ASSAY OF PHOSPHORUS: CPT | Performed by: INTERNAL MEDICINE

## 2020-04-24 PROCEDURE — 82306 VITAMIN D 25 HYDROXY: CPT | Performed by: CLINICAL NURSE SPECIALIST

## 2020-04-24 PROCEDURE — 82746 ASSAY OF FOLIC ACID SERUM: CPT | Performed by: INTERNAL MEDICINE

## 2020-04-24 PROCEDURE — 43239 EGD BIOPSY SINGLE/MULTIPLE: CPT | Performed by: NURSE PRACTITIONER

## 2020-04-24 PROCEDURE — 71045 X-RAY EXAM CHEST 1 VIEW: CPT

## 2020-04-24 PROCEDURE — 80048 BASIC METABOLIC PNL TOTAL CA: CPT | Performed by: INTERNAL MEDICINE

## 2020-04-24 PROCEDURE — 94799 UNLISTED PULMONARY SVC/PX: CPT

## 2020-04-24 PROCEDURE — 85027 COMPLETE CBC AUTOMATED: CPT | Performed by: INTERNAL MEDICINE

## 2020-04-24 PROCEDURE — 25010000002 PROPOFOL 10 MG/ML EMULSION: Performed by: NURSE ANESTHETIST, CERTIFIED REGISTERED

## 2020-04-24 PROCEDURE — 0DB68ZX EXCISION OF STOMACH, VIA NATURAL OR ARTIFICIAL OPENING ENDOSCOPIC, DIAGNOSTIC: ICD-10-PCS | Performed by: INTERNAL MEDICINE

## 2020-04-24 RX ORDER — IPRATROPIUM BROMIDE AND ALBUTEROL SULFATE 2.5; .5 MG/3ML; MG/3ML
3 SOLUTION RESPIRATORY (INHALATION) ONCE
Status: DISCONTINUED | OUTPATIENT
Start: 2020-04-24 | End: 2020-04-24

## 2020-04-24 RX ORDER — SODIUM CHLORIDE 0.9 % (FLUSH) 0.9 %
10 SYRINGE (ML) INJECTION AS NEEDED
Status: DISCONTINUED | OUTPATIENT
Start: 2020-04-24 | End: 2020-04-24 | Stop reason: HOSPADM

## 2020-04-24 RX ORDER — SODIUM CHLORIDE 0.9 % (FLUSH) 0.9 %
10 SYRINGE (ML) INJECTION EVERY 12 HOURS SCHEDULED
Status: DISCONTINUED | OUTPATIENT
Start: 2020-04-24 | End: 2020-04-24 | Stop reason: HOSPADM

## 2020-04-24 RX ORDER — ONDANSETRON 2 MG/ML
4 INJECTION INTRAMUSCULAR; INTRAVENOUS ONCE
Status: COMPLETED | OUTPATIENT
Start: 2020-04-24 | End: 2020-04-24

## 2020-04-24 RX ORDER — PROPOFOL 10 MG/ML
VIAL (ML) INTRAVENOUS AS NEEDED
Status: DISCONTINUED | OUTPATIENT
Start: 2020-04-24 | End: 2020-04-24 | Stop reason: SURG

## 2020-04-24 RX ORDER — SODIUM CHLORIDE 9 MG/ML
100 INJECTION, SOLUTION INTRAVENOUS CONTINUOUS
Status: DISCONTINUED | OUTPATIENT
Start: 2020-04-24 | End: 2020-04-24 | Stop reason: HOSPADM

## 2020-04-24 RX ADMIN — SODIUM CHLORIDE 100 ML/HR: 9 INJECTION, SOLUTION INTRAVENOUS at 04:52

## 2020-04-24 RX ADMIN — ONDANSETRON HYDROCHLORIDE 4 MG: 2 SOLUTION INTRAMUSCULAR; INTRAVENOUS at 15:23

## 2020-04-24 RX ADMIN — HEPARIN SODIUM 5000 UNITS: 5000 INJECTION, SOLUTION INTRAVENOUS; SUBCUTANEOUS at 05:00

## 2020-04-24 RX ADMIN — ATORVASTATIN CALCIUM 10 MG: 10 TABLET, FILM COATED ORAL at 21:32

## 2020-04-24 RX ADMIN — SODIUM CHLORIDE 100 ML/HR: 9 INJECTION, SOLUTION INTRAVENOUS at 11:25

## 2020-04-24 RX ADMIN — LIDOCAINE HYDROCHLORIDE 150 MG: 20 INJECTION, SOLUTION INTRAVENOUS at 11:30

## 2020-04-24 RX ADMIN — ONDANSETRON HYDROCHLORIDE 4 MG: 2 SOLUTION INTRAMUSCULAR; INTRAVENOUS at 17:06

## 2020-04-24 RX ADMIN — SODIUM CHLORIDE 100 ML/HR: 9 INJECTION, SOLUTION INTRAVENOUS at 17:02

## 2020-04-24 RX ADMIN — HEPARIN SODIUM 5000 UNITS: 5000 INJECTION, SOLUTION INTRAVENOUS; SUBCUTANEOUS at 21:32

## 2020-04-24 RX ADMIN — PROPOFOL 210 MG: 10 INJECTION, EMULSION INTRAVENOUS at 11:30

## 2020-04-24 RX ADMIN — HEPARIN SODIUM 5000 UNITS: 5000 INJECTION, SOLUTION INTRAVENOUS; SUBCUTANEOUS at 13:12

## 2020-04-24 RX ADMIN — ONDANSETRON HYDROCHLORIDE 4 MG: 2 SOLUTION INTRAMUSCULAR; INTRAVENOUS at 13:10

## 2020-04-24 NOTE — ANESTHESIA PREPROCEDURE EVALUATION
Anesthesia Evaluation     Patient summary reviewed   no history of anesthetic complications:  NPO Solid Status: > 8 hours  NPO Liquid Status: > 4 hours           Airway   Mallampati: II  TM distance: >3 FB  Neck ROM: full  Dental      Pulmonary - negative pulmonary ROS   Cardiovascular   Exercise tolerance: excellent (>7 METS)    ECG reviewed    (+) hypertension, hyperlipidemia,       Neuro/Psych- negative ROS  GI/Hepatic/Renal/Endo    (+) obesity,  GERD,  renal disease ARF,     Musculoskeletal     Abdominal    Substance History      OB/GYN          Other                        Anesthesia Plan    ASA 3 - emergent     MAC       Anesthetic plan, all risks, benefits, and alternatives have been provided, discussed and informed consent has been obtained with: patient.

## 2020-04-24 NOTE — ANESTHESIA POSTPROCEDURE EVALUATION
"Patient: Sammi Cordero    Procedure Summary     Date:  04/24/20 Room / Location:  [facility] ENDOSCOPY 4 / [facility] ENDOSCOPY    Anesthesia Start:  1125 Anesthesia Stop:  1153    Procedure:  ESOPHAGOGASTRODUODENOSCOPY WITH ANESTHESIA (N/A ) Diagnosis:       Nausea and vomiting, intractability of vomiting not specified, unspecified vomiting type      (Nausea and vomiting, intractability of vomiting not specified, unspecified vomiting type [R11.2])    Surgeon:  Daniel Junior MD Provider:  Klever Menendez CRNA    Anesthesia Type:  MAC ASA Status:  3 - Emergent          Anesthesia Type: MAC    Vitals  No vitals data found for the desired time range.          Post Anesthesia Care and Evaluation    Patient location during evaluation: PACU  Patient participation: complete - patient participated  Level of consciousness: awake and alert  Pain management: adequate  Airway patency: patent  Anesthetic complications: No anesthetic complications    Cardiovascular status: acceptable  Respiratory status: acceptable  Hydration status: acceptable    Comments: Blood pressure 117/54, pulse 63, temperature 98.4 °F (36.9 °C), temperature source Oral, resp. rate 16, height 151.1 cm (59.5\"), weight 75.3 kg (166 lb), SpO2 98 %, not currently breastfeeding.    Pt discharged from PACU based on augustin score >8      "

## 2020-04-25 ENCOUNTER — APPOINTMENT (OUTPATIENT)
Dept: ULTRASOUND IMAGING | Facility: HOSPITAL | Age: 65
End: 2020-04-25

## 2020-04-25 LAB
25(OH)D3 SERPL-MCNC: 25.4 NG/ML (ref 30–100)
ANION GAP SERPL CALCULATED.3IONS-SCNC: 13 MMOL/L (ref 5–15)
BUN BLD-MCNC: 48 MG/DL (ref 8–23)
BUN/CREAT SERPL: 12.5 (ref 7–25)
CALCIUM SPEC-SCNC: 8.6 MG/DL (ref 8.6–10.5)
CHLORIDE SERPL-SCNC: 104 MMOL/L (ref 98–107)
CO2 SERPL-SCNC: 23 MMOL/L (ref 22–29)
CREAT BLD-MCNC: 3.85 MG/DL (ref 0.57–1)
CREAT UR-MCNC: 27.4 MG/DL
GFR SERPL CREATININE-BSD FRML MDRD: 12 ML/MIN/1.73
GFR SERPL CREATININE-BSD FRML MDRD: ABNORMAL ML/MIN/{1.73_M2}
GLUCOSE BLD-MCNC: 136 MG/DL (ref 65–99)
MAGNESIUM SERPL-MCNC: 1.9 MG/DL (ref 1.6–2.4)
OSMOLALITY UR: 288 MOSM/KG (ref 601–850)
PHOSPHATE SERPL-MCNC: 4.8 MG/DL (ref 2.5–4.5)
POTASSIUM BLD-SCNC: 4.3 MMOL/L (ref 3.5–5.2)
SODIUM BLD-SCNC: 140 MMOL/L (ref 136–145)
SODIUM UR-SCNC: 92 MMOL/L
URATE SERPL-MCNC: 8.6 MG/DL (ref 2.4–5.7)
UREASE TISS QL: NEGATIVE
WHOLE BLOOD HOLD SPECIMEN: NORMAL

## 2020-04-25 PROCEDURE — 87205 SMEAR GRAM STAIN: CPT | Performed by: CLINICAL NURSE SPECIALIST

## 2020-04-25 PROCEDURE — 84300 ASSAY OF URINE SODIUM: CPT | Performed by: CLINICAL NURSE SPECIALIST

## 2020-04-25 PROCEDURE — 25010000002 HEPARIN (PORCINE) PER 1000 UNITS: Performed by: INTERNAL MEDICINE

## 2020-04-25 PROCEDURE — 82570 ASSAY OF URINE CREATININE: CPT | Performed by: CLINICAL NURSE SPECIALIST

## 2020-04-25 PROCEDURE — 80048 BASIC METABOLIC PNL TOTAL CA: CPT | Performed by: INTERNAL MEDICINE

## 2020-04-25 PROCEDURE — 83735 ASSAY OF MAGNESIUM: CPT | Performed by: CLINICAL NURSE SPECIALIST

## 2020-04-25 PROCEDURE — 83935 ASSAY OF URINE OSMOLALITY: CPT | Performed by: CLINICAL NURSE SPECIALIST

## 2020-04-25 PROCEDURE — 76775 US EXAM ABDO BACK WALL LIM: CPT

## 2020-04-25 PROCEDURE — 63710000001 PREDNISONE PER 1 MG: Performed by: INTERNAL MEDICINE

## 2020-04-25 PROCEDURE — 84550 ASSAY OF BLOOD/URIC ACID: CPT | Performed by: INTERNAL MEDICINE

## 2020-04-25 PROCEDURE — 84100 ASSAY OF PHOSPHORUS: CPT | Performed by: CLINICAL NURSE SPECIALIST

## 2020-04-25 PROCEDURE — 25010000002 ONDANSETRON PER 1 MG: Performed by: INTERNAL MEDICINE

## 2020-04-25 RX ORDER — PREDNISONE 20 MG/1
60 TABLET ORAL DAILY
Status: COMPLETED | OUTPATIENT
Start: 2020-04-25 | End: 2020-04-29

## 2020-04-25 RX ADMIN — ACETAMINOPHEN 650 MG: 325 TABLET, FILM COATED ORAL at 15:57

## 2020-04-25 RX ADMIN — HEPARIN SODIUM 5000 UNITS: 5000 INJECTION, SOLUTION INTRAVENOUS; SUBCUTANEOUS at 06:28

## 2020-04-25 RX ADMIN — SODIUM CHLORIDE 125 ML/HR: 9 INJECTION, SOLUTION INTRAVENOUS at 14:14

## 2020-04-25 RX ADMIN — PREDNISONE 60 MG: 20 TABLET ORAL at 17:26

## 2020-04-25 RX ADMIN — SODIUM CHLORIDE 100 ML/HR: 9 INJECTION, SOLUTION INTRAVENOUS at 03:55

## 2020-04-25 RX ADMIN — ACETAMINOPHEN 650 MG: 325 TABLET, FILM COATED ORAL at 20:26

## 2020-04-25 RX ADMIN — PANTOPRAZOLE SODIUM 40 MG: 40 TABLET, DELAYED RELEASE ORAL at 07:34

## 2020-04-25 RX ADMIN — SODIUM CHLORIDE 125 ML/HR: 9 INJECTION, SOLUTION INTRAVENOUS at 23:51

## 2020-04-25 RX ADMIN — PAROXETINE 10 MG: 10 TABLET, FILM COATED ORAL at 08:30

## 2020-04-25 RX ADMIN — ATORVASTATIN CALCIUM 10 MG: 10 TABLET, FILM COATED ORAL at 20:26

## 2020-04-25 RX ADMIN — HEPARIN SODIUM 5000 UNITS: 5000 INJECTION, SOLUTION INTRAVENOUS; SUBCUTANEOUS at 14:15

## 2020-04-25 RX ADMIN — HEPARIN SODIUM 5000 UNITS: 5000 INJECTION, SOLUTION INTRAVENOUS; SUBCUTANEOUS at 20:27

## 2020-04-25 RX ADMIN — ONDANSETRON HYDROCHLORIDE 4 MG: 2 SOLUTION INTRAMUSCULAR; INTRAVENOUS at 15:57

## 2020-04-26 ENCOUNTER — APPOINTMENT (OUTPATIENT)
Dept: ULTRASOUND IMAGING | Facility: HOSPITAL | Age: 65
End: 2020-04-26

## 2020-04-26 PROBLEM — N28.9 RENAL LESION: Status: ACTIVE | Noted: 2020-04-26

## 2020-04-26 LAB
ALBUMIN SERPL-MCNC: 3.3 G/DL (ref 3.5–5.2)
ALBUMIN/GLOB SERPL: 1 G/DL
ALP SERPL-CCNC: 57 U/L (ref 39–117)
ALT SERPL W P-5'-P-CCNC: 13 U/L (ref 1–33)
ANION GAP SERPL CALCULATED.3IONS-SCNC: 14 MMOL/L (ref 5–15)
AST SERPL-CCNC: 23 U/L (ref 1–32)
BASOPHILS # BLD AUTO: 0.02 10*3/MM3 (ref 0–0.2)
BASOPHILS NFR BLD AUTO: 0.2 % (ref 0–1.5)
BILIRUB SERPL-MCNC: 0.2 MG/DL (ref 0.2–1.2)
BUN BLD-MCNC: 48 MG/DL (ref 8–23)
BUN/CREAT SERPL: 13.4 (ref 7–25)
CALCIUM SPEC-SCNC: 8.5 MG/DL (ref 8.6–10.5)
CHLORIDE SERPL-SCNC: 109 MMOL/L (ref 98–107)
CO2 SERPL-SCNC: 20 MMOL/L (ref 22–29)
CREAT BLD-MCNC: 3.57 MG/DL (ref 0.57–1)
DEPRECATED RDW RBC AUTO: 44.7 FL (ref 37–54)
EOSINOPHIL # BLD AUTO: 0.01 10*3/MM3 (ref 0–0.4)
EOSINOPHIL NFR BLD AUTO: 0.1 % (ref 0.3–6.2)
EOSINOPHIL SPEC QL MICRO: 0 % EOS/100 CELLS (ref 0–0)
ERYTHROCYTE [DISTWIDTH] IN BLOOD BY AUTOMATED COUNT: 14.7 % (ref 12.3–15.4)
FOLATE SERPL-MCNC: >20 NG/ML (ref 4.78–24.2)
GFR SERPL CREATININE-BSD FRML MDRD: 13 ML/MIN/1.73
GFR SERPL CREATININE-BSD FRML MDRD: ABNORMAL ML/MIN/{1.73_M2}
GLOBULIN UR ELPH-MCNC: 3.4 GM/DL
GLUCOSE BLD-MCNC: 212 MG/DL (ref 65–99)
HCT VFR BLD AUTO: 28.5 % (ref 34–46.6)
HEMOCCULT STL QL: NEGATIVE
HGB BLD-MCNC: 9.4 G/DL (ref 12–15.9)
IMM GRANULOCYTES # BLD AUTO: 0.11 10*3/MM3 (ref 0–0.05)
IMM GRANULOCYTES NFR BLD AUTO: 1.2 % (ref 0–0.5)
IRON 24H UR-MRATE: 22 MCG/DL (ref 37–145)
IRON SATN MFR SERPL: 8 % (ref 20–50)
LYMPHOCYTES # BLD AUTO: 1.15 10*3/MM3 (ref 0.7–3.1)
LYMPHOCYTES NFR BLD AUTO: 12.9 % (ref 19.6–45.3)
MCH RBC QN AUTO: 27.4 PG (ref 26.6–33)
MCHC RBC AUTO-ENTMCNC: 33 G/DL (ref 31.5–35.7)
MCV RBC AUTO: 83.1 FL (ref 79–97)
MONOCYTES # BLD AUTO: 0.21 10*3/MM3 (ref 0.1–0.9)
MONOCYTES NFR BLD AUTO: 2.3 % (ref 5–12)
NEUTROPHILS # BLD AUTO: 7.44 10*3/MM3 (ref 1.7–7)
NEUTROPHILS NFR BLD AUTO: 83.3 % (ref 42.7–76)
NRBC BLD AUTO-RTO: 0 /100 WBC (ref 0–0.2)
PLATELET # BLD AUTO: 294 10*3/MM3 (ref 140–450)
PMV BLD AUTO: 9.8 FL (ref 6–12)
POTASSIUM BLD-SCNC: 4.5 MMOL/L (ref 3.5–5.2)
PROT SERPL-MCNC: 6.7 G/DL (ref 6–8.5)
RBC # BLD AUTO: 3.43 10*6/MM3 (ref 3.77–5.28)
SODIUM BLD-SCNC: 143 MMOL/L (ref 136–145)
TIBC SERPL-MCNC: 285 MCG/DL (ref 298–536)
TRANSFERRIN SERPL-MCNC: 191 MG/DL (ref 200–360)
VIT B12 BLD-MCNC: 703 PG/ML (ref 211–946)
WBC NRBC COR # BLD: 8.94 10*3/MM3 (ref 3.4–10.8)

## 2020-04-26 PROCEDURE — 85025 COMPLETE CBC W/AUTO DIFF WBC: CPT | Performed by: INTERNAL MEDICINE

## 2020-04-26 PROCEDURE — 99232 SBSQ HOSP IP/OBS MODERATE 35: CPT | Performed by: INTERNAL MEDICINE

## 2020-04-26 PROCEDURE — 84466 ASSAY OF TRANSFERRIN: CPT | Performed by: INTERNAL MEDICINE

## 2020-04-26 PROCEDURE — 83540 ASSAY OF IRON: CPT | Performed by: INTERNAL MEDICINE

## 2020-04-26 PROCEDURE — 63710000001 PREDNISONE PER 1 MG: Performed by: INTERNAL MEDICINE

## 2020-04-26 PROCEDURE — 93971 EXTREMITY STUDY: CPT

## 2020-04-26 PROCEDURE — 80053 COMPREHEN METABOLIC PANEL: CPT | Performed by: INTERNAL MEDICINE

## 2020-04-26 PROCEDURE — 82272 OCCULT BLD FECES 1-3 TESTS: CPT | Performed by: INTERNAL MEDICINE

## 2020-04-26 PROCEDURE — 25010000002 HEPARIN (PORCINE) PER 1000 UNITS: Performed by: INTERNAL MEDICINE

## 2020-04-26 PROCEDURE — 93971 EXTREMITY STUDY: CPT | Performed by: SURGERY

## 2020-04-26 RX ADMIN — PANTOPRAZOLE SODIUM 40 MG: 40 TABLET, DELAYED RELEASE ORAL at 08:17

## 2020-04-26 RX ADMIN — HEPARIN SODIUM 5000 UNITS: 5000 INJECTION, SOLUTION INTRAVENOUS; SUBCUTANEOUS at 20:27

## 2020-04-26 RX ADMIN — HEPARIN SODIUM 5000 UNITS: 5000 INJECTION, SOLUTION INTRAVENOUS; SUBCUTANEOUS at 05:56

## 2020-04-26 RX ADMIN — SODIUM CHLORIDE 125 ML/HR: 9 INJECTION, SOLUTION INTRAVENOUS at 11:28

## 2020-04-26 RX ADMIN — SODIUM CHLORIDE 125 ML/HR: 9 INJECTION, SOLUTION INTRAVENOUS at 23:05

## 2020-04-26 RX ADMIN — PREDNISONE 60 MG: 20 TABLET ORAL at 08:17

## 2020-04-26 RX ADMIN — ATORVASTATIN CALCIUM 10 MG: 10 TABLET, FILM COATED ORAL at 20:28

## 2020-04-26 RX ADMIN — PAROXETINE 10 MG: 10 TABLET, FILM COATED ORAL at 08:17

## 2020-04-27 LAB
ALBUMIN SERPL-MCNC: 3.2 G/DL (ref 3.5–5.2)
ALBUMIN/GLOB SERPL: 1 G/DL
ALP SERPL-CCNC: 50 U/L (ref 39–117)
ALT SERPL W P-5'-P-CCNC: 10 U/L (ref 1–33)
ANION GAP SERPL CALCULATED.3IONS-SCNC: 13 MMOL/L (ref 5–15)
AST SERPL-CCNC: 11 U/L (ref 1–32)
BASOPHILS # BLD AUTO: 0.03 10*3/MM3 (ref 0–0.2)
BASOPHILS NFR BLD AUTO: 0.2 % (ref 0–1.5)
BILIRUB SERPL-MCNC: <0.2 MG/DL (ref 0.2–1.2)
BUN BLD-MCNC: 49 MG/DL (ref 8–23)
BUN/CREAT SERPL: 14.9 (ref 7–25)
C3 SERPL-MCNC: 111 MG/DL (ref 82–167)
C4 SERPL-MCNC: 26 MG/DL (ref 14–44)
CALCIUM SPEC-SCNC: 8.5 MG/DL (ref 8.6–10.5)
CHLORIDE SERPL-SCNC: 107 MMOL/L (ref 98–107)
CO2 SERPL-SCNC: 21 MMOL/L (ref 22–29)
CREAT BLD-MCNC: 3.28 MG/DL (ref 0.57–1)
DEPRECATED RDW RBC AUTO: 46.5 FL (ref 37–54)
EOSINOPHIL # BLD AUTO: 0.04 10*3/MM3 (ref 0–0.4)
EOSINOPHIL NFR BLD AUTO: 0.3 % (ref 0.3–6.2)
ERYTHROCYTE [DISTWIDTH] IN BLOOD BY AUTOMATED COUNT: 15 % (ref 12.3–15.4)
GFR SERPL CREATININE-BSD FRML MDRD: 14 ML/MIN/1.73
GFR SERPL CREATININE-BSD FRML MDRD: ABNORMAL ML/MIN/{1.73_M2}
GLOBULIN UR ELPH-MCNC: 3.1 GM/DL
GLUCOSE BLD-MCNC: 145 MG/DL (ref 65–99)
HCT VFR BLD AUTO: 26.3 % (ref 34–46.6)
HGB BLD-MCNC: 8.6 G/DL (ref 12–15.9)
IMM GRANULOCYTES # BLD AUTO: 0.11 10*3/MM3 (ref 0–0.05)
IMM GRANULOCYTES NFR BLD AUTO: 0.9 % (ref 0–0.5)
LYMPHOCYTES # BLD AUTO: 1.97 10*3/MM3 (ref 0.7–3.1)
LYMPHOCYTES NFR BLD AUTO: 16.2 % (ref 19.6–45.3)
MCH RBC QN AUTO: 27.6 PG (ref 26.6–33)
MCHC RBC AUTO-ENTMCNC: 32.7 G/DL (ref 31.5–35.7)
MCV RBC AUTO: 84.3 FL (ref 79–97)
MONOCYTES # BLD AUTO: 1.25 10*3/MM3 (ref 0.1–0.9)
MONOCYTES NFR BLD AUTO: 10.3 % (ref 5–12)
NEUTROPHILS # BLD AUTO: 8.74 10*3/MM3 (ref 1.7–7)
NEUTROPHILS NFR BLD AUTO: 72.1 % (ref 42.7–76)
NRBC BLD AUTO-RTO: 0 /100 WBC (ref 0–0.2)
PHOSPHATE SERPL-MCNC: 4.5 MG/DL (ref 2.5–4.5)
PLATELET # BLD AUTO: 324 10*3/MM3 (ref 140–450)
PMV BLD AUTO: 10.7 FL (ref 6–12)
POTASSIUM BLD-SCNC: 4.1 MMOL/L (ref 3.5–5.2)
PROT SERPL-MCNC: 6.3 G/DL (ref 6–8.5)
RBC # BLD AUTO: 3.12 10*6/MM3 (ref 3.77–5.28)
SODIUM BLD-SCNC: 141 MMOL/L (ref 136–145)
WBC NRBC COR # BLD: 12.14 10*3/MM3 (ref 3.4–10.8)

## 2020-04-27 PROCEDURE — 85025 COMPLETE CBC W/AUTO DIFF WBC: CPT | Performed by: INTERNAL MEDICINE

## 2020-04-27 PROCEDURE — 83520 IMMUNOASSAY QUANT NOS NONAB: CPT | Performed by: NURSE PRACTITIONER

## 2020-04-27 PROCEDURE — 84100 ASSAY OF PHOSPHORUS: CPT | Performed by: INTERNAL MEDICINE

## 2020-04-27 PROCEDURE — 25010000002 HEPARIN (PORCINE) PER 1000 UNITS: Performed by: INTERNAL MEDICINE

## 2020-04-27 PROCEDURE — 86256 FLUORESCENT ANTIBODY TITER: CPT | Performed by: NURSE PRACTITIONER

## 2020-04-27 PROCEDURE — 86038 ANTINUCLEAR ANTIBODIES: CPT | Performed by: INTERNAL MEDICINE

## 2020-04-27 PROCEDURE — 86160 COMPLEMENT ANTIGEN: CPT | Performed by: INTERNAL MEDICINE

## 2020-04-27 PROCEDURE — 80053 COMPREHEN METABOLIC PANEL: CPT | Performed by: INTERNAL MEDICINE

## 2020-04-27 PROCEDURE — 63710000001 PREDNISONE PER 1 MG: Performed by: INTERNAL MEDICINE

## 2020-04-27 RX ADMIN — PAROXETINE 10 MG: 10 TABLET, FILM COATED ORAL at 08:10

## 2020-04-27 RX ADMIN — HEPARIN SODIUM 5000 UNITS: 5000 INJECTION, SOLUTION INTRAVENOUS; SUBCUTANEOUS at 06:28

## 2020-04-27 RX ADMIN — SODIUM CHLORIDE 75 ML/HR: 9 INJECTION, SOLUTION INTRAVENOUS at 19:50

## 2020-04-27 RX ADMIN — PREDNISONE 60 MG: 20 TABLET ORAL at 08:10

## 2020-04-27 RX ADMIN — SODIUM CHLORIDE, PRESERVATIVE FREE 10 ML: 5 INJECTION INTRAVENOUS at 08:10

## 2020-04-27 RX ADMIN — SODIUM CHLORIDE 125 ML/HR: 9 INJECTION, SOLUTION INTRAVENOUS at 07:32

## 2020-04-27 RX ADMIN — PANTOPRAZOLE SODIUM 40 MG: 40 TABLET, DELAYED RELEASE ORAL at 06:28

## 2020-04-27 RX ADMIN — ATORVASTATIN CALCIUM 10 MG: 10 TABLET, FILM COATED ORAL at 21:31

## 2020-04-28 ENCOUNTER — APPOINTMENT (OUTPATIENT)
Dept: CT IMAGING | Facility: HOSPITAL | Age: 65
End: 2020-04-28

## 2020-04-28 LAB
ANA SER QL: NEGATIVE
ANION GAP SERPL CALCULATED.3IONS-SCNC: 13 MMOL/L (ref 5–15)
APTT PPP: 33.8 SECONDS (ref 24.1–35)
BUN BLD-MCNC: 55 MG/DL (ref 8–23)
BUN/CREAT SERPL: 19.8 (ref 7–25)
CALCIUM SPEC-SCNC: 8.2 MG/DL (ref 8.6–10.5)
CHLORIDE SERPL-SCNC: 109 MMOL/L (ref 98–107)
CO2 SERPL-SCNC: 21 MMOL/L (ref 22–29)
CREAT BLD-MCNC: 2.78 MG/DL (ref 0.57–1)
DEPRECATED RDW RBC AUTO: 46.2 FL (ref 37–54)
ERYTHROCYTE [DISTWIDTH] IN BLOOD BY AUTOMATED COUNT: 15.1 % (ref 12.3–15.4)
FERRITIN SERPL-MCNC: 401.6 NG/ML (ref 13–150)
GFR SERPL CREATININE-BSD FRML MDRD: 17 ML/MIN/1.73
GLUCOSE BLD-MCNC: 120 MG/DL (ref 65–99)
HCT VFR BLD AUTO: 26.1 % (ref 34–46.6)
HGB BLD-MCNC: 8.5 G/DL (ref 12–15.9)
INR PPP: 1.13 (ref 0.91–1.09)
IRON 24H UR-MRATE: 81 MCG/DL (ref 37–145)
IRON SATN MFR SERPL: 29 % (ref 20–50)
MCH RBC QN AUTO: 27.3 PG (ref 26.6–33)
MCHC RBC AUTO-ENTMCNC: 32.6 G/DL (ref 31.5–35.7)
MCV RBC AUTO: 83.9 FL (ref 79–97)
PLATELET # BLD AUTO: 368 10*3/MM3 (ref 140–450)
PMV BLD AUTO: 10.1 FL (ref 6–12)
POTASSIUM BLD-SCNC: 3.7 MMOL/L (ref 3.5–5.2)
PROTHROMBIN TIME: 14.1 SECONDS (ref 11.9–14.6)
RBC # BLD AUTO: 3.11 10*6/MM3 (ref 3.77–5.28)
SODIUM BLD-SCNC: 143 MMOL/L (ref 136–145)
TIBC SERPL-MCNC: 279 MCG/DL (ref 298–536)
TRANSFERRIN SERPL-MCNC: 187 MG/DL (ref 200–360)
WBC NRBC COR # BLD: 9.93 10*3/MM3 (ref 3.4–10.8)

## 2020-04-28 PROCEDURE — 86235 NUCLEAR ANTIGEN ANTIBODY: CPT | Performed by: NURSE PRACTITIONER

## 2020-04-28 PROCEDURE — 85610 PROTHROMBIN TIME: CPT | Performed by: INTERNAL MEDICINE

## 2020-04-28 PROCEDURE — 80048 BASIC METABOLIC PNL TOTAL CA: CPT | Performed by: INTERNAL MEDICINE

## 2020-04-28 PROCEDURE — 83540 ASSAY OF IRON: CPT | Performed by: INTERNAL MEDICINE

## 2020-04-28 PROCEDURE — 85730 THROMBOPLASTIN TIME PARTIAL: CPT | Performed by: INTERNAL MEDICINE

## 2020-04-28 PROCEDURE — 88348 ELECTRON MICROSCOPY DX: CPT

## 2020-04-28 PROCEDURE — 63710000001 PREDNISONE PER 1 MG: Performed by: INTERNAL MEDICINE

## 2020-04-28 PROCEDURE — 88346 IMFLUOR 1ST 1ANTB STAIN PX: CPT

## 2020-04-28 PROCEDURE — 25010000002 ONDANSETRON PER 1 MG: Performed by: INTERNAL MEDICINE

## 2020-04-28 PROCEDURE — 77012 CT SCAN FOR NEEDLE BIOPSY: CPT

## 2020-04-28 PROCEDURE — 88350 IMFLUOR EA ADDL 1ANTB STN PX: CPT

## 2020-04-28 PROCEDURE — 88342 IMHCHEM/IMCYTCHM 1ST ANTB: CPT

## 2020-04-28 PROCEDURE — 84466 ASSAY OF TRANSFERRIN: CPT | Performed by: INTERNAL MEDICINE

## 2020-04-28 PROCEDURE — 0TB03ZX EXCISION OF RIGHT KIDNEY, PERCUTANEOUS APPROACH, DIAGNOSTIC: ICD-10-PCS | Performed by: RADIOLOGY

## 2020-04-28 PROCEDURE — 88313 SPECIAL STAINS GROUP 2: CPT

## 2020-04-28 PROCEDURE — 85027 COMPLETE CBC AUTOMATED: CPT | Performed by: INTERNAL MEDICINE

## 2020-04-28 PROCEDURE — 82728 ASSAY OF FERRITIN: CPT | Performed by: INTERNAL MEDICINE

## 2020-04-28 PROCEDURE — 88305 TISSUE EXAM BY PATHOLOGIST: CPT

## 2020-04-28 PROCEDURE — 86225 DNA ANTIBODY NATIVE: CPT | Performed by: NURSE PRACTITIONER

## 2020-04-28 RX ADMIN — SODIUM CHLORIDE, PRESERVATIVE FREE 10 ML: 5 INJECTION INTRAVENOUS at 08:17

## 2020-04-28 RX ADMIN — SODIUM CHLORIDE 75 ML/HR: 9 INJECTION, SOLUTION INTRAVENOUS at 11:39

## 2020-04-28 RX ADMIN — ONDANSETRON HYDROCHLORIDE 4 MG: 2 SOLUTION INTRAMUSCULAR; INTRAVENOUS at 08:17

## 2020-04-28 RX ADMIN — SODIUM BICARBONATE: 84 INJECTION, SOLUTION INTRAVENOUS at 16:57

## 2020-04-28 RX ADMIN — ATORVASTATIN CALCIUM 10 MG: 10 TABLET, FILM COATED ORAL at 20:13

## 2020-04-28 RX ADMIN — SODIUM CHLORIDE, PRESERVATIVE FREE 10 ML: 5 INJECTION INTRAVENOUS at 20:14

## 2020-04-28 RX ADMIN — ACETAMINOPHEN 650 MG: 325 TABLET, FILM COATED ORAL at 20:13

## 2020-04-28 RX ADMIN — PREDNISONE 60 MG: 20 TABLET ORAL at 16:57

## 2020-04-28 RX ADMIN — PAROXETINE 10 MG: 10 TABLET, FILM COATED ORAL at 08:17

## 2020-04-28 RX ADMIN — PANTOPRAZOLE SODIUM 40 MG: 40 TABLET, DELAYED RELEASE ORAL at 08:17

## 2020-04-29 VITALS
BODY MASS INDEX: 32.59 KG/M2 | TEMPERATURE: 98.1 F | HEART RATE: 52 BPM | RESPIRATION RATE: 16 BRPM | SYSTOLIC BLOOD PRESSURE: 135 MMHG | OXYGEN SATURATION: 99 % | WEIGHT: 166 LBS | HEIGHT: 60 IN | DIASTOLIC BLOOD PRESSURE: 64 MMHG

## 2020-04-29 PROBLEM — K44.9 HIATAL HERNIA: Status: ACTIVE | Noted: 2020-04-29

## 2020-04-29 PROBLEM — M25.532 LEFT WRIST PAIN: Status: ACTIVE | Noted: 2020-04-29

## 2020-04-29 PROBLEM — K20.90 ESOPHAGITIS DETERMINED BY ENDOSCOPY: Status: ACTIVE | Noted: 2020-04-29

## 2020-04-29 LAB
ALBUMIN SERPL-MCNC: 3.4 G/DL (ref 3.5–5.2)
ALBUMIN/GLOB SERPL: 1.1 G/DL
ALP SERPL-CCNC: 49 U/L (ref 39–117)
ALT SERPL W P-5'-P-CCNC: 10 U/L (ref 1–33)
ANION GAP SERPL CALCULATED.3IONS-SCNC: 14 MMOL/L (ref 5–15)
AST SERPL-CCNC: 21 U/L (ref 1–32)
BASOPHILS # BLD AUTO: 0.01 10*3/MM3 (ref 0–0.2)
BASOPHILS NFR BLD AUTO: 0.1 % (ref 0–1.5)
BILIRUB SERPL-MCNC: <0.2 MG/DL (ref 0.2–1.2)
BUN BLD-MCNC: 44 MG/DL (ref 8–23)
BUN/CREAT SERPL: 19.7 (ref 7–25)
C-ANCA TITR SER IF: NORMAL TITER
CALCIUM SPEC-SCNC: 7.6 MG/DL (ref 8.6–10.5)
CENTROMERE B AB SER-ACNC: <0.2 AI (ref 0–0.9)
CHLORIDE SERPL-SCNC: 104 MMOL/L (ref 98–107)
CHROMATIN AB SERPL-ACNC: <0.2 AI (ref 0–0.9)
CO2 SERPL-SCNC: 24 MMOL/L (ref 22–29)
CREAT BLD-MCNC: 2.23 MG/DL (ref 0.57–1)
DEPRECATED RDW RBC AUTO: 44.1 FL (ref 37–54)
DSDNA AB SER-ACNC: <1 IU/ML (ref 0–9)
ENA JO1 AB SER-ACNC: <0.2 AI (ref 0–0.9)
ENA RNP AB SER-ACNC: <0.2 AI (ref 0–0.9)
ENA SCL70 AB SER-ACNC: <0.2 AI (ref 0–0.9)
ENA SM AB SER-ACNC: <0.2 AI (ref 0–0.9)
ENA SS-A AB SER-ACNC: 0.2 AI (ref 0–0.9)
ENA SS-B AB SER-ACNC: <0.2 AI (ref 0–0.9)
EOSINOPHIL # BLD AUTO: 0 10*3/MM3 (ref 0–0.4)
EOSINOPHIL NFR BLD AUTO: 0 % (ref 0.3–6.2)
ERYTHROCYTE [DISTWIDTH] IN BLOOD BY AUTOMATED COUNT: 14.7 % (ref 12.3–15.4)
GFR SERPL CREATININE-BSD FRML MDRD: 22 ML/MIN/1.73
GLOBULIN UR ELPH-MCNC: 3.1 GM/DL
GLUCOSE BLD-MCNC: 234 MG/DL (ref 65–99)
HCT VFR BLD AUTO: 27.2 % (ref 34–46.6)
HGB BLD-MCNC: 9.1 G/DL (ref 12–15.9)
IMM GRANULOCYTES # BLD AUTO: 0.07 10*3/MM3 (ref 0–0.05)
IMM GRANULOCYTES NFR BLD AUTO: 0.7 % (ref 0–0.5)
LYMPHOCYTES # BLD AUTO: 1.31 10*3/MM3 (ref 0.7–3.1)
LYMPHOCYTES NFR BLD AUTO: 13.4 % (ref 19.6–45.3)
Lab: NORMAL
MAGNESIUM SERPL-MCNC: 1 MG/DL (ref 1.6–2.4)
MCH RBC QN AUTO: 27.5 PG (ref 26.6–33)
MCHC RBC AUTO-ENTMCNC: 33.5 G/DL (ref 31.5–35.7)
MCV RBC AUTO: 82.2 FL (ref 79–97)
MONOCYTES # BLD AUTO: 0.34 10*3/MM3 (ref 0.1–0.9)
MONOCYTES NFR BLD AUTO: 3.5 % (ref 5–12)
MYELOPEROXIDASE AB SER-ACNC: <9 U/ML (ref 0–9)
NEUTROPHILS # BLD AUTO: 8.07 10*3/MM3 (ref 1.7–7)
NEUTROPHILS NFR BLD AUTO: 82.3 % (ref 42.7–76)
NRBC BLD AUTO-RTO: 0 /100 WBC (ref 0–0.2)
P-ANCA ATYPICAL TITR SER IF: NORMAL TITER
P-ANCA TITR SER IF: NORMAL TITER
PLATELET # BLD AUTO: 411 10*3/MM3 (ref 140–450)
PMV BLD AUTO: 9.7 FL (ref 6–12)
POTASSIUM BLD-SCNC: 3.5 MMOL/L (ref 3.5–5.2)
PROT SERPL-MCNC: 6.5 G/DL (ref 6–8.5)
PROTEINASE3 AB SER IA-ACNC: <3.5 U/ML (ref 0–3.5)
RBC # BLD AUTO: 3.31 10*6/MM3 (ref 3.77–5.28)
SODIUM BLD-SCNC: 142 MMOL/L (ref 136–145)
WBC NRBC COR # BLD: 9.8 10*3/MM3 (ref 3.4–10.8)

## 2020-04-29 PROCEDURE — 80053 COMPREHEN METABOLIC PANEL: CPT | Performed by: INTERNAL MEDICINE

## 2020-04-29 PROCEDURE — 85025 COMPLETE CBC W/AUTO DIFF WBC: CPT | Performed by: INTERNAL MEDICINE

## 2020-04-29 PROCEDURE — 63710000001 PREDNISONE PER 1 MG: Performed by: INTERNAL MEDICINE

## 2020-04-29 PROCEDURE — 83735 ASSAY OF MAGNESIUM: CPT | Performed by: INTERNAL MEDICINE

## 2020-04-29 RX ORDER — PANTOPRAZOLE SODIUM 40 MG/1
40 TABLET, DELAYED RELEASE ORAL
Qty: 30 TABLET | Refills: 0 | Status: SHIPPED | OUTPATIENT
Start: 2020-04-30 | End: 2020-05-27 | Stop reason: SDUPTHER

## 2020-04-29 RX ADMIN — PANTOPRAZOLE SODIUM 40 MG: 40 TABLET, DELAYED RELEASE ORAL at 08:23

## 2020-04-29 RX ADMIN — PREDNISONE 60 MG: 20 TABLET ORAL at 08:23

## 2020-04-29 RX ADMIN — PAROXETINE 10 MG: 10 TABLET, FILM COATED ORAL at 08:23

## 2020-04-29 RX ADMIN — SODIUM BICARBONATE: 84 INJECTION, SOLUTION INTRAVENOUS at 07:20

## 2020-04-30 ENCOUNTER — READMISSION MANAGEMENT (OUTPATIENT)
Dept: CALL CENTER | Facility: HOSPITAL | Age: 65
End: 2020-04-30

## 2020-04-30 NOTE — OUTREACH NOTE
Prep Survey      Responses   [facility] facility patient discharged from?  [facility]   Is LACE score < 7 ?  No   Eligibility  Readm Mgmt   Discharge diagnosis  **WILDA (acute kidney injury   Does the patient have one of the following disease processes/diagnoses(primary or secondary)?  Other   Does the patient have Home health ordered?  No   Is there a DME ordered?  No   Prep survey completed?  Yes          Bobbi Snell RN

## 2020-05-01 ENCOUNTER — READMISSION MANAGEMENT (OUTPATIENT)
Dept: CALL CENTER | Facility: HOSPITAL | Age: 65
End: 2020-05-01

## 2020-05-01 NOTE — OUTREACH NOTE
Medical Week 1 Survey      Responses   [facility] patient discharged from?  [facility]   COVID-19 Test Status  Not tested   Does the patient have one of the following disease processes/diagnoses(primary or secondary)?  Other   Is there a successful TCM telephone encounter documented?  No   Week 1 attempt successful?  Yes   Call start time  1401   Call end time  1404   Discharge diagnosis  **WILDA (acute kidney injury   Meds reviewed with patient/caregiver?  Yes   Is the patient having any side effects they believe may be caused by any medication additions or changes?  No   Does the patient have all medications ordered at discharge?  Yes   Is the patient taking all medications as directed (includes completed medication regime)?  Yes   Does the patient have a primary care provider?   Yes   Does the patient have an appointment with their PCP within 7 days of discharge?  Yes   Has the patient kept scheduled appointments due by today?  Yes [telehealth visits]   Has home health visited the patient within 72 hours of discharge?  N/A   Psychosocial issues?  No   Comments  states having nausea and weakness , but no vomiting, states appetite is poor   Did the patient receive a copy of their discharge instructions?  Yes   Nursing interventions  Reviewed instructions with patient   What is the patient's perception of their health status since discharge?  Improving   Is the patient/caregiver able to teach back signs and symptoms related to disease process for when to call PCP?  Yes   Is the patient/caregiver able to teach back signs and symptoms related to disease process for when to call 911?  Yes   Is the patient/caregiver able to teach back the hierarchy of who to call/visit for symptoms/problems? PCP, Specialist, Home health nurse, Urgent Care, ED, 911  Yes   Additional teach back comments  states is so weak, is hoping appetite improves   Week 1 call completed?  Yes          Kasia Alas RN

## 2020-05-03 ENCOUNTER — HOSPITAL ENCOUNTER (INPATIENT)
Facility: HOSPITAL | Age: 65
LOS: 3 days | Discharge: HOME | End: 2020-05-06
Attending: INTERNAL MEDICINE | Admitting: INTERNAL MEDICINE

## 2020-05-03 ENCOUNTER — READMISSION MANAGEMENT (OUTPATIENT)
Dept: CALL CENTER | Facility: HOSPITAL | Age: 65
End: 2020-05-03

## 2020-05-03 ENCOUNTER — APPOINTMENT (OUTPATIENT)
Dept: CT IMAGING | Facility: HOSPITAL | Age: 65
End: 2020-05-03

## 2020-05-03 ENCOUNTER — APPOINTMENT (OUTPATIENT)
Dept: GENERAL RADIOLOGY | Facility: HOSPITAL | Age: 65
End: 2020-05-03

## 2020-05-03 DIAGNOSIS — E87.6 HYPOKALEMIA: Primary | ICD-10-CM

## 2020-05-03 LAB
ALBUMIN SERPL-MCNC: 3.7 G/DL (ref 3.5–5.2)
ALBUMIN/GLOB SERPL: 1.1 G/DL
ALP SERPL-CCNC: 51 U/L (ref 39–117)
ALT SERPL W P-5'-P-CCNC: 11 U/L (ref 1–33)
ANION GAP SERPL CALCULATED.3IONS-SCNC: 17 MMOL/L (ref 5–15)
ANION GAP SERPL CALCULATED.3IONS-SCNC: 18 MMOL/L (ref 5–15)
APTT PPP: 32.3 SECONDS (ref 24.1–35)
AST SERPL-CCNC: 16 U/L (ref 1–32)
BASOPHILS # BLD AUTO: 0.04 10*3/MM3 (ref 0–0.2)
BASOPHILS NFR BLD AUTO: 0.2 % (ref 0–1.5)
BILIRUB SERPL-MCNC: 0.6 MG/DL (ref 0.2–1.2)
BILIRUB UR QL STRIP: NEGATIVE
BUN BLD-MCNC: 24 MG/DL (ref 8–23)
BUN BLD-MCNC: 26 MG/DL (ref 8–23)
BUN/CREAT SERPL: 19.8 (ref 7–25)
BUN/CREAT SERPL: 21.5 (ref 7–25)
CA-I BLD-MCNC: 2.89 MG/DL (ref 4.6–5.4)
CALCIUM SPEC-SCNC: 6 MG/DL (ref 8.6–10.5)
CALCIUM SPEC-SCNC: 6.4 MG/DL (ref 8.6–10.5)
CHLORIDE SERPL-SCNC: 90 MMOL/L (ref 98–107)
CHLORIDE SERPL-SCNC: 93 MMOL/L (ref 98–107)
CLARITY UR: CLEAR
CO2 SERPL-SCNC: 31 MMOL/L (ref 22–29)
CO2 SERPL-SCNC: 34 MMOL/L (ref 22–29)
COLOR UR: YELLOW
CREAT BLD-MCNC: 1.21 MG/DL (ref 0.57–1)
CREAT BLD-MCNC: 1.21 MG/DL (ref 0.57–1)
CRP SERPL-MCNC: 18.09 MG/DL (ref 0–0.5)
DEPRECATED RDW RBC AUTO: 42.1 FL (ref 37–54)
EOSINOPHIL # BLD AUTO: 0.01 10*3/MM3 (ref 0–0.4)
EOSINOPHIL NFR BLD AUTO: 0.1 % (ref 0.3–6.2)
ERYTHROCYTE [DISTWIDTH] IN BLOOD BY AUTOMATED COUNT: 14.4 % (ref 12.3–15.4)
GFR SERPL CREATININE-BSD FRML MDRD: 45 ML/MIN/1.73
GFR SERPL CREATININE-BSD FRML MDRD: 45 ML/MIN/1.73
GLOBULIN UR ELPH-MCNC: 3.3 GM/DL
GLUCOSE BLD-MCNC: 129 MG/DL (ref 65–99)
GLUCOSE BLD-MCNC: 152 MG/DL (ref 65–99)
GLUCOSE UR STRIP-MCNC: NEGATIVE MG/DL
HCT VFR BLD AUTO: 30.2 % (ref 34–46.6)
HGB BLD-MCNC: 10.4 G/DL (ref 12–15.9)
HGB UR QL STRIP.AUTO: NEGATIVE
HOLD SPECIMEN: NORMAL
IMM GRANULOCYTES # BLD AUTO: 0.13 10*3/MM3 (ref 0–0.05)
IMM GRANULOCYTES NFR BLD AUTO: 0.7 % (ref 0–0.5)
INR PPP: 1.34 (ref 0.91–1.09)
KETONES UR QL STRIP: ABNORMAL
LEUKOCYTE ESTERASE UR QL STRIP.AUTO: NEGATIVE
LIPASE SERPL-CCNC: 20 U/L (ref 13–60)
LYMPHOCYTES # BLD AUTO: 1.41 10*3/MM3 (ref 0.7–3.1)
LYMPHOCYTES NFR BLD AUTO: 7.1 % (ref 19.6–45.3)
Lab: ABNORMAL
Lab: ABNORMAL
MAGNESIUM SERPL-MCNC: 0.6 MG/DL (ref 1.6–2.4)
MAGNESIUM SERPL-MCNC: 1.9 MG/DL (ref 1.6–2.4)
MCH RBC QN AUTO: 28 PG (ref 26.6–33)
MCHC RBC AUTO-ENTMCNC: 34.4 G/DL (ref 31.5–35.7)
MCV RBC AUTO: 81.2 FL (ref 79–97)
MONOCYTES # BLD AUTO: 2.99 10*3/MM3 (ref 0.1–0.9)
MONOCYTES NFR BLD AUTO: 15 % (ref 5–12)
NEUTROPHILS # BLD AUTO: 15.33 10*3/MM3 (ref 1.7–7)
NEUTROPHILS NFR BLD AUTO: 76.9 % (ref 42.7–76)
NITRITE UR QL STRIP: NEGATIVE
NOTIFIED BY: ABNORMAL
NOTIFIED WHO: ABNORMAL
NRBC BLD AUTO-RTO: 0 /100 WBC (ref 0–0.2)
PH UR STRIP.AUTO: 5.5 [PH] (ref 5–8)
PLATELET # BLD AUTO: 337 10*3/MM3 (ref 140–450)
PMV BLD AUTO: 8.8 FL (ref 6–12)
POTASSIUM BLD-SCNC: 2.3 MMOL/L (ref 3.5–5.2)
POTASSIUM BLD-SCNC: 2.5 MMOL/L (ref 3.5–5.2)
PROT SERPL-MCNC: 7 G/DL (ref 6–8.5)
PROT UR QL STRIP: ABNORMAL
PROTHROMBIN TIME: 16.2 SECONDS (ref 11.9–14.6)
RBC # BLD AUTO: 3.72 10*6/MM3 (ref 3.77–5.28)
SODIUM BLD-SCNC: 141 MMOL/L (ref 136–145)
SODIUM BLD-SCNC: 142 MMOL/L (ref 136–145)
SP GR UR STRIP: 1.01 (ref 1–1.03)
URATE SERPL-MCNC: 7.1 MG/DL (ref 2.4–5.7)
UROBILINOGEN UR QL STRIP: ABNORMAL
WBC NRBC COR # BLD: 19.91 10*3/MM3 (ref 3.4–10.8)
WHOLE BLOOD HOLD SPECIMEN: NORMAL
WHOLE BLOOD HOLD SPECIMEN: NORMAL

## 2020-05-03 PROCEDURE — 25010000002 MAGNESIUM SULFATE 2 GM/50ML SOLUTION: Performed by: INTERNAL MEDICINE

## 2020-05-03 PROCEDURE — 25010000002 ONDANSETRON PER 1 MG: Performed by: NURSE PRACTITIONER

## 2020-05-03 PROCEDURE — 99284 EMERGENCY DEPT VISIT MOD MDM: CPT

## 2020-05-03 PROCEDURE — 93010 ELECTROCARDIOGRAM REPORT: CPT | Performed by: INTERNAL MEDICINE

## 2020-05-03 PROCEDURE — 80053 COMPREHEN METABOLIC PANEL: CPT | Performed by: NURSE PRACTITIONER

## 2020-05-03 PROCEDURE — 85730 THROMBOPLASTIN TIME PARTIAL: CPT | Performed by: NURSE PRACTITIONER

## 2020-05-03 PROCEDURE — 93005 ELECTROCARDIOGRAM TRACING: CPT | Performed by: NURSE PRACTITIONER

## 2020-05-03 PROCEDURE — 80048 BASIC METABOLIC PNL TOTAL CA: CPT | Performed by: INTERNAL MEDICINE

## 2020-05-03 PROCEDURE — 25010000002 CALCIUM GLUCONATE PER 10 ML: Performed by: INTERNAL MEDICINE

## 2020-05-03 PROCEDURE — 83690 ASSAY OF LIPASE: CPT | Performed by: NURSE PRACTITIONER

## 2020-05-03 PROCEDURE — 85610 PROTHROMBIN TIME: CPT | Performed by: NURSE PRACTITIONER

## 2020-05-03 PROCEDURE — 83735 ASSAY OF MAGNESIUM: CPT | Performed by: NURSE PRACTITIONER

## 2020-05-03 PROCEDURE — 36415 COLL VENOUS BLD VENIPUNCTURE: CPT

## 2020-05-03 PROCEDURE — 85025 COMPLETE CBC W/AUTO DIFF WBC: CPT | Performed by: NURSE PRACTITIONER

## 2020-05-03 PROCEDURE — 25810000003 SODIUM CHLORIDE 0.9 % WITH KCL 20 MEQ 20-0.9 MEQ/L-% SOLUTION: Performed by: INTERNAL MEDICINE

## 2020-05-03 PROCEDURE — 81003 URINALYSIS AUTO W/O SCOPE: CPT | Performed by: INTERNAL MEDICINE

## 2020-05-03 PROCEDURE — 82330 ASSAY OF CALCIUM: CPT

## 2020-05-03 PROCEDURE — 84550 ASSAY OF BLOOD/URIC ACID: CPT | Performed by: NURSE PRACTITIONER

## 2020-05-03 PROCEDURE — 25010000002 ONDANSETRON PER 1 MG: Performed by: INTERNAL MEDICINE

## 2020-05-03 PROCEDURE — 25010000003 HYDROMORPHONE 1 MG/ML SOLUTION: Performed by: NURSE PRACTITIONER

## 2020-05-03 PROCEDURE — 83735 ASSAY OF MAGNESIUM: CPT | Performed by: INTERNAL MEDICINE

## 2020-05-03 PROCEDURE — 71045 X-RAY EXAM CHEST 1 VIEW: CPT

## 2020-05-03 PROCEDURE — 25010000002 POTASSIUM CHLORIDE PER 2 MEQ: Performed by: NURSE PRACTITIONER

## 2020-05-03 PROCEDURE — 74176 CT ABD & PELVIS W/O CONTRAST: CPT

## 2020-05-03 PROCEDURE — 86140 C-REACTIVE PROTEIN: CPT | Performed by: INTERNAL MEDICINE

## 2020-05-03 RX ORDER — POTASSIUM CHLORIDE 14.9 MG/ML
20 INJECTION INTRAVENOUS ONCE
Status: COMPLETED | OUTPATIENT
Start: 2020-05-03 | End: 2020-05-03

## 2020-05-03 RX ORDER — SODIUM CHLORIDE 0.9 % (FLUSH) 0.9 %
10 SYRINGE (ML) INJECTION EVERY 12 HOURS SCHEDULED
Status: DISCONTINUED | OUTPATIENT
Start: 2020-05-03 | End: 2020-05-06 | Stop reason: HOSPADM

## 2020-05-03 RX ORDER — POTASSIUM CHLORIDE 7.45 MG/ML
10 INJECTION INTRAVENOUS
Status: DISCONTINUED | OUTPATIENT
Start: 2020-05-03 | End: 2020-05-04

## 2020-05-03 RX ORDER — HYDROCODONE BITARTRATE AND ACETAMINOPHEN 5; 325 MG/1; MG/1
1 TABLET ORAL EVERY 4 HOURS PRN
Status: DISCONTINUED | OUTPATIENT
Start: 2020-05-03 | End: 2020-05-06 | Stop reason: HOSPADM

## 2020-05-03 RX ORDER — SODIUM CHLORIDE AND POTASSIUM CHLORIDE 150; 900 MG/100ML; MG/100ML
100 INJECTION, SOLUTION INTRAVENOUS CONTINUOUS
Status: DISCONTINUED | OUTPATIENT
Start: 2020-05-03 | End: 2020-05-06

## 2020-05-03 RX ORDER — POTASSIUM CHLORIDE 750 MG/1
40 CAPSULE, EXTENDED RELEASE ORAL ONCE
Status: COMPLETED | OUTPATIENT
Start: 2020-05-03 | End: 2020-05-03

## 2020-05-03 RX ORDER — PANTOPRAZOLE SODIUM 40 MG/1
40 TABLET, DELAYED RELEASE ORAL
Status: DISCONTINUED | OUTPATIENT
Start: 2020-05-04 | End: 2020-05-06 | Stop reason: HOSPADM

## 2020-05-03 RX ORDER — COLCHICINE 0.6 MG/1
0.6 TABLET ORAL EVERY 12 HOURS SCHEDULED
Status: COMPLETED | OUTPATIENT
Start: 2020-05-03 | End: 2020-05-04

## 2020-05-03 RX ORDER — SODIUM CHLORIDE 0.9 % (FLUSH) 0.9 %
10 SYRINGE (ML) INJECTION AS NEEDED
Status: DISCONTINUED | OUTPATIENT
Start: 2020-05-03 | End: 2020-05-06 | Stop reason: HOSPADM

## 2020-05-03 RX ORDER — POTASSIUM CHLORIDE 750 MG/1
40 CAPSULE, EXTENDED RELEASE ORAL AS NEEDED
Status: DISCONTINUED | OUTPATIENT
Start: 2020-05-03 | End: 2020-05-04

## 2020-05-03 RX ORDER — ONDANSETRON 2 MG/ML
4 INJECTION INTRAMUSCULAR; INTRAVENOUS ONCE
Status: COMPLETED | OUTPATIENT
Start: 2020-05-03 | End: 2020-05-03

## 2020-05-03 RX ORDER — POTASSIUM CHLORIDE 1.5 G/1.77G
40 POWDER, FOR SOLUTION ORAL AS NEEDED
Status: DISCONTINUED | OUTPATIENT
Start: 2020-05-03 | End: 2020-05-04

## 2020-05-03 RX ORDER — ATORVASTATIN CALCIUM 10 MG/1
10 TABLET, FILM COATED ORAL NIGHTLY
Status: DISCONTINUED | OUTPATIENT
Start: 2020-05-03 | End: 2020-05-06 | Stop reason: HOSPADM

## 2020-05-03 RX ORDER — MAGNESIUM SULFATE 1 G/100ML
2 INJECTION INTRAVENOUS
Status: DISCONTINUED | OUTPATIENT
Start: 2020-05-03 | End: 2020-05-03 | Stop reason: SDUPTHER

## 2020-05-03 RX ORDER — PAROXETINE 10 MG/1
10 TABLET, FILM COATED ORAL DAILY
Status: DISCONTINUED | OUTPATIENT
Start: 2020-05-04 | End: 2020-05-06 | Stop reason: HOSPADM

## 2020-05-03 RX ORDER — MAGNESIUM SULFATE HEPTAHYDRATE 40 MG/ML
2 INJECTION, SOLUTION INTRAVENOUS
Status: COMPLETED | OUTPATIENT
Start: 2020-05-03 | End: 2020-05-03

## 2020-05-03 RX ORDER — MULTIVIT,CALC,MINS/IRON/FOLIC 9MG-400MCG
1 TABLET ORAL DAILY
Status: DISCONTINUED | OUTPATIENT
Start: 2020-05-04 | End: 2020-05-06 | Stop reason: HOSPADM

## 2020-05-03 RX ORDER — ONDANSETRON 2 MG/ML
4 INJECTION INTRAMUSCULAR; INTRAVENOUS EVERY 6 HOURS PRN
Status: DISCONTINUED | OUTPATIENT
Start: 2020-05-03 | End: 2020-05-06 | Stop reason: HOSPADM

## 2020-05-03 RX ADMIN — SODIUM CHLORIDE, PRESERVATIVE FREE 10 ML: 5 INJECTION INTRAVENOUS at 20:58

## 2020-05-03 RX ADMIN — POTASSIUM CHLORIDE 40 MEQ: 750 CAPSULE, EXTENDED RELEASE ORAL at 17:44

## 2020-05-03 RX ADMIN — CALCIUM GLUCONATE 1 G: 98 INJECTION, SOLUTION INTRAVENOUS at 20:58

## 2020-05-03 RX ADMIN — HYDROMORPHONE HYDROCHLORIDE 1 MG: 1 INJECTION, SOLUTION INTRAMUSCULAR; INTRAVENOUS; SUBCUTANEOUS at 14:47

## 2020-05-03 RX ADMIN — POTASSIUM CHLORIDE AND SODIUM CHLORIDE 100 ML/HR: 900; 150 INJECTION, SOLUTION INTRAVENOUS at 17:08

## 2020-05-03 RX ADMIN — HYDROCODONE BITARTRATE AND ACETAMINOPHEN 1 TABLET: 5; 325 TABLET ORAL at 17:44

## 2020-05-03 RX ADMIN — MAGNESIUM SULFATE HEPTAHYDRATE 2 G: 40 INJECTION, SOLUTION INTRAVENOUS at 17:03

## 2020-05-03 RX ADMIN — POTASSIUM CHLORIDE 40 MEQ: 750 CAPSULE, EXTENDED RELEASE ORAL at 20:58

## 2020-05-03 RX ADMIN — COLCHICINE 0.6 MG: 0.6 TABLET, FILM COATED ORAL at 20:58

## 2020-05-03 RX ADMIN — HYDROMORPHONE HYDROCHLORIDE 1 MG: 1 INJECTION, SOLUTION INTRAMUSCULAR; INTRAVENOUS; SUBCUTANEOUS at 12:22

## 2020-05-03 RX ADMIN — ONDANSETRON HYDROCHLORIDE 4 MG: 2 SOLUTION INTRAMUSCULAR; INTRAVENOUS at 14:47

## 2020-05-03 RX ADMIN — ONDANSETRON HYDROCHLORIDE 4 MG: 2 SOLUTION INTRAMUSCULAR; INTRAVENOUS at 12:22

## 2020-05-03 RX ADMIN — ONDANSETRON HYDROCHLORIDE 4 MG: 2 SOLUTION INTRAMUSCULAR; INTRAVENOUS at 22:36

## 2020-05-03 RX ADMIN — POTASSIUM CHLORIDE 20 MEQ: 14.9 INJECTION, SOLUTION INTRAVENOUS at 14:50

## 2020-05-03 RX ADMIN — ATORVASTATIN CALCIUM 10 MG: 10 TABLET, FILM COATED ORAL at 20:58

## 2020-05-03 RX ADMIN — HYDROCODONE BITARTRATE AND ACETAMINOPHEN 1 TABLET: 5; 325 TABLET ORAL at 22:35

## 2020-05-03 RX ADMIN — SODIUM CHLORIDE 1000 ML: 9 INJECTION, SOLUTION INTRAVENOUS at 12:22

## 2020-05-03 RX ADMIN — MAGNESIUM SULFATE HEPTAHYDRATE 2 G: 40 INJECTION, SOLUTION INTRAVENOUS at 17:06

## 2020-05-03 NOTE — ED PROVIDER NOTES
Subjective   65 yof presents with c/o weakness, nausea and vomiting.  She states she was recently admitted for an WILDA and discharged 04/29/2020. She states she has had n/v since that time.  She states she also had a kidney biopsy while she was here.  She denies cough, fever or SOB.  She denies abdomen pain.  She states she 'just feels weak and bad.' She is also c/o left arm pain.  She states she has recently been treated for gout in that arm and she feels it has returned.           Review of Systems   Constitutional: Negative for activity change, appetite change, fatigue and fever.   HENT: Negative for congestion, ear pain, facial swelling and sore throat.    Eyes: Negative for discharge and visual disturbance.   Respiratory: Negative for apnea, chest tightness, shortness of breath, wheezing and stridor.    Cardiovascular: Negative for chest pain and palpitations.   Gastrointestinal: Negative for abdominal distention, abdominal pain, diarrhea, nausea and vomiting.   Genitourinary: Negative for difficulty urinating and dysuria.   Musculoskeletal: Negative for arthralgias and myalgias.   Skin: Negative for rash and wound.   Neurological: Negative for dizziness and seizures.   Psychiatric/Behavioral: Negative for agitation and confusion.       Past Medical History:   Diagnosis Date   • WILDA (acute kidney injury) (CMS/HCC)    • Allergic rhinitis    • Colon polyps    • Depression    • Difficulty swallowing solids    • DJD (degenerative joint disease)    • GERD (gastroesophageal reflux disease)    • Gitelman syndrome     low magnesium   • Gout    • Hyperlipidemia    • Obesity    • Osteopenia        Allergies   Allergen Reactions   • Penicillins Swelling and Rash   • Sulfa Antibiotics Rash and Other (See Comments)     Temp. fluctuations        Past Surgical History:   Procedure Laterality Date   • AXILLARY LYMPH NODE BIOPSY/EXCISION Right 10/14/2019    Procedure: AXILLARY LYMPH NODE BIOPSY/EXCISION;  Surgeon: Emmanuel April  MD ARNOLDO;  Location: [facility] OR;  Service: General   • AXILLARY LYMPH NODE BIOPSY/EXCISION Left 11/26/2019    Procedure: AXILLARY LYMPH NODE BIOPSY/EXCISION;  Surgeon: Karen Benitez MD;  Location: [facility] OR;  Service: General   • COLONOSCOPY  08/20/2010   • COLONOSCOPY W/ POLYPECTOMY  09/14/2016    2   5 mm sessile polyps removed with hot snare repeat 5 years   • DILATATION AND CURETTAGE     • ENDOSCOPY  09/14/2016    Medium sized HH, LA Grade A esohagitis, Fluid in the middle third of esohagus    • ENDOSCOPY N/A 2/25/2020    Procedure: ESOPHAGOGASTRODUODENOSCOPY WITH ANESTHESIA;  Surgeon: Isaac Murrell MD;  Location: [facility] ENDOSCOPY;  Service: Gastroenterology;  Laterality: N/A;  pre: abdominal CT  post: gastric erosion; hiatal hernia  Avery Whaley MD   • ENDOSCOPY N/A 4/24/2020    Procedure: ESOPHAGOGASTRODUODENOSCOPY WITH ANESTHESIA;  Surgeon: Daniel Junior MD;  Location: [facility] ENDOSCOPY;  Service: Gastroenterology;  Laterality: N/A;  pre: nausea  post: esophagitis. hiatal hernia.    • JOINT REPLACEMENT Bilateral     knee   • GA TOTAL KNEE ARTHROPLASTY Left 3/9/2018    Procedure: LEFT TOTAL KNEE ARTHROPLASTY;  Surgeon: Orlin Meza MD;  Location: [facility] OR;  Service: Orthopedics   • REPLACEMENT TOTAL KNEE Right        Family History   Problem Relation Age of Onset   • Aortic aneurysm Father    • Anorexia nervosa Mother    • No Known Problems Brother    • No Known Problems Sister    • No Known Problems Son    • No Known Problems Daughter    • No Known Problems Daughter    • No Known Problems Maternal Grandmother    • No Known Problems Paternal Grandmother    • No Known Problems Maternal Aunt    • No Known Problems Paternal Aunt    • Lymphoma Paternal Grandfather    • Breast cancer Neg Hx    • Ovarian cancer Neg Hx    • Colon cancer Neg Hx    • BRCA 1/2 Neg Hx    • Endometrial cancer Neg Hx    • Colon polyps Neg Hx        Social History     Socioeconomic History   • Marital status:       Spouse name: Not on file   • Number of children: Not on file   • Years of education: Not on file   • Highest education level: Not on file   Tobacco Use   • Smoking status: Former Smoker     Packs/day: 0.50     Types: Cigarettes     Last attempt to quit:      Years since quittin.3   • Smokeless tobacco: Never Used   Substance and Sexual Activity   • Alcohol use: No   • Drug use: No   • Sexual activity: Defer           Objective   Physical Exam   Constitutional: She is oriented to person, place, and time. She appears well-developed.   HENT:   Head: Normocephalic.   Eyes: Pupils are equal, round, and reactive to light. EOM are normal.   Neck: Normal range of motion. Neck supple.   Cardiovascular: Normal rate and regular rhythm.   No murmur heard.  Pulmonary/Chest: Effort normal and breath sounds normal.   Abdominal: Soft. Bowel sounds are normal. There is no tenderness.   Musculoskeletal: Normal range of motion.   Neurological: She is alert and oriented to person, place, and time.   Skin: Skin is warm and dry.   Psychiatric: She has a normal mood and affect.   Nursing note and vitals reviewed.      Procedures            Current Facility-Administered Medications:   •  magnesium sulfate in D5W 1g/100mL (PREMIX), 2 g, Intravenous, Q1H, Saedi Walls MD  •  potassium chloride 20 mEq in 100 mL IVPB, 20 mEq, Intravenous, Once, Shoulders, Barb Romeo, APRN, Last Rate: 50 mL/hr at 20 1450, 20 mEq at 20 1450  •  Insert peripheral IV, , , Once **AND** sodium chloride 0.9 % flush 10 mL, 10 mL, Intravenous, PRN, Shoulders, Barb Romeo, APRN    Current Outpatient Medications:   •  Calcium Carb-Ergocalciferol (CHEWABLE CALCIUM/D PO), Take 1 tablet by mouth Daily., Disp: , Rfl:   •  Multiple Vitamins-Minerals (MULTIVITAMIN WITH MINERALS) tablet tablet, Take 1 tablet by mouth Daily., Disp: , Rfl:   •  ondansetron (ZOFRAN) 4 MG tablet, Take 1 tablet by mouth four times a day as needed  "for nausea, Disp: 40 tablet, Rfl: 0  •  pantoprazole (PROTONIX) 40 MG EC tablet, Take 1 tablet by mouth Every Morning Before Breakfast., Disp: 30 tablet, Rfl: 0  •  PARoxetine (PAXIL) 10 MG tablet, Take 10 mg by mouth Every Morning., Disp: , Rfl: 5  •  simvastatin (ZOCOR) 20 MG tablet, Take 1 tablet by mouth once daily, Disp: 90 tablet, Rfl: 2    Vital signs:  /53   Pulse 60   Temp 99 °F (37.2 °C) (Oral)   Resp 15   Ht 154.9 cm (61\")   Wt 75.3 kg (166 lb)   SpO2 100%   Breastfeeding No   BMI 31.37 kg/m²        ED LAB RESULTS:   Lab Results (last 24 hours)     Procedure Component Value Units Date/Time    [place] Blood Culture Bottle Set [[id number]] Collected:  05/03/20 1324    Specimen:  Blood Updated:  05/03/20 1430     Extra Tube Hold for add-ons.     Comment: Auto resulted.       CBC & Differential [[id number]] Collected:  05/03/20 1324    Specimen:  Blood Updated:  05/03/20 1341    Narrative:       The following orders were created for panel order CBC & Differential.  Procedure                               Abnormality         Status                     ---------                               -----------         ------                     CBC Auto Differential[[id number]]        Abnormal            Final result                 Please view results for these tests on the individual orders.    Comprehensive Metabolic Panel [[id number]]  (Abnormal) Collected:  05/03/20 1324    Specimen:  Blood Updated:  05/03/20 1359     Glucose 129 mg/dL      BUN 26 mg/dL      Creatinine 1.21 mg/dL      Sodium 142 mmol/L      Potassium 2.3 mmol/L      Chloride 90 mmol/L      CO2 34.0 mmol/L      Calcium 6.4 mg/dL      Total Protein 7.0 g/dL      Albumin 3.70 g/dL      ALT (SGPT) 11 U/L      AST (SGOT) 16 U/L      Alkaline Phosphatase 51 U/L      Total Bilirubin 0.6 mg/dL      eGFR Non African Amer 45 mL/min/1.73      Globulin 3.3 gm/dL      A/G Ratio 1.1 g/dL      BUN/Creatinine Ratio 21.5     Anion Gap 18.0 mmol/L     " Narrative:       GFR Normal >60  Chronic Kidney Disease <60  Kidney Failure <15      Protime-INR [[id number]]  (Abnormal) Collected:  05/03/20 1324    Specimen:  Blood Updated:  05/03/20 1350     Protime 16.2 Seconds      INR 1.34    aPTT [[id number]]  (Normal) Collected:  05/03/20 1324    Specimen:  Blood Updated:  05/03/20 1350     PTT 32.3 seconds     Lipase [[id number]]  (Normal) Collected:  05/03/20 1324    Specimen:  Blood Updated:  05/03/20 1357     Lipase 20 U/L     CBC Auto Differential [[id number]]  (Abnormal) Collected:  05/03/20 1324    Specimen:  Blood Updated:  05/03/20 1341     WBC 19.91 10*3/mm3      RBC 3.72 10*6/mm3      Hemoglobin 10.4 g/dL      Hematocrit 30.2 %      MCV 81.2 fL      MCH 28.0 pg      MCHC 34.4 g/dL      RDW 14.4 %      RDW-SD 42.1 fl      MPV 8.8 fL      Platelets 337 10*3/mm3      Neutrophil % 76.9 %      Lymphocyte % 7.1 %      Monocyte % 15.0 %      Eosinophil % 0.1 %      Basophil % 0.2 %      Immature Grans % 0.7 %      Neutrophils, Absolute 15.33 10*3/mm3      Lymphocytes, Absolute 1.41 10*3/mm3      Monocytes, Absolute 2.99 10*3/mm3      Eosinophils, Absolute 0.01 10*3/mm3      Basophils, Absolute 0.04 10*3/mm3      Immature Grans, Absolute 0.13 10*3/mm3      nRBC 0.0 /100 WBC     Uric Acid [[id number]]  (Abnormal) Collected:  05/03/20 1324    Specimen:  Blood Updated:  05/03/20 1520     Uric Acid 7.1 mg/dL     Magnesium [[id number]]  (Abnormal) Collected:  05/03/20 1324    Specimen:  Blood Updated:  05/03/20 1527     Magnesium 0.6 mg/dL     C-reactive Protein [[id number]] Collected:  05/03/20 1324    Specimen:  Blood Updated:  05/03/20 1542             IMAGING RESULTS  XR Chest 1 View   Final Result   1. Chronic medial left basilar opacities. Sequestration considered. No   acute radiographic cardiopulmonary process   This report was finalized on 05/03/2020 15:01 by Dr. Annmarie Ontiveros MD.      CT Abdomen Pelvis Without Contrast   Final Result   1. Small hiatal hernia. Apparent  wall thickening of the stomach may be   due to underdistention. Gastritis a second possibility. No bowel   obstruction. No free air or abscess. Normal appendix.   2. Chronic changes of the left lung base, sequestration considered.   Bibasilar atelectasis.   This report was finalized on 05/03/2020 12:46 by Dr. Annmarie Ontiveros MD.                       ED Course  ED Course as of May 03 1543   Sun May 03, 2020   1414 Alerted to K+ of 2.3.  Orders written.  Her WBC count is elevated.  She states she recently took steroids for gout.    [KS]   9390 Spoke with Dr Torres regarding admission.  The chest xray and urinalysis are still pending. She will be admitted to Dr Bush's services.  ARNOLDO Mathews RN, has spoken with the patient's daughter regarding admission. She voiced understanding of admission.    [KS]      ED Course User Index  [KS] Barb Raymundo APRN                                           MDM  Number of Diagnoses or Management Options  Hypokalemia: new and requires workup     Amount and/or Complexity of Data Reviewed  Clinical lab tests: ordered and reviewed  Tests in the radiology section of CPT®: ordered and reviewed  Decide to obtain previous medical records or to obtain history from someone other than the patient: yes  Discuss the patient with other providers: yes    Risk of Complications, Morbidity, and/or Mortality  Presenting problems: low  Diagnostic procedures: low  Management options: low    Patient Progress  Patient progress: stable      Final diagnoses:   Hypokalemia            Barb Raymundo APRN  05/03/20 1543

## 2020-05-03 NOTE — H&P
[facility] Medicine Services  HISTORY AND PHYSICAL    Date of Admission: 5/3/2020  Primary Care Physician: Avery Whaley MD    Subjective     Chief Complaint: n/v     History of Present Illness  Discharge last Wednesday.  Able to eat that discharge  Feels weak subsequently  States she kept burping when she got home.  She had taken Zofran and alternate with Phenergan.  She is on PPI. She had HIDA in Nov 2019 - normal  Had some nausea since October; prior hx of EGD - esophagitis; known hx of hiatal hernia  Had some vomiting    States temp of 99.  No cough  No sob  No diarrhea  No exposure recent sick exposure other than she was hospitalized early part of this week with WILDA.  Ended with renal biopsy.  She also felt she has gout flare again with left wrist elbow and shoulder hurting.     In Er, found with leukocytosis hypokalemia. She is alkalotic  Renal function is improve significantly,     Review of Systems     Otherwise complete ROS reviewed and negative except as mentioned in the HPI.    Past Medical History:   Past Medical History:   Diagnosis Date   • WILDA (acute kidney injury) (CMS/HCC)    • Allergic rhinitis    • Colon polyps    • Depression    • Difficulty swallowing solids    • DJD (degenerative joint disease)    • GERD (gastroesophageal reflux disease)    • Gitelman syndrome     low magnesium   • Gout    • Hyperlipidemia    • Obesity    • Osteopenia      Past Surgical History:  Past Surgical History:   Procedure Laterality Date   • AXILLARY LYMPH NODE BIOPSY/EXCISION Right 10/14/2019    Procedure: AXILLARY LYMPH NODE BIOPSY/EXCISION;  Surgeon: Karen Benitez MD;  Location: [facility] OR;  Service: General   • AXILLARY LYMPH NODE BIOPSY/EXCISION Left 11/26/2019    Procedure: AXILLARY LYMPH NODE BIOPSY/EXCISION;  Surgeon: Karen Benitez MD;  Location: [facility] OR;  Service: General   • COLONOSCOPY  08/20/2010   • COLONOSCOPY W/ POLYPECTOMY  09/14/2016    2   5 mm  sessile polyps removed with hot snare repeat 5 years   • DILATATION AND CURETTAGE     • ENDOSCOPY  09/14/2016    Medium sized HH, LA Grade A esohagitis, Fluid in the middle third of esohagus    • ENDOSCOPY N/A 2/25/2020    Procedure: ESOPHAGOGASTRODUODENOSCOPY WITH ANESTHESIA;  Surgeon: Isaac Murrell MD;  Location: [facility] ENDOSCOPY;  Service: Gastroenterology;  Laterality: N/A;  pre: abdominal CT  post: gastric erosion; hiatal hernia  Avery Whaley MD   • ENDOSCOPY N/A 4/24/2020    Procedure: ESOPHAGOGASTRODUODENOSCOPY WITH ANESTHESIA;  Surgeon: Daniel Junior MD;  Location: [facility] ENDOSCOPY;  Service: Gastroenterology;  Laterality: N/A;  pre: nausea  post: esophagitis. hiatal hernia.    • JOINT REPLACEMENT Bilateral     knee   • AR TOTAL KNEE ARTHROPLASTY Left 3/9/2018    Procedure: LEFT TOTAL KNEE ARTHROPLASTY;  Surgeon: Orlin Meza MD;  Location: [facility] OR;  Service: Orthopedics   • REPLACEMENT TOTAL KNEE Right      Social History:  reports that she quit smoking about 17 years ago. Her smoking use included cigarettes. She smoked 0.50 packs per day. She has never used smokeless tobacco. She reports that she does not drink alcohol or use drugs.    Family History: family history includes Anorexia nervosa in her mother; Aortic aneurysm in her father; Lymphoma in her paternal grandfather; No Known Problems in her brother, daughter, daughter, maternal aunt, maternal grandmother, paternal aunt, paternal grandmother, sister, and son.       Allergies:  Allergies   Allergen Reactions   • Penicillins Swelling and Rash   • Sulfa Antibiotics Rash and Other (See Comments)     Temp. fluctuations      Medications:  Prior to Admission medications    Medication Sig Start Date End Date Taking? Authorizing Provider   Calcium Carb-Ergocalciferol (CHEWABLE CALCIUM/D PO) Take 1 tablet by mouth Daily.    Provider, MD Sid   Multiple Vitamins-Minerals (MULTIVITAMIN WITH MINERALS) tablet tablet Take 1  "tablet by mouth Daily.    Provider, MD Sid   ondansetron (ZOFRAN) 4 MG tablet Take 1 tablet by mouth four times a day as needed for nausea 4/22/20      pantoprazole (PROTONIX) 40 MG EC tablet Take 1 tablet by mouth Every Morning Before Breakfast. 4/30/20   Saeid Walls MD   PARoxetine (PAXIL) 10 MG tablet Take 10 mg by mouth Every Morning. 8/7/16   ProviderSid MD   simvastatin (ZOCOR) 20 MG tablet Take 1 tablet by mouth once daily 4/1/20        Objective     Vital Signs: /53   Pulse 60   Temp 99 °F (37.2 °C) (Oral)   Resp 15   Ht 154.9 cm (61\")   Wt 75.3 kg (166 lb)   SpO2 100%   Breastfeeding No   BMI 31.37 kg/m²   Physical Exam   Constitutional: She appears well-developed.   HENT:   Head: Normocephalic and atraumatic.   Right Ear: External ear normal.   Left Ear: External ear normal.   Wearing mask   Eyes: Pupils are equal, round, and reactive to light. Conjunctivae and EOM are normal. Right eye exhibits no discharge. Left eye exhibits no discharge. No scleral icterus.   Neck: Normal range of motion. Neck supple. No JVD present. No tracheal deviation present. No thyromegaly present.   Cardiovascular: Normal rate, regular rhythm and normal heart sounds.   Pulmonary/Chest: Effort normal and breath sounds normal.   Abdominal: Soft. Bowel sounds are normal. She exhibits no distension. There is no tenderness. There is no guarding.   Musculoskeletal:   Tender and warm left wrist although thus not appear as swollen when first encountered early this past week   Skin: Skin is warm and dry. Capillary refill takes less than 2 seconds. No erythema.   Psychiatric: She has a normal mood and affect. Her behavior is normal. Thought content normal.   Vitals reviewed.      Results Reviewed:  Lab Results (last 24 hours)     Procedure Component Value Units Date/Time    Magnesium [[id number]] Collected:  05/03/20 1324    Specimen:  Blood Updated:  05/03/20 1508    Uric Acid [[id number]] " Collected:  05/03/20 1324    Specimen:  Blood Updated:  05/03/20 1506    [place] Draw [[id number]] Collected:  05/03/20 1324    Specimen:  Blood Updated:  05/03/20 1430    Narrative:       The following orders were created for panel order [place] Draw.  Procedure                               Abnormality         Status                     ---------                               -----------         ------                     Light Blue Top[[id number]]                                   Final result               Green Top (Gel)[[id number]]                                  Final result               Lavender Top[[id number]]                                     Final result               Red Top[[id number]]                                          Final result               [place] Blood Culture Archie...[[id number]]                      Final result               Gray Top - Ice[[id number]]                                   Final result                 Please view results for these tests on the individual orders.    Light Blue Top [[id number]] Collected:  05/03/20 1324    Specimen:  Blood Updated:  05/03/20 1430     Extra Tube hold for add-on     Comment: Auto resulted       Green Top (Gel) [[id number]] Collected:  05/03/20 1324    Specimen:  Blood Updated:  05/03/20 1430     Extra Tube Hold for add-ons.     Comment: Auto resulted.       Lavender Top [[id number]] Collected:  05/03/20 1324    Specimen:  Blood Updated:  05/03/20 1430     Extra Tube hold for add-on     Comment: Auto resulted       Red Top [[id number]] Collected:  05/03/20 1324    Specimen:  Blood Updated:  05/03/20 1430     Extra Tube Hold for add-ons.     Comment: Auto resulted.       [place] Blood Culture Bottle Set [[id number]] Collected:  05/03/20 1324    Specimen:  Blood Updated:  05/03/20 1430     Extra Tube Hold for add-ons.     Comment: Auto resulted.       Comprehensive Metabolic Panel [[id number]]  (Abnormal) Collected:  05/03/20 1324    Specimen:  Blood Updated:   05/03/20 1359     Glucose 129 mg/dL      BUN 26 mg/dL      Creatinine 1.21 mg/dL      Sodium 142 mmol/L      Potassium 2.3 mmol/L      Chloride 90 mmol/L      CO2 34.0 mmol/L      Calcium 6.4 mg/dL      Total Protein 7.0 g/dL      Albumin 3.70 g/dL      ALT (SGPT) 11 U/L      AST (SGOT) 16 U/L      Alkaline Phosphatase 51 U/L      Total Bilirubin 0.6 mg/dL      eGFR Non African Amer 45 mL/min/1.73      Globulin 3.3 gm/dL      A/G Ratio 1.1 g/dL      BUN/Creatinine Ratio 21.5     Anion Gap 18.0 mmol/L     Narrative:       GFR Normal >60  Chronic Kidney Disease <60  Kidney Failure <15      Lipase [[id number]]  (Normal) Collected:  05/03/20 1324    Specimen:  Blood Updated:  05/03/20 1357     Lipase 20 U/L     Protime-INR [[id number]]  (Abnormal) Collected:  05/03/20 1324    Specimen:  Blood Updated:  05/03/20 1350     Protime 16.2 Seconds      INR 1.34    aPTT [[id number]]  (Normal) Collected:  05/03/20 1324    Specimen:  Blood Updated:  05/03/20 1350     PTT 32.3 seconds     Gray Top - Ice [[id number]] Collected:  05/03/20 1324    Specimen:  Blood Updated:  05/03/20 1348     Extra Tube Hold    CBC & Differential [[id number]] Collected:  05/03/20 1324    Specimen:  Blood Updated:  05/03/20 1341    Narrative:       The following orders were created for panel order CBC & Differential.  Procedure                               Abnormality         Status                     ---------                               -----------         ------                     CBC Auto Differential[[id number]]        Abnormal            Final result                 Please view results for these tests on the individual orders.    CBC Auto Differential [[id number]]  (Abnormal) Collected:  05/03/20 1324    Specimen:  Blood Updated:  05/03/20 1341     WBC 19.91 10*3/mm3      RBC 3.72 10*6/mm3      Hemoglobin 10.4 g/dL      Hematocrit 30.2 %      MCV 81.2 fL      MCH 28.0 pg      MCHC 34.4 g/dL      RDW 14.4 %      RDW-SD 42.1 fl      MPV 8.8 fL       Platelets 337 10*3/mm3      Neutrophil % 76.9 %      Lymphocyte % 7.1 %      Monocyte % 15.0 %      Eosinophil % 0.1 %      Basophil % 0.2 %      Immature Grans % 0.7 %      Neutrophils, Absolute 15.33 10*3/mm3      Lymphocytes, Absolute 1.41 10*3/mm3      Monocytes, Absolute 2.99 10*3/mm3      Eosinophils, Absolute 0.01 10*3/mm3      Basophils, Absolute 0.04 10*3/mm3      Immature Grans, Absolute 0.13 10*3/mm3      nRBC 0.0 /100 WBC         Imaging Results (Last 24 Hours)     Procedure Component Value Units Date/Time    XR Chest 1 View [[id number]] Collected:  05/03/20 1459     Updated:  05/03/20 1504    Narrative:       HISTORY: Cough     CXR: Frontal view the chest obtained.     COMPARISON: 04/24/2020.     FINDINGS: There are chronic medial left basilar opacities along the  cardiophrenic sulcus. Findings may represent sequestration this finding  present on studies dating back to 10/11/2019. No new consolidation.  Normal cardiomediastinal contours. No pleural effusion or pneumothorax.  No acute bony pathology.       Impression:       1. Chronic medial left basilar opacities. Sequestration considered. No  acute radiographic cardiopulmonary process  This report was finalized on 05/03/2020 15:01 by Dr. Annmarie Ontiveros MD.    CT Abdomen Pelvis Without Contrast [[id number]] Collected:  05/03/20 1241     Updated:  05/03/20 1249    Narrative:       CT ABDOMEN PELVIS WO CONTRAST- 5/3/2020 12:29 PM CDT     HISTORY: Nausea, vomiting, diarrhea      COMPARISON: 04/23/2020     DOSE LENGTH PRODUCT: 528 mGy cm. Automated exposure control was also  utilized to decrease patient radiation dose.     TECHNIQUE: Axial images the and pelvis are obtained without IV contrast.     FINDINGS:  There is a chronic 3.5 cm posterior medial left lower lobe  opacity with questionable air bronchograms. Sequestration considered.  Persistent present on studies dating back to 10/11/2019. Dependent  basilar atelectasis.     The nonenhanced liver,  spleen, pancreas, gallbladder, and adrenal glands  are unremarkable. No abnormal perinephric fluid collection. No  hydronephrosis. Lateral appears intact. Calcified phleboliths in the  right retroperitoneum. Bladder appears intact. Uterus unremarkable. No  adnexal mass.     Mild sigmoid colonic diverticulosis with no acute diverticulitis. No  evidence for bowel obstruction. Appendix remains normal. Small hiatal  hernia. Gastric wall thickening may be due to underdistention, however  gastritis considered. Mild to moderate vascular calcification with no  aneurysm.     Degenerative change of the regional skeleton with no focal destructive  osseous lesions.       Impression:       1. Small hiatal hernia. Apparent wall thickening of the stomach may be  due to underdistention. Gastritis a second possibility. No bowel  obstruction. No free air or abscess. Normal appendix.  2. Chronic changes of the left lung base, sequestration considered.  Bibasilar atelectasis.  This report was finalized on 05/03/2020 12:46 by Dr. Annmarie Ontiveros MD.        I have personally reviewed and interpreted the radiology studies and ECG obtained at time of admission.     Assessment / Plan     Assessment:   There are no active hospital problems to display for this patient.    Hypokalemia, metabolic alkalosis  Nausea and vomiting: hx of hiatal hernia, esophagitis, negative HIDA in Nov 2019.   WILDA - resolved - renal biopsy from last hospitalization  Hyperuricemia  Leukocytosis  Hx of Gitelmann syndrome    Plan:    IV fluid  replace potassium  Check magnesium - hypocalcemia/hypokalemia - noted < 1 - replaced total 4 gram then follow level; check ionized calcium - replace accordingly   check crp  Colchicine (renal function improve) - monitor for adverse effect  Supportive care  Other plan per orders    Code Status: full code     I discussed the patient's findings and my recommendations with the patient  Estimated length of stay  Tbd/ possibly 1-2  days    Patient seen and examined by me on  Saeid Walls MD   05/03/20   15:13

## 2020-05-03 NOTE — OUTREACH NOTE
Medical Week 2 Survey      Responses   [facility] patient discharged from?  [place]   COVID-19 Test Status  Not tested   Does the patient have one of the following disease processes/diagnoses(primary or secondary)?  Other   Week 2 attempt successful?  No   Revoke  Readmitted          Darlene Blankenship RN

## 2020-05-04 ENCOUNTER — APPOINTMENT (OUTPATIENT)
Dept: GENERAL RADIOLOGY | Facility: HOSPITAL | Age: 65
End: 2020-05-04

## 2020-05-04 ENCOUNTER — APPOINTMENT (OUTPATIENT)
Dept: ULTRASOUND IMAGING | Facility: HOSPITAL | Age: 65
End: 2020-05-04

## 2020-05-04 PROBLEM — M10.9 GOUT: Status: ACTIVE | Noted: 2020-05-04

## 2020-05-04 PROBLEM — E83.39 HYPOPHOSPHATEMIA: Status: ACTIVE | Noted: 2020-05-04

## 2020-05-04 LAB
ALBUMIN SERPL-MCNC: 3.2 G/DL (ref 3.5–5.2)
ALBUMIN/GLOB SERPL: 1 G/DL
ALP SERPL-CCNC: 50 U/L (ref 39–117)
ALT SERPL W P-5'-P-CCNC: 8 U/L (ref 1–33)
ANION GAP SERPL CALCULATED.3IONS-SCNC: 13 MMOL/L (ref 5–15)
AST SERPL-CCNC: 10 U/L (ref 1–32)
BASOPHILS # BLD AUTO: 0.04 10*3/MM3 (ref 0–0.2)
BASOPHILS NFR BLD AUTO: 0.3 % (ref 0–1.5)
BILIRUB SERPL-MCNC: 0.5 MG/DL (ref 0.2–1.2)
BUN BLD-MCNC: 23 MG/DL (ref 8–23)
BUN/CREAT SERPL: 19.3 (ref 7–25)
CALCIUM SPEC-SCNC: 6.4 MG/DL (ref 8.6–10.5)
CHLORIDE SERPL-SCNC: 100 MMOL/L (ref 98–107)
CO2 SERPL-SCNC: 29 MMOL/L (ref 22–29)
CREAT BLD-MCNC: 1.19 MG/DL (ref 0.57–1)
DEPRECATED RDW RBC AUTO: 44 FL (ref 37–54)
EOSINOPHIL # BLD AUTO: 0.02 10*3/MM3 (ref 0–0.4)
EOSINOPHIL NFR BLD AUTO: 0.1 % (ref 0.3–6.2)
ERYTHROCYTE [DISTWIDTH] IN BLOOD BY AUTOMATED COUNT: 14.8 % (ref 12.3–15.4)
GFR SERPL CREATININE-BSD FRML MDRD: 46 ML/MIN/1.73
GLOBULIN UR ELPH-MCNC: 3.1 GM/DL
GLUCOSE BLD-MCNC: 155 MG/DL (ref 65–99)
HCT VFR BLD AUTO: 28.4 % (ref 34–46.6)
HGB BLD-MCNC: 9.5 G/DL (ref 12–15.9)
IMM GRANULOCYTES # BLD AUTO: 0.1 10*3/MM3 (ref 0–0.05)
IMM GRANULOCYTES NFR BLD AUTO: 0.7 % (ref 0–0.5)
LYMPHOCYTES # BLD AUTO: 1.26 10*3/MM3 (ref 0.7–3.1)
LYMPHOCYTES NFR BLD AUTO: 8.3 % (ref 19.6–45.3)
MAGNESIUM SERPL-MCNC: 1.6 MG/DL (ref 1.6–2.4)
MCH RBC QN AUTO: 27.6 PG (ref 26.6–33)
MCHC RBC AUTO-ENTMCNC: 33.5 G/DL (ref 31.5–35.7)
MCV RBC AUTO: 82.6 FL (ref 79–97)
MONOCYTES # BLD AUTO: 2.57 10*3/MM3 (ref 0.1–0.9)
MONOCYTES NFR BLD AUTO: 17 % (ref 5–12)
NEUTROPHILS # BLD AUTO: 11.17 10*3/MM3 (ref 1.7–7)
NEUTROPHILS NFR BLD AUTO: 73.6 % (ref 42.7–76)
NRBC BLD AUTO-RTO: 0 /100 WBC (ref 0–0.2)
PHOSPHATE SERPL-MCNC: 1.6 MG/DL (ref 2.5–4.5)
PLATELET # BLD AUTO: 313 10*3/MM3 (ref 140–450)
PMV BLD AUTO: 9.4 FL (ref 6–12)
POTASSIUM BLD-SCNC: 3.3 MMOL/L (ref 3.5–5.2)
POTASSIUM BLD-SCNC: 3.8 MMOL/L (ref 3.5–5.2)
PROT SERPL-MCNC: 6.3 G/DL (ref 6–8.5)
RBC # BLD AUTO: 3.44 10*6/MM3 (ref 3.77–5.28)
SODIUM BLD-SCNC: 142 MMOL/L (ref 136–145)
WBC NRBC COR # BLD: 15.16 10*3/MM3 (ref 3.4–10.8)

## 2020-05-04 PROCEDURE — 73030 X-RAY EXAM OF SHOULDER: CPT

## 2020-05-04 PROCEDURE — 85025 COMPLETE CBC W/AUTO DIFF WBC: CPT | Performed by: INTERNAL MEDICINE

## 2020-05-04 PROCEDURE — 83735 ASSAY OF MAGNESIUM: CPT | Performed by: INTERNAL MEDICINE

## 2020-05-04 PROCEDURE — 25810000003 SODIUM CHLORIDE 0.9 % WITH KCL 20 MEQ 20-0.9 MEQ/L-% SOLUTION: Performed by: INTERNAL MEDICINE

## 2020-05-04 PROCEDURE — 80053 COMPREHEN METABOLIC PANEL: CPT | Performed by: INTERNAL MEDICINE

## 2020-05-04 PROCEDURE — 84100 ASSAY OF PHOSPHORUS: CPT | Performed by: INTERNAL MEDICINE

## 2020-05-04 PROCEDURE — 84132 ASSAY OF SERUM POTASSIUM: CPT | Performed by: INTERNAL MEDICINE

## 2020-05-04 PROCEDURE — 76705 ECHO EXAM OF ABDOMEN: CPT

## 2020-05-04 RX ADMIN — POTASSIUM PHOSPHATE, MONOBASIC AND POTASSIUM PHOSPHATE, DIBASIC 20 MMOL: 224; 236 INJECTION, SOLUTION INTRAVENOUS at 15:49

## 2020-05-04 RX ADMIN — HYDROCODONE BITARTRATE AND ACETAMINOPHEN 1 TABLET: 5; 325 TABLET ORAL at 20:52

## 2020-05-04 RX ADMIN — POTASSIUM CHLORIDE 40 MEQ: 750 CAPSULE, EXTENDED RELEASE ORAL at 01:46

## 2020-05-04 RX ADMIN — SODIUM CHLORIDE, PRESERVATIVE FREE 10 ML: 5 INJECTION INTRAVENOUS at 09:11

## 2020-05-04 RX ADMIN — COLCHICINE 0.6 MG: 0.6 TABLET, FILM COATED ORAL at 09:10

## 2020-05-04 RX ADMIN — HYDROCODONE BITARTRATE AND ACETAMINOPHEN 1 TABLET: 5; 325 TABLET ORAL at 15:49

## 2020-05-04 RX ADMIN — HYDROCODONE BITARTRATE AND ACETAMINOPHEN 1 TABLET: 5; 325 TABLET ORAL at 11:03

## 2020-05-04 RX ADMIN — PAROXETINE 10 MG: 10 TABLET, FILM COATED ORAL at 09:10

## 2020-05-04 RX ADMIN — ATORVASTATIN CALCIUM 10 MG: 10 TABLET, FILM COATED ORAL at 21:35

## 2020-05-04 RX ADMIN — Medication 1 TABLET: at 09:39

## 2020-05-04 RX ADMIN — Medication 1 TABLET: at 14:39

## 2020-05-04 RX ADMIN — POTASSIUM CHLORIDE AND SODIUM CHLORIDE 100 ML/HR: 900; 150 INJECTION, SOLUTION INTRAVENOUS at 09:10

## 2020-05-04 RX ADMIN — POTASSIUM CHLORIDE 40 MEQ: 750 CAPSULE, EXTENDED RELEASE ORAL at 05:41

## 2020-05-04 RX ADMIN — MAGNESIUM GLUCONATE 500 MG ORAL TABLET 400 MG: 500 TABLET ORAL at 21:35

## 2020-05-04 RX ADMIN — HYDROCODONE BITARTRATE AND ACETAMINOPHEN 1 TABLET: 5; 325 TABLET ORAL at 05:42

## 2020-05-04 RX ADMIN — PANTOPRAZOLE SODIUM 40 MG: 40 TABLET, DELAYED RELEASE ORAL at 05:42

## 2020-05-04 NOTE — PLAN OF CARE
Problem: Patient Care Overview  Goal: Plan of Care Review  Outcome: Ongoing (interventions implemented as appropriate)  Flowsheets (Taken 5/4/2020 1422)  Progress: no change  Plan of Care Reviewed With: other (see comments)  Outcome Summary: Pt is ordered a clear liquid diet. She reports a poor appetite r/t N/V. No po intake recorded. Will follow for nutrition POC and diet advancement.

## 2020-05-04 NOTE — PAYOR COMM NOTE
"REF: [id number]    [facility]  KELY,  [phone]  OR   FAX  [phone]    Sammi Quinones (65 y.o. Female)     Date of Birth Social Security Number Address Home Phone MRN    1955  [address] [place] [id number] [phone] [id number]    Congregation Marital Status          [employer]        Admission Date Admission Type Admitting Provider Attending Provider Department, Room/Bed    5/3/20 Emergency Mayito Membreno, Mayito Pedroza,  [facility] 4B, 447/1    Discharge Date Discharge Disposition Discharge Destination                       Attending Provider:  Mayito Membreno DO    Allergies:  Penicillins, Sulfa Antibiotics    Isolation:  None   Infection:  None   Code Status:  CPR    Ht:  154.9 cm (60.98\")   Wt:  75.7 kg (166 lb 14.4 oz)    Admission Cmt:  None   Principal Problem:  Hypokalemia [E87.6]                 Active Insurance as of 5/3/2020     Primary Coverage     Payor Plan Insurance Group Employer/Plan Group    [facility] [employer] [facility] EMPLOYEE [id number]     Payor Plan Address Payor Plan Phone Number Payor Plan Fax Number Effective Dates    [address] [phone]  1/1/2020 - None Entered    [place] [address]       Subscriber Name Subscriber Birth Date Member ID       SAMMI QUINONES 1955 [id number]                 Emergency Contacts      (Rel.) Home Phone Work Phone Mobile Phone    Sade Lepe (Daughter) [phone] -- --    Hrenandez Quinones (Son) [phone] -- --        Patient Care Timeline (5/3/2020 10:36 to 5/3/2020 16:21:53)     5/3/2020 Event Details User   10:36 Patient arrival  Negrita Davila RN   10:36:46 Arrival Complaint vomiting       10:40 HPI HPI   Stated Reason for Visit: recently discharged 4/29 for WILDA and dehydration. still unable to keep fluids/ food down since discharge. worsened weakness. denies fever, cough, soa  History Obtained From: patient; family   " "  Precipitating Event (s): unknown Duration (Weeks): 1   Medications/Treatments Prior to Arrival: none     Negrita Davila RN     Temp: 99 °F (37.2 °C) Temp src: Oral   Heart Rate: 68 Heart Rate Source: Monitor   Resp: 14 Resp Rate Source: Visual   BP: 125/58 BP Location: Right arm   BP Method: Automatic Patient Position: Sitting   BMI (Calculated): 31.4    Oxygen Therapy   SpO2: 99 % Pulse Oximetry Type: Intermittent   Device (Oxygen Therapy): room air    Vitals Timer   Restart Vitals Timer: Yes    Height and Weight   Height: 154.9 cm (61\") Height Method: Stated   Weight: 75.3 kg (166 lb)    Other flowsheet entries   Ideal Body Weight k.8     Negrita Davila RN     10:43 Pain (Adult) Pain (Adult)   Presence of Pain: complains of pain/discomfort Preferred Pain Scale: number (Numeric Rating Pain Scale)   Pain Body Location - Side: Left Pain Body Location: finger (gout in her thumb, elbow and shoulder)   Pain Rating (0-10): Rest: 8     Negrita Davila RN     11:15 Peripheral Neurovascular (Adult) Peripheral Neurovascular (Adult)   Peripheral Neuro Vascular WDL: WDL except Additional Documentation: Edema (Group)   Edema   Edema: arm, left; hand, left; wrist, left Arm, Left Edema: 1+ (Trace)   Wrist, Left Edema: 1+ (Trace) Hand, Left Edema: 1+ (Trace)    Donna Mathews RN     12:22 Medication New Bag sodium chloride 0.9 % bolus 1,000 mL - Dose: 1,000 mL ; Rate: 2,000 mL/hr ; Route: Intravenous ; Line: Peripheral IV 20 1124 Right;Anterior Wrist ; Scheduled Time: 1210  Donna Mathews RN   12:22 Medication Given HYDROmorphone (DILAUDID) injection 1 mg - Dose: 1 mg ; Route: Intravenous ; Line: Peripheral IV 20 1124 Right;Anterior Wrist ; Scheduled Time: 1210  Donna Mathews RN   12:22 Medication Given ondansetron (ZOFRAN) injection 4 mg - Dose: 4 mg ; Route: Intravenous ; Line: Peripheral IV 20 1124 Right;Anterior Wrist ; Scheduled Time: 1210  Donna Mathews RN   12:22 Pain " Medication Administered - Adult Given - HYDROmorphone (DILAUDID) injection 1 mg  Donna Mathews RN     12:42 Device Vitals Device Data   Heart Rate: 60 (Device Time: 12:42:30) SpO2: 84 %Abnormal  (Device Time: 12:42:30)    Donna Mathews RN   12:43:42 Respiratory Interventions (Adult) Respiratory Interventions   Additional Documentation: Oxygen Therapy (Group)    Oxygen Therapy   Flow (L/min): 2 Device (Oxygen Therapy): nasal cannula    Donna Mathews RN   12:44:06 Vital Signs Vital Signs   Heart Rate: 58 Resp: 18   BP: 117/49    Oxygen Therapy   SpO2: 99 % Pulse Oximetry Type: Continuous   Device (Oxygen Therapy): nasal cannula    Vitals Timer   Restart Vitals Timer: Yes     Donna Mathews RN     14:47 Medication Given HYDROmorphone (DILAUDID) injection 1 mg - Dose: 1 mg ; Route: Intravenous ; Line: Peripheral IV 05/03/20 1124 Right;Anterior Wrist ; Scheduled Time: 1426  Donna Mathews RN   14:47 Medication Given ondansetron (ZOFRAN) injection 4 mg - Dose: 4 mg ; Route: Intravenous ; Line: Peripheral IV 05/03/20 1124 Right;Anterior Wrist ; Scheduled Time: 1426  Donna Mathews RN   14:47 Pain Medication Administered - Adult Given - HYDROmorphone (DILAUDID) injection 1 mg  Donna Mathews RN       14:50 Medication New Bag potassium chloride 20 mEq in 100 mL IVPB - Dose: 20 mEq ; Rate: 50 mL/hr ; Route: Intravenous ; Line: Peripheral IV 05/03/20 1124 Right;Anterior Wrist ; Scheduled Time: 1418  Donna Mathews RN                  History & Physical      [facility] at 05/03/20 1512              [facility]  HISTORY AND PHYSICAL    Date of Admission: 5/3/2020  Primary Care Physician: Avery Whaley MD    Subjective     Chief Complaint: n/v     History of Present Illness  Discharge last Wednesday.  Able to eat that discharge  Feels weak subsequently  States she kept burping when she got home.  She had taken  Zofran and alternate with Phenergan.  She is on PPI. She had HIDA in Nov 2019 - normal  Had some nausea since October; prior hx of EGD - esophagitis; known hx of hiatal hernia  Had some vomiting    States temp of 99.  No cough  No sob  No diarrhea  No exposure recent sick exposure other than she was hospitalized early part of this week with WILDA.  Ended with renal biopsy.  She also felt she has gout flare again with left wrist elbow and shoulder hurting.     In Er, found with leukocytosis hypokalemia. She is alkalotic  Renal function is improve significantly,     Review of Systems     Otherwise complete ROS reviewed and negative except as mentioned in the HPI.    Past Medical History:   Past Medical History:   Diagnosis Date   • WILDA (acute kidney injury) (CMS/HCC)    • Allergic rhinitis    • Colon polyps    • Depression    • Difficulty swallowing solids    • DJD (degenerative joint disease)    • GERD (gastroesophageal reflux disease)    • Gitelman syndrome     low magnesium   • Gout    • Hyperlipidemia    • Obesity    • Osteopenia      Past Surgical History:  Past Surgical History:   Procedure Laterality Date   • AXILLARY LYMPH NODE BIOPSY/EXCISION Right 10/14/2019    Procedure: AXILLARY LYMPH NODE BIOPSY/EXCISION;  Surgeon: Karen Benitez MD;  Location: [facility] OR;  Service: General   • AXILLARY LYMPH NODE BIOPSY/EXCISION Left 11/26/2019    Procedure: AXILLARY LYMPH NODE BIOPSY/EXCISION;  Surgeon: Karen Benitez MD;  Location: [facility] OR;  Service: General   • COLONOSCOPY  08/20/2010   • COLONOSCOPY W/ POLYPECTOMY  09/14/2016    2   5 mm sessile polyps removed with hot snare repeat 5 years   • DILATATION AND CURETTAGE     • ENDOSCOPY  09/14/2016    Medium sized HH, LA Grade A esohagitis, Fluid in the middle third of esohagus    • ENDOSCOPY N/A 2/25/2020    Procedure: ESOPHAGOGASTRODUODENOSCOPY WITH ANESTHESIA;  Surgeon: Isaac Murrell MD;  Location: [facility] ENDOSCOPY;  Service: Gastroenterology;  Laterality:  N/A;  pre: abdominal CT  post: gastric erosion; hiatal hernia  Avery Whaley MD   • ENDOSCOPY N/A 4/24/2020    Procedure: ESOPHAGOGASTRODUODENOSCOPY WITH ANESTHESIA;  Surgeon: Daniel Junior MD;  Location: [facility] ENDOSCOPY;  Service: Gastroenterology;  Laterality: N/A;  pre: nausea  post: esophagitis. hiatal hernia.    • JOINT REPLACEMENT Bilateral     knee   • [place] TOTAL KNEE ARTHROPLASTY Left 3/9/2018    Procedure: LEFT TOTAL KNEE ARTHROPLASTY;  Surgeon: Orlin Mzea MD;  Location: [facility] OR;  Service: Orthopedics   • REPLACEMENT TOTAL KNEE Right      Social History:  reports that she quit smoking about 17 years ago. Her smoking use included cigarettes. She smoked 0.50 packs per day. She has never used smokeless tobacco. She reports that she does not drink alcohol or use drugs.    Family History: family history includes Anorexia nervosa in her mother; Aortic aneurysm in her father; Lymphoma in her paternal grandfather; No Known Problems in her brother, daughter, daughter, maternal aunt, maternal grandmother, paternal aunt, paternal grandmother, sister, and son.       Allergies:  Allergies   Allergen Reactions   • Penicillins Swelling and Rash   • Sulfa Antibiotics Rash and Other (See Comments)     Temp. fluctuations      Medications:  Prior to Admission medications    Medication Sig Start Date End Date Taking? Authorizing Provider   Calcium Carb-Ergocalciferol (CHEWABLE CALCIUM/D PO) Take 1 tablet by mouth Daily.    Sdi Vance MD   Multiple Vitamins-Minerals (MULTIVITAMIN WITH MINERALS) tablet tablet Take 1 tablet by mouth Daily.    Sid Vance MD   ondansetron (ZOFRAN) 4 MG tablet Take 1 tablet by mouth four times a day as needed for nausea 4/22/20      pantoprazole (PROTONIX) 40 MG EC tablet Take 1 tablet by mouth Every Morning Before Breakfast. 4/30/20   Saeid Walls MD   PARoxetine (PAXIL) 10 MG tablet Take 10 mg by mouth Every Morning. 8/7/16   Provider  "MD Sid   simvastatin (ZOCOR) 20 MG tablet Take 1 tablet by mouth once daily 4/1/20        Objective     Vital Signs: /53   Pulse 60   Temp 99 °F (37.2 °C) (Oral)   Resp 15   Ht 154.9 cm (61\")   Wt 75.3 kg (166 lb)   SpO2 100%   Breastfeeding No   BMI 31.37 kg/m²    Physical Exam   Constitutional: She appears well-developed.   HENT:   Head: Normocephalic and atraumatic.   Right Ear: External ear normal.   Left Ear: External ear normal.   Wearing mask   Eyes: Pupils are equal, round, and reactive to light. Conjunctivae and EOM are normal. Right eye exhibits no discharge. Left eye exhibits no discharge. No scleral icterus.   Neck: Normal range of motion. Neck supple. No JVD present. No tracheal deviation present. No thyromegaly present.   Cardiovascular: Normal rate, regular rhythm and normal heart sounds.   Pulmonary/Chest: Effort normal and breath sounds normal.   Abdominal: Soft. Bowel sounds are normal. She exhibits no distension. There is no tenderness. There is no guarding.   Musculoskeletal:   Tender and warm left wrist although thus not appear as swollen when first encountered early this past week   Skin: Skin is warm and dry. Capillary refill takes less than 2 seconds. No erythema.   Psychiatric: She has a normal mood and affect. Her behavior is normal. Thought content normal.   Vitals reviewed.      Results Reviewed:  Lab Results (last 24 hours)     Procedure Component Value Units Date/Time    Magnesium [[id number]] Collected:  05/03/20 1324    Specimen:  Blood Updated:  05/03/20 1508    Uric Acid [[id number]] Collected:  05/03/20 1324    Specimen:  Blood Updated:  05/03/20 1506    [place] Draw [[id number]] Collected:  05/03/20 1324    Specimen:  Blood Updated:  05/03/20 1430    Narrative:       The following orders were created for panel order [place] Draw.  Procedure                               Abnormality         Status                     ---------                               " -----------         ------                     Light Blue Top[[id number]]                                   Final result               Green Top (Gel)[[id number]]                                  Final result               Lavender Top[[id number]]                                     Final result               Red Top[[id number]]                                          Final result               Jackson Blood Culture Archie...[[id number]]                      Final result               Gray Top - Ice[[id number]]                                   Final result                 Please view results for these tests on the individual orders.    Light Blue Top [[id number]] Collected:  05/03/20 1324    Specimen:  Blood Updated:  05/03/20 1430     Extra Tube hold for add-on     Comment: Auto resulted       Green Top (Gel) [[id number]] Collected:  05/03/20 1324    Specimen:  Blood Updated:  05/03/20 1430     Extra Tube Hold for add-ons.     Comment: Auto resulted.       Lavender Top [[id number]] Collected:  05/03/20 1324    Specimen:  Blood Updated:  05/03/20 1430     Extra Tube hold for add-on     Comment: Auto resulted       Red Top [[id number]] Collected:  05/03/20 1324    Specimen:  Blood Updated:  05/03/20 1430     Extra Tube Hold for add-ons.     Comment: Auto resulted.       Jackson Blood Culture Bottle Set [[id number]] Collected:  05/03/20 1324    Specimen:  Blood Updated:  05/03/20 1430     Extra Tube Hold for add-ons.     Comment: Auto resulted.       Comprehensive Metabolic Panel [[id number]]  (Abnormal) Collected:  05/03/20 1324    Specimen:  Blood Updated:  05/03/20 1359     Glucose 129 mg/dL      BUN 26 mg/dL      Creatinine 1.21 mg/dL      Sodium 142 mmol/L      Potassium 2.3 mmol/L      Chloride 90 mmol/L      CO2 34.0 mmol/L      Calcium 6.4 mg/dL      Total Protein 7.0 g/dL      Albumin 3.70 g/dL      ALT (SGPT) 11 U/L      AST (SGOT) 16 U/L      Alkaline Phosphatase 51 U/L      Total Bilirubin 0.6 mg/dL      eGFR Non   Amer 45 mL/min/1.73      Globulin 3.3 gm/dL      A/G Ratio 1.1 g/dL      BUN/Creatinine Ratio 21.5     Anion Gap 18.0 mmol/L     Narrative:       GFR Normal >60  Chronic Kidney Disease <60  Kidney Failure <15      Lipase [[id number]]  (Normal) Collected:  05/03/20 1324    Specimen:  Blood Updated:  05/03/20 1357     Lipase 20 U/L     Protime-INR [[id number]]  (Abnormal) Collected:  05/03/20 1324    Specimen:  Blood Updated:  05/03/20 1350     Protime 16.2 Seconds      INR 1.34    aPTT [[id number]]  (Normal) Collected:  05/03/20 1324    Specimen:  Blood Updated:  05/03/20 1350     PTT 32.3 seconds     Gray Top - Ice [[id number]] Collected:  05/03/20 1324    Specimen:  Blood Updated:  05/03/20 1348     Extra Tube Hold    CBC & Differential [[id number]] Collected:  05/03/20 1324    Specimen:  Blood Updated:  05/03/20 1341    Narrative:       The following orders were created for panel order CBC & Differential.  Procedure                               Abnormality         Status                     ---------                               -----------         ------                     CBC Auto Differential[[id number]]        Abnormal            Final result                 Please view results for these tests on the individual orders.    CBC Auto Differential [[id number]]  (Abnormal) Collected:  05/03/20 1324    Specimen:  Blood Updated:  05/03/20 1341     WBC 19.91 10*3/mm3      RBC 3.72 10*6/mm3      Hemoglobin 10.4 g/dL      Hematocrit 30.2 %      MCV 81.2 fL      MCH 28.0 pg      MCHC 34.4 g/dL      RDW 14.4 %      RDW-SD 42.1 fl      MPV 8.8 fL      Platelets 337 10*3/mm3      Neutrophil % 76.9 %      Lymphocyte % 7.1 %      Monocyte % 15.0 %      Eosinophil % 0.1 %      Basophil % 0.2 %      Immature Grans % 0.7 %      Neutrophils, Absolute 15.33 10*3/mm3      Lymphocytes, Absolute 1.41 10*3/mm3      Monocytes, Absolute 2.99 10*3/mm3      Eosinophils, Absolute 0.01 10*3/mm3      Basophils, Absolute 0.04 10*3/mm3       Immature Grans, Absolute 0.13 10*3/mm3      nRBC 0.0 /100 WBC         Imaging Results (Last 24 Hours)     Procedure Component Value Units Date/Time    XR Chest 1 View [[id number]] Collected:  05/03/20 1459     Updated:  05/03/20 1504    Narrative:       HISTORY: Cough     CXR: Frontal view the chest obtained.     COMPARISON: 04/24/2020.     FINDINGS: There are chronic medial left basilar opacities along the  cardiophrenic sulcus. Findings may represent sequestration this finding  present on studies dating back to 10/11/2019. No new consolidation.  Normal cardiomediastinal contours. No pleural effusion or pneumothorax.  No acute bony pathology.       Impression:       1. Chronic medial left basilar opacities. Sequestration considered. No  acute radiographic cardiopulmonary process  This report was finalized on 05/03/2020 15:01 by Dr. Annmarie Ontiveros MD.    CT Abdomen Pelvis Without Contrast [[id number]] Collected:  05/03/20 1241     Updated:  05/03/20 1249    Narrative:       CT ABDOMEN PELVIS WO CONTRAST- 5/3/2020 12:29 PM CDT     HISTORY: Nausea, vomiting, diarrhea      COMPARISON: 04/23/2020     DOSE LENGTH PRODUCT: 528 mGy cm. Automated exposure control was also  utilized to decrease patient radiation dose.     TECHNIQUE: Axial images the and pelvis are obtained without IV contrast.     FINDINGS:  There is a chronic 3.5 cm posterior medial left lower lobe  opacity with questionable air bronchograms. Sequestration considered.  Persistent present on studies dating back to 10/11/2019. Dependent  basilar atelectasis.     The nonenhanced liver, spleen, pancreas, gallbladder, and adrenal glands  are unremarkable. No abnormal perinephric fluid collection. No  hydronephrosis. Lateral appears intact. Calcified phleboliths in the  right retroperitoneum. Bladder appears intact. Uterus unremarkable. No  adnexal mass.     Mild sigmoid colonic diverticulosis with no acute diverticulitis. No  evidence for bowel obstruction.  Appendix remains normal. Small hiatal  hernia. Gastric wall thickening may be due to underdistention, however  gastritis considered. Mild to moderate vascular calcification with no  aneurysm.     Degenerative change of the regional skeleton with no focal destructive  osseous lesions.       Impression:       1. Small hiatal hernia. Apparent wall thickening of the stomach may be  due to underdistention. Gastritis a second possibility. No bowel  obstruction. No free air or abscess. Normal appendix.  2. Chronic changes of the left lung base, sequestration considered.  Bibasilar atelectasis.  This report was finalized on 05/03/2020 12:46 by Dr. Annmarie Ontiveros MD.        I have personally reviewed and interpreted the radiology studies and ECG obtained at time of admission.     Assessment / Plan     Assessment:   There are no active hospital problems to display for this patient.    Hypokalemia, metabolic alkalosis  Nausea and vomiting: hx of hiatal hernia, esophagitis, negative HIDA in Nov 2019.   WILDA - resolved - renal biopsy from last hospitalization  Hyperuricemia  Leukocytosis  Hx of Gitelmann syndrome    Plan:    IV fluid  replace potassium  Check magnesium - hypocalcemia/hypokalemia - noted < 1 - replaced total 4 gram then follow level; check ionized calcium - replace accordingly   check crp  Colchicine (renal function improve) - monitor for adverse effect  Supportive care  Other plan per orders    Code Status: full code     I discussed the patient's findings and my recommendations with the patient  Estimated length of stay  Tbd/ possibly 1-2 days    Patient seen and examined by me on  Saeid Walls MD   05/03/20   15:13            Electronically signed by Saeid Walls MD at 05/03/20 1546          Emergency Department Notes      Barb Raymundo APRN at 05/03/20 1136     Attestation signed by Rita Berumen MD at 05/03/20 1610          For this patient encounter, I reviewed the NP  or PA documentation, treatment plan, and medical decision making. Rita Berumen MD 5/3/2020 16:10                  Subjective   65 yof presents with c/o weakness, nausea and vomiting.  She states she was recently admitted for an WILDA and discharged 04/29/2020. She states she has had n/v since that time.  She states she also had a kidney biopsy while she was here.  She denies cough, fever or SOB.  She denies abdomen pain.  She states she 'just feels weak and bad.' She is also c/o left arm pain.  She states she has recently been treated for gout in that arm and she feels it has returned.           Review of Systems   Constitutional: Negative for activity change, appetite change, fatigue and fever.   HENT: Negative for congestion, ear pain, facial swelling and sore throat.    Eyes: Negative for discharge and visual disturbance.   Respiratory: Negative for apnea, chest tightness, shortness of breath, wheezing and stridor.    Cardiovascular: Negative for chest pain and palpitations.   Gastrointestinal: Negative for abdominal distention, abdominal pain, diarrhea, nausea and vomiting.   Genitourinary: Negative for difficulty urinating and dysuria.   Musculoskeletal: Negative for arthralgias and myalgias.   Skin: Negative for rash and wound.   Neurological: Negative for dizziness and seizures.   Psychiatric/Behavioral: Negative for agitation and confusion.       Past Medical History:   Diagnosis Date   • WILDA (acute kidney injury) (CMS/HCC)    • Allergic rhinitis    • Colon polyps    • Depression    • Difficulty swallowing solids    • DJD (degenerative joint disease)    • GERD (gastroesophageal reflux disease)    • Gitelman syndrome     low magnesium   • Gout    • Hyperlipidemia    • Obesity    • Osteopenia        Allergies   Allergen Reactions   • Penicillins Swelling and Rash   • Sulfa Antibiotics Rash and Other (See Comments)     Temp. fluctuations        Past Surgical History:   Procedure Laterality Date   • AXILLARY  LYMPH NODE BIOPSY/EXCISION Right 10/14/2019    Procedure: AXILLARY LYMPH NODE BIOPSY/EXCISION;  Surgeon: Karen Benitez MD;  Location: [facility] OR;  Service: General   • AXILLARY LYMPH NODE BIOPSY/EXCISION Left 11/26/2019    Procedure: AXILLARY LYMPH NODE BIOPSY/EXCISION;  Surgeon: Karen Benitez MD;  Location: [facility] OR;  Service: General   • COLONOSCOPY  08/20/2010   • COLONOSCOPY W/ POLYPECTOMY  09/14/2016    2   5 mm sessile polyps removed with hot snare repeat 5 years   • DILATATION AND CURETTAGE     • ENDOSCOPY  09/14/2016    Medium sized HH, LA Grade A esohagitis, Fluid in the middle third of esohagus    • ENDOSCOPY N/A 2/25/2020    Procedure: ESOPHAGOGASTRODUODENOSCOPY WITH ANESTHESIA;  Surgeon: Isaac Murrell MD;  Location: [facility] ENDOSCOPY;  Service: Gastroenterology;  Laterality: N/A;  pre: abdominal CT  post: gastric erosion; hiatal hernia  Avery Whaley MD   • ENDOSCOPY N/A 4/24/2020    Procedure: ESOPHAGOGASTRODUODENOSCOPY WITH ANESTHESIA;  Surgeon: Daniel Junior MD;  Location: [facility] ENDOSCOPY;  Service: Gastroenterology;  Laterality: N/A;  pre: nausea  post: esophagitis. hiatal hernia.    • JOINT REPLACEMENT Bilateral     knee   • [place] TOTAL KNEE ARTHROPLASTY Left 3/9/2018    Procedure: LEFT TOTAL KNEE ARTHROPLASTY;  Surgeon: Orlin Meza MD;  Location: [facility] OR;  Service: Orthopedics   • REPLACEMENT TOTAL KNEE Right        Family History   Problem Relation Age of Onset   • Aortic aneurysm Father    • Anorexia nervosa Mother    • No Known Problems Brother    • No Known Problems Sister    • No Known Problems Son    • No Known Problems Daughter    • No Known Problems Daughter    • No Known Problems Maternal Grandmother    • No Known Problems Paternal Grandmother    • No Known Problems Maternal Aunt    • No Known Problems Paternal Aunt    • Lymphoma Paternal Grandfather    • Breast cancer Neg Hx    • Ovarian cancer Neg Hx    • Colon cancer Neg Hx    • BRCA 1/2 Neg Hx    •  Endometrial cancer Neg Hx    • Colon polyps Neg Hx        Social History     Socioeconomic History   • Marital status:      Spouse name: Not on file   • Number of children: Not on file   • Years of education: Not on file   • Highest education level: Not on file   Tobacco Use   • Smoking status: Former Smoker     Packs/day: 0.50     Types: Cigarettes     Last attempt to quit:      Years since quittin.3   • Smokeless tobacco: Never Used   Substance and Sexual Activity   • Alcohol use: No   • Drug use: No   • Sexual activity: Defer           Objective   Physical Exam   Constitutional: She is oriented to person, place, and time. She appears well-developed.   HENT:   Head: Normocephalic.   Eyes: Pupils are equal, round, and reactive to light. EOM are normal.   Neck: Normal range of motion. Neck supple.   Cardiovascular: Normal rate and regular rhythm.   No murmur heard.  Pulmonary/Chest: Effort normal and breath sounds normal.   Abdominal: Soft. Bowel sounds are normal. There is no tenderness.   Musculoskeletal: Normal range of motion.   Neurological: She is alert and oriented to person, place, and time.   Skin: Skin is warm and dry.   Psychiatric: She has a normal mood and affect.   Nursing note and vitals reviewed.      Procedures           Current Facility-Administered Medications:   •  magnesium sulfate in D5W 1g/100mL (PREMIX), 2 g, Intravenous, Q1H, Saeid Walls MD  •  potassium chloride 20 mEq in 100 mL IVPB, 20 mEq, Intravenous, Once, Shoulders, Kristee Croft, APRN, Last Rate: 50 mL/hr at 20 1450, 20 mEq at 20 1450  •  Insert peripheral IV, , , Once **AND** sodium chloride 0.9 % flush 10 mL, 10 mL, Intravenous, PRN, Shoulders, Kristee Croft, APRN    Current Outpatient Medications:   •  Calcium Carb-Ergocalciferol (CHEWABLE CALCIUM/D PO), Take 1 tablet by mouth Daily., Disp: , Rfl:   •  Multiple Vitamins-Minerals (MULTIVITAMIN WITH MINERALS) tablet tablet, Take 1 tablet by  "mouth Daily., Disp: , Rfl:   •  ondansetron (ZOFRAN) 4 MG tablet, Take 1 tablet by mouth four times a day as needed for nausea, Disp: 40 tablet, Rfl: 0  •  pantoprazole (PROTONIX) 40 MG EC tablet, Take 1 tablet by mouth Every Morning Before Breakfast., Disp: 30 tablet, Rfl: 0  •  PARoxetine (PAXIL) 10 MG tablet, Take 10 mg by mouth Every Morning., Disp: , Rfl: 5  •  simvastatin (ZOCOR) 20 MG tablet, Take 1 tablet by mouth once daily, Disp: 90 tablet, Rfl: 2    Vital signs:  /53   Pulse 60   Temp 99 °F (37.2 °C) (Oral)   Resp 15   Ht 154.9 cm (61\")   Wt 75.3 kg (166 lb)   SpO2 100%   Breastfeeding No   BMI 31.37 kg/m²         ED LAB RESULTS:   Lab Results (last 24 hours)     Procedure Component Value Units Date/Time    [place] Blood Culture Bottle Set [[id number]] Collected:  05/03/20 1324    Specimen:  Blood Updated:  05/03/20 1430     Extra Tube Hold for add-ons.     Comment: Auto resulted.       CBC & Differential [[id number]] Collected:  05/03/20 1324    Specimen:  Blood Updated:  05/03/20 1341    Narrative:       The following orders were created for panel order CBC & Differential.  Procedure                               Abnormality         Status                     ---------                               -----------         ------                     CBC Auto Differential[[id number]]        Abnormal            Final result                 Please view results for these tests on the individual orders.    Comprehensive Metabolic Panel [[id number]]  (Abnormal) Collected:  05/03/20 1324    Specimen:  Blood Updated:  05/03/20 1359     Glucose 129 mg/dL      BUN 26 mg/dL      Creatinine 1.21 mg/dL      Sodium 142 mmol/L      Potassium 2.3 mmol/L      Chloride 90 mmol/L      CO2 34.0 mmol/L      Calcium 6.4 mg/dL      Total Protein 7.0 g/dL      Albumin 3.70 g/dL      ALT (SGPT) 11 U/L      AST (SGOT) 16 U/L      Alkaline Phosphatase 51 U/L      Total Bilirubin 0.6 mg/dL      eGFR Non  Amer 45 " mL/min/1.73      Globulin 3.3 gm/dL      A/G Ratio 1.1 g/dL      BUN/Creatinine Ratio 21.5     Anion Gap 18.0 mmol/L     Narrative:       GFR Normal >60  Chronic Kidney Disease <60  Kidney Failure <15      Protime-INR [[id number]]  (Abnormal) Collected:  05/03/20 1324    Specimen:  Blood Updated:  05/03/20 1350     Protime 16.2 Seconds      INR 1.34    aPTT [[id number]]  (Normal) Collected:  05/03/20 1324    Specimen:  Blood Updated:  05/03/20 1350     PTT 32.3 seconds     Lipase [[id number]]  (Normal) Collected:  05/03/20 1324    Specimen:  Blood Updated:  05/03/20 1357     Lipase 20 U/L     CBC Auto Differential [[id number]]  (Abnormal) Collected:  05/03/20 1324    Specimen:  Blood Updated:  05/03/20 1341     WBC 19.91 10*3/mm3      RBC 3.72 10*6/mm3      Hemoglobin 10.4 g/dL      Hematocrit 30.2 %      MCV 81.2 fL      MCH 28.0 pg      MCHC 34.4 g/dL      RDW 14.4 %      RDW-SD 42.1 fl      MPV 8.8 fL      Platelets 337 10*3/mm3      Neutrophil % 76.9 %      Lymphocyte % 7.1 %      Monocyte % 15.0 %      Eosinophil % 0.1 %      Basophil % 0.2 %      Immature Grans % 0.7 %      Neutrophils, Absolute 15.33 10*3/mm3      Lymphocytes, Absolute 1.41 10*3/mm3      Monocytes, Absolute 2.99 10*3/mm3      Eosinophils, Absolute 0.01 10*3/mm3      Basophils, Absolute 0.04 10*3/mm3      Immature Grans, Absolute 0.13 10*3/mm3      nRBC 0.0 /100 WBC     Uric Acid [[id number]]  (Abnormal) Collected:  05/03/20 1324    Specimen:  Blood Updated:  05/03/20 1520     Uric Acid 7.1 mg/dL     Magnesium [[id number]]  (Abnormal) Collected:  05/03/20 1324    Specimen:  Blood Updated:  05/03/20 1527     Magnesium 0.6 mg/dL     C-reactive Protein [[id number]] Collected:  05/03/20 1324    Specimen:  Blood Updated:  05/03/20 1542             IMAGING RESULTS  XR Chest 1 View   Final Result   1. Chronic medial left basilar opacities. Sequestration considered. No   acute radiographic cardiopulmonary process   This report was finalized on 05/03/2020  15:01 by Dr. Annmarie Ontiveros MD.      CT Abdomen Pelvis Without Contrast   Final Result   1. Small hiatal hernia. Apparent wall thickening of the stomach may be   due to underdistention. Gastritis a second possibility. No bowel   obstruction. No free air or abscess. Normal appendix.   2. Chronic changes of the left lung base, sequestration considered.   Bibasilar atelectasis.   This report was finalized on 05/03/2020 12:46 by Dr. Annmarie Ontiveros MD.                       ED Course  ED Course as of May 03 1543   Sun May 03, 2020   1414 Alerted to K+ of 2.3.  Orders written.  Her WBC count is elevated.  She states she recently took steroids for gout.    [KS]   6835 Spoke with Dr Torres regarding admission.  The chest xray and urinalysis are still pending. She will be admitted to Dr Bush's services.  ARNOLDO Mathews RN, has spoken with the patient's daughter regarding admission. She voiced understanding of admission.    [KS]      ED Course User Index  [KS] Barb Raymundo APRN                                           [facility]  Number of Diagnoses or Management Options  Hypokalemia: new and requires workup     Amount and/or Complexity of Data Reviewed  Clinical lab tests: ordered and reviewed  Tests in the radiology section of CPT®:  ordered and reviewed  Decide to obtain previous medical records or to obtain history from someone other than the patient: yes  Discuss the patient with other providers: yes    Risk of Complications, Morbidity, and/or Mortality  Presenting problems: low  Diagnostic procedures: low  Management options: low    Patient Progress  Patient progress: stable      Final diagnoses:   Hypokalemia            Barb Raymundo APRN  05/03/20 1543      Electronically signed by Rita Berumen MD at 05/03/20 1610         Facility-Administered Medications as of 5/4/2020   Medication Dose Route Frequency Provider Last Rate Last Dose   • atorvastatin (LIPITOR) tablet 10 mg  10 mg Oral Nightly  Saeid Walls MD   10 mg at 05/03/20 2058   • calcium carb-cholecalciferol 600-800 MG-UNIT tablet 1 tablet  1 tablet Oral Daily Saeid Walls MD   1 tablet at 05/04/20 0939   • [COMPLETED] calcium gluconate 1 g in sodium chloride 0.9 % 100 mL IVPB  1 g Intravenous Once Ole Noble MD   Stopped at 05/04/20 0031   • [COMPLETED] colchicine tablet 0.6 mg  0.6 mg Oral Q12H Saeid Walls MD   0.6 mg at 05/04/20 0910   • HYDROcodone-acetaminophen (NORCO) 5-325 MG per tablet 1 tablet  1 tablet Oral Q4H PRN Saeid Walls MD   1 tablet at 05/04/20 0542   • [COMPLETED] HYDROmorphone (DILAUDID) injection 1 mg  1 mg Intravenous Once Shoulders, Kristee Croft, APRN   1 mg at 05/03/20 1222   • [COMPLETED] HYDROmorphone (DILAUDID) injection 1 mg  1 mg Intravenous Once Shoulders, Kristee Croft, APRN   1 mg at 05/03/20 1447   • [COMPLETED] magnesium sulfate 2g/50 mL (PREMIX) infusion  2 g Intravenous Q1H Saeid Walls MD   Stopped at 05/03/20 1930   • multivitamin w/minerals tablet 1 tablet  1 tablet Oral Daily Saeid Walls MD       • [COMPLETED] ondansetron (ZOFRAN) injection 4 mg  4 mg Intravenous Once Shoulders, Kristee Croft, APRN   4 mg at 05/03/20 1222   • [COMPLETED] ondansetron (ZOFRAN) injection 4 mg  4 mg Intravenous Once Shoulders, Kristee Croft, APRN   4 mg at 05/03/20 1447   • ondansetron (ZOFRAN) injection 4 mg  4 mg Intravenous Q6H PRN Saeid Walls MD   4 mg at 05/03/20 2236   • pantoprazole (PROTONIX) EC tablet 40 mg  40 mg Oral Q AM Saeid Walls MD   40 mg at 05/04/20 0542   • PARoxetine (PAXIL) tablet 10 mg  10 mg Oral Daily Saeid Walls MD   10 mg at 05/04/20 0910   • potassium chloride (MICRO-K) CR capsule 40 mEq  40 mEq Oral PRN Ole Noble MD   40 mEq at 05/04/20 0541    Or   • potassium chloride (KLOR-CON) packet 40 mEq  40 mEq Oral PRN Ole Noble MD        Or   • potassium  chloride 10 mEq in 100 mL IVPB  10 mEq Intravenous Q1H PRN Ole Noble MD       • [COMPLETED] potassium chloride (MICRO-K) CR capsule 40 mEq  40 mEq Oral Once Saeid Walls MD   40 mEq at 05/03/20 1744   • [COMPLETED] potassium chloride 20 mEq in 100 mL IVPB  20 mEq Intravenous Once Shoulders, Kristee Croft, APRN 50 mL/hr at 05/03/20 1450 20 mEq at 05/03/20 1450   • [COMPLETED] sodium chloride 0.9 % bolus 1,000 mL  1,000 mL Intravenous Once Shoulders, Kristee Croft, APRN 2,000 mL/hr at 05/03/20 1222 1,000 mL at 05/03/20 1222   • sodium chloride 0.9 % flush 10 mL  10 mL Intravenous PRN Saeid Walls MD       • sodium chloride 0.9 % flush 10 mL  10 mL Intravenous Q12H Saeid Walls MD   10 mL at 05/04/20 0911   • sodium chloride 0.9 % flush 10 mL  10 mL Intravenous PRN Saeid Walls MD       • sodium chloride 0.9 % with KCl 20 mEq/L infusion  100 mL/hr Intravenous Continuous Saeid Walls  mL/hr at 05/04/20 0910 100 mL/hr at 05/04/20 0910       Lab Results (last 24 hours)     Procedure Component Value Units Date/Time    Potassium [[id number]]  (Normal) Collected:  05/04/20 1038    Specimen:  Blood Updated:  05/04/20 1053     Potassium 3.8 mmol/L     Phosphorus [[id number]]  (Abnormal) Collected:  05/04/20 0602    Specimen:  Blood Updated:  05/04/20 1044     Phosphorus 1.6 mg/dL     Comprehensive Metabolic Panel [[id number]]  (Abnormal) Collected:  05/04/20 0602    Specimen:  Blood Updated:  05/04/20 0635     Glucose 155 mg/dL      BUN 23 mg/dL      Creatinine 1.19 mg/dL      Sodium 142 mmol/L      Potassium 3.3 mmol/L      Chloride 100 mmol/L      CO2 29.0 mmol/L      Calcium 6.4 mg/dL      Total Protein 6.3 g/dL      Albumin 3.20 g/dL      ALT (SGPT) 8 U/L      AST (SGOT) 10 U/L      Alkaline Phosphatase 50 U/L      Total Bilirubin 0.5 mg/dL      eGFR Non African Amer 46 mL/min/1.73      Globulin 3.1 gm/dL      A/G Ratio 1.0 g/dL       BUN/Creatinine Ratio 19.3     Anion Gap 13.0 mmol/L     Narrative:       GFR Normal >60  Chronic Kidney Disease <60  Kidney Failure <15      Magnesium [[id number]]  (Normal) Collected:  05/04/20 0602    Specimen:  Blood Updated:  05/04/20 0634     Magnesium 1.6 mg/dL     CBC & Differential [[id number]] Collected:  05/04/20 0602    Specimen:  Blood Updated:  05/04/20 0614    Narrative:       The following orders were created for panel order CBC & Differential.  Procedure                               Abnormality         Status                     ---------                               -----------         ------                     CBC Auto Differential[[id number]]        Abnormal            Final result                 Please view results for these tests on the individual orders.    CBC Auto Differential [[id number]]  (Abnormal) Collected:  05/04/20 0602    Specimen:  Blood Updated:  05/04/20 0614     WBC 15.16 10*3/mm3      RBC 3.44 10*6/mm3      Hemoglobin 9.5 g/dL      Hematocrit 28.4 %      MCV 82.6 fL      MCH 27.6 pg      MCHC 33.5 g/dL      RDW 14.8 %      RDW-SD 44.0 fl      MPV 9.4 fL      Platelets 313 10*3/mm3      Neutrophil % 73.6 %      Lymphocyte % 8.3 %      Monocyte % 17.0 %      Eosinophil % 0.1 %      Basophil % 0.3 %      Immature Grans % 0.7 %      Neutrophils, Absolute 11.17 10*3/mm3      Lymphocytes, Absolute 1.26 10*3/mm3      Monocytes, Absolute 2.57 10*3/mm3      Eosinophils, Absolute 0.02 10*3/mm3      Basophils, Absolute 0.04 10*3/mm3      Immature Grans, Absolute 0.10 10*3/mm3      nRBC 0.0 /100 WBC     Magnesium [[id number]]  (Normal) Collected:  05/03/20 1944    Specimen:  Blood Updated:  05/03/20 2034     Magnesium 1.9 mg/dL     Basic Metabolic Panel [[id number]]  (Abnormal) Collected:  05/03/20 1944    Specimen:  Blood Updated:  05/03/20 2033     Glucose 152 mg/dL      BUN 24 mg/dL      Creatinine 1.21 mg/dL      Sodium 141 mmol/L      Potassium 2.5 mmol/L      Chloride 93 mmol/L      CO2  31.0 mmol/L      Calcium 6.0 mg/dL      eGFR Non African Amer 45 mL/min/1.73      BUN/Creatinine Ratio 19.8     Anion Gap 17.0 mmol/L     Narrative:       GFR Normal >60  Chronic Kidney Disease <60  Kidney Failure <15      Urinalysis With Culture If Indicated - Urine, Clean Catch [[id number]]  (Abnormal) Collected:  05/03/20 2007    Specimen:  Urine, Clean Catch Updated:  05/03/20 2017     Color, UA Yellow     Appearance, UA Clear     pH, UA 5.5     Specific Gravity, UA 1.015     Glucose, UA Negative     Ketones, UA 15 mg/dL (1+)     Bilirubin, UA Negative     Blood, UA Negative     Protein, UA Trace     Leuk Esterase, UA Negative     Nitrite, UA Negative     Urobilinogen, UA 0.2 E.U./dL    Narrative:       Urine microscopic not indicated.    Calcium, Ionized [[id number]]  (Abnormal) Collected:  05/03/20 1941    Specimen:  Blood Updated:  05/03/20 1955     Ionized Calcium 2.89 mg/dL      Comment: 85 Value below critical limit        Notified [facility] [id number]     Notified By [id number]     Notified Time 05/03/2020 19:57     Collected by [id number]     Comment: Meter: [id number]     :  [id number]       C-reactive Protein [[id number]]  (Abnormal) Collected:  05/03/20 1324    Specimen:  Blood Updated:  05/03/20 1628     C-Reactive Protein 18.09 mg/dL     Magnesium [[id number]]  (Abnormal) Collected:  05/03/20 1324    Specimen:  Blood Updated:  05/03/20 1527     Magnesium 0.6 mg/dL     Uric Acid [[id number]]  (Abnormal) Collected:  05/03/20 1324    Specimen:  Blood Updated:  05/03/20 1520     Uric Acid 7.1 mg/dL     [place] Draw [[id number]] Collected:  05/03/20 1324    Specimen:  Blood Updated:  05/03/20 1430    Narrative:       The following orders were created for panel order [place] Draw.  Procedure                               Abnormality         Status                     ---------                               -----------         ------                     Light Blue Top[[id number]]                                   Final  result               Green Top (Gel)[[id number]]                                  Final result               Lavender Top[[id number]]                                     Final result               Red Top[[id number]]                                          Final result               [place] Blood Culture [place]chie...[[id number]]                      Final result               Gray Top - Ice[[id number]]                                   Final result                 Please view results for these tests on the individual orders.    Light Blue Top [[id number]] Collected:  05/03/20 1324    Specimen:  Blood Updated:  05/03/20 1430     Extra Tube hold for add-on     Comment: Auto resulted       Green Top (Gel) [[id number]] Collected:  05/03/20 1324    Specimen:  Blood Updated:  05/03/20 1430     Extra Tube Hold for add-ons.     Comment: Auto resulted.       [place]ender Top [[id number]] Collected:  05/03/20 1324    Specimen:  Blood Updated:  05/03/20 1430     Extra Tube hold for add-on     Comment: Auto resulted       Red Top [[id number]] Collected:  05/03/20 1324    Specimen:  Blood Updated:  05/03/20 1430     Extra Tube Hold for add-ons.     Comment: Auto resulted.       [place] Blood Culture Bottle Set [[id number]] Collected:  05/03/20 1324    Specimen:  Blood Updated:  05/03/20 1430     Extra Tube Hold for add-ons.     Comment: Auto resulted.       Comprehensive Metabolic Panel [[id number]]  (Abnormal) Collected:  05/03/20 1324    Specimen:  Blood Updated:  05/03/20 1359     Glucose 129 mg/dL      BUN 26 mg/dL      Creatinine 1.21 mg/dL      Sodium 142 mmol/L      Potassium 2.3 mmol/L      Chloride 90 mmol/L      CO2 34.0 mmol/L      Calcium 6.4 mg/dL      Total Protein 7.0 g/dL      Albumin 3.70 g/dL      ALT (SGPT) 11 U/L      AST (SGOT) 16 U/L      Alkaline Phosphatase 51 U/L      Total Bilirubin 0.6 mg/dL      eGFR Non African Amer 45 mL/min/1.73      Globulin 3.3 gm/dL      A/G Ratio 1.1 g/dL      BUN/Creatinine Ratio 21.5     Anion  Gap 18.0 mmol/L     Narrative:       GFR Normal >60  Chronic Kidney Disease <60  Kidney Failure <15      Lipase [[id number]]  (Normal) Collected:  05/03/20 1324    Specimen:  Blood Updated:  05/03/20 1357     Lipase 20 U/L     Protime-INR [[id number]]  (Abnormal) Collected:  05/03/20 1324    Specimen:  Blood Updated:  05/03/20 1350     Protime 16.2 Seconds      INR 1.34    aPTT [[id number]]  (Normal) Collected:  05/03/20 1324    Specimen:  Blood Updated:  05/03/20 1350     PTT 32.3 seconds     Gray Top - Ice [[id number]] Collected:  05/03/20 1324    Specimen:  Blood Updated:  05/03/20 1348     Extra Tube Hold    CBC & Differential [[id number]] Collected:  05/03/20 1324    Specimen:  Blood Updated:  05/03/20 1341    Narrative:       The following orders were created for panel order CBC & Differential.  Procedure                               Abnormality         Status                     ---------                               -----------         ------                     CBC Auto Differential[[id number]]        Abnormal            Final result                 Please view results for these tests on the individual orders.    CBC Auto Differential [[id number]]  (Abnormal) Collected:  05/03/20 1324    Specimen:  Blood Updated:  05/03/20 1341     WBC 19.91 10*3/mm3      RBC 3.72 10*6/mm3      Hemoglobin 10.4 g/dL      Hematocrit 30.2 %      MCV 81.2 fL      MCH 28.0 pg      MCHC 34.4 g/dL      RDW 14.4 %      RDW-SD 42.1 fl      MPV 8.8 fL      Platelets 337 10*3/mm3      Neutrophil % 76.9 %      Lymphocyte % 7.1 %      Monocyte % 15.0 %      Eosinophil % 0.1 %      Basophil % 0.2 %      Immature Grans % 0.7 %      Neutrophils, Absolute 15.33 10*3/mm3      Lymphocytes, Absolute 1.41 10*3/mm3      Monocytes, Absolute 2.99 10*3/mm3      Eosinophils, Absolute 0.01 10*3/mm3      Basophils, Absolute 0.04 10*3/mm3      Immature Grans, Absolute 0.13 10*3/mm3      nRBC 0.0 /100 WBC           Orders (last 48 hrs)      Start     Ordered     05/05/20 0600  Basic Metabolic Panel  Morning Draw      05/04/20 1024    05/05/20 0600  Magnesium  Morning Draw      05/04/20 1024    05/05/20 0600  Uric Acid  Morning Draw      05/04/20 1024    05/04/20 1300  multivitamin w/minerals tablet 1 tablet  Daily      05/03/20 1636    05/04/20 1025  Phosphorus  Once      05/04/20 1024    05/04/20 0956  Potassium  STAT      05/04/20 0955    05/04/20 0900  calcium carb-cholecalciferol 600-800 MG-UNIT tablet 1 tablet  Daily      05/03/20 1636    05/04/20 0900  PARoxetine (PAXIL) tablet 10 mg  Daily      05/03/20 1636    05/04/20 0700  US Abdomen Limited  1 Time Imaging     Comments:  Right Upper Quad    05/03/20 1738    05/04/20 0600  pantoprazole (PROTONIX) EC tablet 40 mg  Every Early Morning      05/03/20 1636    05/04/20 0600  CBC & Differential  Morning Draw      05/03/20 1636    05/04/20 0600  Comprehensive Metabolic Panel  Morning Draw      05/03/20 1636    05/04/20 0600  Magnesium  Morning Draw      05/03/20 1636    05/04/20 0600  CBC Auto Differential  PROCEDURE ONCE      05/04/20 0002    05/03/20 2130  calcium gluconate 1 g in sodium chloride 0.9 % 100 mL IVPB  Once      05/03/20 2032    05/03/20 2100  atorvastatin (LIPITOR) tablet 10 mg  Nightly      05/03/20 1636    05/03/20 2100  sodium chloride 0.9 % flush 10 mL  Every 12 Hours Scheduled      05/03/20 1636    05/03/20 2100  colchicine tablet 0.6 mg  Every 12 Hours Scheduled      05/03/20 1711    05/03/20 2039  Patient Currently On Electrolyte Replacement Protocol - Please Refer to MAR for Protocol Details  Misc Nursing Order (Specify)  Daily     Comments:  Patient Currently On Electrolyte Replacement Protocol - Please Refer to MAR for Protocol Details    05/03/20 2038 05/03/20 2037  potassium chloride (MICRO-K) CR capsule 40 mEq  As Needed      05/03/20 2038 05/03/20 2037  potassium chloride (KLOR-CON) packet 40 mEq  As Needed      05/03/20 2038 05/03/20 2037  potassium chloride 10 mEq in 100 mL IVPB   Every 1 Hour PRN      05/03/20 2038 05/03/20 2032  Magnesium  STAT,   Status:  Canceled      05/03/20 2031 05/03/20 2032  Basic Metabolic Panel  STAT,   Status:  Canceled      05/03/20 2031 05/03/20 2000  Vital Signs  Every 4 Hours      05/03/20 1636 05/03/20 2000  Basic Metabolic Panel  Once      05/03/20 1708    05/03/20 2000  Magnesium  Once      05/03/20 1708 05/1956  Calcium, Ionized  Once      05/03/20 1941 05/03/20 1800  potassium chloride (MICRO-K) CR capsule 40 mEq  Once      05/03/20 1702 05/03/20 1730  sodium chloride 0.9 % with KCl 20 mEq/L infusion  Continuous      05/03/20 1636 05/03/20 1704  Can you follow through with RT my requested ionized calcium.  Thanks  Nursing Communication  Continuous     Comments:  Can you follow through with RT my requested ionized calcium.  Thanks    05/03/20 1708 05/03/20 1637  Intake & Output  Every Shift      05/03/20 1636    05/03/20 1637  Weigh Patient  Once      05/03/20 1636    05/03/20 1637  Oxygen Therapy- Nasal Cannula; Titrate for SPO2: 90% - 95%  Continuous      05/03/20 1636    05/03/20 1637  Insert Peripheral IV  Once      05/03/20 1636    05/03/20 1637  Saline Lock & Maintain IV Access  Continuous      05/03/20 1636    05/03/20 1637  Place Sequential Compression Device  Once      05/03/20 1636 05/03/20 1637  Maintain Sequential Compression Device  Continuous      05/03/20 1636 05/03/20 1637  Diet Clear Liquid  Diet Effective Now      05/03/20 1636    05/03/20 1636  sodium chloride 0.9 % flush 10 mL  As Needed      05/03/20 1636    05/03/20 1636  ondansetron (ZOFRAN) injection 4 mg  Every 6 Hours PRN      05/03/20 1636    05/03/20 1636  HYDROcodone-acetaminophen (NORCO) 5-325 MG per tablet 1 tablet  Every 4 Hours PRN      05/03/20 1636    05/03/20 1600  magnesium sulfate 2g/50 mL (PREMIX) infusion  Every 1 Hour      05/03/20 1546    05/03/20 1545  Calcium, Ionized  Once      05/03/20 1544    05/03/20 1541  Code Status  and Medical Interventions:  Continuous      05/03/20 1543    05/03/20 1541  Inpatient Admission  Once      05/03/20 1543    05/03/20 1540  C-reactive Protein  Once      05/03/20 1539    05/03/20 1540  Inpatient Admission  Once      05/03/20 1541    05/03/20 1538  magnesium sulfate in D5W 1g/100mL (PREMIX)  Every 1 Hour,   Status:  Discontinued      05/03/20 1536    05/03/20 1507  Magnesium  STAT      05/03/20 1506    05/03/20 1501  Uric Acid  STAT      05/03/20 1500    05/03/20 1426  HYDROmorphone (DILAUDID) injection 1 mg  Once      05/03/20 1424    05/03/20 1426  ondansetron (ZOFRAN) injection 4 mg  Once      05/03/20 1424    05/03/20 1426  XR Chest 1 View  1 Time Imaging      05/03/20 1426    05/03/20 1418  potassium chloride 20 mEq in 100 mL IVPB  Once      05/03/20 1416    05/03/20 1343  Urinalysis With Culture If Indicated - Urine, Clean Catch  Once      05/03/20 1342    05/03/20 1210  sodium chloride 0.9 % bolus 1,000 mL  Once      05/03/20 1208    05/03/20 1210  HYDROmorphone (DILAUDID) injection 1 mg  Once      05/03/20 1208    05/03/20 1210  ondansetron (ZOFRAN) injection 4 mg  Once      05/03/20 1208    05/03/20 1208  CT Abdomen Pelvis Without Contrast  1 Time Imaging     Comments:  NON-CONTRASTED STUDY      05/03/20 1208    05/03/20 1207  CBC & Differential  Once      05/03/20 1208    05/03/20 1207  Comprehensive Metabolic Panel  Once      05/03/20 1208    05/03/20 1207  Protime-INR  Once      05/03/20 1208    05/03/20 1207  aPTT  Once      05/03/20 1208    05/03/20 1207  Lipase  Once      05/03/20 1208    05/03/20 1207  ECG 12 Lead  Once      05/03/20 1208    05/03/20 1207  Insert peripheral IV  Once      05/03/20 1208    05/03/20 1207  CBC Auto Differential  PROCEDURE ONCE      05/03/20 1208    05/03/20 1206  sodium chloride 0.9 % flush 10 mL  As Needed      05/03/20 1208    05/03/20 1045  [place] Draw  Once      05/03/20 1045    05/03/20 1045  Light Blue Top  PROCEDURE ONCE      05/03/20 1045     05/03/20 1045  Green Top (Gel)  PROCEDURE ONCE      05/03/20 1045    05/03/20 1045  Lavender Top  PROCEDURE ONCE      05/03/20 1045    05/03/20 1045  Red Top  PROCEDURE ONCE      05/03/20 1045    05/03/20 1045  [place] Blood Culture Bottle Set  PROCEDURE ONCE      05/03/20 1045    05/03/20 1045  Gray Top - Ice  PROCEDURE ONCE      05/03/20 1045    Unscheduled  Up With Assistance  As Needed      05/03/20 1636    --  SCANNED - TELEMETRY        05/03/20 0000

## 2020-05-04 NOTE — PLAN OF CARE
Problem: Patient Care Overview  Goal: Plan of Care Review  Outcome: Ongoing (interventions implemented as appropriate)  Flowsheets (Taken 5/4/2020 0416)  Progress: improving  Plan of Care Reviewed With: patient  Note:   Less nausea after Zofran IV given.  Colchicine started for gout of left side.  Pain with movement of right side.  Relief with prn pain medication.  No falls noted.  Calls for assistance.  Decreased appetite.  Continue to monitor.   Goal: Individualization and Mutuality  Outcome: Ongoing (interventions implemented as appropriate)  Flowsheets (Taken 5/4/2020 0416)  Patient Specific Goals (Include Timeframe): stop nausea  Patient Specific Interventions: IV abx and fluids  Patient Specific Preferences: door open  Goal: Discharge Needs Assessment  Outcome: Ongoing (interventions implemented as appropriate)  Goal: Interprofessional Rounds/Family Conf  Outcome: Ongoing (interventions implemented as appropriate)     Problem: Pain, Chronic (Adult)  Goal: Identify Related Risk Factors and Signs and Symptoms  Outcome: Ongoing (interventions implemented as appropriate)  Goal: Acceptable Pain/Comfort Level and Functional Ability  Outcome: Ongoing (interventions implemented as appropriate)     Problem: Nausea/Vomiting (Adult)  Goal: Identify Related Risk Factors and Signs and Symptoms  Outcome: Ongoing (interventions implemented as appropriate)  Goal: Symptom Relief  Outcome: Ongoing (interventions implemented as appropriate)  Goal: Adequate Hydration  Outcome: Ongoing (interventions implemented as appropriate)

## 2020-05-04 NOTE — PLAN OF CARE
Problem: Patient Care Overview  Goal: Plan of Care Review  Outcome: Ongoing (interventions implemented as appropriate)  Flowsheets (Taken 5/4/2020 5241)  Progress: no change  Plan of Care Reviewed With: patient  Outcome Summary: A&Ox4. C/o pain. Prn pain meds given with some relief. Reports n/t in bilat hands, states this is her baseline. PPP. LUE very weak. 2+ edema noted. LLE with 1+ edema and slight weakness. IVF maintained.  Safety maintianed. Will continue to monitor.

## 2020-05-04 NOTE — PROGRESS NOTES
[facility] Medicine Services  INPATIENT PROGRESS NOTE    Patient Name: Sammi Cordero  Date of Admission: 5/3/2020  Today's Date: 05/04/20  Length of Stay: 1  Primary Care Physician: Avery Whaley MD    Subjective   Chief Complaint: Follow-up nausea and vomiting with electrolyte abnormalities    HPI   Patient seen and examined.  Says she is feeling better today.  Not nauseous currently.  Has not been throwing up.  Denies nominal pain.  No chest pain or shortness of breath.  She has significant left shoulder pain at this time.  Some elbow pain.      Review of Systems   All pertinent negatives and positives are as above. All other systems have been reviewed and are negative unless otherwise stated.     Objective    Temp:  [98.1 °F (36.7 °C)-99.5 °F (37.5 °C)] 98.1 °F (36.7 °C)  Heart Rate:  [58-85] 72  Resp:  [14-20] 16  BP: (101-146)/(39-83) 101/52  Physical Exam  GEN: Awake, alert, interactive, in NAD  HEENT: Atraumatic, PERRLA, EOMI, Anicteric, Trachea midline  Lungs: CTAB, no wheezing/rales/rhonchi  Heart: RRR, +S1/s2, no rub  ABD: soft, nt/nd, +BS, no guarding/rebound  Extremities: No obvious erythema or swelling of her left shoulder.  No warmth on palpation.  She has limited range of motion due to pain.  No swelling of the distal extremity.  Hands are warm.  Neuro: AAOx3, no focal deficits  Psych: normal mood & affect        Results Review:  I have reviewed the labs, radiology results, and diagnostic studies.    Laboratory Data:   Results from last 7 days   Lab Units 05/04/20  0602 05/03/20  1324 04/29/20  0526   WBC 10*3/mm3 15.16* 19.91* 9.80   HEMOGLOBIN g/dL 9.5* 10.4* 9.1*   HEMATOCRIT % 28.4* 30.2* 27.2*   PLATELETS 10*3/mm3 313 337 411        Results from last 7 days   Lab Units 05/04/20  0602 05/03/20  1944 05/03/20  1324 04/29/20  0526   SODIUM mmol/L 142 141 142 142   POTASSIUM mmol/L 3.3* 2.5* 2.3* 3.5   CHLORIDE mmol/L 100 93* 90* 104   CO2 mmol/L 29.0  31.0* 34.0* 24.0   BUN mg/dL 23 24* 26* 44*   CREATININE mg/dL 1.19* 1.21* 1.21* 2.23*   CALCIUM mg/dL 6.4* 6.0* 6.4* 7.6*   BILIRUBIN mg/dL 0.5  --  0.6 <0.2*   ALK PHOS U/L 50  --  51 49   ALT (SGPT) U/L 8  --  11 10   AST (SGOT) U/L 10  --  16 21   GLUCOSE mg/dL 155* 152* 129* 234*       Culture Data:   No results found for: BLOODCX, URINECX, WOUNDCX, MRSACX, RESPCX, STOOLCX    Radiology Data:   Imaging Results (Last 24 Hours)     Procedure Component Value Units Date/Time    XR Chest 1 View [[id number]] Collected:  05/03/20 1459     Updated:  05/03/20 1504    Narrative:       HISTORY: Cough     CXR: Frontal view the chest obtained.     COMPARISON: 04/24/2020.     FINDINGS: There are chronic medial left basilar opacities along the  cardiophrenic sulcus. Findings may represent sequestration this finding  present on studies dating back to 10/11/2019. No new consolidation.  Normal cardiomediastinal contours. No pleural effusion or pneumothorax.  No acute bony pathology.       Impression:       1. Chronic medial left basilar opacities. Sequestration considered. No  acute radiographic cardiopulmonary process  This report was finalized on 05/03/2020 15:01 by Dr. Annmarie Ontiveros MD.    CT Abdomen Pelvis Without Contrast [[id number]] Collected:  05/03/20 1241     Updated:  05/03/20 1249    Narrative:       CT ABDOMEN PELVIS WO CONTRAST- 5/3/2020 12:29 PM CDT     HISTORY: Nausea, vomiting, diarrhea      COMPARISON: 04/23/2020     DOSE LENGTH PRODUCT: 528 mGy cm. Automated exposure control was also  utilized to decrease patient radiation dose.     TECHNIQUE: Axial images the and pelvis are obtained without IV contrast.     FINDINGS:  There is a chronic 3.5 cm posterior medial left lower lobe  opacity with questionable air bronchograms. Sequestration considered.  Persistent present on studies dating back to 10/11/2019. Dependent  basilar atelectasis.     The nonenhanced liver, spleen, pancreas, gallbladder, and adrenal  glands  are unremarkable. No abnormal perinephric fluid collection. No  hydronephrosis. Lateral appears intact. Calcified phleboliths in the  right retroperitoneum. Bladder appears intact. Uterus unremarkable. No  adnexal mass.     Mild sigmoid colonic diverticulosis with no acute diverticulitis. No  evidence for bowel obstruction. Appendix remains normal. Small hiatal  hernia. Gastric wall thickening may be due to underdistention, however  gastritis considered. Mild to moderate vascular calcification with no  aneurysm.     Degenerative change of the regional skeleton with no focal destructive  osseous lesions.       Impression:       1. Small hiatal hernia. Apparent wall thickening of the stomach may be  due to underdistention. Gastritis a second possibility. No bowel  obstruction. No free air or abscess. Normal appendix.  2. Chronic changes of the left lung base, sequestration considered.  Bibasilar atelectasis.  This report was finalized on 05/03/2020 12:46 by Dr. Annmarie Ontiveros MD.          I have reviewed the patient's current medications.     Assessment/Plan     Active Hospital Problems    Diagnosis   • **Hypokalemia   • Gout   • Esophagitis determined by endoscopy   • Benign essential HTN   • Leukocytosis   • Hypomagnesemia   • Hyperlipidemia       #1 hypokalemia -likely type factorial from wasting in the setting of her recent renal failure and secondary diuresis as well as nausea and vomiting.  Improving and up to 3.3.  Continue to replace today.  Magnesium improved.    #2 hypomagnesemia -again the same Gettleman's and GI losses.  Up to 1.6 today.  Will resume her home oral magnesium.    #3 hypophosphatemia -1.6.  reaced and recheck in the morning.    #4 nausea and vomiting -has been a chronic issue for her.  To recent endoscopy showing gastritis.  She been on PPIs other treatments.  Small right hernia.  Improved today.  Initial CT of her belly was benign.  LFTs normal.  Right upper quadrant ultrasound was  done this morning and pending.    #5 left shoulder pain -patient recently hospitalized and during that stay had acute gout flare mostly of the left elbow and wrist.  Was on steroid and got better.  Was not put on any maintenance meds at discharge.  Comes back in with uric acid of 7.1 and mostly shoulder pain.  No obvious deformity or swelling of the shoulder.  No warmth.  We will get an x-ray of her shoulder to rule out any bony pathology.  If normal will continue to treat for underlying gout with colchicine.  Now that her kidney function is improved she can also allopurinol once uric acid levels have improved.  If she persistently has pain and decreased ROM after improvement in her uric acid she would need further outpatient imaging to rule out tendon and ligament injury.      Discharge Planning: I expect the patient to be discharged to home in 1 to 2 days.    Mayito Membreno DO   05/04/20   10:26

## 2020-05-04 NOTE — PROGRESS NOTES
Discharge Planning Assessment  [facility]     Patient Name: Sammi Cordero  MRN: [id number]  Today's Date: 5/4/2020    Admit Date: 5/3/2020    Discharge Needs Assessment     Row Name 05/04/20 1032       Living Environment    Lives With  alone    Current Living Arrangements  home/apartment/condo    Primary Care Provided by  self    Provides Primary Care For  no one    Family Caregiver if Needed  none    Quality of Family Relationships  supportive;involved;helpful    Able to Return to Prior Arrangements  yes       Resource/Environmental Concerns    Resource/Environmental Concerns  none    Transportation Concerns  car, none       Transition Planning    Patient/Family Anticipates Transition to  home    Patient/Family Anticipated Services at Transition  none    Transportation Anticipated  family or friend will provide       Discharge Needs Assessment    Concerns to be Addressed  denies needs/concerns at this time    Equipment Currently Used at Home  none    Anticipated Changes Related to Illness  none    Equipment Needed After Discharge  none    Current Discharge Risk  lives alone        Discharge Plan     Row Name 05/04/20 1032       Plan    Plan  Home    Patient/Family in Agreement with Plan  yes    Plan Comments  Spoke with pt to assess for home needs. Pt lives at home alone and plans same. Pt says she is independent and denies home needs. Pt has RX coverage/PCP, will have a ride home at discharge. Will follow.         Destination      Coordination has not been started for this encounter.      Durable Medical Equipment      Coordination has not been started for this encounter.      Dialysis/Infusion      Coordination has not been started for this encounter.      Home Medical Care      Coordination has not been started for this encounter.      Therapy      Coordination has not been started for this encounter.      Community Resources      Coordination has not been started for this encounter.          Demographic  Summary    No documentation.       Functional Status    No documentation.       Psychosocial    No documentation.       Abuse/Neglect    No documentation.       Legal    No documentation.       Substance Abuse    No documentation.       Patient Forms    No documentation.           TJ Piedra

## 2020-05-05 LAB
ANION GAP SERPL CALCULATED.3IONS-SCNC: 14 MMOL/L (ref 5–15)
BUN BLD-MCNC: 18 MG/DL (ref 8–23)
BUN/CREAT SERPL: 18.2 (ref 7–25)
CALCIUM SPEC-SCNC: 7 MG/DL (ref 8.6–10.5)
CHLORIDE SERPL-SCNC: 98 MMOL/L (ref 98–107)
CO2 SERPL-SCNC: 28 MMOL/L (ref 22–29)
CREAT BLD-MCNC: 0.99 MG/DL (ref 0.57–1)
GFR SERPL CREATININE-BSD FRML MDRD: 56 ML/MIN/1.73
GLUCOSE BLD-MCNC: 117 MG/DL (ref 65–99)
MAGNESIUM SERPL-MCNC: 1.1 MG/DL (ref 1.6–2.4)
PHOSPHATE SERPL-MCNC: 1.8 MG/DL (ref 2.5–4.5)
POTASSIUM BLD-SCNC: 3.6 MMOL/L (ref 3.5–5.2)
SODIUM BLD-SCNC: 140 MMOL/L (ref 136–145)
URATE SERPL-MCNC: 4.2 MG/DL (ref 2.4–5.7)

## 2020-05-05 PROCEDURE — 84100 ASSAY OF PHOSPHORUS: CPT | Performed by: INTERNAL MEDICINE

## 2020-05-05 PROCEDURE — 83735 ASSAY OF MAGNESIUM: CPT | Performed by: INTERNAL MEDICINE

## 2020-05-05 PROCEDURE — 80048 BASIC METABOLIC PNL TOTAL CA: CPT | Performed by: INTERNAL MEDICINE

## 2020-05-05 PROCEDURE — 84550 ASSAY OF BLOOD/URIC ACID: CPT | Performed by: INTERNAL MEDICINE

## 2020-05-05 PROCEDURE — 25010000002 MAGNESIUM SULFATE IN D5W 1G/100ML (PREMIX) 1-5 GM/100ML-% SOLUTION: Performed by: INTERNAL MEDICINE

## 2020-05-05 PROCEDURE — 25810000003 SODIUM CHLORIDE 0.9 % WITH KCL 20 MEQ 20-0.9 MEQ/L-% SOLUTION: Performed by: INTERNAL MEDICINE

## 2020-05-05 RX ORDER — MAGNESIUM SULFATE 1 G/100ML
2 INJECTION INTRAVENOUS ONCE
Status: COMPLETED | OUTPATIENT
Start: 2020-05-05 | End: 2020-05-05

## 2020-05-05 RX ORDER — POTASSIUM CHLORIDE 750 MG/1
20 CAPSULE, EXTENDED RELEASE ORAL ONCE
Status: COMPLETED | OUTPATIENT
Start: 2020-05-05 | End: 2020-05-05

## 2020-05-05 RX ADMIN — PAROXETINE 10 MG: 10 TABLET, FILM COATED ORAL at 07:41

## 2020-05-05 RX ADMIN — POTASSIUM CHLORIDE AND SODIUM CHLORIDE 100 ML/HR: 900; 150 INJECTION, SOLUTION INTRAVENOUS at 23:10

## 2020-05-05 RX ADMIN — PANTOPRAZOLE SODIUM 40 MG: 40 TABLET, DELAYED RELEASE ORAL at 06:30

## 2020-05-05 RX ADMIN — MAGNESIUM GLUCONATE 500 MG ORAL TABLET 400 MG: 500 TABLET ORAL at 07:42

## 2020-05-05 RX ADMIN — MAGNESIUM GLUCONATE 500 MG ORAL TABLET 800 MG: 500 TABLET ORAL at 20:17

## 2020-05-05 RX ADMIN — HYDROCODONE BITARTRATE AND ACETAMINOPHEN 1 TABLET: 5; 325 TABLET ORAL at 22:00

## 2020-05-05 RX ADMIN — MAGNESIUM SULFATE IN DEXTROSE 2 G: 10 INJECTION, SOLUTION INTRAVENOUS at 09:58

## 2020-05-05 RX ADMIN — POTASSIUM CHLORIDE 20 MEQ: 750 CAPSULE, EXTENDED RELEASE ORAL at 09:30

## 2020-05-05 RX ADMIN — POTASSIUM CHLORIDE AND SODIUM CHLORIDE 100 ML/HR: 900; 150 INJECTION, SOLUTION INTRAVENOUS at 14:43

## 2020-05-05 RX ADMIN — ATORVASTATIN CALCIUM 10 MG: 10 TABLET, FILM COATED ORAL at 20:17

## 2020-05-05 RX ADMIN — POTASSIUM PHOSPHATE, MONOBASIC AND POTASSIUM PHOSPHATE, DIBASIC 20 MMOL: 224; 236 INJECTION, SOLUTION INTRAVENOUS at 11:17

## 2020-05-05 RX ADMIN — HYDROCODONE BITARTRATE AND ACETAMINOPHEN 1 TABLET: 5; 325 TABLET ORAL at 17:54

## 2020-05-05 RX ADMIN — SODIUM CHLORIDE, PRESERVATIVE FREE 10 ML: 5 INJECTION INTRAVENOUS at 20:17

## 2020-05-05 RX ADMIN — HYDROCODONE BITARTRATE AND ACETAMINOPHEN 1 TABLET: 5; 325 TABLET ORAL at 09:30

## 2020-05-05 RX ADMIN — Medication 1 TABLET: at 07:41

## 2020-05-05 RX ADMIN — Medication 1 TABLET: at 12:31

## 2020-05-05 NOTE — PAYOR COMM NOTE
"RECONSIDERATION    REF: [id number]    [facility]  KELY,   [phone]  OR   FAX   [phone]    Sammi Quinones (65 y.o. Female)     Date of Birth Social Security Number Address Home Phone MRN    1955  [address] [place] [id number] [phone] [id number]    Quaker Marital Status          [employer]        Admission Date Admission Type Admitting Provider Attending Provider Department, Room/Bed    5/3/20 Emergency Mayito Membreno, Mayito Pedroza,  [facility] 4B, 447/1    Discharge Date Discharge Disposition Discharge Destination                       Attending Provider:  Mayito Membreno DO    Allergies:  Penicillins, Sulfa Antibiotics    Isolation:  None   Infection:  None   Code Status:  CPR    Ht:  154.9 cm (60.98\")   Wt:  75.7 kg (166 lb 12.8 oz)    Admission Cmt:  None   Principal Problem:  Hypokalemia [E87.6]                 Active Insurance as of 5/3/2020     Primary Coverage     Payor Plan Insurance Group Employer/Plan Group    Formerly [facility] BLUE [facility] [employer] EMPLOYEE [id number]     Payor Plan Address Payor Plan Phone Number Payor Plan Fax Number Effective Dates    [address] [phone]  1/1/2020 - None Entered    [place] [id number]       Subscriber Name Subscriber Birth Date Member ID       SAMMI QUINONES 1955 [id number]                 Emergency Contacts      (Rel.) Home Phone Work Phone Mobile Phone    Sade Lepe (Daughter) [phone] -- --    Quinones,Corey (Son) [phone] -- --              PATIENT MAGNESIUM LEVEL O 0.6MG/DL    15:27 Magnesium Resulted Abnormal Result   Collected: 5/3/2020 13:24   Last updated: 5/3/2020 15:27   Status: Final result   Magnesium: 0.6 mg/dLLow Critical  [Ref Range: 1.6 - 2.4]  Veda Swann     15:46:57 Orders Placed magnesium sulfate 2g/50 mL (PREMIX) infusion  Saeid Walls MD                 10:40 HPI HPI   Stated Reason for Visit: " recently discharged 4/29 for WILDA and dehydration. still unable to keep fluids/ food down since discharge. worsened weakness. denies fever, cough, soa  History Obtained From: patient; family   Precipitating Event (s): unknown Duration (Weeks): 1   Medications/Treatments Prior to Arrival: none     Negrita Davila RN     11:15 Cardiac (Adult) Cardiac (Adult)   Cardiac WDL: WDL except; rhythm Cardiac Rhythm: bradycardic    Donna Mathews,        PT USING PHENERGAN AND ZOFRAN AT HOME PER HISTORY     called for lab collection     Donna Mathews RN     12:22 Medication New Bag sodium chloride 0.9 % bolus 1,000 mL - Dose: 1,000 mL ; Rate: 2,000 mL/hr ; Route: Intravenous ; Line: Peripheral IV 05/03/20 1124 Right;Anterior Wrist ; Scheduled Time: 1210  Donna Mathews RN   12:22 Medication Given HYDROmorphone (DILAUDID) injection 1 mg - Dose: 1 mg ; Route: Intravenous ; Line: Peripheral IV 05/03/20 1124 Right;Anterior Wrist ; Scheduled Time: 1210  Donna Mathews RN   12:22 Medication Given ondansetron (ZOFRAN) injection 4 mg - Dose: 4 mg ; Route: Intravenous ; Line: Peripheral IV 05/03/20 1124 Right;Anterior Wrist ; Scheduled Time: 1210  Donna Mathews RN   12:22 Pain Medication Administered - Adult Given - HYDROmorphone (DILAUDID) injection 1 mg  Donna Mathews RN     12:42 Device Vitals Device Data   Heart Rate: 60 (Device Time: 12:42:30) SpO2: 84 %Abnormal  (Device Time: 12:42:30)    Donna Mathews RN   12:43:42 Respiratory Interventions (Adult) Respiratory Interventions   Additional Documentation: Oxygen Therapy (Group)    Oxygen Therapy   Flow (L/min): 2 Device (Oxygen Therapy): nasal cannula    Donna Mathews RN   12:44:06 Vital Signs Vital Signs   Heart Rate: 58 Resp: 18   BP: 117/49    Oxygen Therapy   SpO2: 99 % Pulse Oximetry Type: Continuous   Device (Oxygen Therapy): nasal cannula    Vitals Timer   Restart Vitals Timer: Yes     Donna Mathews,      13:59:23 Lab  Notifications Lab communication received   Notice type: Critical valueAbnormal     Critical value(s)   Critical value #1: KAbnormal  (2.3) Read back and verified: Patient name; Patient ID; Result(s)   Provider(s) notified: PA/NP Order(s) received: Yes    Donna Mathews RN   13:59:52 Lab Resulted (Final result) COMPREHENSIVE METABOLIC PANEL  Lab, Background User   14:14 Free Text Alerted to K+ of 2.3.  Orders written.  Her WBC count is elevated.  She states she recently took steroids for gout.  Shoulders, Kristee Croft, APRN   14:16:31 Orders Placed potassium chloride 20 mEq in 100 mL IVPB  Shoulders, Kristee Croft, APRN   14:24:49 Orders Placed HYDROmorphone (DILAUDID) injection 1 mg ; ondansetron (ZOFRAN) injection 4 mg  Shoulders, Kristee Croft, APRN     14:47 Medication Given HYDROmorphone (DILAUDID) injection 1 mg - Dose: 1 mg ; Route: Intravenous ; Line: Peripheral IV 05/03/20 1124 Right;Anterior Wrist ; Scheduled Time: 1426  Donna Mathews RN   14:47 Medication Given ondansetron (ZOFRAN) injection 4 mg - Dose: 4 mg ; Route: Intravenous ; Line: Peripheral IV 05/03/20 1124 Right;Anterior Wrist ; Scheduled Time: 1426  Donna Mathews RN   14:47 Pain Medication Administered - Adult Given - HYDROmorphone (DILAUDID) injection 1 mg  Donna Mathews RN     15:00:14 Orders Acknowledged New  - potassium chloride 20 mEq in 100 mL IVPB ; HYDROmorphone (DILAUDID) injection 1 mg ; ondansetron (ZOFRAN) injection 4 mg ; XR Chest 1 View  Donna Mathews RN       EKG_ SB AT 59 BPM WITH LEFT VENTRICUALAR HYPERTROPHY WITH REPLORAIATION ABNORMALITY.       History & Physical      Saeid Walls MD at 05/03/20 1512              [facility]  HISTORY AND PHYSICAL    Date of Admission: 5/3/2020  Primary Care Physician: Avery Whaley MD    Subjective     Chief Complaint: n/v     History of Present Illness  Discharge last Wednesday.  Able to eat  that discharge  Feels weak subsequently  States she kept burping when she got home.  She had taken Zofran and alternate with Phenergan.  She is on PPI. She had HIDA in Nov 2019 - normal  Had some nausea since October; prior hx of EGD - esophagitis; known hx of hiatal hernia  Had some vomiting    States temp of 99.  No cough  No sob  No diarrhea  No exposure recent sick exposure other than she was hospitalized early part of this week with WILDA.  Ended with renal biopsy.  She also felt she has gout flare again with left wrist elbow and shoulder hurting.     In Er, found with leukocytosis hypokalemia. She is alkalotic  Renal function is improve significantly,     Review of Systems     Otherwise complete ROS reviewed and negative except as mentioned in the HPI.    Past Medical History:   Past Medical History:   Diagnosis Date   • WILDA (acute kidney injury) (CMS/HCC)    • Allergic rhinitis    • Colon polyps    • Depression    • Difficulty swallowing solids    • DJD (degenerative joint disease)    • GERD (gastroesophageal reflux disease)    • Gitelman syndrome     low magnesium   • Gout    • Hyperlipidemia    • Obesity    • Osteopenia      Past Surgical History:  Past Surgical History:   Procedure Laterality Date   • AXILLARY LYMPH NODE BIOPSY/EXCISION Right 10/14/2019    Procedure: AXILLARY LYMPH NODE BIOPSY/EXCISION;  Surgeon: Karen Benitez MD;  Location: [facility] OR;  Service: General   • AXILLARY LYMPH NODE BIOPSY/EXCISION Left 11/26/2019    Procedure: AXILLARY LYMPH NODE BIOPSY/EXCISION;  Surgeon: Karen Benitez MD;  Location: [facility] OR;  Service: General   • COLONOSCOPY  08/20/2010   • COLONOSCOPY W/ POLYPECTOMY  09/14/2016    2   5 mm sessile polyps removed with hot snare repeat 5 years   • DILATATION AND CURETTAGE     • ENDOSCOPY  09/14/2016    Medium sized HH, LA Grade A esohagitis, Fluid in the middle third of esohagus    • ENDOSCOPY N/A 2/25/2020    Procedure: ESOPHAGOGASTRODUODENOSCOPY WITH ANESTHESIA;   Surgeon: Isaac Murrell MD;  Location: [facility] ENDOSCOPY;  Service: Gastroenterology;  Laterality: N/A;  pre: abdominal CT  post: gastric erosion; hiatal hernia  Avery Whaley MD   • ENDOSCOPY N/A 4/24/2020    Procedure: ESOPHAGOGASTRODUODENOSCOPY WITH ANESTHESIA;  Surgeon: Daniel Junior MD;  Location: [facility] ENDOSCOPY;  Service: Gastroenterology;  Laterality: N/A;  pre: nausea  post: esophagitis. hiatal hernia.    • JOINT REPLACEMENT Bilateral     knee   • GA TOTAL KNEE ARTHROPLASTY Left 3/9/2018    Procedure: LEFT TOTAL KNEE ARTHROPLASTY;  Surgeon: Orlin Meza MD;  Location: [facility] OR;  Service: Orthopedics   • REPLACEMENT TOTAL KNEE Right      Social History:  reports that she quit smoking about 17 years ago. Her smoking use included cigarettes. She smoked 0.50 packs per day. She has never used smokeless tobacco. She reports that she does not drink alcohol or use drugs.    Family History: family history includes Anorexia nervosa in her mother; Aortic aneurysm in her father; Lymphoma in her paternal grandfather; No Known Problems in her brother, daughter, daughter, maternal aunt, maternal grandmother, paternal aunt, paternal grandmother, sister, and son.       Allergies:  Allergies   Allergen Reactions   • Penicillins Swelling and Rash   • Sulfa Antibiotics Rash and Other (See Comments)     Temp. fluctuations      Medications:  Prior to Admission medications    Medication Sig Start Date End Date Taking? Authorizing Provider   Calcium Carb-Ergocalciferol (CHEWABLE CALCIUM/D PO) Take 1 tablet by mouth Daily.    Provider, MD Sid   Multiple Vitamins-Minerals (MULTIVITAMIN WITH MINERALS) tablet tablet Take 1 tablet by mouth Daily.    ProviderSid MD   ondansetron (ZOFRAN) 4 MG tablet Take 1 tablet by mouth four times a day as needed for nausea 4/22/20      pantoprazole (PROTONIX) 40 MG EC tablet Take 1 tablet by mouth Every Morning Before Breakfast. 4/30/20   Saeid Walls  "MD Sivan   PARoxetine (PAXIL) 10 MG tablet Take 10 mg by mouth Every Morning. 8/7/16   Provider, MD Sid   simvastatin (ZOCOR) 20 MG tablet Take 1 tablet by mouth once daily 4/1/20        Objective     Vital Signs: /53   Pulse 60   Temp 99 °F (37.2 °C) (Oral)   Resp 15   Ht 154.9 cm (61\")   Wt 75.3 kg (166 lb)   SpO2 100%   Breastfeeding No   BMI 31.37 kg/m²    Physical Exam   Constitutional: She appears well-developed.   HENT:   Head: Normocephalic and atraumatic.   Right Ear: External ear normal.   Left Ear: External ear normal.   Wearing mask   Eyes: Pupils are equal, round, and reactive to light. Conjunctivae and EOM are normal. Right eye exhibits no discharge. Left eye exhibits no discharge. No scleral icterus.   Neck: Normal range of motion. Neck supple. No JVD present. No tracheal deviation present. No thyromegaly present.   Cardiovascular: Normal rate, regular rhythm and normal heart sounds.   Pulmonary/Chest: Effort normal and breath sounds normal.   Abdominal: Soft. Bowel sounds are normal. She exhibits no distension. There is no tenderness. There is no guarding.   Musculoskeletal:   Tender and warm left wrist although thus not appear as swollen when first encountered early this past week   Skin: Skin is warm and dry. Capillary refill takes less than 2 seconds. No erythema.   Psychiatric: She has a normal mood and affect. Her behavior is normal. Thought content normal.   Vitals reviewed.      Results Reviewed:  Lab Results (last 24 hours)     Procedure Component Value Units Date/Time    Magnesium [[id number]] Collected:  05/03/20 1324    Specimen:  Blood Updated:  05/03/20 1508    Uric Acid [[id number]] Collected:  05/03/20 1324    Specimen:  Blood Updated:  05/03/20 1506    [place] Draw [[id number]] Collected:  05/03/20 1324    Specimen:  Blood Updated:  05/03/20 1430    Narrative:       The following orders were created for panel order [place] Draw.  Procedure                           "     Abnormality         Status                     ---------                               -----------         ------                     Light Blue Top[[id number]]                                   Final result               Green Top (Gel)[[id number]]                                  Final result               Lavender Top[[id number]]                                     Final result               Red Top[[id number]]                                          Final result               [place] Blood Culture Archie...[[id number]]                      Final result               Gray Top - Ice[[id number]]                                   Final result                 Please view results for these tests on the individual orders.    Light Blue Top [[id number]] Collected:  05/03/20 1324    Specimen:  Blood Updated:  05/03/20 1430     Extra Tube hold for add-on     Comment: Auto resulted       Green Top (Gel) [[id number]] Collected:  05/03/20 1324    Specimen:  Blood Updated:  05/03/20 1430     Extra Tube Hold for add-ons.     Comment: Auto resulted.       Lavender Top [[id number]] Collected:  05/03/20 1324    Specimen:  Blood Updated:  05/03/20 1430     Extra Tube hold for add-on     Comment: Auto resulted       Red Top [[id number]] Collected:  05/03/20 1324    Specimen:  Blood Updated:  05/03/20 1430     Extra Tube Hold for add-ons.     Comment: Auto resulted.       [place] Blood Culture Bottle Set [[id number]] Collected:  05/03/20 1324    Specimen:  Blood Updated:  05/03/20 1430     Extra Tube Hold for add-ons.     Comment: Auto resulted.       Comprehensive Metabolic Panel [[id number]]  (Abnormal) Collected:  05/03/20 1324    Specimen:  Blood Updated:  05/03/20 1359     Glucose 129 mg/dL      BUN 26 mg/dL      Creatinine 1.21 mg/dL      Sodium 142 mmol/L      Potassium 2.3 mmol/L      Chloride 90 mmol/L      CO2 34.0 mmol/L      Calcium 6.4 mg/dL      Total Protein 7.0 g/dL      Albumin 3.70 g/dL      ALT (SGPT) 11 U/L      AST (SGOT)  16 U/L      Alkaline Phosphatase 51 U/L      Total Bilirubin 0.6 mg/dL      eGFR Non African Amer 45 mL/min/1.73      Globulin 3.3 gm/dL      A/G Ratio 1.1 g/dL      BUN/Creatinine Ratio 21.5     Anion Gap 18.0 mmol/L     Narrative:       GFR Normal >60  Chronic Kidney Disease <60  Kidney Failure <15      Lipase [[id number]]  (Normal) Collected:  05/03/20 1324    Specimen:  Blood Updated:  05/03/20 1357     Lipase 20 U/L     Protime-INR [[id number]]  (Abnormal) Collected:  05/03/20 1324    Specimen:  Blood Updated:  05/03/20 1350     Protime 16.2 Seconds      INR 1.34    aPTT [[id number]]  (Normal) Collected:  05/03/20 1324    Specimen:  Blood Updated:  05/03/20 1350     PTT 32.3 seconds     Gray Top - Ice [[id number]] Collected:  05/03/20 1324    Specimen:  Blood Updated:  05/03/20 1348     Extra Tube Hold    CBC & Differential [[id number]] Collected:  05/03/20 1324    Specimen:  Blood Updated:  05/03/20 1341    Narrative:       The following orders were created for panel order CBC & Differential.  Procedure                               Abnormality         Status                     ---------                               -----------         ------                     CBC Auto Differential[[id number]]        Abnormal            Final result                 Please view results for these tests on the individual orders.    CBC Auto Differential [[id number]]  (Abnormal) Collected:  05/03/20 1324    Specimen:  Blood Updated:  05/03/20 1341     WBC 19.91 10*3/mm3      RBC 3.72 10*6/mm3      Hemoglobin 10.4 g/dL      Hematocrit 30.2 %      MCV 81.2 fL      MCH 28.0 pg      MCHC 34.4 g/dL      RDW 14.4 %      RDW-SD 42.1 fl      MPV 8.8 fL      Platelets 337 10*3/mm3      Neutrophil % 76.9 %      Lymphocyte % 7.1 %      Monocyte % 15.0 %      Eosinophil % 0.1 %      Basophil % 0.2 %      Immature Grans % 0.7 %      Neutrophils, Absolute 15.33 10*3/mm3      Lymphocytes, Absolute 1.41 10*3/mm3      Monocytes, Absolute 2.99  10*3/mm3      Eosinophils, Absolute 0.01 10*3/mm3      Basophils, Absolute 0.04 10*3/mm3      Immature Grans, Absolute 0.13 10*3/mm3      nRBC 0.0 /100 WBC         Imaging Results (Last 24 Hours)     Procedure Component Value Units Date/Time    XR Chest 1 View [[id number]] Collected:  05/03/20 1459     Updated:  05/03/20 1504    Narrative:       HISTORY: Cough     CXR: Frontal view the chest obtained.     COMPARISON: 04/24/2020.     FINDINGS: There are chronic medial left basilar opacities along the  cardiophrenic sulcus. Findings may represent sequestration this finding  present on studies dating back to 10/11/2019. No new consolidation.  Normal cardiomediastinal contours. No pleural effusion or pneumothorax.  No acute bony pathology.       Impression:       1. Chronic medial left basilar opacities. Sequestration considered. No  acute radiographic cardiopulmonary process  This report was finalized on 05/03/2020 15:01 by Dr. Annmarie Ontiveros MD.    CT Abdomen Pelvis Without Contrast [[id number]] Collected:  05/03/20 1241     Updated:  05/03/20 1249    Narrative:       CT ABDOMEN PELVIS WO CONTRAST- 5/3/2020 12:29 PM CDT     HISTORY: Nausea, vomiting, diarrhea      COMPARISON: 04/23/2020     DOSE LENGTH PRODUCT: 528 mGy cm. Automated exposure control was also  utilized to decrease patient radiation dose.     TECHNIQUE: Axial images the and pelvis are obtained without IV contrast.     FINDINGS:  There is a chronic 3.5 cm posterior medial left lower lobe  opacity with questionable air bronchograms. Sequestration considered.  Persistent present on studies dating back to 10/11/2019. Dependent  basilar atelectasis.     The nonenhanced liver, spleen, pancreas, gallbladder, and adrenal glands  are unremarkable. No abnormal perinephric fluid collection. No  hydronephrosis. Lateral appears intact. Calcified phleboliths in the  right retroperitoneum. Bladder appears intact. Uterus unremarkable. No  adnexal mass.     Mild sigmoid  colonic diverticulosis with no acute diverticulitis. No  evidence for bowel obstruction. Appendix remains normal. Small hiatal  hernia. Gastric wall thickening may be due to underdistention, however  gastritis considered. Mild to moderate vascular calcification with no  aneurysm.     Degenerative change of the regional skeleton with no focal destructive  osseous lesions.       Impression:       1. Small hiatal hernia. Apparent wall thickening of the stomach may be  due to underdistention. Gastritis a second possibility. No bowel  obstruction. No free air or abscess. Normal appendix.  2. Chronic changes of the left lung base, sequestration considered.  Bibasilar atelectasis.  This report was finalized on 05/03/2020 12:46 by Dr. Annmarie Ontiveros MD.        I have personally reviewed and interpreted the radiology studies and ECG obtained at time of admission.     Assessment / Plan     Assessment:   There are no active hospital problems to display for this patient.    Hypokalemia, metabolic alkalosis  Nausea and vomiting: hx of hiatal hernia, esophagitis, negative HIDA in Nov 2019.   WILDA - resolved - renal biopsy from last hospitalization  Hyperuricemia  Leukocytosis  Hx of Gitelmann syndrome    Plan:    IV fluid  replace potassium  Check magnesium - hypocalcemia/hypokalemia - noted < 1 - replaced total 4 gram then follow level; check ionized calcium - replace accordingly   check crp  Colchicine (renal function improve) - monitor for adverse effect  Supportive care  Other plan per orders    Code Status: full code     I discussed the patient's findings and my recommendations with the patient  Estimated length of stay  Tbd/ possibly 1-2 days    Patient seen and examined by me on  Saeid Walls MD   05/03/20   15:13            Electronically signed by Saeid Walls MD at 05/03/20 1546          Emergency Department Notes      Barb Raymundo APRN at 05/03/20 1136     Attestation signed by  Rita Berumen MD at 05/03/20 1610          For this patient encounter, I reviewed the NP or PA documentation, treatment plan, and medical decision making. Rita Berumen MD 5/3/2020 16:10                  Subjective   65 yof presents with c/o weakness, nausea and vomiting.  She states she was recently admitted for an WILDA and discharged 04/29/2020. She states she has had n/v since that time.  She states she also had a kidney biopsy while she was here.  She denies cough, fever or SOB.  She denies abdomen pain.  She states she 'just feels weak and bad.' She is also c/o left arm pain.  She states she has recently been treated for gout in that arm and she feels it has returned.           Review of Systems   Constitutional: Negative for activity change, appetite change, fatigue and fever.   HENT: Negative for congestion, ear pain, facial swelling and sore throat.    Eyes: Negative for discharge and visual disturbance.   Respiratory: Negative for apnea, chest tightness, shortness of breath, wheezing and stridor.    Cardiovascular: Negative for chest pain and palpitations.   Gastrointestinal: Negative for abdominal distention, abdominal pain, diarrhea, nausea and vomiting.   Genitourinary: Negative for difficulty urinating and dysuria.   Musculoskeletal: Negative for arthralgias and myalgias.   Skin: Negative for rash and wound.   Neurological: Negative for dizziness and seizures.   Psychiatric/Behavioral: Negative for agitation and confusion.       Past Medical History:   Diagnosis Date   • WILDA (acute kidney injury) (CMS/[facility])    • Allergic rhinitis    • Colon polyps    • Depression    • Difficulty swallowing solids    • DJD (degenerative joint disease)    • GERD (gastroesophageal reflux disease)    • Gitelman syndrome     low magnesium   • Gout    • Hyperlipidemia    • Obesity    • Osteopenia        Allergies   Allergen Reactions   • Penicillins Swelling and Rash   • Sulfa Antibiotics Rash and Other (See Comments)      Temp. fluctuations        Past Surgical History:   Procedure Laterality Date   • AXILLARY LYMPH NODE BIOPSY/EXCISION Right 10/14/2019    Procedure: AXILLARY LYMPH NODE BIOPSY/EXCISION;  Surgeon: Karen Benitez MD;  Location: [facility] OR;  Service: General   • AXILLARY LYMPH NODE BIOPSY/EXCISION Left 11/26/2019    Procedure: AXILLARY LYMPH NODE BIOPSY/EXCISION;  Surgeon: Karen Benitez MD;  Location: [facility] OR;  Service: General   • COLONOSCOPY  08/20/2010   • COLONOSCOPY W/ POLYPECTOMY  09/14/2016    2   5 mm sessile polyps removed with hot snare repeat 5 years   • DILATATION AND CURETTAGE     • ENDOSCOPY  09/14/2016    Medium sized HH, LA Grade A esohagitis, Fluid in the middle third of esohagus    • ENDOSCOPY N/A 2/25/2020    Procedure: ESOPHAGOGASTRODUODENOSCOPY WITH ANESTHESIA;  Surgeon: Isaac Murrell MD;  Location: [facility] ENDOSCOPY;  Service: Gastroenterology;  Laterality: N/A;  pre: abdominal CT  post: gastric erosion; hiatal hernia  Avery Whaley MD   • ENDOSCOPY N/A 4/24/2020    Procedure: ESOPHAGOGASTRODUODENOSCOPY WITH ANESTHESIA;  Surgeon: Daniel Junior MD;  Location: [facility] ENDOSCOPY;  Service: Gastroenterology;  Laterality: N/A;  pre: nausea  post: esophagitis. hiatal hernia.    • JOINT REPLACEMENT Bilateral     knee   • [place] TOTAL KNEE ARTHROPLASTY Left 3/9/2018    Procedure: LEFT TOTAL KNEE ARTHROPLASTY;  Surgeon: Orlin Meza MD;  Location: [facility] OR;  Service: Orthopedics   • REPLACEMENT TOTAL KNEE Right        Family History   Problem Relation Age of Onset   • Aortic aneurysm Father    • Anorexia nervosa Mother    • No Known Problems Brother    • No Known Problems Sister    • No Known Problems Son    • No Known Problems Daughter    • No Known Problems Daughter    • No Known Problems Maternal Grandmother    • No Known Problems Paternal Grandmother    • No Known Problems Maternal Aunt    • No Known Problems Paternal Aunt    • Lymphoma Paternal Grandfather    • Breast  cancer Neg Hx    • Ovarian cancer Neg Hx    • Colon cancer Neg Hx    • BRCA 1/2 Neg Hx    • Endometrial cancer Neg Hx    • Colon polyps Neg Hx        Social History     Socioeconomic History   • Marital status:      Spouse name: Not on file   • Number of children: Not on file   • Years of education: Not on file   • Highest education level: Not on file   Tobacco Use   • Smoking status: Former Smoker     Packs/day: 0.50     Types: Cigarettes     Last attempt to quit:      Years since quittin.3   • Smokeless tobacco: Never Used   Substance and Sexual Activity   • Alcohol use: No   • Drug use: No   • Sexual activity: Defer           Objective   Physical Exam   Constitutional: She is oriented to person, place, and time. She appears well-developed.   HENT:   Head: Normocephalic.   Eyes: Pupils are equal, round, and reactive to light. EOM are normal.   Neck: Normal range of motion. Neck supple.   Cardiovascular: Normal rate and regular rhythm.   No murmur heard.  Pulmonary/Chest: Effort normal and breath sounds normal.   Abdominal: Soft. Bowel sounds are normal. There is no tenderness.   Musculoskeletal: Normal range of motion.   Neurological: She is alert and oriented to person, place, and time.   Skin: Skin is warm and dry.   Psychiatric: She has a normal mood and affect.   Nursing note and vitals reviewed.      Procedures           Current Facility-Administered Medications:   •  magnesium sulfate in D5W 1g/100mL (PREMIX), 2 g, Intravenous, Q1H, Saeid Walls MD  •  potassium chloride 20 mEq in 100 mL IVPB, 20 mEq, Intravenous, Once, Shoulders, Kristee Croft, APRN, Last Rate: 50 mL/hr at 20 1450, 20 mEq at 20 1450  •  Insert peripheral IV, , , Once **AND** sodium chloride 0.9 % flush 10 mL, 10 mL, Intravenous, PRN, Shoulders, Kristee Croft, APRN    Current Outpatient Medications:   •  Calcium Carb-Ergocalciferol (CHEWABLE CALCIUM/D PO), Take 1 tablet by mouth Daily., Disp: , Rfl:   "  •  Multiple Vitamins-Minerals (MULTIVITAMIN WITH MINERALS) tablet tablet, Take 1 tablet by mouth Daily., Disp: , Rfl:   •  ondansetron (ZOFRAN) 4 MG tablet, Take 1 tablet by mouth four times a day as needed for nausea, Disp: 40 tablet, Rfl: 0  •  pantoprazole (PROTONIX) 40 MG EC tablet, Take 1 tablet by mouth Every Morning Before Breakfast., Disp: 30 tablet, Rfl: 0  •  PARoxetine (PAXIL) 10 MG tablet, Take 10 mg by mouth Every Morning., Disp: , Rfl: 5  •  simvastatin (ZOCOR) 20 MG tablet, Take 1 tablet by mouth once daily, Disp: 90 tablet, Rfl: 2    Vital signs:  /53   Pulse 60   Temp 99 °F (37.2 °C) (Oral)   Resp 15   Ht 154.9 cm (61\")   Wt 75.3 kg (166 lb)   SpO2 100%   Breastfeeding No   BMI 31.37 kg/m²         ED LAB RESULTS:   Lab Results (last 24 hours)     Procedure Component Value Units Date/Time    [place] Blood Culture Bottle Set [[id number]] Collected:  05/03/20 1324    Specimen:  Blood Updated:  05/03/20 1430     Extra Tube Hold for add-ons.     Comment: Auto resulted.       CBC & Differential [[id number]] Collected:  05/03/20 1324    Specimen:  Blood Updated:  05/03/20 1341    Narrative:       The following orders were created for panel order CBC & Differential.  Procedure                               Abnormality         Status                     ---------                               -----------         ------                     CBC Auto Differential[[id number]]        Abnormal            Final result                 Please view results for these tests on the individual orders.    Comprehensive Metabolic Panel [[id number]]  (Abnormal) Collected:  05/03/20 1324    Specimen:  Blood Updated:  05/03/20 1359     Glucose 129 mg/dL      BUN 26 mg/dL      Creatinine 1.21 mg/dL      Sodium 142 mmol/L      Potassium 2.3 mmol/L      Chloride 90 mmol/L      CO2 34.0 mmol/L      Calcium 6.4 mg/dL      Total Protein 7.0 g/dL      Albumin 3.70 g/dL      ALT (SGPT) 11 U/L      AST (SGOT) 16 U/L      " Alkaline Phosphatase 51 U/L      Total Bilirubin 0.6 mg/dL      eGFR Non African Amer 45 mL/min/1.73      Globulin 3.3 gm/dL      A/G Ratio 1.1 g/dL      BUN/Creatinine Ratio 21.5     Anion Gap 18.0 mmol/L     Narrative:       GFR Normal >60  Chronic Kidney Disease <60  Kidney Failure <15      Protime-INR [[id number]]  (Abnormal) Collected:  05/03/20 1324    Specimen:  Blood Updated:  05/03/20 1350     Protime 16.2 Seconds      INR 1.34    aPTT [[id number]]  (Normal) Collected:  05/03/20 1324    Specimen:  Blood Updated:  05/03/20 1350     PTT 32.3 seconds     Lipase [[id number]]  (Normal) Collected:  05/03/20 1324    Specimen:  Blood Updated:  05/03/20 1357     Lipase 20 U/L     CBC Auto Differential [[id number]]  (Abnormal) Collected:  05/03/20 1324    Specimen:  Blood Updated:  05/03/20 1341     WBC 19.91 10*3/mm3      RBC 3.72 10*6/mm3      Hemoglobin 10.4 g/dL      Hematocrit 30.2 %      MCV 81.2 fL      MCH 28.0 pg      MCHC 34.4 g/dL      RDW 14.4 %      RDW-SD 42.1 fl      MPV 8.8 fL      Platelets 337 10*3/mm3      Neutrophil % 76.9 %      Lymphocyte % 7.1 %      Monocyte % 15.0 %      Eosinophil % 0.1 %      Basophil % 0.2 %      Immature Grans % 0.7 %      Neutrophils, Absolute 15.33 10*3/mm3      Lymphocytes, Absolute 1.41 10*3/mm3      Monocytes, Absolute 2.99 10*3/mm3      Eosinophils, Absolute 0.01 10*3/mm3      Basophils, Absolute 0.04 10*3/mm3      Immature Grans, Absolute 0.13 10*3/mm3      nRBC 0.0 /100 WBC     Uric Acid [[id number]]  (Abnormal) Collected:  05/03/20 1324    Specimen:  Blood Updated:  05/03/20 1520     Uric Acid 7.1 mg/dL     Magnesium [[id number]]  (Abnormal) Collected:  05/03/20 1324    Specimen:  Blood Updated:  05/03/20 1527     Magnesium 0.6 mg/dL     C-reactive Protein [[id number]] Collected:  05/03/20 1324    Specimen:  Blood Updated:  05/03/20 5746             IMAGING RESULTS  XR Chest 1 View   Final Result   1. Chronic medial left basilar opacities. Sequestration considered. No      acute radiographic cardiopulmonary process   This report was finalized on 05/03/2020 15:01 by Dr. Annmarie Ontiveros MD.      CT Abdomen Pelvis Without Contrast   Final Result   1. Small hiatal hernia. Apparent wall thickening of the stomach may be   due to underdistention. Gastritis a second possibility. No bowel   obstruction. No free air or abscess. Normal appendix.   2. Chronic changes of the left lung base, sequestration considered.   Bibasilar atelectasis.   This report was finalized on 05/03/2020 12:46 by Dr. Annmarie Ontiveros MD.                       ED Course  ED Course as of May 03 1543   Sun May 03, 2020   1414 Alerted to K+ of 2.3.  Orders written.  Her WBC count is elevated.  She states she recently took steroids for gout.    [KS]   1543 Spoke with Dr Torres regarding admission.  The chest xray and urinalysis are still pending. She will be admitted to Dr Bush's services.  ARNOLDO Mathews RN, has spoken with the patient's daughter regarding admission. She voiced understanding of admission.    [KS]      ED Course User Index  [KS] Barb Raymundo APRN                                           [facility]  Number of Diagnoses or Management Options  Hypokalemia: new and requires workup     Amount and/or Complexity of Data Reviewed  Clinical lab tests: ordered and reviewed  Tests in the radiology section of CPT®:  ordered and reviewed  Decide to obtain previous medical records or to obtain history from someone other than the patient: yes  Discuss the patient with other providers: yes    Risk of Complications, Morbidity, and/or Mortality  Presenting problems: low  Diagnostic procedures: low  Management options: low    Patient Progress  Patient progress: stable      Final diagnoses:   Hypokalemia            Barb Raymundo APRN  05/03/20 1543      Electronically signed by Rita Berumen MD at 05/03/20 1610         Facility-Administered Medications as of 5/5/2020   Medication Dose Route Frequency  Provider Last Rate Last Dose   • atorvastatin (LIPITOR) tablet 10 mg  10 mg Oral Nightly Saeid Walls MD   10 mg at 05/04/20 2135   • calcium carb-cholecalciferol 600-800 MG-UNIT tablet 1 tablet  1 tablet Oral Daily Saeid Walls MD   1 tablet at 05/05/20 0741   • [COMPLETED] calcium gluconate 1 g in sodium chloride 0.9 % 100 mL IVPB  1 g Intravenous Once Ole Noble MD   Stopped at 05/04/20 0031   • [COMPLETED] colchicine tablet 0.6 mg  0.6 mg Oral Q12H Saeid Walls MD   0.6 mg at 05/04/20 0910   • HYDROcodone-acetaminophen (NORCO) 5-325 MG per tablet 1 tablet  1 tablet Oral Q4H PRN Saeid Walls MD   1 tablet at 05/05/20 0930   • [COMPLETED] HYDROmorphone (DILAUDID) injection 1 mg  1 mg Intravenous Once Shoulders, Kristee Croft, APRN   1 mg at 05/03/20 1222   • [COMPLETED] HYDROmorphone (DILAUDID) injection 1 mg  1 mg Intravenous Once Shoulders, Kristee Croft, APRN   1 mg at 05/03/20 1447   • magnesium oxide (MAGOX) tablet 400 mg  400 mg Oral BID Mayito Membreno DO   400 mg at 05/05/20 0742   • [COMPLETED] magnesium sulfate 2g/50 mL (PREMIX) infusion  2 g Intravenous Q1H Saeid Walls MD   Stopped at 05/03/20 1930   • [COMPLETED] magnesium sulfate in D5W 1g/100mL (PREMIX)  2 g Intravenous Once Mayito Membreno DO   2 g at 05/05/20 0958   • multivitamin w/minerals tablet 1 tablet  1 tablet Oral Daily Saeid Walls MD   1 tablet at 05/05/20 1231   • [COMPLETED] ondansetron (ZOFRAN) injection 4 mg  4 mg Intravenous Once Shoulders, Kristee Croft, APRN   4 mg at 05/03/20 1222   • [COMPLETED] ondansetron (ZOFRAN) injection 4 mg  4 mg Intravenous Once Shoulders, Kristee Croft, APRN   4 mg at 05/03/20 1447   • ondansetron (ZOFRAN) injection 4 mg  4 mg Intravenous Q6H PRN Saeid Walls MD   4 mg at 05/03/20 2236   • pantoprazole (PROTONIX) EC tablet 40 mg  40 mg Oral Q AM Saeid Walls MD   40 mg at 05/05/20 0630   •  PARoxetine (PAXIL) tablet 10 mg  10 mg Oral Daily Saeid Walls MD   10 mg at 05/05/20 0741   • [COMPLETED] potassium chloride (MICRO-K) CR capsule 20 mEq  20 mEq Oral Once Mayito Membreno, DO   20 mEq at 05/05/20 0930   • [COMPLETED] potassium chloride (MICRO-K) CR capsule 40 mEq  40 mEq Oral Once Saeid Walls MD   40 mEq at 05/03/20 1744   • [COMPLETED] potassium chloride 20 mEq in 100 mL IVPB  20 mEq Intravenous Once Shoulders, Maryee Agueda, APRN 50 mL/hr at 05/03/20 1450 20 mEq at 05/03/20 1450   • [COMPLETED] Potassium Phosphates 20 mmol in sodium chloride 0.9 % 500 mL infusion  20 mmol Intravenous Once Mayito Membreno, DO   20 mmol at 05/04/20 1549   • [COMPLETED] Potassium Phosphates 20 mmol in sodium chloride 0.9 % 500 mL infusion  20 mmol Intravenous Once Mayito Membreno, DO   20 mmol at 05/05/20 1117   • [COMPLETED] sodium chloride 0.9 % bolus 1,000 mL  1,000 mL Intravenous Once Shoulders, Barb Romeo, APRN 2,000 mL/hr at 05/03/20 1222 1,000 mL at 05/03/20 1222   • sodium chloride 0.9 % flush 10 mL  10 mL Intravenous PRN Saeid Walls MD       • sodium chloride 0.9 % flush 10 mL  10 mL Intravenous Q12H Saeid Walls MD   10 mL at 05/04/20 0911   • sodium chloride 0.9 % flush 10 mL  10 mL Intravenous PRN Saeid Walls MD       • sodium chloride 0.9 % with KCl 20 mEq/L infusion  100 mL/hr Intravenous Continuous Saeid Walls  mL/hr at 05/04/20 0910 100 mL/hr at 05/04/20 0910     Orders (last 7 days)      Start     Ordered    05/05/20 1000  potassium chloride (MICRO-K) CR capsule 20 mEq  Once      05/05/20 0912    05/05/20 1000  magnesium sulfate in D5W 1g/100mL (PREMIX)  Once      05/05/20 0912    05/05/20 1000  Potassium Phosphates 20 mmol in sodium chloride 0.9 % 500 mL infusion  Once      05/05/20 0912    05/05/20 0600  Basic Metabolic Panel  Morning Draw      05/04/20 1024    05/05/20 0600  Magnesium  Morning Draw       05/04/20 1024    05/05/20 0600  Uric Acid  Morning Draw      05/04/20 1024    05/05/20 0600  Phosphorus  Morning Draw      05/04/20 1445    05/04/20 2100  magnesium oxide (MAGOX) tablet 400 mg  2 Times Daily      05/04/20 1445    05/04/20 1833  Diet Regular  Diet Effective Now     Comments:  Avoid red meats and seafood. Gout diet.    05/04/20 1834    05/04/20 1600  Potassium Phosphates 20 mmol in sodium chloride 0.9 % 500 mL infusion  Once      05/04/20 1420    05/04/20 1427  XR Shoulder 2+ View Left  1 Time Imaging      05/04/20 1426    05/04/20 1300  multivitamin w/minerals tablet 1 tablet  Daily      05/03/20 1636    05/04/20 1025  Phosphorus  Once      05/04/20 1024    05/04/20 0956  Potassium  STAT      05/04/20 0955    05/04/20 0900  calcium carb-cholecalciferol 600-800 MG-UNIT tablet 1 tablet  Daily      05/03/20 1636    05/04/20 0900  PARoxetine (PAXIL) tablet 10 mg  Daily      05/03/20 1636    05/04/20 0700  US Abdomen Limited  1 Time Imaging     Comments:  Right Upper Quad    05/03/20 1738    05/04/20 0600  pantoprazole (PROTONIX) EC tablet 40 mg  Every Early Morning      05/03/20 1636    05/04/20 0600  CBC & Differential  Morning Draw      05/03/20 1636    05/04/20 0600  Comprehensive Metabolic Panel  Morning Draw      05/03/20 1636    05/04/20 0600  Magnesium  Morning Draw      05/03/20 1636    05/04/20 0600  CBC Auto Differential  PROCEDURE ONCE      05/04/20 0002    05/03/20 2130  calcium gluconate 1 g in sodium chloride 0.9 % 100 mL IVPB  Once      05/03/20 2032    05/03/20 2100  atorvastatin (LIPITOR) tablet 10 mg  Nightly      05/03/20 1636    05/03/20 2100  sodium chloride 0.9 % flush 10 mL  Every 12 Hours Scheduled      05/03/20 1636    05/03/20 2100  colchicine tablet 0.6 mg  Every 12 Hours Scheduled      05/03/20 1711    05/03/20 2039  Patient Currently On Electrolyte Replacement Protocol - Please Refer to MAR for Protocol Details  Misc Nursing Order (Specify)  Daily     Comments:  Patient  Currently On Electrolyte Replacement Protocol - Please Refer to MAR for Protocol Details    05/03/20 2038 05/03/20 2037  potassium chloride (MICRO-K) CR capsule 40 mEq  As Needed,   Status:  Discontinued      05/03/20 2038 05/03/20 2037  potassium chloride (KLOR-CON) packet 40 mEq  As Needed,   Status:  Discontinued      05/03/20 2038 05/03/20 2037  potassium chloride 10 mEq in 100 mL IVPB  Every 1 Hour PRN,   Status:  Discontinued      05/03/20 2038 05/03/20 2032  Magnesium  STAT,   Status:  Canceled      05/03/20 2031 05/03/20 2032  Basic Metabolic Panel  STAT,   Status:  Canceled      05/03/20 2031 05/03/20 2000  Vital Signs  Every 4 Hours      05/03/20 1636 05/03/20 2000  Basic Metabolic Panel  Once      05/03/20 1708 05/03/20 2000  Magnesium  Once      05/03/20 1708 05/1956  Calcium, Ionized  Once      05/03/20 1941 05/03/20 1800  potassium chloride (MICRO-K) CR capsule 40 mEq  Once      05/03/20 1702 05/03/20 1730  sodium chloride 0.9 % with KCl 20 mEq/L infusion  Continuous      05/03/20 1636 05/03/20 1704  Can you follow through with RT my requested ionized calcium.  Thanks  Nursing Communication  Continuous     Comments:  Can you follow through with RT my requested ionized calcium.  Thanks    05/03/20 1708 05/03/20 1637  Intake & Output  Every Shift      05/03/20 1636    05/03/20 1637  Weigh Patient  Once      05/03/20 1636 05/03/20 1637  Oxygen Therapy- Nasal Cannula; Titrate for SPO2: 90% - 95%  Continuous      05/03/20 1636    05/03/20 1637  Insert Peripheral IV  Once      05/03/20 1636    05/03/20 1637  Saline Lock & Maintain IV Access  Continuous      05/03/20 1636    05/03/20 1637  Place Sequential Compression Device  Once      05/03/20 1636    05/03/20 1637  Maintain Sequential Compression Device  Continuous      05/03/20 1636    05/03/20 1637  Diet Clear Liquid  Diet Effective Now,   Status:  Canceled      05/03/20 1636 05/03/20 1636  sodium  chloride 0.9 % flush 10 mL  As Needed      05/03/20 1636    05/03/20 1636  ondansetron (ZOFRAN) injection 4 mg  Every 6 Hours PRN      05/03/20 1636    05/03/20 1636  HYDROcodone-acetaminophen (NORCO) 5-325 MG per tablet 1 tablet  Every 4 Hours PRN      05/03/20 1636    05/03/20 1600  magnesium sulfate 2g/50 mL (PREMIX) infusion  Every 1 Hour      05/03/20 1546    05/03/20 1545  Calcium, Ionized  Once      05/03/20 1544    05/03/20 1541  Code Status and Medical Interventions:  Continuous      05/03/20 1543    05/03/20 1541  Inpatient Admission  Once      05/03/20 1543    05/03/20 1540  C-reactive Protein  Once      05/03/20 1539    05/03/20 1540  Inpatient Admission  Once      05/03/20 1541    05/03/20 1538  magnesium sulfate in D5W 1g/100mL (PREMIX)  Every 1 Hour,   Status:  Discontinued      05/03/20 1536    05/03/20 1507  Magnesium  STAT      05/03/20 1506    05/03/20 1501  Uric Acid  STAT      05/03/20 1500    05/03/20 1426  HYDROmorphone (DILAUDID) injection 1 mg  Once      05/03/20 1424    05/03/20 1426  ondansetron (ZOFRAN) injection 4 mg  Once      05/03/20 1424    05/03/20 1426  XR Chest 1 View  1 Time Imaging      05/03/20 1426    05/03/20 1418  potassium chloride 20 mEq in 100 mL IVPB  Once      05/03/20 1416    05/03/20 1343  Urinalysis With Culture If Indicated - Urine, Clean Catch  Once      05/03/20 1342    05/03/20 1210  sodium chloride 0.9 % bolus 1,000 mL  Once      05/03/20 1208    05/03/20 1210  HYDROmorphone (DILAUDID) injection 1 mg  Once      05/03/20 1208    05/03/20 1210  ondansetron (ZOFRAN) injection 4 mg  Once      05/03/20 1208    05/03/20 1208  CT Abdomen Pelvis Without Contrast  1 Time Imaging     Comments:  NON-CONTRASTED STUDY      05/03/20 1208    05/03/20 1207  CBC & Differential  Once      05/03/20 1208    05/03/20 1207  Comprehensive Metabolic Panel  Once      05/03/20 1208    05/03/20 1207  Protime-INR  Once      05/03/20 1208    05/03/20 1207  aPTT  Once      05/03/20 1208     05/03/20 1207  Lipase  Once      05/03/20 1208    05/03/20 1207  ECG 12 Lead  Once      05/03/20 1208    05/03/20 1207  Insert peripheral IV  Once      05/03/20 1208    05/03/20 1207  CBC Auto Differential  PROCEDURE ONCE      05/03/20 1208    05/03/20 1206  sodium chloride 0.9 % flush 10 mL  As Needed      05/03/20 1208    05/03/20 1045  [place] Draw  Once      05/03/20 1045    05/03/20 1045  Light Blue Top  PROCEDURE ONCE      05/03/20 1045    05/03/20 1045  Green Top (Gel)  PROCEDURE ONCE      05/03/20 1045    05/03/20 1045  Lavender Top  PROCEDURE ONCE      05/03/20 1045    05/03/20 1045  Red Top  PROCEDURE ONCE      05/03/20 1045    05/03/20 1045  [place] Blood Culture Bottle Set  PROCEDURE ONCE      05/03/20 1045    05/03/20 1045  Gray Top - Ice  PROCEDURE ONCE      05/03/20 1045    Unscheduled  Up With Assistance  As Needed      05/03/20 1636    --  SCANNED - TELEMETRY        05/03/20 0000    --  SCANNED EKG      05/03/20 0000                   Physician Progress Notes (last 7 days) (Notes from 04/28/20 1304 through 05/05/20 1304)      Mayito Membreno DO at 05/04/20 1025              [facility] Medicine Services  INPATIENT PROGRESS NOTE    Patient Name: Sammi Cordero  Date of Admission: 5/3/2020  Today's Date: 05/04/20  Length of Stay: 1  Primary Care Physician: Avery Whaley MD    Subjective   Chief Complaint: Follow-up nausea and vomiting with electrolyte abnormalities    HPI   Patient seen and examined.  Says she is feeling better today.  Not nauseous currently.  Has not been throwing up.  Denies nominal pain.  No chest pain or shortness of breath.  She has significant left shoulder pain at this time.  Some elbow pain.      Review of Systems   All pertinent negatives and positives are as above. All other systems have been reviewed and are negative unless otherwise stated.     Objective    Temp:  [98.1 °F (36.7 °C)-99.5 °F (37.5 °C)] 98.1 °F (36.7 °C)  Heart  Rate:  [58-85] 72  Resp:  [14-20] 16  BP: (101-146)/(39-83) 101/52  Physical Exam  GEN: Awake, alert, interactive, in NAD  HEENT: Atraumatic, PERRLA, EOMI, Anicteric, Trachea midline  Lungs: CTAB, no wheezing/rales/rhonchi  Heart: RRR, +S1/s2, no rub  ABD: soft, nt/nd, +BS, no guarding/rebound  Extremities: No obvious erythema or swelling of her left shoulder.  No warmth on palpation.  She has limited range of motion due to pain.  No swelling of the distal extremity.  Hands are warm.  Neuro: AAOx3, no focal deficits  Psych: normal mood & affect        Results Review:  I have reviewed the labs, radiology results, and diagnostic studies.    Laboratory Data:   Results from last 7 days   Lab Units 05/04/20  0602 05/03/20  1324 04/29/20  0526   WBC 10*3/mm3 15.16* 19.91* 9.80   HEMOGLOBIN g/dL 9.5* 10.4* 9.1*   HEMATOCRIT % 28.4* 30.2* 27.2*   PLATELETS 10*3/mm3 313 337 411        Results from last 7 days   Lab Units 05/04/20  0602 05/03/20  1944 05/03/20  1324 04/29/20  0526   SODIUM mmol/L 142 141 142 142   POTASSIUM mmol/L 3.3* 2.5* 2.3* 3.5   CHLORIDE mmol/L 100 93* 90* 104   CO2 mmol/L 29.0 31.0* 34.0* 24.0   BUN mg/dL 23 24* 26* 44*   CREATININE mg/dL 1.19* 1.21* 1.21* 2.23*   CALCIUM mg/dL 6.4* 6.0* 6.4* 7.6*   BILIRUBIN mg/dL 0.5  --  0.6 <0.2*   ALK PHOS U/L 50  --  51 49   ALT (SGPT) U/L 8  --  11 10   AST (SGOT) U/L 10  --  16 21   GLUCOSE mg/dL 155* 152* 129* 234*       Culture Data:   No results found for: BLOODCX, URINECX, WOUNDCX, MRSACX, RESPCX, STOOLCX    Radiology Data:   Imaging Results (Last 24 Hours)     Procedure Component Value Units Date/Time    XR Chest 1 View [[id number]] Collected:  05/03/20 1459     Updated:  05/03/20 1504    Narrative:       HISTORY: Cough     CXR: Frontal view the chest obtained.     COMPARISON: 04/24/2020.     FINDINGS: There are chronic medial left basilar opacities along the  cardiophrenic sulcus. Findings may represent sequestration this finding  present on studies dating  back to 10/11/2019. No new consolidation.  Normal cardiomediastinal contours. No pleural effusion or pneumothorax.  No acute bony pathology.       Impression:       1. Chronic medial left basilar opacities. Sequestration considered. No  acute radiographic cardiopulmonary process  This report was finalized on 05/03/2020 15:01 by Dr. Annmarie Ontiveros MD.    CT Abdomen Pelvis Without Contrast [[id number]] Collected:  05/03/20 1241     Updated:  05/03/20 1249    Narrative:       CT ABDOMEN PELVIS WO CONTRAST- 5/3/2020 12:29 PM CDT     HISTORY: Nausea, vomiting, diarrhea      COMPARISON: 04/23/2020     DOSE LENGTH PRODUCT: 528 mGy cm. Automated exposure control was also  utilized to decrease patient radiation dose.     TECHNIQUE: Axial images the and pelvis are obtained without IV contrast.     FINDINGS:  There is a chronic 3.5 cm posterior medial left lower lobe  opacity with questionable air bronchograms. Sequestration considered.  Persistent present on studies dating back to 10/11/2019. Dependent  basilar atelectasis.     The nonenhanced liver, spleen, pancreas, gallbladder, and adrenal glands  are unremarkable. No abnormal perinephric fluid collection. No  hydronephrosis. Lateral appears intact. Calcified phleboliths in the  right retroperitoneum. Bladder appears intact. Uterus unremarkable. No  adnexal mass.     Mild sigmoid colonic diverticulosis with no acute diverticulitis. No  evidence for bowel obstruction. Appendix remains normal. Small hiatal  hernia. Gastric wall thickening may be due to underdistention, however  gastritis considered. Mild to moderate vascular calcification with no  aneurysm.     Degenerative change of the regional skeleton with no focal destructive  osseous lesions.       Impression:       1. Small hiatal hernia. Apparent wall thickening of the stomach may be  due to underdistention. Gastritis a second possibility. No bowel  obstruction. No free air or abscess. Normal appendix.  2. Chronic  changes of the left lung base, sequestration considered.  Bibasilar atelectasis.  This report was finalized on 05/03/2020 12:46 by Dr. Annmarie Ontiveros MD.          I have reviewed the patient's current medications.     Assessment/Plan     Active Hospital Problems    Diagnosis   • **Hypokalemia   • Gout   • Esophagitis determined by endoscopy   • Benign essential HTN   • Leukocytosis   • Hypomagnesemia   • Hyperlipidemia       #1 hypokalemia -likely type factorial from wasting in the setting of her recent renal failure and secondary diuresis as well as nausea and vomiting.  Improving and up to 3.3.  Continue to replace today.  Magnesium improved.    #2 hypomagnesemia -again the same Gettleman's and GI losses.  Up to 1.6 today.  Will resume her home oral magnesium.    #3 hypophosphatemia -1.6.  reaced and recheck in the morning.    #4 nausea and vomiting -has been a chronic issue for her.  To recent endoscopy showing gastritis.  She been on PPIs other treatments.  Small right hernia.  Improved today.  Initial CT of her belly was benign.  LFTs normal.  Right upper quadrant ultrasound was done this morning and pending.    #5 left shoulder pain -patient recently hospitalized and during that stay had acute gout flare mostly of the left elbow and wrist.  Was on steroid and got better.  Was not put on any maintenance meds at discharge.  Comes back in with uric acid of 7.1 and mostly shoulder pain.  No obvious deformity or swelling of the shoulder.  No warmth.  We will get an x-ray of her shoulder to rule out any bony pathology.  If normal will continue to treat for underlying gout with colchicine.  Now that her kidney function is improved she can also allopurinol once uric acid levels have improved.  If she persistently has pain and decreased ROM after improvement in her uric acid she would need further outpatient imaging to rule out tendon and ligament injury.      Discharge Planning: I expect the patient to be discharged  to home in 1 to 2 days.    Mayito Membreno DO   05/04/20   10:26      Electronically signed by Mayito Membreon,  at 05/04/20 4181

## 2020-05-05 NOTE — PLAN OF CARE
Problem: Patient Care Overview  Goal: Plan of Care Review  Outcome: Ongoing (interventions implemented as appropriate)  Flowsheets (Taken 5/5/2020 0420)  Progress: improving  Plan of Care Reviewed With: patient  Note:   No c/o nausea or vomiting , pain meds given x 1 iv infiltrated vas teem to replace this am, poss d/c home today , pt s/sb 52-64 down to 49 , cont to monitor.

## 2020-05-05 NOTE — PLAN OF CARE
Problem: Patient Care Overview  Goal: Plan of Care Review  Outcome: Ongoing (interventions implemented as appropriate)  Flowsheets  Taken 5/5/2020 0420 by Dorothea Fajardo LPN  Progress: improving  Taken 5/5/2020 0800 by Cherri Phelan RN  Plan of Care Reviewed With: patient  Taken 5/4/2020 1659 by Yesy Choudhary RN  Outcome Summary: A&Ox4. C/o pain. Prn pain meds given with some relief. Reports n/t in bilat hands, states this is her baseline. PPP. LUE very weak. 2+ edema noted. LLE with 1+ edema and slight weakness. IVF maintained. Clear liquid diet. Safety maintianed. Will continue to monitor.  Goal: Individualization and Mutuality  Outcome: Ongoing (interventions implemented as appropriate)  Flowsheets  Taken 5/4/2020 0416 by Karla Villatoro RN  Patient Specific Goals (Include Timeframe): stop nausea  Patient Specific Preferences: door open  Taken 5/4/2020 1659 by Yesy Choudhary RN  Patient Specific Interventions: Spoke with MD regarding Pain meds per pt request  Goal: Discharge Needs Assessment  Outcome: Ongoing (interventions implemented as appropriate)  Goal: Interprofessional Rounds/Family Conf  Outcome: Ongoing (interventions implemented as appropriate)     Problem: Pain, Chronic (Adult)  Goal: Identify Related Risk Factors and Signs and Symptoms  Outcome: Ongoing (interventions implemented as appropriate)  Flowsheets (Taken 5/4/2020 1659 by Yesy Choudhary, RN)  Signs and Symptoms (Chronic Pain): fatigue/weakness;verbalization of pain descriptors  Goal: Acceptable Pain/Comfort Level and Functional Ability  Outcome: Ongoing (interventions implemented as appropriate)  Flowsheets (Taken 5/4/2020 1659 by Yesy Choudhary RN)  Acceptable Pain/Comfort Level and Functional Ability: making progress toward outcome     Problem: Nausea/Vomiting (Adult)  Goal: Identify Related Risk Factors and Signs and Symptoms  Outcome: Ongoing (interventions implemented as appropriate)  Flowsheets (Taken 5/4/2020  1659 by Yesy Choudhary, RN)  Signs and Symptoms (Nausea/Vomiting): weakness  Goal: Symptom Relief  Outcome: Ongoing (interventions implemented as appropriate)  Flowsheets (Taken 5/4/2020 1659 by Yesy Choudhary, RN)  Symptom Relief: making progress toward outcome  Goal: Adequate Hydration  Outcome: Ongoing (interventions implemented as appropriate)  Flowsheets (Taken 5/4/2020 1659 by Yesy Choudhary, RN)  Adequate Hydration: making progress toward outcome

## 2020-05-05 NOTE — PROGRESS NOTES
[facility] Medicine Services  INPATIENT PROGRESS NOTE    Patient Name: Sammi Cordero  Date of Admission: 5/3/2020  Today's Date: 05/05/20  Length of Stay: 2  Primary Care Physician: Avery Whaley MD    Subjective   Chief Complaint: Follow-up nausea and vomiting with electrolyte abnormalities    HPI   Patient seen and examined.  Feels a little better today.  Nausea and vomiting have improved.  She is tolerating p.o.  Still little bit shaky she says she thinks is because of her magnesium.  Shoulder pain is improving slightly.  No chest pain or shortness of breath.      Review of Systems   All pertinent negatives and positives are as above. All other systems have been reviewed and are negative unless otherwise stated.     Objective    Temp:  [98.1 °F (36.7 °C)-99.9 °F (37.7 °C)] 98.1 °F (36.7 °C)  Heart Rate:  [52-70] 54  Resp:  [12-18] 18  BP: (103-139)/(38-66) 139/66  Physical Exam  GEN: Awake, alert, interactive, in NAD  HEENT: Atraumatic, PERRLA, EOMI, Anicteric, Trachea midline  Lungs: CTAB, no wheezing/rales/rhonchi  Heart: RRR, +S1/s2, no rub  ABD: soft, nt/nd, +BS, no guarding/rebound  Extremities: No obvious erythema or swelling of her left shoulder.  No warmth on palpation.  She has limited range of motion due to pain.  No swelling of the distal extremity.  Hands are warm.  Neuro: AAOx3, no focal deficits  Psych: normal mood & affect        Results Review:  I have reviewed the labs, radiology results, and diagnostic studies.    Laboratory Data:   Results from last 7 days   Lab Units 05/04/20  0602 05/03/20  1324 04/29/20  0526   WBC 10*3/mm3 15.16* 19.91* 9.80   HEMOGLOBIN g/dL 9.5* 10.4* 9.1*   HEMATOCRIT % 28.4* 30.2* 27.2*   PLATELETS 10*3/mm3 313 337 411        Results from last 7 days   Lab Units 05/05/20  0724 05/04/20  1038 05/04/20  0602 05/03/20  1944 05/03/20  1324 04/29/20  0526   SODIUM mmol/L 140  --  142 141 142 142   POTASSIUM mmol/L 3.6 3.8 3.3*  2.5* 2.3* 3.5   CHLORIDE mmol/L 98  --  100 93* 90* 104   CO2 mmol/L 28.0  --  29.0 31.0* 34.0* 24.0   BUN mg/dL 18  --  23 24* 26* 44*   CREATININE mg/dL 0.99  --  1.19* 1.21* 1.21* 2.23*   CALCIUM mg/dL 7.0*  --  6.4* 6.0* 6.4* 7.6*   BILIRUBIN mg/dL  --   --  0.5  --  0.6 <0.2*   ALK PHOS U/L  --   --  50  --  51 49   ALT (SGPT) U/L  --   --  8  --  11 10   AST (SGOT) U/L  --   --  10  --  16 21   GLUCOSE mg/dL 117*  --  155* 152* 129* 234*       Culture Data:   No results found for: BLOODCX, URINECX, WOUNDCX, MRSACX, RESPCX, STOOLCX    Radiology Data:   Imaging Results (Last 24 Hours)     Procedure Component Value Units Date/Time    XR Shoulder 2+ View Left [[id number]] Collected:  05/04/20 1842     Updated:  05/04/20 1850    Narrative:       EXAMINATION: XR SHOULDER 2+ VW LEFT-     05/04/2020 6:32 PM     HISTORY: shoulder pain; E87.6-Hypokalemia     Two-view left shoulder exam.     Mild AC joint and humeral head spurring.     No fracture or dislocation.     No discrete soft tissue abnormality.     Summary:  1. Degenerative spurring with no acute bony abnormality.     This report was finalized on 05/04/2020 18:43 by Dr. Wiliam Mccabe MD.    US Abdomen Limited [[id number]] Collected:  05/04/20 1456     Updated:  05/04/20 1500    Narrative:       Exam: Limited ultrasound of the abdomen, RUQ     Indication: Nausea, vomiting, hypokalemia     Comparison: 11/12/2019     Findings:      The visualized portion of the pancreas is unremarkable. The abdominal  aorta is nonaneurysmal. The juxtahepatic IVC is unremarkable.     Hepatic echotexture and echogenicity are normal. No evidence of focal  liver mass or intrahepatic ductal dilatation. The common bile duct  measures 5 mm. The gallbladder is unremarkable.      Visualized portion of the RIGHT kidney is unremarkable without evidence  of hydronephrosis.       Impression:          No evidence of cholelithiasis or biliary ductal dilatation.  This report was finalized on  05/04/2020 14:57 by Dr. Prasad Trevizo MD.          I have reviewed the patient's current medications.     Assessment/Plan     Active Hospital Problems    Diagnosis   • **Hypokalemia   • Gout   • Hypophosphatemia   • Esophagitis determined by endoscopy   • Benign essential HTN   • Leukocytosis   • Hypomagnesemia   • Hyperlipidemia       #1 hypokalemia -likely multifactorial from wasting in the setting of her recent renal failure and secondary diuresis as well as nausea and vomiting.  Was initially 2.3, after multiple replacements now up to 3.6.  We will give 40 p.o.      #2 hypomagnesemia -came in at 0.6 and improved to 1.6.  Now back down to 1.1 patient feels little bit shaky.  Replace 2 g IV.  Increase oral magnesium from 400 twice daily to 800 twice daily.  Recheck in morning.    #3 hypophosphatemia - up to 1.8 from 1.6.  Replace again and recheck    #4 nausea and vomiting -has been a chronic issue for her.  Two recent endoscopy showing gastritis.  She been on PPIs and other treatments.  Small hiatal hernia.  Improved today.  Initial CT of her belly was benign.  LFTs normal.  Right upper quadrant ultrasound normal.  Tolerating p.o. today.    #5 left shoulder pain -patient recently hospitalized and during that stay had acute gout flare mostly of the left elbow and wrist.  Was on steroid and got better.  Was not put on any maintenance meds at discharge.  Comes back in with uric acid of 7.1 and mostly shoulder pain.  No obvious deformity or swelling of the shoulder.  No warmth.  X-ray of her shoulder yesterday showed some arthritis but no acute bony issues.  Her pain is improving somewhat today.  Her uric acid is down to 4.2.  Continue with colchicine for now that and plan on allopurinol at discharge.  If she persistently has pain and decreased ROM after improvement in her uric acid she would need further outpatient imaging to rule out tendon and ligament injury.      Discharge Planning: I expect the patient to be  discharged to home tomorrow.    Mayito Membreno DO   05/05/20   09:12

## 2020-05-05 NOTE — NURSING NOTE
Patients IV went bad last night.  Patient refused to allow staff to attempt an IV because of swelling to left side and history of difficult stick.  Once labs were resulted and I seen patient would need IV medications, I contacted Juan with Vascular access to please attempt access on this patient.

## 2020-05-06 VITALS
HEART RATE: 68 BPM | DIASTOLIC BLOOD PRESSURE: 76 MMHG | SYSTOLIC BLOOD PRESSURE: 122 MMHG | OXYGEN SATURATION: 100 % | RESPIRATION RATE: 12 BRPM | WEIGHT: 177.5 LBS | HEIGHT: 61 IN | BODY MASS INDEX: 33.51 KG/M2 | TEMPERATURE: 99.2 F

## 2020-05-06 LAB
ANION GAP SERPL CALCULATED.3IONS-SCNC: 13 MMOL/L (ref 5–15)
BUN BLD-MCNC: 15 MG/DL (ref 8–23)
BUN/CREAT SERPL: 16.9 (ref 7–25)
CALCIUM SPEC-SCNC: 7.6 MG/DL (ref 8.6–10.5)
CHLORIDE SERPL-SCNC: 101 MMOL/L (ref 98–107)
CO2 SERPL-SCNC: 28 MMOL/L (ref 22–29)
CREAT BLD-MCNC: 0.89 MG/DL (ref 0.57–1)
GFR SERPL CREATININE-BSD FRML MDRD: 64 ML/MIN/1.73
GLUCOSE BLD-MCNC: 123 MG/DL (ref 65–99)
MAGNESIUM SERPL-MCNC: 1.2 MG/DL (ref 1.6–2.4)
PHOSPHATE SERPL-MCNC: 2.4 MG/DL (ref 2.5–4.5)
POTASSIUM BLD-SCNC: 3.9 MMOL/L (ref 3.5–5.2)
SODIUM BLD-SCNC: 142 MMOL/L (ref 136–145)

## 2020-05-06 PROCEDURE — 25010000002 MAGNESIUM SULFATE IN D5W 1G/100ML (PREMIX) 1-5 GM/100ML-% SOLUTION: Performed by: INTERNAL MEDICINE

## 2020-05-06 PROCEDURE — 84100 ASSAY OF PHOSPHORUS: CPT | Performed by: INTERNAL MEDICINE

## 2020-05-06 PROCEDURE — 83735 ASSAY OF MAGNESIUM: CPT | Performed by: INTERNAL MEDICINE

## 2020-05-06 PROCEDURE — 25810000003 SODIUM CHLORIDE 0.9 % WITH KCL 20 MEQ 20-0.9 MEQ/L-% SOLUTION: Performed by: INTERNAL MEDICINE

## 2020-05-06 PROCEDURE — 80048 BASIC METABOLIC PNL TOTAL CA: CPT | Performed by: INTERNAL MEDICINE

## 2020-05-06 RX ORDER — ACETAMINOPHEN 325 MG/1
650 TABLET ORAL EVERY 6 HOURS PRN
Start: 2020-05-06

## 2020-05-06 RX ORDER — ALLOPURINOL 100 MG/1
100 TABLET ORAL DAILY
Status: DISCONTINUED | OUTPATIENT
Start: 2020-05-06 | End: 2020-05-06 | Stop reason: HOSPADM

## 2020-05-06 RX ORDER — MAGNESIUM SULFATE 1 G/100ML
1 INJECTION INTRAVENOUS
Status: COMPLETED | OUTPATIENT
Start: 2020-05-06 | End: 2020-05-06

## 2020-05-06 RX ORDER — ALLOPURINOL 100 MG/1
100 TABLET ORAL DAILY
Qty: 30 TABLET | Refills: 0 | Status: SHIPPED | OUTPATIENT
Start: 2020-05-07 | End: 2020-10-12

## 2020-05-06 RX ADMIN — PAROXETINE 10 MG: 10 TABLET, FILM COATED ORAL at 08:50

## 2020-05-06 RX ADMIN — PANTOPRAZOLE SODIUM 40 MG: 40 TABLET, DELAYED RELEASE ORAL at 05:21

## 2020-05-06 RX ADMIN — MAGNESIUM SULFATE 1 G: 1 INJECTION INTRAVENOUS at 09:39

## 2020-05-06 RX ADMIN — Medication 1 TABLET: at 08:50

## 2020-05-06 RX ADMIN — HYDROCODONE BITARTRATE AND ACETAMINOPHEN 1 TABLET: 5; 325 TABLET ORAL at 08:50

## 2020-05-06 RX ADMIN — MAGNESIUM SULFATE 1 G: 1 INJECTION INTRAVENOUS at 10:32

## 2020-05-06 RX ADMIN — MAGNESIUM GLUCONATE 500 MG ORAL TABLET 800 MG: 500 TABLET ORAL at 08:49

## 2020-05-06 RX ADMIN — ALLOPURINOL 100 MG: 100 TABLET ORAL at 08:50

## 2020-05-06 RX ADMIN — POTASSIUM CHLORIDE AND SODIUM CHLORIDE 100 ML/HR: 900; 150 INJECTION, SOLUTION INTRAVENOUS at 10:31

## 2020-05-06 NOTE — PAYOR COMM NOTE
"REF: [id number]  DISCHARGES ON 5/6/2020  [facility]  KELY,   [phone]  OR  FAX   [phone]     Sammi Quinones (65 y.o. Female)     Date of Birth Social Security Number Address Home Phone MRN    1955  [address] [place] [id number] [phone] [id number]    M[employer]on Marital Status          [employer] [facility]        Admission Date Admission Type Admitting Provider Attending Provider Department, Room/Bed    5/3/20 Emergency Mayito Membreno, [facility] 4B, 447/1    Discharge Date Discharge Disposition Discharge Destination        5/6/2020 Home or Self Care Home             Attending Provider:  (none)   Allergies:  Penicillins, Sulfa Antibiotics    Isolation:  None   Infection:  None   Code Status:  CPR    Ht:  154.9 cm (60.98\")   Wt:  80.5 kg (177 lb 8 oz)    Admission Cmt:  None   Principal Problem:  Hypokalemia [E87.6]                 Active Insurance as of 5/3/2020     Primary Coverage     Payor Plan Insurance Group Employer/Plan Group    [employer] [id number]     Payor Plan Address Payor Plan Phone Number Payor Plan Fax Number Effective Dates    [address] [phone]  1/1/2020 - None Entered    [place] [id number]       Subscriber Name Subscriber Birth Date Member ID       SAMMI QUINONES 1955 [id number]                 Emergency Contacts      (Rel.) Home Phone Work Phone Mobile Phone    Sade Lepe (Daughter) [phone] -- --    Quinones,Corey (Son) [phone] -- --            Discharge Summary    No notes of this type exist for this encounter.         "

## 2020-05-06 NOTE — DISCHARGE SUMMARY
[facility] Medicine Services  DISCHARGE SUMMARY       Date of Admission: 5/3/2020  Date of Discharge:  5/6/2020  Primary Care Physician: Avery Whaley MD    Presenting Problem/History of Present Illness:  Hypokalemia [E87.6]  Hypokalemia [E87.6]     Final Discharge Diagnoses:  Active Hospital Problems    Diagnosis   • **Hypokalemia   • Gout   • Hypophosphatemia   • Esophagitis determined by endoscopy   • Benign essential HTN   • Leukocytosis   • Hypomagnesemia   • Hyperlipidemia       Consults:   None    Procedures Performed:   None    Pertinent Test Results:   Procedure Component Value Units Date/Time   XR Shoulder 2+ View Left [[id number]] Rey as Reviewed   Order Status: Completed Collected: 05/04/20 1842    Updated: 05/04/20 1850   Narrative:     EXAMINATION: XR SHOULDER 2+ VW LEFT-     05/04/2020 6:32 PM     HISTORY: shoulder pain; E87.6-Hypokalemia     Two-view left shoulder exam.     Mild AC joint and humeral head spurring.     No fracture or dislocation.     No discrete soft tissue abnormality.     Summary:  1. Degenerative spurring with no acute bony abnormality.     This report was finalized on 05/04/2020 18:43 by Dr. Wiliam Mccabe MD.   US Abdomen Limited [[id number]] Rey as Reviewed   Order Status: Completed Collected: 05/04/20 1456    Updated: 05/04/20 1500   Narrative:     Exam: Limited ultrasound of the abdomen, RUQ     Indication: Nausea, vomiting, hypokalemia     Comparison: 11/12/2019     Findings:      The visualized portion of the pancreas is unremarkable. The abdominal  aorta is nonaneurysmal. The juxtahepatic IVC is unremarkable.     Hepatic echotexture and echogenicity are normal. No evidence of focal  liver mass or intrahepatic ductal dilatation. The common bile duct  measures 5 mm. The gallbladder is unremarkable.      Visualized portion of the RIGHT kidney is unremarkable without evidence  of hydronephrosis.      Impression:        No evidence of  cholelithiasis or biliary ductal dilatation.  This report was finalized on 05/04/2020 14:57 by Dr. Prasad Trevizo MD.   XR Chest 1 View [[id number]] Rey as Reviewed   Order Status: Completed Collected: 05/03/20 1459    Updated: 05/03/20 1504   Narrative:     HISTORY: Cough     CXR: Frontal view the chest obtained.     COMPARISON: 04/24/2020.     FINDINGS: There are chronic medial left basilar opacities along the  cardiophrenic sulcus. Findings may represent sequestration this finding  present on studies dating back to 10/11/2019. No new consolidation.  Normal cardiomediastinal contours. No pleural effusion or pneumothorax.  No acute bony pathology.      Impression:     1. Chronic medial left basilar opacities. Sequestration considered. No  acute radiographic cardiopulmonary process  This report was finalized on 05/03/2020 15:01 by Dr. Annmarie Ontiveros MD.   CT Abdomen Pelvis Without Contrast [[id number]] Rey as Reviewed   Order Status: Completed Collected: 05/03/20 1241    Updated: 05/03/20 1249   Narrative:     CT ABDOMEN PELVIS WO CONTRAST- 5/3/2020 12:29 PM CDT     HISTORY: Nausea, vomiting, diarrhea      COMPARISON: 04/23/2020     DOSE LENGTH PRODUCT: 528 mGy cm. Automated exposure control was also  utilized to decrease patient radiation dose.     TECHNIQUE: Axial images the and pelvis are obtained without IV contrast.     FINDINGS:  There is a chronic 3.5 cm posterior medial left lower lobe  opacity with questionable air bronchograms. Sequestration considered.  Persistent present on studies dating back to 10/11/2019. Dependent  basilar atelectasis.     The nonenhanced liver, spleen, pancreas, gallbladder, and adrenal glands  are unremarkable. No abnormal perinephric fluid collection. No  hydronephrosis. Lateral appears intact. Calcified phleboliths in the  right retroperitoneum. Bladder appears intact. Uterus unremarkable. No  adnexal mass.     Mild sigmoid colonic diverticulosis with no acute diverticulitis.  No  evidence for bowel obstruction. Appendix remains normal. Small hiatal  hernia. Gastric wall thickening may be due to underdistention, however  gastritis considered. Mild to moderate vascular calcification with no  aneurysm.     Degenerative change of the regional skeleton with no focal destructive  osseous lesions.      Impression:     1. Small hiatal hernia. Apparent wall thickening of the stomach may be  due to underdistention. Gastritis a second possibility. No bowel  obstruction. No free air or abscess. Normal appendix.  2. Chronic changes of the left lung base, sequestration considered.  Bibasilar atelectasis.  This report was finalized on 05/03/2020 12:46 by Dr. Annmarie Ontiveros MD.         Chief Complaint on Day of Discharge:   Follow-up nausea vomiting    History of Present Illness on Day of Discharge: Patient seen and examined.  She is feeling great and wants to go home.  Magnesium still low but low today but she states she is used to it being low and tolerates this at home.  She was given mag prior to discharge.  She takes 2 g of mag at home daily.    Hospital Course:  The patient is a 65 y.o. female with history of chronic hypomagnesemia and Gittleman syndrome.  She recently was diagnosed with gastritis and esophagitis.  H pylori.  She had 2 recent EGDs.  Presented into the hospital on 5/3 due to nausea and vomiting.  She had been just discharged 4/29 for similar with renal failure at that time.  Her renal function has improved.  CT the abdomen pelvis showed no acute issues.  LFTs were okay.  Significant electrolyte abnormalities including hypokalemia, hypomagnesemia, and hypophosphatemia.  She was admitted and IV fluid resuscitated with replacements of electrolytes.  Other issue was left shoulder pain felt to be gout flare.  Uric acid was 7.1 and she was started on colchicine.  Her shoulder range of motion and pain has been improving.  X-ray negative for bony issues other than arthritis.  No overt  "signs of swelling or infection.  At this point she has been converted and started on allopurinol and if pain and limited range of motion persists she should get outpatient follow-up imaging with possible MRI through PCP.  Otherwise she is tolerating p.o. and doing much better today.  Discharge home with outpatient follow-up.    Condition on Discharge: Able/improved    Physical Exam on Discharge:  /76 (BP Location: Right arm, Patient Position: Sitting)   Pulse 68   Temp 99.2 °F (37.3 °C) (Oral)   Resp 12   Ht 154.9 cm (60.98\")   Wt 80.5 kg (177 lb 8 oz)   SpO2 100%   Breastfeeding No   BMI 33.56 kg/m²   Physical Exam  GEN: Awake, alert, interactive, in NAD  HEENT: Atraumatic, PERRLA, EOMI, Anicteric, Trachea midline  Lungs: CTAB, no wheezing/rales/rhonchi  Heart: RRR, +S1/s2, no rub  ABD: soft, nt/nd, +BS, no guarding/rebound  Extremities: No obvious erythema or swelling of her left shoulder.  No warmth on palpation. Improving ROM.  No swelling of the distal extremity.  Hands are warm.  Neuro: AAOx3, no focal deficits  Psych: normal mood & affect    Discharge Disposition:  Home or Self Care    Discharge Medications:     Discharge Medications      New Medications      Instructions Start Date   acetaminophen 325 MG tablet  Commonly known as:  Tylenol   650 mg, Oral, Every 6 Hours PRN      allopurinol 100 MG tablet  Commonly known as:  ZYLOPRIM   100 mg, Oral, Daily   Start Date:  May 7, 2020     magnesium oxide 400 (241.3 Mg) MG tablet tablet  Commonly known as:  MAGOX   Take 3 tabs in the morning and 2 tabs at night         Continue These Medications      Instructions Start Date   multivitamin with minerals tablet tablet   1 tablet, Oral, Daily      ondansetron 4 MG tablet  Commonly known as:  ZOFRAN   Take 1 tablet by mouth four times a day as needed for nausea      pantoprazole 40 MG EC tablet  Commonly known as:  PROTONIX   40 mg, Oral, Every Morning Before Breakfast      PARoxetine 10 MG " tablet  Commonly known as:  PAXIL   10 mg, Oral, Every Morning      simvastatin 20 MG tablet  Commonly known as:  ZOCOR   Take 1 tablet by mouth once daily             Activity at Discharge: Increase to baseline as tolerated    Discharge Care Plan/Instructions:   Follow-up with your PCP.  Switch her home magnesium up instead of taking 5 tabs in the morning take 3 in the morning and 2 at night.  Follow-up with PCP and get repeat electrolyte testing.  Follow-up GI as previous.    Follow-up Appointments:   Future Appointments   Date Time Provider Department Center   5/29/2020  9:00 AM Annmarie Martinez APRN [facility] None   8/5/2020 10:00 AM Armand Macario MD [facility]       Test Results Pending at Discharge: none    Mayito Membreno DO  05/06/20  11:45    Time: 34 minutes

## 2020-05-06 NOTE — PLAN OF CARE
Problem: Patient Care Overview  Goal: Plan of Care Review  Outcome: Ongoing (interventions implemented as appropriate)  Note:   VSS, pt c/o pain in left shoulder, tx with pain meds with relief; left UE & LE edema continues; Iv fluids continue @ 100 mL/hr; up ad alexis to bathroom; will continue to monitor     Problem: Patient Care Overview  Goal: Individualization and Mutuality  Outcome: Ongoing (interventions implemented as appropriate)      Problem: Patient Care Overview  Goal: Individualization and Mutuality  Outcome: Ongoing (interventions implemented as appropriate)     Problem: Patient Care Overview  Goal: Discharge Needs Assessment  Outcome: Ongoing (interventions implemented as appropriate)     Problem: Pain, Chronic (Adult)  Goal: Identify Related Risk Factors and Signs and Symptoms  Outcome: Ongoing (interventions implemented as appropriate)     Problem: Pain, Chronic (Adult)  Goal: Acceptable Pain/Comfort Level and Functional Ability  Outcome: Ongoing (interventions implemented as appropriate)     Problem: Nausea/Vomiting (Adult)  Goal: Identify Related Risk Factors and Signs and Symptoms  Outcome: Ongoing (interventions implemented as appropriate)     Problem: Nausea/Vomiting (Adult)  Goal: Symptom Relief  Outcome: Ongoing (interventions implemented as appropriate)     Problem: Nausea/Vomiting (Adult)  Goal: Adequate Hydration  Outcome: Ongoing (interventions implemented as appropriate)

## 2020-05-07 ENCOUNTER — READMISSION MANAGEMENT (OUTPATIENT)
Dept: CALL CENTER | Facility: HOSPITAL | Age: 65
End: 2020-05-07

## 2020-05-07 NOTE — OUTREACH NOTE
Prep Survey      Responses   [facility] facility patient discharged from?  [place]   Is LACE score < 7 ?  No   Eligibility  Readm Mgmt   Discharge diagnosis  Hypokalemia, gout, hypophosphatemia, esophagitis, benign essential HTN, Leukocytosis, hypomagnesemia, HLD   COVID-19 Test Status  Not tested   Does the patient have one of the following disease processes/diagnoses(primary or secondary)?  Other   Does the patient have Home health ordered?  No   Is there a DME ordered?  No   Comments regarding appointments  See AVS   Prep survey completed?  Yes          Oneyda Aragon RN

## 2020-05-08 ENCOUNTER — READMISSION MANAGEMENT (OUTPATIENT)
Dept: CALL CENTER | Facility: HOSPITAL | Age: 65
End: 2020-05-08

## 2020-05-08 NOTE — OUTREACH NOTE
Medical Week 1 Survey      Responses   [facility] patient discharged from?  [place]   COVID-19 Test Status  Not tested   Does the patient have one of the following disease processes/diagnoses(primary or secondary)?  Other   Is there a successful TCM telephone encounter documented?  No   Week 1 attempt successful?  Yes   Call start time  1102   Call end time  1106   Discharge diagnosis  Hypokalemia, gout, hypophosphatemia, esophagitis, benign essential HTN, Leukocytosis, hypomagnesemia, HLD   Is patient permission given to speak with other caregiver?  Yes   List who call center can speak with  Daughter or son   Meds reviewed with patient/caregiver?  Yes   Is the patient having any side effects they believe may be caused by any medication additions or changes?  No   Does the patient have all medications ordered at discharge?  Yes   Is the patient taking all medications as directed (includes completed medication regime)?  Yes   Comments  Dr. Whaley telemedicine visit 0513/2020   Pulse Ox monitoring  None   Comments  Still weak   Did the patient receive a copy of their discharge instructions?  Yes   Nursing interventions  Reviewed instructions with patient   What is the patient's perception of their health status since discharge?  Improving   Is the patient/caregiver able to teach back the hierarchy of who to call/visit for symptoms/problems? PCP, Specialist, Home health nurse, Urgent Care, ED, 911  Yes   Week 1 call completed?  Yes          Ilana Garcias RN

## 2020-05-13 ENCOUNTER — TRANSCRIBE ORDERS (OUTPATIENT)
Dept: ADMINISTRATIVE | Facility: HOSPITAL | Age: 65
End: 2020-05-13

## 2020-05-13 ENCOUNTER — READMISSION MANAGEMENT (OUTPATIENT)
Dept: CALL CENTER | Facility: HOSPITAL | Age: 65
End: 2020-05-13

## 2020-05-13 ENCOUNTER — NURSE TRIAGE (OUTPATIENT)
Dept: CALL CENTER | Facility: HOSPITAL | Age: 65
End: 2020-05-13

## 2020-05-13 ENCOUNTER — LAB (OUTPATIENT)
Dept: LAB | Facility: HOSPITAL | Age: 65
End: 2020-05-13

## 2020-05-13 ENCOUNTER — EPISODE CHANGES (OUTPATIENT)
Dept: CASE MANAGEMENT | Facility: OTHER | Age: 65
End: 2020-05-13

## 2020-05-13 DIAGNOSIS — R73.9 HYPERGLYCEMIA: ICD-10-CM

## 2020-05-13 DIAGNOSIS — E78.00 PURE HYPERCHOLESTEROLEMIA: ICD-10-CM

## 2020-05-13 DIAGNOSIS — E78.00 PURE HYPERCHOLESTEROLEMIA: Primary | ICD-10-CM

## 2020-05-13 DIAGNOSIS — E78.5 HYPERLIPIDEMIA, UNSPECIFIED HYPERLIPIDEMIA TYPE: Primary | ICD-10-CM

## 2020-05-13 LAB
ANION GAP SERPL CALCULATED.3IONS-SCNC: 13.4 MMOL/L (ref 5–15)
BUN BLD-MCNC: 25 MG/DL (ref 8–23)
BUN/CREAT SERPL: 30.1 (ref 7–25)
CALCIUM SPEC-SCNC: 8.4 MG/DL (ref 8.6–10.5)
CHLORIDE SERPL-SCNC: 97 MMOL/L (ref 98–107)
CO2 SERPL-SCNC: 30.6 MMOL/L (ref 22–29)
CREAT BLD-MCNC: 0.83 MG/DL (ref 0.57–1)
GFR SERPL CREATININE-BSD FRML MDRD: 69 ML/MIN/1.73
GLUCOSE BLD-MCNC: 120 MG/DL (ref 65–99)
MAGNESIUM SERPL-MCNC: 0.8 MG/DL (ref 1.6–2.4)
POTASSIUM BLD-SCNC: 3.7 MMOL/L (ref 3.5–5.2)
SODIUM BLD-SCNC: 141 MMOL/L (ref 136–145)

## 2020-05-13 PROCEDURE — 36415 COLL VENOUS BLD VENIPUNCTURE: CPT

## 2020-05-13 PROCEDURE — 80048 BASIC METABOLIC PNL TOTAL CA: CPT | Performed by: INTERNAL MEDICINE

## 2020-05-13 PROCEDURE — 83735 ASSAY OF MAGNESIUM: CPT | Performed by: INTERNAL MEDICINE

## 2020-05-13 NOTE — OUTREACH NOTE
Medical Week 2 Survey      Responses   [facility] patient discharged from?  [place]   COVID-19 Test Status  Not tested   Does the patient have one of the following disease processes/diagnoses(primary or secondary)?  Other   Week 2 attempt successful?  No   Unsuccessful attempts  Attempt 1          Soniya Felton RN

## 2020-05-14 NOTE — TELEPHONE ENCOUNTER
"Called Critical value, Mag. 0.8 drawn at 10;26am to Anders Hardy PA-C    Reason for Disposition  • Lab or radiology calling with CRITICAL test results    Additional Information  • Negative: Lab calling with strep throat test results and triager can call in prescription  • Negative: Lab calling with urinalysis test results and triager can call in prescription  • Negative: Medication questions  • Negative: ED call to PCP  • Negative: Physician call to PCP  • Negative: Call about patient who is currently hospitalized    Answer Assessment - Initial Assessment Questions  1. REASON FOR CALL or QUESTION: \"What is your reason for calling today?\" or \"How can I best  help you?\" or \"What question do you have that I can help answer?\"      Marcelino in lab  2. CALLER: Document the source of call. (e.g., laboratory, patient).      Critical value called by lab    Protocols used: PCP CALL - NO TRIAGE-ADULT-      "

## 2020-05-15 ENCOUNTER — READMISSION MANAGEMENT (OUTPATIENT)
Dept: CALL CENTER | Facility: HOSPITAL | Age: 65
End: 2020-05-15

## 2020-05-15 NOTE — OUTREACH NOTE
Medical Week 2 Survey      Responses   [facility] patient discharged from?  [facility]   COVID-19 Test Status  Not tested   Does the patient have one of the following disease processes/diagnoses(primary or secondary)?  Other   Week 2 attempt successful?  No   Unsuccessful attempts  Attempt 2          Barbara Rojas, RN

## 2020-05-21 ENCOUNTER — READMISSION MANAGEMENT (OUTPATIENT)
Dept: CALL CENTER | Facility: HOSPITAL | Age: 65
End: 2020-05-21

## 2020-05-21 ENCOUNTER — EPISODE CHANGES (OUTPATIENT)
Dept: CASE MANAGEMENT | Facility: OTHER | Age: 65
End: 2020-05-21

## 2020-05-21 NOTE — OUTREACH NOTE
Medical Week 3 Survey      Responses   [facility] patient discharged from?  [place]   COVID-19 Test Status  Not tested   Does the patient have one of the following disease processes/diagnoses(primary or secondary)?  Other   Week 3 attempt successful?  No   Unsuccessful attempts  Attempt 1          Perla Krishnan RN

## 2020-05-22 ENCOUNTER — READMISSION MANAGEMENT (OUTPATIENT)
Dept: CALL CENTER | Facility: HOSPITAL | Age: 65
End: 2020-05-22

## 2020-05-22 NOTE — OUTREACH NOTE
Medical Week 3 Survey      Responses   [facility] patient discharged from?  [place]   COVID-19 Test Status  Not tested   Does the patient have one of the following disease processes/diagnoses(primary or secondary)?  Other   Week 3 attempt successful?  No   Unsuccessful attempts  Attempt 2          Melisa Armenta LPN

## 2020-05-27 ENCOUNTER — LAB (OUTPATIENT)
Dept: LAB | Facility: HOSPITAL | Age: 65
End: 2020-05-27

## 2020-05-27 ENCOUNTER — TRANSCRIBE ORDERS (OUTPATIENT)
Dept: ADMINISTRATIVE | Facility: HOSPITAL | Age: 65
End: 2020-05-27

## 2020-05-27 ENCOUNTER — OFFICE VISIT (OUTPATIENT)
Dept: GASTROENTEROLOGY | Facility: CLINIC | Age: 65
End: 2020-05-27

## 2020-05-27 VITALS
SYSTOLIC BLOOD PRESSURE: 132 MMHG | HEIGHT: 60 IN | BODY MASS INDEX: 32.98 KG/M2 | RESPIRATION RATE: 16 BRPM | HEART RATE: 66 BPM | WEIGHT: 168 LBS | DIASTOLIC BLOOD PRESSURE: 82 MMHG

## 2020-05-27 DIAGNOSIS — K21.00 GASTROESOPHAGEAL REFLUX DISEASE WITH ESOPHAGITIS: Primary | ICD-10-CM

## 2020-05-27 DIAGNOSIS — E66.9 OBESITY, UNSPECIFIED OBESITY SEVERITY, UNSPECIFIED OBESITY TYPE: ICD-10-CM

## 2020-05-27 DIAGNOSIS — E83.42 HYPOMAGNESEMIA: ICD-10-CM

## 2020-05-27 DIAGNOSIS — N17.9 ACUTE RENAL FAILURE, UNSPECIFIED ACUTE RENAL FAILURE TYPE (HCC): Primary | ICD-10-CM

## 2020-05-27 DIAGNOSIS — E86.0 DEHYDRATION: ICD-10-CM

## 2020-05-27 DIAGNOSIS — N17.9 ACUTE RENAL FAILURE, UNSPECIFIED ACUTE RENAL FAILURE TYPE (HCC): ICD-10-CM

## 2020-05-27 LAB
ALBUMIN SERPL-MCNC: 4.2 G/DL (ref 3.5–5.2)
ALBUMIN/GLOB SERPL: 1.3 G/DL
ALP SERPL-CCNC: 85 U/L (ref 39–117)
ALT SERPL W P-5'-P-CCNC: 10 U/L (ref 1–33)
ANION GAP SERPL CALCULATED.3IONS-SCNC: 12 MMOL/L (ref 5–15)
AST SERPL-CCNC: 16 U/L (ref 1–32)
BILIRUB SERPL-MCNC: 0.2 MG/DL (ref 0.2–1.2)
BUN BLD-MCNC: 17 MG/DL (ref 8–23)
BUN/CREAT SERPL: 27.9 (ref 7–25)
CALCIUM SPEC-SCNC: 9.2 MG/DL (ref 8.6–10.5)
CHLORIDE SERPL-SCNC: 98 MMOL/L (ref 98–107)
CO2 SERPL-SCNC: 31 MMOL/L (ref 22–29)
CREAT BLD-MCNC: 0.61 MG/DL (ref 0.57–1)
GFR SERPL CREATININE-BSD FRML MDRD: 99 ML/MIN/1.73
GLOBULIN UR ELPH-MCNC: 3.3 GM/DL
GLUCOSE BLD-MCNC: 99 MG/DL (ref 65–99)
MAGNESIUM SERPL-MCNC: 1 MG/DL (ref 1.6–2.4)
POTASSIUM BLD-SCNC: 3.6 MMOL/L (ref 3.5–5.2)
PROT SERPL-MCNC: 7.5 G/DL (ref 6–8.5)
SODIUM BLD-SCNC: 141 MMOL/L (ref 136–145)

## 2020-05-27 PROCEDURE — 36415 COLL VENOUS BLD VENIPUNCTURE: CPT

## 2020-05-27 PROCEDURE — 80053 COMPREHEN METABOLIC PANEL: CPT | Performed by: INTERNAL MEDICINE

## 2020-05-27 PROCEDURE — 83735 ASSAY OF MAGNESIUM: CPT | Performed by: INTERNAL MEDICINE

## 2020-05-27 PROCEDURE — 99213 OFFICE O/P EST LOW 20 MIN: CPT | Performed by: CLINICAL NURSE SPECIALIST

## 2020-05-27 RX ORDER — PANTOPRAZOLE SODIUM 40 MG/1
40 TABLET, DELAYED RELEASE ORAL
Qty: 30 TABLET | Refills: 5 | Status: SHIPPED | OUTPATIENT
Start: 2020-05-27 | End: 2020-12-16 | Stop reason: SDUPTHER

## 2020-05-27 NOTE — PROGRESS NOTES
Sammi Cordero  1955 5/27/2020  Chief Complaint   Patient presents with   • GI Problem     Here to discuss recent endoscopy while in the hospital         HPI    Sammi Cordero is a  65 y.o. female here for a follow up visit for complaint of recently diagnosed reflux esophagitis while inpatient. Dr Junior performed her Endoscopy and this showed esophagitis. She is on Protonix and she is doing well with this. No nausea or vomiting. No change in bowels. Hpylori was negative. No wt loss. No fever. No rectal bleeding.     Past Medical History:   Diagnosis Date   • WILDA (acute kidney injury) (CMS/HCC)    • Allergic rhinitis    • Colon polyps    • Depression    • Difficulty swallowing solids    • DJD (degenerative joint disease)    • GERD (gastroesophageal reflux disease)    • Gitelman syndrome     low magnesium   • Gout    • Hyperlipidemia    • Obesity    • Osteopenia      Past Surgical History:   Procedure Laterality Date   • AXILLARY LYMPH NODE BIOPSY/EXCISION Right 10/14/2019    Procedure: AXILLARY LYMPH NODE BIOPSY/EXCISION;  Surgeon: Karen Benitez MD;  Location: [facility] OR;  Service: General   • AXILLARY LYMPH NODE BIOPSY/EXCISION Left 11/26/2019    Procedure: AXILLARY LYMPH NODE BIOPSY/EXCISION;  Surgeon: Karen Benitez MD;  Location: [facility] OR;  Service: General   • COLONOSCOPY  08/20/2010   • COLONOSCOPY W/ POLYPECTOMY  09/14/2016    2   5 mm sessile polyps removed with hot snare repeat 5 years   • DILATATION AND CURETTAGE     • ENDOSCOPY  09/14/2016    Medium sized HH, LA Grade A esohagitis, Fluid in the middle third of esohagus    • ENDOSCOPY N/A 2/25/2020    Non-bleeding erosive gastropathy, medium size HH, H Pylori + treated    • ENDOSCOPY N/A 4/24/2020    LA Grade A reflux esophagitis, 2 cm HH, Urea negative   • JOINT REPLACEMENT Bilateral     knee   • NM TOTAL KNEE ARTHROPLASTY Left 3/9/2018    Procedure: LEFT TOTAL KNEE ARTHROPLASTY;  Surgeon: Orlin Meza MD;  Location: [facility]  OR;  Service: Orthopedics   • REPLACEMENT TOTAL KNEE Right        Outpatient Medications Marked as Taking for the 20 encounter (Office Visit) with Annmarie Martinez APRN   Medication Sig Dispense Refill   • acetaminophen (Tylenol) 325 MG tablet Take 2 tablets by mouth Every 6 (Six) Hours As Needed for Mild Pain  or Moderate Pain .     • magnesium oxide (MAGOX) 400 (241.3 Mg) MG tablet tablet Take 3 tabs in the morning and 2 tabs at night 30 each    • Multiple Vitamins-Minerals (MULTIVITAMIN WITH MINERALS) tablet tablet Take 1 tablet by mouth Daily.     • ondansetron (ZOFRAN) 4 MG tablet Take 1 tablet by mouth four times a day as needed for nausea 40 tablet 0   • pantoprazole (PROTONIX) 40 MG EC tablet Take 1 tablet by mouth Every Morning Before Breakfast. 30 tablet 5   • PARoxetine (PAXIL) 10 MG tablet Take 10 mg by mouth Every Morning.  5   • simvastatin (ZOCOR) 20 MG tablet Take 1 tablet by mouth once daily 90 tablet 2   • [DISCONTINUED] pantoprazole (PROTONIX) 40 MG EC tablet Take 1 tablet by mouth Every Morning Before Breakfast. 30 tablet 0       Allergies   Allergen Reactions   • Penicillins Swelling and Rash   • Sulfa Antibiotics Rash and Other (See Comments)     Temp. fluctuations        Social History     Socioeconomic History   • Marital status:      Spouse name: Not on file   • Number of children: Not on file   • Years of education: Not on file   • Highest education level: Not on file   Tobacco Use   • Smoking status: Former Smoker     Packs/day: 0.50     Types: Cigarettes     Last attempt to quit:      Years since quittin.4   • Smokeless tobacco: Never Used   Substance and Sexual Activity   • Alcohol use: No   • Drug use: No   • Sexual activity: Defer       Family History   Problem Relation Age of Onset   • Aortic aneurysm Father    • Anorexia nervosa Mother    • No Known Problems Brother    • No Known Problems Sister    • No Known Problems Son    • No Known Problems Daughter   "  • No Known Problems Daughter    • No Known Problems Maternal Grandmother    • No Known Problems Paternal Grandmother    • No Known Problems Maternal Aunt    • No Known Problems Paternal Aunt    • Lymphoma Paternal Grandfather    • Breast cancer Neg Hx    • Ovarian cancer Neg Hx    • Colon cancer Neg Hx    • BRCA 1/2 Neg Hx    • Endometrial cancer Neg Hx    • Colon polyps Neg Hx        Review of Systems   Constitutional: Negative for activity change, appetite change, chills, diaphoresis, fatigue, fever and unexpected weight change.   HENT: Negative for ear pain, hearing loss, mouth sores, sore throat, trouble swallowing and voice change.    Eyes: Negative.    Respiratory: Negative for cough, choking, shortness of breath and wheezing.    Cardiovascular: Negative for chest pain and palpitations.   Gastrointestinal: Negative for abdominal pain, blood in stool, constipation, diarrhea, nausea and vomiting.   Endocrine: Negative for cold intolerance and heat intolerance.   Genitourinary: Negative for decreased urine volume, dysuria, frequency, hematuria and urgency.   Musculoskeletal: Negative for back pain, gait problem and myalgias.   Skin: Negative for color change, pallor and rash.   Allergic/Immunologic: Negative for food allergies and immunocompromised state.   Neurological: Negative for dizziness, tremors, seizures, syncope, weakness, light-headedness, numbness and headaches.   Hematological: Negative for adenopathy. Does not bruise/bleed easily.   Psychiatric/Behavioral: Negative for agitation and confusion. The patient is not nervous/anxious.    All other systems reviewed and are negative.      /82   Pulse 66   Resp 16   Ht 152.4 cm (60\")   Wt 76.2 kg (168 lb)   Breastfeeding No   BMI 32.81 kg/m²   Body mass index is 32.81 kg/m².    Physical Exam   Constitutional: She is oriented to person, place, and time. She appears well-developed and well-nourished.   HENT:   Head: Normocephalic and atraumatic. "   Eyes: Pupils are equal, round, and reactive to light.   Neck: Normal range of motion. Neck supple. No tracheal deviation present.   Cardiovascular: Normal rate, regular rhythm and normal heart sounds. Exam reveals no gallop and no friction rub.   No murmur heard.  Pulmonary/Chest: Effort normal and breath sounds normal. No respiratory distress. She has no wheezes. She has no rales. She exhibits no tenderness.   Abdominal: Soft. Bowel sounds are normal. She exhibits no distension. There is no hepatosplenomegaly. There is no tenderness. There is no rigidity, no rebound and no guarding.   Musculoskeletal: Normal range of motion. She exhibits no edema, tenderness or deformity.   Neurological: She is alert and oriented to person, place, and time. She has normal reflexes.   Skin: Skin is warm and dry. No rash noted. No pallor.   Psychiatric: She has a normal mood and affect. Her behavior is normal. Judgment and thought content normal.       ASSESSMENT AND PLAN    Patient's Body mass index is 32.81 kg/m². BMI is above normal parameters. Recommendations include: nutrition counseling.    Sammi was seen today for gi problem.    Diagnoses and all orders for this visit:    Gastroesophageal reflux disease with esophagitis    Obesity, unspecified obesity severity, unspecified obesity type    Other orders  -     pantoprazole (PROTONIX) 40 MG EC tablet; Take 1 tablet by mouth Every Morning Before Breakfast.      I discussed non pharmaceutical treatment of gerd.  This includes gradually losing weight to achieve ideal body wt., elevation of the head of bed by 4-6 inches, nothing to eat or drink 3 hours prior to lying down, avoiding tight clothing, stress reduction, tobacco cessation, reduction of alcohol intake, and dietary restrictions (avoiding caffeine, coffee, fatty foods, mints, chocolate, spicy foods and tomato based sauces as much as possible).    Due for colonoscopy next year.    There are no Patient Instructions on file  for this visit.  Annmarie Morocho Juan, ROSENDA  14:04  5/27/2020    Obesity, Adult  Obesity is the condition of having too much total body fat. Being overweight or obese means that your weight is greater than what is considered healthy for your body size. Obesity is determined by a measurement called BMI. BMI is an estimate of body fat and is calculated from height and weight. For adults, a BMI of 30 or higher is considered obese.  Obesity can eventually lead to other health concerns and major illnesses, including:  · Stroke.  · Coronary artery disease (CAD).  · Type 2 diabetes.  · Some types of cancer, including cancers of the colon, breast, uterus, and gallbladder.  · Osteoarthritis.  · High blood pressure (hypertension).  · High cholesterol.  · Sleep apnea.  · Gallbladder stones.  · Infertility problems.  What are the causes?  The main cause of obesity is taking in (consuming) more calories than your body uses for energy. Other factors that contribute to this condition may include:  · Being born with genes that make you more likely to become obese.  · Having a medical condition that causes obesity. These conditions include:  ¨ Hypothyroidism.  ¨ Polycystic ovarian syndrome (PCOS).  ¨ Binge-eating disorder.  ¨ Cushing syndrome.  · Taking certain medicines, such as steroids, antidepressants, and seizure medicines.  · Not being physically active (sedentary lifestyle).  · Living where there are limited places to exercise safely or buy healthy foods.  · Not getting enough sleep.  What increases the risk?  The following factors may increase your risk of this condition:  · Having a family history of obesity.  · Being a woman of -American descent.  · Being a man of  descent.  What are the signs or symptoms?  Having excessive body fat is the main symptom of this condition.  How is this diagnosed?  This condition may be diagnosed based on:  · Your symptoms.  · Your medical history.  · A physical exam. Your  health care provider may measure:  ¨ Your BMI. If you are an adult with a BMI between 25 and less than 30, you are considered overweight. If you are an adult with a BMI of 30 or higher, you are considered obese.  ¨ The distances around your hips and your waist (circumferences). These may be compared to each other to help diagnose your condition.  ¨ Your skinfold thickness. Your health care provider may gently pinch a fold of your skin and measure it.  How is this treated?  Treatment for this condition often includes changing your lifestyle. Treatment may include some or all of the following:  · Dietary changes. Work with your health care provider and a dietitian to set a weight-loss goal that is healthy and reasonable for you. Dietary changes may include eating:  ¨ Smaller portions. A portion size is the amount of a particular food that is healthy for you to eat at one time. This varies from person to person.  ¨ Low-calorie or low-fat options.  ¨ More whole grains, fruits, and vegetables.  · Regular physical activity. This may include aerobic activity (cardio) and strength training.  · Medicine to help you lose weight. Your health care provider may prescribe medicine if you are unable to lose 1 pound a week after 6 weeks of eating more healthily and doing more physical activity.  · Surgery. Surgical options may include gastric banding and gastric bypass. Surgery may be done if:  ¨ Other treatments have not helped to improve your condition.  ¨ You have a BMI of 40 or higher.  ¨ You have life-threatening health problems related to obesity.  Follow these instructions at home:     Eating and drinking     · Follow recommendations from your health care provider about what you eat and drink. Your health care provider may advise you to:  ¨ Limit fast foods, sweets, and processed snack foods.  ¨ Choose low-fat options, such as low-fat milk instead of whole milk.  ¨ Eat 5 or more servings of fruits or vegetables every  day.  ¨ Eat at home more often. This gives you more control over what you eat.  ¨ Choose healthy foods when you eat out.  ¨ Learn what a healthy portion size is.  ¨ Keep low-fat snacks on hand.  ¨ Avoid sugary drinks, such as soda, fruit juice, iced tea sweetened with sugar, and flavored milk.  ¨ Eat a healthy breakfast.  · Drink enough water to keep your urine clear or pale yellow.  · Do not go without eating for long periods of time (do not fast) or follow a fad diet. Fasting and fad diets can be unhealthy and even dangerous.  Physical Activity   · Exercise regularly, as told by your health care provider. Ask your health care provider what types of exercise are safe for you and how often you should exercise.  · Warm up and stretch before being active.  · Cool down and stretch after being active.  · Rest between periods of activity.  Lifestyle   · Limit the time that you spend in front of your TV, computer, or video game system.  · Find ways to reward yourself that do not involve food.  · Limit alcohol intake to no more than 1 drink a day for nonpregnant women and 2 drinks a day for men. One drink equals 12 oz of beer, 5 oz of wine, or 1½ oz of hard liquor.  General instructions   · Keep a weight loss journal to keep track of the food you eat and how much you exercise you get.  · Take over-the-counter and prescription medicines only as told by your health care provider.  · Take vitamins and supplements only as told by your health care provider.  · Consider joining a support group. Your health care provider may be able to recommend a support group.  · Keep all follow-up visits as told by your health care provider. This is important.  Contact a health care provider if:  · You are unable to meet your weight loss goal after 6 weeks of dietary and lifestyle changes.  This information is not intended to replace advice given to you by your health care provider. Make sure you discuss any questions you have with your health  care provider.  Document Released: 01/25/2006 Document Revised: 05/22/2017 Document Reviewed: 10/05/2016  [employer] Interactive Patient Education © 2017 [employer]      IF YOU SMOKE OR USE TOBACCO PLEASE READ THE FOLLOWING:    Why is smoking bad for me?  Smoking increases the risk of heart disease, lung disease, vascular disease, stroke, and cancer.     If you smoke, STOP!    If you would like more information on quitting smoking, please visit the [employer] website: www.[employer]/corporate/healthier-together/smoke   This link will provide additional resources including the QUIT line and the Beat the Pack support groups.     For more information:    Quit Now [facility]  1-800-QUIT-NOW  https://kentsusany.quitlogix.org/en-US/

## 2020-06-22 ENCOUNTER — TELEPHONE (OUTPATIENT)
Dept: ONCOLOGY | Facility: CLINIC | Age: 65
End: 2020-06-22

## 2020-06-23 ENCOUNTER — LAB (OUTPATIENT)
Dept: LAB | Facility: HOSPITAL | Age: 65
End: 2020-06-23

## 2020-06-23 ENCOUNTER — TRANSCRIBE ORDERS (OUTPATIENT)
Dept: ADMINISTRATIVE | Facility: HOSPITAL | Age: 65
End: 2020-06-23

## 2020-06-23 DIAGNOSIS — E78.5 HYPERLIPIDEMIA, UNSPECIFIED HYPERLIPIDEMIA TYPE: ICD-10-CM

## 2020-06-23 DIAGNOSIS — K21.9 GASTROESOPHAGEAL REFLUX DISEASE WITHOUT ESOPHAGITIS: ICD-10-CM

## 2020-06-23 DIAGNOSIS — F32.1 MAJOR DEPRESSIVE DISORDER, SINGLE EPISODE, MODERATE (HCC): ICD-10-CM

## 2020-06-23 DIAGNOSIS — K44.9 DIAPHRAGMATIC HERNIA WITHOUT OBSTRUCTION OR GANGRENE: ICD-10-CM

## 2020-06-23 DIAGNOSIS — Z00.00 ENCOUNTER FOR GENERAL ADULT MEDICAL EXAMINATION W/O ABNORMAL FINDINGS: Primary | ICD-10-CM

## 2020-06-23 DIAGNOSIS — Z00.00 ENCOUNTER FOR GENERAL ADULT MEDICAL EXAMINATION W/O ABNORMAL FINDINGS: ICD-10-CM

## 2020-06-23 DIAGNOSIS — F41.1 GENERALIZED ANXIETY DISORDER: ICD-10-CM

## 2020-06-23 DIAGNOSIS — M15.0 PRIMARY GENERALIZED (OSTEO)ARTHRITIS: ICD-10-CM

## 2020-06-23 DIAGNOSIS — R73.9 HYPERGLYCEMIA, UNSPECIFIED: ICD-10-CM

## 2020-06-23 LAB
ALBUMIN SERPL-MCNC: 4.1 G/DL (ref 3.5–5.2)
ALBUMIN/GLOB SERPL: 1.3 G/DL
ALP SERPL-CCNC: 74 U/L (ref 39–117)
ALT SERPL W P-5'-P-CCNC: 14 U/L (ref 1–33)
ANION GAP SERPL CALCULATED.3IONS-SCNC: 12.6 MMOL/L (ref 5–15)
AST SERPL-CCNC: 24 U/L (ref 1–32)
BACTERIA UR QL AUTO: ABNORMAL /HPF
BASOPHILS # BLD AUTO: 0.05 10*3/MM3 (ref 0–0.2)
BASOPHILS NFR BLD AUTO: 0.6 % (ref 0–1.5)
BILIRUB SERPL-MCNC: 0.4 MG/DL (ref 0.2–1.2)
BILIRUB UR QL STRIP: NEGATIVE
BUN BLD-MCNC: 17 MG/DL (ref 8–23)
BUN/CREAT SERPL: 23.9 (ref 7–25)
CALCIUM SPEC-SCNC: 9.3 MG/DL (ref 8.6–10.5)
CHLORIDE SERPL-SCNC: 96 MMOL/L (ref 98–107)
CHOLEST SERPL-MCNC: 153 MG/DL (ref 0–200)
CLARITY UR: ABNORMAL
CO2 SERPL-SCNC: 26.4 MMOL/L (ref 22–29)
COLOR UR: YELLOW
CREAT BLD-MCNC: 0.71 MG/DL (ref 0.57–1)
DEPRECATED RDW RBC AUTO: 43.4 FL (ref 37–54)
EOSINOPHIL # BLD AUTO: 0.15 10*3/MM3 (ref 0–0.4)
EOSINOPHIL NFR BLD AUTO: 1.7 % (ref 0.3–6.2)
ERYTHROCYTE [DISTWIDTH] IN BLOOD BY AUTOMATED COUNT: 14.4 % (ref 12.3–15.4)
GFR SERPL CREATININE-BSD FRML MDRD: 83 ML/MIN/1.73
GLOBULIN UR ELPH-MCNC: 3.1 GM/DL
GLUCOSE BLD-MCNC: 132 MG/DL (ref 65–99)
GLUCOSE UR STRIP-MCNC: NEGATIVE MG/DL
HCT VFR BLD AUTO: 34 % (ref 34–46.6)
HDLC SERPL-MCNC: 56 MG/DL (ref 40–60)
HGB BLD-MCNC: 11.5 G/DL (ref 12–15.9)
HGB UR QL STRIP.AUTO: NEGATIVE
HYALINE CASTS UR QL AUTO: ABNORMAL /LPF
IMM GRANULOCYTES # BLD AUTO: 0.02 10*3/MM3 (ref 0–0.05)
IMM GRANULOCYTES NFR BLD AUTO: 0.2 % (ref 0–0.5)
KETONES UR QL STRIP: NEGATIVE
LDLC SERPL CALC-MCNC: 77 MG/DL (ref 0–100)
LDLC/HDLC SERPL: 1.38 {RATIO}
LEUKOCYTE ESTERASE UR QL STRIP.AUTO: ABNORMAL
LYMPHOCYTES # BLD AUTO: 1.85 10*3/MM3 (ref 0.7–3.1)
LYMPHOCYTES NFR BLD AUTO: 21.5 % (ref 19.6–45.3)
MCH RBC QN AUTO: 28 PG (ref 26.6–33)
MCHC RBC AUTO-ENTMCNC: 33.8 G/DL (ref 31.5–35.7)
MCV RBC AUTO: 82.7 FL (ref 79–97)
MONOCYTES # BLD AUTO: 0.74 10*3/MM3 (ref 0.1–0.9)
MONOCYTES NFR BLD AUTO: 8.6 % (ref 5–12)
NEUTROPHILS # BLD AUTO: 5.81 10*3/MM3 (ref 1.7–7)
NEUTROPHILS NFR BLD AUTO: 67.4 % (ref 42.7–76)
NITRITE UR QL STRIP: NEGATIVE
NRBC BLD AUTO-RTO: 0 /100 WBC (ref 0–0.2)
PH UR STRIP.AUTO: 8 [PH] (ref 5–8)
PLATELET # BLD AUTO: 262 10*3/MM3 (ref 140–450)
PMV BLD AUTO: 10 FL (ref 6–12)
POTASSIUM BLD-SCNC: 4.2 MMOL/L (ref 3.5–5.2)
PROT SERPL-MCNC: 7.2 G/DL (ref 6–8.5)
PROT UR QL STRIP: NEGATIVE
RBC # BLD AUTO: 4.11 10*6/MM3 (ref 3.77–5.28)
RBC # UR: ABNORMAL /HPF
REF LAB TEST METHOD: ABNORMAL
SODIUM BLD-SCNC: 135 MMOL/L (ref 136–145)
SP GR UR STRIP: 1.02 (ref 1–1.03)
SQUAMOUS #/AREA URNS HPF: ABNORMAL /HPF
T4 FREE SERPL-MCNC: 1.07 NG/DL (ref 0.93–1.7)
TRIGL SERPL-MCNC: 100 MG/DL (ref 0–150)
TSH SERPL DL<=0.05 MIU/L-ACNC: 2.96 UIU/ML (ref 0.27–4.2)
UROBILINOGEN UR QL STRIP: ABNORMAL
VLDLC SERPL-MCNC: 20 MG/DL (ref 5–40)
WBC NRBC COR # BLD: 8.62 10*3/MM3 (ref 3.4–10.8)
WBC UR QL AUTO: ABNORMAL /HPF

## 2020-06-23 PROCEDURE — 80061 LIPID PANEL: CPT | Performed by: INTERNAL MEDICINE

## 2020-06-23 PROCEDURE — 84443 ASSAY THYROID STIM HORMONE: CPT | Performed by: INTERNAL MEDICINE

## 2020-06-23 PROCEDURE — 81001 URINALYSIS AUTO W/SCOPE: CPT

## 2020-06-23 PROCEDURE — 36415 COLL VENOUS BLD VENIPUNCTURE: CPT

## 2020-06-23 PROCEDURE — 80053 COMPREHEN METABOLIC PANEL: CPT | Performed by: INTERNAL MEDICINE

## 2020-06-23 PROCEDURE — 84439 ASSAY OF FREE THYROXINE: CPT | Performed by: INTERNAL MEDICINE

## 2020-06-23 PROCEDURE — 85025 COMPLETE CBC W/AUTO DIFF WBC: CPT | Performed by: INTERNAL MEDICINE

## 2020-07-01 ENCOUNTER — TRANSCRIBE ORDERS (OUTPATIENT)
Dept: ADMINISTRATIVE | Facility: HOSPITAL | Age: 65
End: 2020-07-01

## 2020-07-01 DIAGNOSIS — Z12.31 ENCOUNTER FOR SCREENING MAMMOGRAM FOR MALIGNANT NEOPLASM OF BREAST: Primary | ICD-10-CM

## 2020-07-02 ENCOUNTER — TRANSCRIBE ORDERS (OUTPATIENT)
Dept: ADMINISTRATIVE | Facility: HOSPITAL | Age: 65
End: 2020-07-02

## 2020-07-02 ENCOUNTER — LAB (OUTPATIENT)
Dept: LAB | Facility: HOSPITAL | Age: 65
End: 2020-07-02

## 2020-07-02 DIAGNOSIS — E78.00 PURE HYPERCHOLESTEROLEMIA: ICD-10-CM

## 2020-07-02 DIAGNOSIS — Z20.89 CONTACT WITH AND (SUSPECTED) EXPOSURE TO OTHER COMMUNICABLE DISEASES: Primary | ICD-10-CM

## 2020-07-02 DIAGNOSIS — R73.9 HYPERGLYCEMIA: ICD-10-CM

## 2020-07-02 LAB
MAGNESIUM SERPL-MCNC: 1.1 MG/DL (ref 1.6–2.4)
SARS-COV-2 IGG SERPLBLD QL IA.RAPID: NOT DETECTED
SARS-COV-2 IGM SERPLBLD QL IA.RAPID: NOT DETECTED

## 2020-07-02 PROCEDURE — C9803 HOPD COVID-19 SPEC COLLECT: HCPCS

## 2020-07-02 PROCEDURE — 83735 ASSAY OF MAGNESIUM: CPT | Performed by: INTERNAL MEDICINE

## 2020-07-02 PROCEDURE — 86328 IA NFCT AB SARSCOV2 COVID19: CPT | Performed by: INTERNAL MEDICINE

## 2020-07-02 PROCEDURE — 80048 BASIC METABOLIC PNL TOTAL CA: CPT | Performed by: INTERNAL MEDICINE

## 2020-07-02 PROCEDURE — 36415 COLL VENOUS BLD VENIPUNCTURE: CPT | Performed by: INTERNAL MEDICINE

## 2020-07-03 LAB
ANION GAP SERPL CALCULATED.3IONS-SCNC: 11.8 MMOL/L (ref 5–15)
BUN SERPL-MCNC: 20 MG/DL (ref 8–23)
BUN/CREAT SERPL: 30.3 (ref 7–25)
CALCIUM SPEC-SCNC: 9.5 MG/DL (ref 8.6–10.5)
CHLORIDE SERPL-SCNC: 100 MMOL/L (ref 98–107)
CO2 SERPL-SCNC: 29.2 MMOL/L (ref 22–29)
CREAT SERPL-MCNC: 0.66 MG/DL (ref 0.57–1)
GFR SERPL CREATININE-BSD FRML MDRD: 90 ML/MIN/1.73
GLUCOSE SERPL-MCNC: 87 MG/DL (ref 65–99)
POTASSIUM SERPL-SCNC: 3.9 MMOL/L (ref 3.5–5.2)
SODIUM SERPL-SCNC: 141 MMOL/L (ref 136–145)

## 2020-08-03 ENCOUNTER — APPOINTMENT (OUTPATIENT)
Dept: CT IMAGING | Facility: HOSPITAL | Age: 65
End: 2020-08-03

## 2020-08-21 ENCOUNTER — TRANSCRIBE ORDERS (OUTPATIENT)
Dept: ADMINISTRATIVE | Facility: HOSPITAL | Age: 65
End: 2020-08-21

## 2020-08-21 ENCOUNTER — LAB (OUTPATIENT)
Dept: LAB | Facility: HOSPITAL | Age: 65
End: 2020-08-21

## 2020-08-21 DIAGNOSIS — E83.42 HYPOMAGNESEMIA: Primary | ICD-10-CM

## 2020-08-21 LAB
ALBUMIN SERPL-MCNC: 3.9 G/DL (ref 3.5–5.2)
ALBUMIN/GLOB SERPL: 1.4 G/DL
ALP SERPL-CCNC: 73 U/L (ref 39–117)
ALT SERPL W P-5'-P-CCNC: 13 U/L (ref 1–33)
ANION GAP SERPL CALCULATED.3IONS-SCNC: 10.4 MMOL/L (ref 5–15)
AST SERPL-CCNC: 16 U/L (ref 1–32)
BILIRUB SERPL-MCNC: 0.2 MG/DL (ref 0–1.2)
BUN SERPL-MCNC: 18 MG/DL (ref 8–23)
BUN/CREAT SERPL: 24 (ref 7–25)
CALCIUM SPEC-SCNC: 8.8 MG/DL (ref 8.6–10.5)
CHLORIDE SERPL-SCNC: 100 MMOL/L (ref 98–107)
CO2 SERPL-SCNC: 28.6 MMOL/L (ref 22–29)
CREAT SERPL-MCNC: 0.75 MG/DL (ref 0.57–1)
GFR SERPL CREATININE-BSD FRML MDRD: 78 ML/MIN/1.73
GLOBULIN UR ELPH-MCNC: 2.8 GM/DL
GLUCOSE SERPL-MCNC: 109 MG/DL (ref 65–99)
MAGNESIUM SERPL-MCNC: 1.1 MG/DL (ref 1.6–2.4)
POTASSIUM SERPL-SCNC: 3.8 MMOL/L (ref 3.5–5.2)
PROT SERPL-MCNC: 6.7 G/DL (ref 6–8.5)
SODIUM SERPL-SCNC: 139 MMOL/L (ref 136–145)

## 2020-08-21 PROCEDURE — 80053 COMPREHEN METABOLIC PANEL: CPT | Performed by: INTERNAL MEDICINE

## 2020-08-21 PROCEDURE — 83735 ASSAY OF MAGNESIUM: CPT | Performed by: INTERNAL MEDICINE

## 2020-08-21 PROCEDURE — 36415 COLL VENOUS BLD VENIPUNCTURE: CPT | Performed by: INTERNAL MEDICINE

## 2020-08-25 LAB — HOLD SPECIMEN: NORMAL

## 2020-08-28 ENCOUNTER — HOSPITAL ENCOUNTER (OUTPATIENT)
Dept: MAMMOGRAPHY | Facility: HOSPITAL | Age: 65
Discharge: HOME | End: 2020-08-28
Admitting: INTERNAL MEDICINE

## 2020-08-28 ENCOUNTER — APPOINTMENT (OUTPATIENT)
Dept: MAMMOGRAPHY | Facility: HOSPITAL | Age: 65
End: 2020-08-28

## 2020-08-28 DIAGNOSIS — Z12.31 ENCOUNTER FOR SCREENING MAMMOGRAM FOR MALIGNANT NEOPLASM OF BREAST: ICD-10-CM

## 2020-08-28 PROCEDURE — 77063 BREAST TOMOSYNTHESIS BI: CPT

## 2020-08-28 PROCEDURE — 77067 SCR MAMMO BI INCL CAD: CPT

## 2020-10-12 ENCOUNTER — OFFICE VISIT (OUTPATIENT)
Dept: OBSTETRICS AND GYNECOLOGY | Facility: CLINIC | Age: 65
End: 2020-10-12

## 2020-10-12 VITALS
WEIGHT: 172 LBS | HEIGHT: 60 IN | SYSTOLIC BLOOD PRESSURE: 124 MMHG | BODY MASS INDEX: 33.77 KG/M2 | DIASTOLIC BLOOD PRESSURE: 70 MMHG

## 2020-10-12 DIAGNOSIS — Z01.419 WELL WOMAN EXAM WITH ROUTINE GYNECOLOGICAL EXAM: Primary | ICD-10-CM

## 2020-10-12 DIAGNOSIS — R59.0 LYMPHADENOPATHY, CERVICAL: ICD-10-CM

## 2020-10-12 DIAGNOSIS — N95.1 MENOPAUSAL STATE: ICD-10-CM

## 2020-10-12 DIAGNOSIS — E66.9 OBESITY (BMI 30-39.9): ICD-10-CM

## 2020-10-12 PROCEDURE — 99396 PREV VISIT EST AGE 40-64: CPT | Performed by: NURSE PRACTITIONER

## 2020-10-12 PROCEDURE — 87624 HPV HI-RISK TYP POOLED RSLT: CPT | Performed by: NURSE PRACTITIONER

## 2020-10-12 PROCEDURE — G0123 SCREEN CERV/VAG THIN LAYER: HCPCS | Performed by: NURSE PRACTITIONER

## 2020-10-12 NOTE — PROGRESS NOTES
Sammi Cordero is a 65 y.o.      Chief Complaint   Patient presents with   • Gynecologic Exam     pt here for annual exam. pt voices no complaints.           HPI - Sammi is in for gyn exam.  She has no vaginal bleeding.  She recently had 1 cataract repaired recently, she has Gilteman's Syndrome  which is a condition that causes low magnesium.  She takes magnesium in larger doses day. She has had no vaginal bleeding and does not use HRT.      The following portions of the patient's history were reviewed and updated as appropriate:vital signs, allergies, current medications, past family history, past medical history, past social history, past surgical history and problem list.      Current Outpatient Medications:   •  acetaminophen (Tylenol) 325 MG tablet, Take 2 tablets by mouth Every 6 (Six) Hours As Needed for Mild Pain  or Moderate Pain ., Disp: , Rfl:   •  aMILoride (MIDAMOR) 5 MG tablet, Take 1 tablet by mouth daily with food, Disp: 90 tablet, Rfl: 3  •  magnesium oxide (MAGOX) 400 (241.3 Mg) MG tablet tablet, Take 3 tabs in the morning and 2 tabs at night, Disp: 30 each, Rfl:   •  Multiple Vitamins-Minerals (MULTIVITAMIN WITH MINERALS) tablet tablet, Take 1 tablet by mouth Daily., Disp: , Rfl:   •  ondansetron (ZOFRAN) 4 MG tablet, Take 1 tablet by mouth four times a day as needed for nausea, Disp: 40 tablet, Rfl: 0  •  pantoprazole (PROTONIX) 40 MG EC tablet, Take 1 tablet by mouth Every Morning Before Breakfast., Disp: 30 tablet, Rfl: 5  •  PARoxetine (PAXIL) 10 MG tablet, Take 10 mg by mouth Every Morning., Disp: , Rfl: 5  •  prednisoLONE acetate (PRED FORTE) 1 % ophthalmic suspension, Instill 1 drop into affected eye four times a day as directed BEGIN AS DIRECTED AFTER SURGERY, Disp: 15 mL, Rfl: 1  •  PROLENSA 0.07 % solution, Instill 1 drop into affected eye once a day as directed BEGIN 1 DAY PRIOR TO SURGERY, Disp: 3 mL, Rfl: 1  •  simvastatin (ZOCOR) 20 MG tablet, Take 1 tablet by mouth  "once daily, Disp: 90 tablet, Rfl: 2    Review of Systems   Constitutional: Positive for fatigue. Negative for activity change, chills and fever.   HENT: Negative for congestion, drooling, mouth sores, sinus pressure and swollen glands.    Eyes: Negative for double vision, redness and itching.   Respiratory: Negative for apnea, choking and stridor.    Gastrointestinal: Positive for GERD. Negative for abdominal distention, blood in stool and nausea.   Endocrine: Negative for heat intolerance and polyphagia.   Genitourinary: Negative for breast discharge, decreased libido, flank pain, hematuria, pelvic pressure and vaginal bleeding.        Gitelman's Syndrome  (low magnesium)   Musculoskeletal: Positive for arthralgias. Negative for gait problem.        Osteopenia  History of GOUT   Skin: Negative for color change, rash and skin lesions.   Allergic/Immunologic: Negative for food allergies.   Neurological: Negative for dizziness, tremors, speech difficulty and light-headedness.   Psychiatric/Behavioral: Negative for agitation and dysphoric mood.     Breast ROS: UTD  Objective      /70   Ht 152.4 cm (60\")   Wt 78 kg (172 lb)   Breastfeeding No   BMI 33.59 kg/m²       Physical Exam  Vitals signs and nursing note reviewed. Exam conducted with a chaperone present.   Constitutional:       General: She is not in acute distress.     Appearance: She is well-developed. She is not diaphoretic.   HENT:      Head: Normocephalic.      Right Ear: External ear normal.      Left Ear: External ear normal.      Nose: Nose normal.   Eyes:      General: No scleral icterus.        Right eye: No discharge.         Left eye: No discharge.      Conjunctiva/sclera: Conjunctivae normal.   Neck:      Musculoskeletal: Normal range of motion and neck supple.      Thyroid: No thyromegaly.      Vascular: No carotid bruit.      Trachea: No tracheal deviation.   Cardiovascular:      Rate and Rhythm: Normal rate and regular rhythm.      Heart " sounds: Normal heart sounds. No murmur.   Pulmonary:      Effort: Pulmonary effort is normal. No respiratory distress.      Breath sounds: Normal breath sounds. No wheezing.   Chest:      Breasts: Breasts are symmetrical.         Right: No inverted nipple, mass, nipple discharge, skin change or tenderness.         Left: No inverted nipple, mass, nipple discharge, skin change or tenderness.   Abdominal:      General: There is no distension.      Palpations: Abdomen is soft. There is no mass.      Tenderness: There is no abdominal tenderness. There is no guarding.      Hernia: No hernia is present. There is no hernia in the left inguinal area or right inguinal area.   Genitourinary:     General: Normal vulva.      Exam position: Lithotomy position.      Labia:         Right: No rash, tenderness, lesion or injury.         Left: No rash, tenderness, lesion or injury.       Vagina: Normal. No signs of injury and foreign body. No vaginal discharge, erythema, tenderness or bleeding.      Cervix: Normal.      Uterus: Normal. Not enlarged, not fixed and not tender.       Adnexa: Right adnexa normal and left adnexa normal.        Right: No mass, tenderness or fullness.          Left: No mass, tenderness or fullness.        Rectum: Normal. No mass.      Comments:   BSU normal  Urethral meatus  Normal  Perineum  Normal  Musculoskeletal: Normal range of motion.         General: No tenderness.   Lymphadenopathy:      Head:      Right side of head: No submental, submandibular, tonsillar, preauricular, posterior auricular or occipital adenopathy.      Left side of head: No submental, submandibular, tonsillar, preauricular, posterior auricular or occipital adenopathy.      Cervical: No cervical adenopathy.      Right cervical: No superficial, deep or posterior cervical adenopathy.     Left cervical: No superficial, deep or posterior cervical adenopathy.      Lower Body: No right inguinal adenopathy. No left inguinal adenopathy.    Skin:     General: Skin is warm and dry.      Findings: No bruising, erythema or rash.   Neurological:      Mental Status: She is alert and oriented to person, place, and time.      Coordination: Coordination normal.   Psychiatric:         Mood and Affect: Mood normal.         Behavior: Behavior normal.         Thought Content: Thought content normal.         Judgment: Judgment normal.          Assessment/Plan        Diagnoses and all orders for this visit:    1. Well woman exam with routine gynecological exam (Primary)  Comments:  Postmenopausal,  no history of abnormal pap, patient is not on HRT.  Orders:  -     Liquid-based Pap Smear, Screening  -     HPV DNA Probe, Direct - ThinPrep Vial, Cervix    Mammogram is current.    2. Menopausal state    3. Lymphadenopathy, cervical  Comments:  nontender, no sore throat, no earache   2 cm bilateral  Patient declines antibiotic and will see Dr. Whaley in two weeks if still present.    4. Obesity (BMI 30-39.9)  Counseled re: weight reduction, watching  carbs and exercise as tolerated.    Patient is counseled re: BSE, diet, exercise, mammogram, calcium and Vit. D3                                      Denice Talavera, APRN  10/12/2020

## 2020-10-15 LAB
GEN CATEG CVX/VAG CYTO-IMP: NORMAL
HPV I/H RISK 4 DNA CVX QL PROBE+SIG AMP: NOT DETECTED
LAB AP CASE REPORT: NORMAL
LAB AP GYN ADDITIONAL INFORMATION: NORMAL
LAB AP GYN OTHER FINDINGS: NORMAL
PATH INTERP SPEC-IMP: NORMAL
STAT OF ADQ CVX/VAG CYTO-IMP: NORMAL

## 2020-12-16 DIAGNOSIS — K21.9 GASTROESOPHAGEAL REFLUX DISEASE, UNSPECIFIED WHETHER ESOPHAGITIS PRESENT: Primary | ICD-10-CM

## 2020-12-16 RX ORDER — PANTOPRAZOLE SODIUM 40 MG/1
40 TABLET, DELAYED RELEASE ORAL
Qty: 30 TABLET | Refills: 5 | Status: SHIPPED | OUTPATIENT
Start: 2020-12-16

## 2020-12-30 ENCOUNTER — TRANSCRIBE ORDERS (OUTPATIENT)
Dept: ADMINISTRATIVE | Facility: HOSPITAL | Age: 65
End: 2020-12-30

## 2020-12-30 ENCOUNTER — LAB (OUTPATIENT)
Dept: LAB | Facility: HOSPITAL | Age: 65
End: 2020-12-30

## 2020-12-30 DIAGNOSIS — E78.00 PURE HYPERCHOLESTEROLEMIA: ICD-10-CM

## 2020-12-30 DIAGNOSIS — Z00.00 ENCOUNTER FOR GENERAL ADULT MEDICAL EXAMINATION WITHOUT ABNORMAL FINDINGS: Primary | ICD-10-CM

## 2020-12-30 DIAGNOSIS — R73.9 HYPERGLYCEMIA: ICD-10-CM

## 2020-12-30 DIAGNOSIS — Z00.00 ENCOUNTER FOR GENERAL ADULT MEDICAL EXAMINATION WITHOUT ABNORMAL FINDINGS: ICD-10-CM

## 2020-12-30 LAB
ALBUMIN SERPL-MCNC: 4 G/DL (ref 3.5–5.2)
ALBUMIN/GLOB SERPL: 1.2 G/DL
ALP SERPL-CCNC: 95 U/L (ref 39–117)
ALT SERPL W P-5'-P-CCNC: 11 U/L (ref 1–33)
ANION GAP SERPL CALCULATED.3IONS-SCNC: 13.5 MMOL/L (ref 5–15)
AST SERPL-CCNC: 18 U/L (ref 1–32)
BILIRUB SERPL-MCNC: 0.3 MG/DL (ref 0–1.2)
BUN SERPL-MCNC: 17 MG/DL (ref 8–23)
BUN/CREAT SERPL: 22.4 (ref 7–25)
CALCIUM SPEC-SCNC: 9 MG/DL (ref 8.6–10.5)
CHLORIDE SERPL-SCNC: 100 MMOL/L (ref 98–107)
CHOLEST SERPL-MCNC: 164 MG/DL (ref 0–200)
CO2 SERPL-SCNC: 29.5 MMOL/L (ref 22–29)
CREAT SERPL-MCNC: 0.76 MG/DL (ref 0.57–1)
GFR SERPL CREATININE-BSD FRML MDRD: 77 ML/MIN/1.73
GLOBULIN UR ELPH-MCNC: 3.4 GM/DL
GLUCOSE SERPL-MCNC: 121 MG/DL (ref 65–99)
HDLC SERPL-MCNC: 56 MG/DL (ref 40–60)
LDLC SERPL CALC-MCNC: 90 MG/DL (ref 0–100)
LDLC/HDLC SERPL: 1.57 {RATIO}
POTASSIUM SERPL-SCNC: 4.3 MMOL/L (ref 3.5–5.2)
PROT SERPL-MCNC: 7.4 G/DL (ref 6–8.5)
SODIUM SERPL-SCNC: 143 MMOL/L (ref 136–145)
TRIGL SERPL-MCNC: 100 MG/DL (ref 0–150)
VLDLC SERPL-MCNC: 18 MG/DL (ref 5–40)

## 2020-12-30 PROCEDURE — 36415 COLL VENOUS BLD VENIPUNCTURE: CPT

## 2020-12-30 PROCEDURE — 80061 LIPID PANEL: CPT

## 2020-12-30 PROCEDURE — 80053 COMPREHEN METABOLIC PANEL: CPT

## 2021-01-08 ENCOUNTER — APPOINTMENT (OUTPATIENT)
Dept: VACCINE CLINIC | Facility: HOSPITAL | Age: 66
End: 2021-01-08

## 2021-02-03 ENCOUNTER — IMMUNIZATION (OUTPATIENT)
Dept: VACCINE CLINIC | Facility: HOSPITAL | Age: 66
End: 2021-02-03

## 2021-02-03 PROCEDURE — 91301 HC SARSCO02 VAC 100MCG/0.5ML IM: CPT | Performed by: OBSTETRICS & GYNECOLOGY

## 2021-02-03 PROCEDURE — 0011A: CPT | Performed by: OBSTETRICS & GYNECOLOGY

## 2021-02-05 ENCOUNTER — APPOINTMENT (OUTPATIENT)
Dept: VACCINE CLINIC | Facility: HOSPITAL | Age: 66
End: 2021-02-05

## 2021-03-03 ENCOUNTER — IMMUNIZATION (OUTPATIENT)
Dept: VACCINE CLINIC | Facility: HOSPITAL | Age: 66
End: 2021-03-03

## 2021-03-03 PROCEDURE — 0012A: CPT | Performed by: OBSTETRICS & GYNECOLOGY

## 2021-03-03 PROCEDURE — 91301 HC SARSCO02 VAC 100MCG/0.5ML IM: CPT | Performed by: OBSTETRICS & GYNECOLOGY

## 2021-07-07 ENCOUNTER — TRANSCRIBE ORDERS (OUTPATIENT)
Dept: ADMINISTRATIVE | Facility: HOSPITAL | Age: 66
End: 2021-07-07

## 2021-07-07 DIAGNOSIS — Z12.31 ENCOUNTER FOR SCREENING MAMMOGRAM FOR MALIGNANT NEOPLASM OF BREAST: Primary | ICD-10-CM

## 2021-07-07 DIAGNOSIS — Z78.0 POSTMENOPAUSAL STATUS: ICD-10-CM

## 2021-08-30 ENCOUNTER — HOSPITAL ENCOUNTER (OUTPATIENT)
Dept: MAMMOGRAPHY | Facility: HOSPITAL | Age: 66
Discharge: HOME | End: 2021-08-30

## 2021-08-30 ENCOUNTER — HOSPITAL ENCOUNTER (OUTPATIENT)
Dept: BONE DENSITY | Facility: HOSPITAL | Age: 66
Discharge: HOME | End: 2021-08-30

## 2021-08-30 DIAGNOSIS — Z78.0 POSTMENOPAUSAL STATUS: ICD-10-CM

## 2021-08-30 DIAGNOSIS — Z12.31 ENCOUNTER FOR SCREENING MAMMOGRAM FOR MALIGNANT NEOPLASM OF BREAST: ICD-10-CM

## 2021-08-30 PROCEDURE — 77063 BREAST TOMOSYNTHESIS BI: CPT

## 2021-08-30 PROCEDURE — 77067 SCR MAMMO BI INCL CAD: CPT

## 2021-08-30 PROCEDURE — 77080 DXA BONE DENSITY AXIAL: CPT

## 2021-09-20 ENCOUNTER — OFFICE VISIT (OUTPATIENT)
Dept: GASTROENTEROLOGY | Facility: CLINIC | Age: 66
End: 2021-09-20

## 2021-09-20 VITALS
DIASTOLIC BLOOD PRESSURE: 72 MMHG | HEART RATE: 61 BPM | WEIGHT: 175 LBS | TEMPERATURE: 97.7 F | SYSTOLIC BLOOD PRESSURE: 132 MMHG | HEIGHT: 60 IN | OXYGEN SATURATION: 98 % | BODY MASS INDEX: 34.36 KG/M2

## 2021-09-20 DIAGNOSIS — Z78.9 NONSMOKER: ICD-10-CM

## 2021-09-20 DIAGNOSIS — Z86.010 HX OF COLONIC POLYPS: Primary | ICD-10-CM

## 2021-09-20 DIAGNOSIS — E66.9 OBESITY, UNSPECIFIED OBESITY SEVERITY, UNSPECIFIED OBESITY TYPE: ICD-10-CM

## 2021-09-20 PROBLEM — Z86.0100 HX OF COLONIC POLYPS: Status: ACTIVE | Noted: 2021-09-20

## 2021-09-20 PROCEDURE — S0260 H&P FOR SURGERY: HCPCS | Performed by: CLINICAL NURSE SPECIALIST

## 2021-09-20 NOTE — PROGRESS NOTES
Sammi Cordero  1955 9/20/2021  Chief Complaint   Patient presents with   • Colonoscopy     Subjective   HPI  Sammi Cordero is a 66 y.o. female who presents as a referral for preventative maintenance. She has no complaints of nausea or vomiting. No change in bowels. No wt loss. No BRBPR. No melena. There is NO family hx for colon cancer. No abdominal pain.  Past Medical History:   Diagnosis Date   • WILDA (acute kidney injury) (CMS/HCC)    • Allergic rhinitis    • Colon polyps    • Depression    • Difficulty swallowing solids    • DJD (degenerative joint disease)    • GERD (gastroesophageal reflux disease)    • Gitelman syndrome     low magnesium   • Gout    • Hyperlipidemia    • Obesity    • Osteopenia      Past Surgical History:   Procedure Laterality Date   • AXILLARY LYMPH NODE BIOPSY/EXCISION Right 10/14/2019    Procedure: AXILLARY LYMPH NODE BIOPSY/EXCISION;  Surgeon: Karen Benitez MD;  [facility];  Service: General   • AXILLARY LYMPH NODE BIOPSY/EXCISION Left 11/26/2019    Procedure: AXILLARY LYMPH NODE BIOPSY/EXCISION;  Surgeon: Karen Benitez MD;  Location: [facility] OR;  Service: General   • CATARACT EXTRACTION      9/23/20 right eye   • COLONOSCOPY  08/20/2010   • COLONOSCOPY W/ POLYPECTOMY  09/14/2016    2   5 mm sessile polyps removed with hot snare repeat 5 years   • DILATATION AND CURETTAGE     • ENDOSCOPY  09/14/2016    Medium sized HH, LA Grade A esohagitis, Fluid in the middle third of esohagus    • ENDOSCOPY N/A 2/25/2020    Non-bleeding erosive gastropathy, medium size HH, H Pylori + treated    • ENDOSCOPY N/A 4/24/2020    LA Grade A reflux esophagitis, 2 cm HH, Urea negative   • JOINT REPLACEMENT Bilateral     knee   • SC TOTAL KNEE ARTHROPLASTY Left 3/9/2018    Procedure: LEFT TOTAL KNEE ARTHROPLASTY;  Surgeon: Orlin Meza MD;  Location: [facility] OR;  Service: Orthopedics   • REPLACEMENT TOTAL KNEE Right      Outpatient Medications Marked as Taking for the  21 encounter (Office Visit) with Annmarie Martinez APRN   Medication Sig Dispense Refill   • acetaminophen (Tylenol) 325 MG tablet Take 2 tablets by mouth Every 6 (Six) Hours As Needed for Mild Pain  or Moderate Pain .     • magnesium oxide (MAGOX) 400 (241.3 Mg) MG tablet tablet Take 3 tabs in the morning and 2 tabs at night 30 each    • Multiple Vitamins-Minerals (MULTIVITAMIN WITH MINERALS) tablet tablet Take 1 tablet by mouth Daily.     • pantoprazole (PROTONIX) 40 MG EC tablet Take 1 tablet by mouth Every Morning Before Breakfast. 30 tablet 5   • PARoxetine (PAXIL) 10 MG tablet Take 10 mg by mouth Every Morning.  5   • simvastatin (ZOCOR) 20 MG tablet Take 1 tablet by mouth Daily. 90 tablet 2     Allergies   Allergen Reactions   • Penicillins Swelling and Rash   • Sulfa Antibiotics Rash and Other (See Comments)     Temp. fluctuations    • Voltaren [Diclofenac] Rash     Social History     Socioeconomic History   • Marital status:      Spouse name: Not on file   • Number of children: Not on file   • Years of education: Not on file   • Highest education level: Not on file   Tobacco Use   • Smoking status: Former Smoker     Packs/day: 0.50     Types: Cigarettes     Quit date:      Years since quittin.7   • Smokeless tobacco: Never Used   Substance and Sexual Activity   • Alcohol use: No   • Drug use: No   • Sexual activity: Defer     Family History   Problem Relation Age of Onset   • Aortic aneurysm Father    • Anorexia nervosa Mother    • No Known Problems Brother    • No Known Problems Sister    • No Known Problems Son    • No Known Problems Daughter    • No Known Problems Daughter    • No Known Problems Maternal Grandmother    • No Known Problems Paternal Grandmother    • No Known Problems Maternal Aunt    • No Known Problems Paternal Aunt    • Lymphoma Paternal Grandfather    • Breast cancer Neg Hx    • Ovarian cancer Neg Hx    • Colon cancer Neg Hx    • BRCA 1/2 Neg Hx    •  "Endometrial cancer Neg Hx    • Colon polyps Neg Hx    • Uterine cancer Neg Hx      Health Maintenance   Topic Date Due   • TDAP/TD VACCINES (1 - Tdap) Never done   • ZOSTER VACCINE (2 of 3) 08/19/2017   • HEPATITIS C SCREENING  Never done   • ANNUAL WELLNESS VISIT  Never done   • COLORECTAL CANCER SCREENING  04/27/2020   • Pneumococcal Vaccine 65+ (1 of 1 - PPSV23) Never done   • INFLUENZA VACCINE  10/01/2021   • LIPID PANEL  12/30/2021   • MAMMOGRAM  08/30/2023   • DXA SCAN  08/30/2023   • PAP SMEAR  10/12/2023   • COVID-19 Vaccine  Completed       REVIEW OF SYSTEMS  General: well appearing, no fever chills or sweats, no unexplained wt loss  HEENT: no acute visual or hearing disturbances  Cardiovascular: No chest pain or palpitations  Pulmonary: No shortness of breath, coughing, wheezing or hemoptysis  : No burning, urgency, hematuria, or dysuria  Musculoskeletal: No joint pain or stiffness  Peripheral: no edema  Skin: No lesions or rashes  Neuro: No dizziness, headaches, stroke, syncope  Endocrine: No hot or cold intolerances  Hematological: No blood dyscrasias    Objective   Vitals:    09/20/21 1358   BP: 132/72   Pulse: 61   Temp: 97.7 °F (36.5 °C)   SpO2: 98%   Weight: 79.4 kg (175 lb)   Height: 152.4 cm (60\")     Body mass index is 34.18 kg/m².  Patient's Body mass index is 34.18 kg/m². indicating that she is obese (BMI >30). Obesity-related health conditions include the following: none. Obesity is unchanged. BMI is is above average; BMI management plan is completed. We discussed portion control and increasing exercise..      PHYSICAL EXAM  General: age appropriate well nourished well appearing, no acute distress  Head: normocephalic and atraumatic  Global assessment-supple  Neck-No JVD noted, no lymphadenopathy  Pulmonary-clear to auscultation bilaterally, normal respiratory effort  Cardiovascular-normal rate and rhythm, normal heart sounds, S1 and S2 noted  Abdomen-soft, non tender, non distended, normal " bowel sounds all 4 quadrants, no hepatosplenomegaly noted  Extremities-No clubbing cyanosis or edema  Neuro-Non focal, converses appropriately, awake, alert, oriented    Assessment/Plan     Diagnoses and all orders for this visit:    1. Hx of colonic polyps (Primary)  -     Case Request; Standing  -     Follow Anesthesia Guidelines / Standing Orders; Future  -     Obtain Informed Consent; Future  -     Implement Anesthesia Orders Day of Procedure; Standing  -     Obtain Informed Consent; Standing  -     Verify bowel prep was successful; Standing  -     Case Request    2. Nonsmoker    3. Obesity, unspecified obesity severity, unspecified obesity type        COLONOSCOPY WITH ANESTHESIA (N/A)  Body mass index is 34.18 kg/m².    Patient instructions on prep prior to procedure provided to the patient.    All risks, benefits, alternatives, and indications of colonoscopy procedure have been discussed with the patient. Risks to include perforation of the colon requiring possible surgery or colostomy, risk of bleeding from biopsies or removal of colon tissue, possibility of missing a colon polyp or cancer, or adverse drug reaction.  Benefits to include the diagnosis and management of disease of the colon and rectum. Alternatives to include barium enema, radiographic evaluation, lab testing or no intervention. Pt verbalizes understanding and agrees.     Annmarie Martinez, APRN  2021  14:08 CDT      IF YOU SMOKE OR USE TOBACCO PLEASE READ THE FOLLOWIN minutes reading provided    Why is smoking bad for me?  Smoking increases the risk of heart disease, lung disease, vascular disease, stroke, and cancer.     If you smoke, STOP!    If you would like more information on quitting smoking, please visit the [employer] website: [email or web address]   This link will provide additional resources including the QUIT line and the Beat the Pack support groups.     For more  information:    Quit Now [place]  1-800-QUIT-NOW  https://[place].quitlogix.org/en-[place]/

## 2021-10-18 ENCOUNTER — HOSPITAL ENCOUNTER (OUTPATIENT)
Facility: HOSPITAL | Age: 66
Setting detail: HOSPITAL OUTPATIENT SURGERY
Discharge: HOME | End: 2021-10-18
Attending: INTERNAL MEDICINE | Admitting: INTERNAL MEDICINE

## 2021-10-18 ENCOUNTER — ANESTHESIA EVENT (OUTPATIENT)
Dept: GASTROENTEROLOGY | Facility: HOSPITAL | Age: 66
End: 2021-10-18

## 2021-10-18 ENCOUNTER — ANESTHESIA (OUTPATIENT)
Dept: GASTROENTEROLOGY | Facility: HOSPITAL | Age: 66
End: 2021-10-18

## 2021-10-18 VITALS
DIASTOLIC BLOOD PRESSURE: 64 MMHG | RESPIRATION RATE: 14 BRPM | WEIGHT: 172 LBS | OXYGEN SATURATION: 100 % | HEIGHT: 59 IN | BODY MASS INDEX: 34.68 KG/M2 | TEMPERATURE: 97.1 F | SYSTOLIC BLOOD PRESSURE: 120 MMHG | HEART RATE: 61 BPM

## 2021-10-18 DIAGNOSIS — Z86.010 HX OF COLONIC POLYPS: ICD-10-CM

## 2021-10-18 PROCEDURE — G0105 COLORECTAL SCRN; HI RISK IND: HCPCS | Performed by: INTERNAL MEDICINE

## 2021-10-18 PROCEDURE — 25010000002 PROPOFOL 10 MG/ML EMULSION: Performed by: NURSE ANESTHETIST, CERTIFIED REGISTERED

## 2021-10-18 RX ORDER — LIDOCAINE HYDROCHLORIDE 20 MG/ML
INJECTION, SOLUTION EPIDURAL; INFILTRATION; INTRACAUDAL; PERINEURAL AS NEEDED
Status: DISCONTINUED | OUTPATIENT
Start: 2021-10-18 | End: 2021-10-18 | Stop reason: SURG

## 2021-10-18 RX ORDER — SODIUM CHLORIDE 0.9 % (FLUSH) 0.9 %
10 SYRINGE (ML) INJECTION AS NEEDED
Status: DISCONTINUED | OUTPATIENT
Start: 2021-10-18 | End: 2021-10-18 | Stop reason: HOSPADM

## 2021-10-18 RX ORDER — PROPOFOL 10 MG/ML
VIAL (ML) INTRAVENOUS AS NEEDED
Status: DISCONTINUED | OUTPATIENT
Start: 2021-10-18 | End: 2021-10-18 | Stop reason: SURG

## 2021-10-18 RX ORDER — SODIUM CHLORIDE 9 MG/ML
500 INJECTION, SOLUTION INTRAVENOUS CONTINUOUS PRN
Status: DISCONTINUED | OUTPATIENT
Start: 2021-10-18 | End: 2021-10-18 | Stop reason: HOSPADM

## 2021-10-18 RX ADMIN — LIDOCAINE HYDROCHLORIDE 80 MG: 20 INJECTION, SOLUTION EPIDURAL; INFILTRATION; INTRACAUDAL; PERINEURAL at 08:53

## 2021-10-18 RX ADMIN — SODIUM CHLORIDE 500 ML: 9 INJECTION, SOLUTION INTRAVENOUS at 08:01

## 2021-10-18 RX ADMIN — PROPOFOL 80 MG: 10 INJECTION, EMULSION INTRAVENOUS at 08:53

## 2021-10-18 NOTE — ANESTHESIA POSTPROCEDURE EVALUATION
"Patient: Sammi Cordero    Procedure Summary     Date: 10/18/21 Room / Location: [facility] ENDOSCOPY 6 / [facility] END[facility]COPY    Anesthesia Start: 0851 Anesthesia Stop: 0910    Procedure: COLONOSCOPY WITH ANESTHESIA (N/A ) Diagnosis:       Hx of colonic polyps      (Hx of colonic polyps [Z86.010])    Surgeons: Isaac Murrell MD Provider: Gabriel Bynum CRNA    Anesthesia Type: MAC ASA Status: 2          Anesthesia Type: MAC    Vitals  Vitals Value Taken Time   /64 10/18/21 0927   Temp     Pulse 61 10/18/21 0928   Resp 14 10/18/21 0925   SpO2 100 % 10/18/21 0928   Vitals shown include unvalidated device data.        Post Anesthesia Care and Evaluation    Patient location during evaluation: PACU  Patient participation: complete - patient participated  Level of consciousness: awake and awake and alert  Pain score: 0  Pain management: adequate  Airway patency: patent  Anesthetic complications: No anesthetic complications    Cardiovascular status: acceptable and stable  Respiratory status: acceptable and unassisted  Hydration status: acceptable    Comments: Blood pressure 120/64, pulse 61, temperature 97.1 °F (36.2 °C), temperature source Temporal, resp. rate 14, height 149.9 cm (59\"), weight 78 kg (172 lb), SpO2 100 %, not currently breastfeeding.      "

## 2021-10-18 NOTE — ANESTHESIA PREPROCEDURE EVALUATION
Anesthesia Evaluation     Patient summary reviewed and Nursing notes reviewed   no history of anesthetic complications:  NPO Solid Status: > 8 hours  NPO Liquid Status: > 2 hours           Airway   Mallampati: II  TM distance: >3 FB  Neck ROM: full  Dental      Pulmonary    (-) sleep apnea, not a smoker  Cardiovascular   Exercise tolerance: good (4-7 METS)    (+) hypertension, hyperlipidemia,   (-) CAD      Neuro/Psych  (+) psychiatric history Depression,     GI/Hepatic/Renal/Endo    (+) obesity,  hiatal hernia, GERD,  renal disease (hospitalized with ARF 1 year ago, resolved),   (-) liver disease, diabetes    ROS Comment: gitelmans syndrome, low magnesium    Musculoskeletal     Abdominal    Substance History      OB/GYN          Other   arthritis,                      Anesthesia Plan    ASA 2     MAC     intravenous induction     Anesthetic plan, all risks, benefits, and alternatives have been provided, discussed and informed consent has been obtained with: patient.

## 2021-10-21 ENCOUNTER — TELEPHONE (OUTPATIENT)
Dept: GASTROENTEROLOGY | Facility: CLINIC | Age: 66
End: 2021-10-21

## 2022-07-08 ENCOUNTER — TRANSCRIBE ORDERS (OUTPATIENT)
Dept: ADMINISTRATIVE | Facility: HOSPITAL | Age: 67
End: 2022-07-08

## 2022-07-08 ENCOUNTER — NURSE TRIAGE (OUTPATIENT)
Dept: CALL CENTER | Facility: HOSPITAL | Age: 67
End: 2022-07-08

## 2022-07-08 ENCOUNTER — LAB (OUTPATIENT)
Dept: LAB | Facility: HOSPITAL | Age: 67
End: 2022-07-08

## 2022-07-08 ENCOUNTER — HOSPITAL ENCOUNTER (OUTPATIENT)
Dept: GENERAL RADIOLOGY | Facility: HOSPITAL | Age: 67
Discharge: HOME | End: 2022-07-08

## 2022-07-08 DIAGNOSIS — R73.03 PREDIABETES: ICD-10-CM

## 2022-07-08 DIAGNOSIS — R05.1 ACUTE COUGH: ICD-10-CM

## 2022-07-08 DIAGNOSIS — M15.0 PRIMARY GENERALIZED HYPERTROPHIC OSTEOARTHROSIS: ICD-10-CM

## 2022-07-08 DIAGNOSIS — F41.1 GENERALIZED ANXIETY DISORDER: ICD-10-CM

## 2022-07-08 DIAGNOSIS — E78.00 PURE HYPERCHOLESTEROLEMIA: ICD-10-CM

## 2022-07-08 DIAGNOSIS — R05.1 ACUTE COUGH: Primary | ICD-10-CM

## 2022-07-08 LAB
ALBUMIN SERPL-MCNC: 4 G/DL (ref 3.5–5.2)
ALBUMIN UR-MCNC: <1.2 MG/DL
ALBUMIN/GLOB SERPL: 1.3 G/DL
ALP SERPL-CCNC: 74 U/L (ref 39–117)
ALT SERPL W P-5'-P-CCNC: 21 U/L (ref 1–33)
ANION GAP SERPL CALCULATED.3IONS-SCNC: 12 MMOL/L (ref 5–15)
AST SERPL-CCNC: 22 U/L (ref 1–32)
BACTERIA UR QL AUTO: ABNORMAL /HPF
BASOPHILS # BLD AUTO: 0.04 10*3/MM3 (ref 0–0.2)
BASOPHILS NFR BLD AUTO: 0.5 % (ref 0–1.5)
BILIRUB SERPL-MCNC: 0.3 MG/DL (ref 0–1.2)
BILIRUB UR QL STRIP: NEGATIVE
BUN SERPL-MCNC: 14 MG/DL (ref 8–23)
BUN/CREAT SERPL: 21.5 (ref 7–25)
CALCIUM SPEC-SCNC: 7.6 MG/DL (ref 8.6–10.5)
CHLORIDE SERPL-SCNC: 97 MMOL/L (ref 98–107)
CHOLEST SERPL-MCNC: 179 MG/DL (ref 0–200)
CLARITY UR: CLEAR
CO2 SERPL-SCNC: 27 MMOL/L (ref 22–29)
COLOR UR: YELLOW
CREAT SERPL-MCNC: 0.65 MG/DL (ref 0.57–1)
CREAT UR-MCNC: 46 MG/DL
DEPRECATED RDW RBC AUTO: 39.4 FL (ref 37–54)
EGFRCR SERPLBLD CKD-EPI 2021: 97.2 ML/MIN/1.73
EOSINOPHIL # BLD AUTO: 0.3 10*3/MM3 (ref 0–0.4)
EOSINOPHIL NFR BLD AUTO: 3.9 % (ref 0.3–6.2)
ERYTHROCYTE [DISTWIDTH] IN BLOOD BY AUTOMATED COUNT: 13.5 % (ref 12.3–15.4)
GLOBULIN UR ELPH-MCNC: 3 GM/DL
GLUCOSE SERPL-MCNC: 144 MG/DL (ref 65–99)
GLUCOSE UR STRIP-MCNC: NEGATIVE MG/DL
HBA1C MFR BLD: 6.3 % (ref 4.8–5.6)
HCT VFR BLD AUTO: 35.5 % (ref 34–46.6)
HDLC SERPL-MCNC: 46 MG/DL (ref 40–60)
HGB BLD-MCNC: 11.7 G/DL (ref 12–15.9)
HGB UR QL STRIP.AUTO: NEGATIVE
HYALINE CASTS UR QL AUTO: ABNORMAL /LPF
IMM GRANULOCYTES # BLD AUTO: 0.03 10*3/MM3 (ref 0–0.05)
IMM GRANULOCYTES NFR BLD AUTO: 0.4 % (ref 0–0.5)
KETONES UR QL STRIP: NEGATIVE
LDLC SERPL CALC-MCNC: 112 MG/DL (ref 0–100)
LDLC/HDLC SERPL: 2.37 {RATIO}
LEUKOCYTE ESTERASE UR QL STRIP.AUTO: ABNORMAL
LYMPHOCYTES # BLD AUTO: 1.41 10*3/MM3 (ref 0.7–3.1)
LYMPHOCYTES NFR BLD AUTO: 18.4 % (ref 19.6–45.3)
MAGNESIUM SERPL-MCNC: 0.9 MG/DL (ref 1.6–2.4)
MCH RBC QN AUTO: 26.8 PG (ref 26.6–33)
MCHC RBC AUTO-ENTMCNC: 33 G/DL (ref 31.5–35.7)
MCV RBC AUTO: 81.4 FL (ref 79–97)
MICROALBUMIN/CREAT UR: NORMAL MG/G{CREAT}
MONOCYTES # BLD AUTO: 0.71 10*3/MM3 (ref 0.1–0.9)
MONOCYTES NFR BLD AUTO: 9.2 % (ref 5–12)
NEUTROPHILS NFR BLD AUTO: 5.19 10*3/MM3 (ref 1.7–7)
NEUTROPHILS NFR BLD AUTO: 67.6 % (ref 42.7–76)
NITRITE UR QL STRIP: NEGATIVE
NRBC BLD AUTO-RTO: 0 /100 WBC (ref 0–0.2)
PH UR STRIP.AUTO: 8 [PH] (ref 5–8)
PLATELET # BLD AUTO: 308 10*3/MM3 (ref 140–450)
PMV BLD AUTO: 10.5 FL (ref 6–12)
POTASSIUM SERPL-SCNC: 3.9 MMOL/L (ref 3.5–5.2)
PROT SERPL-MCNC: 7 G/DL (ref 6–8.5)
PROT UR QL STRIP: NEGATIVE
RBC # BLD AUTO: 4.36 10*6/MM3 (ref 3.77–5.28)
RBC # UR STRIP: ABNORMAL /HPF
REF LAB TEST METHOD: ABNORMAL
SODIUM SERPL-SCNC: 136 MMOL/L (ref 136–145)
SP GR UR STRIP: 1.01 (ref 1–1.03)
SQUAMOUS #/AREA URNS HPF: ABNORMAL /HPF
T4 FREE SERPL-MCNC: 1.3 NG/DL (ref 0.93–1.7)
TRIGL SERPL-MCNC: 119 MG/DL (ref 0–150)
TSH SERPL DL<=0.05 MIU/L-ACNC: 3.1 UIU/ML (ref 0.27–4.2)
UROBILINOGEN UR QL STRIP: ABNORMAL
VLDLC SERPL-MCNC: 21 MG/DL (ref 5–40)
WBC # UR STRIP: ABNORMAL /HPF
WBC NRBC COR # BLD: 7.68 10*3/MM3 (ref 3.4–10.8)

## 2022-07-08 PROCEDURE — 71046 X-RAY EXAM CHEST 2 VIEWS: CPT

## 2022-07-08 PROCEDURE — 83735 ASSAY OF MAGNESIUM: CPT

## 2022-07-08 PROCEDURE — 85025 COMPLETE CBC W/AUTO DIFF WBC: CPT

## 2022-07-08 PROCEDURE — 80061 LIPID PANEL: CPT

## 2022-07-08 PROCEDURE — 84439 ASSAY OF FREE THYROXINE: CPT

## 2022-07-08 PROCEDURE — 82570 ASSAY OF URINE CREATININE: CPT

## 2022-07-08 PROCEDURE — 82043 UR ALBUMIN QUANTITATIVE: CPT

## 2022-07-08 PROCEDURE — 83036 HEMOGLOBIN GLYCOSYLATED A1C: CPT

## 2022-07-08 PROCEDURE — 80053 COMPREHEN METABOLIC PANEL: CPT

## 2022-07-08 PROCEDURE — 36415 COLL VENOUS BLD VENIPUNCTURE: CPT

## 2022-07-08 PROCEDURE — 84443 ASSAY THYROID STIM HORMONE: CPT

## 2022-07-08 PROCEDURE — 81001 URINALYSIS AUTO W/SCOPE: CPT

## 2022-07-09 NOTE — TELEPHONE ENCOUNTER
"VERITO Mitchell, notified of lab value  Reason for Disposition  • Lab or radiology calling with CRITICAL test results    Additional Information  • Negative: Lab calling with strep throat test results and triager can call in prescription  • Negative: Lab calling with urinalysis test results and triager can call in prescription  • Negative: Medication questions  • Negative: ED call to PCP  • Negative: Physician call to PCP  • Negative: Call about patient who is currently hospitalized    Answer Assessment - Initial Assessment Questions  1. REASON FOR CALL or QUESTION: \"What is your reason for calling today?\" or \"How can I best  help you?\" or \"What question do you have that I can help answer?\"      Critical lab, magnesium 0.9, drawn 7/8 @1014  2. CALLER: Document the source of call. (e.g., laboratory, patient).      hermelinda Glass    Protocols used: PCP CALL - NO TRIAGE-ADULT-    "

## 2022-07-15 ENCOUNTER — TRANSCRIBE ORDERS (OUTPATIENT)
Dept: ADMINISTRATIVE | Facility: HOSPITAL | Age: 67
End: 2022-07-15

## 2022-07-15 DIAGNOSIS — Z12.31 ENCOUNTER FOR SCREENING MAMMOGRAM FOR MALIGNANT NEOPLASM OF BREAST: Primary | ICD-10-CM

## 2022-09-06 ENCOUNTER — LAB (OUTPATIENT)
Dept: LAB | Facility: HOSPITAL | Age: 67
End: 2022-09-06

## 2022-09-06 ENCOUNTER — TRANSCRIBE ORDERS (OUTPATIENT)
Dept: LAB | Facility: HOSPITAL | Age: 67
End: 2022-09-06

## 2022-09-06 ENCOUNTER — HOSPITAL ENCOUNTER (OUTPATIENT)
Dept: GENERAL RADIOLOGY | Facility: HOSPITAL | Age: 67
Discharge: HOME | End: 2022-09-06

## 2022-09-06 ENCOUNTER — TRANSCRIBE ORDERS (OUTPATIENT)
Dept: ADMINISTRATIVE | Facility: HOSPITAL | Age: 67
End: 2022-09-06

## 2022-09-06 ENCOUNTER — HOSPITAL ENCOUNTER (OUTPATIENT)
Dept: CARDIOLOGY | Facility: HOSPITAL | Age: 67
Discharge: HOME | End: 2022-09-06

## 2022-09-06 ENCOUNTER — TRANSCRIBE ORDERS (OUTPATIENT)
Dept: GENERAL RADIOLOGY | Facility: HOSPITAL | Age: 67
End: 2022-09-06

## 2022-09-06 DIAGNOSIS — E78.00 PURE HYPERCHOLESTEROLEMIA: ICD-10-CM

## 2022-09-06 DIAGNOSIS — K21.9 GASTROESOPHAGEAL REFLUX DISEASE WITHOUT ESOPHAGITIS: ICD-10-CM

## 2022-09-06 DIAGNOSIS — R73.03 PRE-DIABETES: ICD-10-CM

## 2022-09-06 DIAGNOSIS — R10.13 EPIGASTRIC PAIN: ICD-10-CM

## 2022-09-06 DIAGNOSIS — F41.1 GENERALIZED ANXIETY DISORDER: Primary | ICD-10-CM

## 2022-09-06 DIAGNOSIS — R10.13 EPIGASTRIC PAIN: Primary | ICD-10-CM

## 2022-09-06 DIAGNOSIS — F41.1 GENERALIZED ANXIETY DISORDER: ICD-10-CM

## 2022-09-06 DIAGNOSIS — R94.31 ABNORMAL ELECTROCARDIOGRAM: ICD-10-CM

## 2022-09-06 LAB
ALBUMIN SERPL-MCNC: 3.9 G/DL (ref 3.5–5.2)
ALBUMIN/GLOB SERPL: 1.2 G/DL
ALP SERPL-CCNC: 86 U/L (ref 39–117)
ALT SERPL W P-5'-P-CCNC: 17 U/L (ref 1–33)
AMYLASE SERPL-CCNC: 86 U/L (ref 28–100)
ANION GAP SERPL CALCULATED.3IONS-SCNC: 8 MMOL/L (ref 5–15)
AST SERPL-CCNC: 19 U/L (ref 1–32)
BACTERIA UR QL AUTO: ABNORMAL /HPF
BILIRUB SERPL-MCNC: 0.2 MG/DL (ref 0–1.2)
BILIRUB UR QL STRIP: NEGATIVE
BUN SERPL-MCNC: 15 MG/DL (ref 8–23)
BUN/CREAT SERPL: 22.7 (ref 7–25)
CALCIUM SPEC-SCNC: 8.6 MG/DL (ref 8.6–10.5)
CHLORIDE SERPL-SCNC: 100 MMOL/L (ref 98–107)
CLARITY UR: CLEAR
CO2 SERPL-SCNC: 32 MMOL/L (ref 22–29)
COLOR UR: YELLOW
CREAT SERPL-MCNC: 0.66 MG/DL (ref 0.57–1)
DEPRECATED RDW RBC AUTO: 47.8 FL (ref 37–54)
EGFRCR SERPLBLD CKD-EPI 2021: 96.9 ML/MIN/1.73
ERYTHROCYTE [DISTWIDTH] IN BLOOD BY AUTOMATED COUNT: 15.2 % (ref 12.3–15.4)
ERYTHROCYTE [SEDIMENTATION RATE] IN BLOOD: 63 MM/HR (ref 0–30)
GLOBULIN UR ELPH-MCNC: 3.2 GM/DL
GLUCOSE SERPL-MCNC: 138 MG/DL (ref 65–99)
GLUCOSE UR STRIP-MCNC: NEGATIVE MG/DL
HCT VFR BLD AUTO: 37.6 % (ref 34–46.6)
HGB BLD-MCNC: 11.5 G/DL (ref 12–15.9)
HGB UR QL STRIP.AUTO: NEGATIVE
HYALINE CASTS UR QL AUTO: ABNORMAL /LPF
KETONES UR QL STRIP: NEGATIVE
LEUKOCYTE ESTERASE UR QL STRIP.AUTO: ABNORMAL
LIPASE SERPL-CCNC: 43 U/L (ref 13–60)
MCH RBC QN AUTO: 26.2 PG (ref 26.6–33)
MCHC RBC AUTO-ENTMCNC: 30.6 G/DL (ref 31.5–35.7)
MCV RBC AUTO: 85.6 FL (ref 79–97)
NITRITE UR QL STRIP: NEGATIVE
PH UR STRIP.AUTO: 6.5 [PH] (ref 5–8)
PLATELET # BLD AUTO: 344 10*3/MM3 (ref 140–450)
PMV BLD AUTO: 9.6 FL (ref 6–12)
POTASSIUM SERPL-SCNC: 3.1 MMOL/L (ref 3.5–5.2)
PROT SERPL-MCNC: 7.1 G/DL (ref 6–8.5)
PROT UR QL STRIP: NEGATIVE
RBC # BLD AUTO: 4.39 10*6/MM3 (ref 3.77–5.28)
RBC # UR STRIP: ABNORMAL /HPF
REF LAB TEST METHOD: ABNORMAL
SODIUM SERPL-SCNC: 140 MMOL/L (ref 136–145)
SP GR UR STRIP: 1.01 (ref 1–1.03)
SQUAMOUS #/AREA URNS HPF: ABNORMAL /HPF
TRANS CELLS #/AREA URNS HPF: ABNORMAL /HPF
UROBILINOGEN UR QL STRIP: ABNORMAL
WBC # UR STRIP: ABNORMAL /HPF
WBC NRBC COR # BLD: 8.97 10*3/MM3 (ref 3.4–10.8)

## 2022-09-06 PROCEDURE — 80053 COMPREHEN METABOLIC PANEL: CPT

## 2022-09-06 PROCEDURE — 82150 ASSAY OF AMYLASE: CPT

## 2022-09-06 PROCEDURE — 71046 X-RAY EXAM CHEST 2 VIEWS: CPT

## 2022-09-06 PROCEDURE — 36415 COLL VENOUS BLD VENIPUNCTURE: CPT

## 2022-09-06 PROCEDURE — 81001 URINALYSIS AUTO W/SCOPE: CPT

## 2022-09-06 PROCEDURE — 93010 ELECTROCARDIOGRAM REPORT: CPT | Performed by: INTERNAL MEDICINE

## 2022-09-06 PROCEDURE — 93005 ELECTROCARDIOGRAM TRACING: CPT | Performed by: INTERNAL MEDICINE

## 2022-09-06 PROCEDURE — 83690 ASSAY OF LIPASE: CPT

## 2022-09-06 PROCEDURE — 85027 COMPLETE CBC AUTOMATED: CPT

## 2022-09-06 PROCEDURE — 85652 RBC SED RATE AUTOMATED: CPT

## 2022-09-06 PROCEDURE — 87086 URINE CULTURE/COLONY COUNT: CPT

## 2022-09-06 PROCEDURE — 74018 RADEX ABDOMEN 1 VIEW: CPT

## 2022-09-07 LAB
BACTERIA SPEC AEROBE CULT: NORMAL
QT INTERVAL: 452 MS
QTC INTERVAL: 455 MS

## 2022-09-08 ENCOUNTER — HOSPITAL ENCOUNTER (OUTPATIENT)
Dept: ULTRASOUND IMAGING | Facility: HOSPITAL | Age: 67
Discharge: HOME | End: 2022-09-08

## 2022-09-08 ENCOUNTER — HOSPITAL ENCOUNTER (OUTPATIENT)
Dept: MAMMOGRAPHY | Facility: HOSPITAL | Age: 67
Discharge: HOME | End: 2022-09-08

## 2022-09-08 DIAGNOSIS — R10.13 EPIGASTRIC PAIN: ICD-10-CM

## 2022-09-08 DIAGNOSIS — Z12.31 ENCOUNTER FOR SCREENING MAMMOGRAM FOR MALIGNANT NEOPLASM OF BREAST: ICD-10-CM

## 2022-09-08 PROCEDURE — 77067 SCR MAMMO BI INCL CAD: CPT

## 2022-09-08 PROCEDURE — 77063 BREAST TOMOSYNTHESIS BI: CPT

## 2022-09-08 PROCEDURE — 76705 ECHO EXAM OF ABDOMEN: CPT

## 2022-09-09 ENCOUNTER — HOSPITAL ENCOUNTER (OUTPATIENT)
Dept: CARDIOLOGY | Facility: HOSPITAL | Age: 67
Discharge: HOME | End: 2022-09-09
Admitting: NURSE PRACTITIONER

## 2022-09-09 VITALS
BODY MASS INDEX: 34.89 KG/M2 | WEIGHT: 173.06 LBS | HEIGHT: 59 IN | SYSTOLIC BLOOD PRESSURE: 132 MMHG | HEART RATE: 62 BPM | DIASTOLIC BLOOD PRESSURE: 78 MMHG

## 2022-09-09 DIAGNOSIS — R10.13 EPIGASTRIC PAIN: ICD-10-CM

## 2022-09-09 DIAGNOSIS — R94.31 ABNORMAL ELECTROCARDIOGRAM: ICD-10-CM

## 2022-09-09 LAB
BH CV STRESS BP STAGE 1: NORMAL
BH CV STRESS BP STAGE 2: NORMAL
BH CV STRESS BP STAGE 3: NORMAL
BH CV STRESS DURATION MIN STAGE 1: 3
BH CV STRESS DURATION MIN STAGE 2: 3
BH CV STRESS DURATION MIN STAGE 3: 0
BH CV STRESS DURATION SEC STAGE 1: 0
BH CV STRESS DURATION SEC STAGE 2: 0
BH CV STRESS DURATION SEC STAGE 3: 32
BH CV STRESS GRADE STAGE 1: 10
BH CV STRESS GRADE STAGE 2: 12
BH CV STRESS GRADE STAGE 3: 14
BH CV STRESS HR STAGE 1: 100
BH CV STRESS HR STAGE 2: 122
BH CV STRESS HR STAGE 3: 140
BH CV STRESS METS STAGE 1: 5
BH CV STRESS METS STAGE 2: 7.5
BH CV STRESS METS STAGE 3: 10
BH CV STRESS PROTOCOL 1: NORMAL
BH CV STRESS RECOVERY BP: NORMAL MMHG
BH CV STRESS RECOVERY HR: 67 BPM
BH CV STRESS SPEED STAGE 1: 1.7
BH CV STRESS SPEED STAGE 2: 2.5
BH CV STRESS SPEED STAGE 3: 3.4
BH CV STRESS STAGE 1: 1
BH CV STRESS STAGE 2: 2
BH CV STRESS STAGE 3: 3
MAXIMAL PREDICTED HEART RATE: 154 BPM
PERCENT MAX PREDICTED HR: 90.91 %
STRESS BASELINE BP: NORMAL MMHG
STRESS BASELINE HR: 59 BPM
STRESS PERCENT HR: 107 %
STRESS POST ESTIMATED WORKLOAD: 10 METS
STRESS POST EXERCISE DUR MIN: 6 MIN
STRESS POST EXERCISE DUR SEC: 32 SEC
STRESS POST PEAK BP: NORMAL MMHG
STRESS POST PEAK HR: 140 BPM
STRESS TARGET HR: 131 BPM

## 2022-09-09 PROCEDURE — 93017 CV STRESS TEST TRACING ONLY: CPT

## 2022-09-09 PROCEDURE — 93018 CV STRESS TEST I&R ONLY: CPT | Performed by: INTERNAL MEDICINE

## 2022-09-14 ENCOUNTER — LAB (OUTPATIENT)
Dept: LAB | Facility: HOSPITAL | Age: 67
End: 2022-09-14

## 2022-09-14 ENCOUNTER — TRANSCRIBE ORDERS (OUTPATIENT)
Dept: ADMINISTRATIVE | Facility: HOSPITAL | Age: 67
End: 2022-09-14

## 2022-09-14 DIAGNOSIS — R73.03 PREDIABETES: Primary | ICD-10-CM

## 2022-09-14 DIAGNOSIS — R10.13 ABDOMINAL PAIN, EPIGASTRIC: ICD-10-CM

## 2022-09-14 DIAGNOSIS — R73.03 PREDIABETES: ICD-10-CM

## 2022-09-14 LAB
ANION GAP SERPL CALCULATED.3IONS-SCNC: 12 MMOL/L (ref 5–15)
BUN SERPL-MCNC: 14 MG/DL (ref 8–23)
BUN/CREAT SERPL: 20.9 (ref 7–25)
CALCIUM SPEC-SCNC: 8.9 MG/DL (ref 8.6–10.5)
CHLORIDE SERPL-SCNC: 98 MMOL/L (ref 98–107)
CO2 SERPL-SCNC: 31 MMOL/L (ref 22–29)
CREAT SERPL-MCNC: 0.67 MG/DL (ref 0.57–1)
EGFRCR SERPLBLD CKD-EPI 2021: 96.5 ML/MIN/1.73
ERYTHROCYTE [SEDIMENTATION RATE] IN BLOOD: 13 MM/HR (ref 0–30)
GLUCOSE SERPL-MCNC: 111 MG/DL (ref 65–99)
MAGNESIUM SERPL-MCNC: 1.2 MG/DL (ref 1.6–2.4)
POTASSIUM SERPL-SCNC: 3.8 MMOL/L (ref 3.5–5.2)
SODIUM SERPL-SCNC: 141 MMOL/L (ref 136–145)

## 2022-09-14 PROCEDURE — 83735 ASSAY OF MAGNESIUM: CPT

## 2022-09-14 PROCEDURE — 36415 COLL VENOUS BLD VENIPUNCTURE: CPT

## 2022-09-14 PROCEDURE — 85652 RBC SED RATE AUTOMATED: CPT

## 2022-09-14 PROCEDURE — 80048 BASIC METABOLIC PNL TOTAL CA: CPT

## 2022-09-16 ENCOUNTER — TRANSCRIBE ORDERS (OUTPATIENT)
Dept: ADMINISTRATIVE | Facility: HOSPITAL | Age: 67
End: 2022-09-16

## 2022-09-16 DIAGNOSIS — R10.13 EPIGASTRIC PAIN: ICD-10-CM

## 2022-09-16 DIAGNOSIS — E78.00 PURE HYPERCHOLESTEROLEMIA: ICD-10-CM

## 2022-09-20 NOTE — PROGRESS NOTES
Karen Benitez MD [facility] History and Physical     Referring Provider: Shaheed Sheppard APRN      Chief complaint   Chief Complaint   Patient presents with   • Cholelithiasis        Subjective .     History of present illness:  Sammi Cordero is a 67 y.o. female who presents complaining of intermittent epigastric pain with associated bloating and nausea.  KUB demonstrated no acute abnormality.  Ultrasound demonstrated cholelithiasis without evidence of cholecystitis.   LFT's were within normal limits.  Last colonoscopy was in 2021 and it was read as within normal limits.  Last EGD was in 2020 and demonstrated a 2cm hiatal hernia with Grade A esophagitis.    History  Past Medical History:   Diagnosis Date   • WILDA (acute kidney injury) (HCC)    • Allergic rhinitis    • Colon polyps    • Depression    • Difficulty swallowing solids    • DJD (degenerative joint disease)    • GERD (gastroesophageal reflux disease)    • Gitelman syndrome     low magnesium   • Gout    • Hyperlipidemia    • Obesity    • Osteopenia    ,   Past Surgical History:   Procedure Laterality Date   • AXILLARY LYMPH NODE BIOPSY/EXCISION Right 10/14/2019    Procedure: AXILLARY LYMPH NODE BIOPSY/EXCISION;  Surgeon: Karen Benitez MD;  Location: [facility] OR;  Service: General   • AXILLARY LYMPH NODE BIOPSY/EXCISION Left 11/26/2019    Procedure: AXILLARY LYMPH NODE BIOPSY/EXCISION;  Surgeon: Karen Benitez MD;  Location: [facility] OR;  Service: General   • CATARACT EXTRACTION      9/23/20 right eye   • COLONOSCOPY  08/20/2010   • COLONOSCOPY N/A 10/18/2021    Procedure: COLONOSCOPY WITH ANESTHESIA;  Surgeon: Isaac Murrell MD;  Location: [facility] ENDOSCOPY;  Service: Gastroenterology;  Laterality: N/A;  pre colon polyps  post  Avery Whaley MD   • COLONOSCOPY W/ POLYPECTOMY  09/14/2016    2   5 mm sessile polyps removed with hot snare repeat 5 years   • DILATATION AND CURETTAGE     • ENDOSCOPY  09/14/2016    Medium sized HH, LA Grade A  esohagitis, Fluid in the middle third of esohagus    • ENDOSCOPY N/A 2020    Non-bleeding erosive gastropathy, medium size HH, H Pylori + treated    • ENDOSCOPY N/A 2020    LA Grade A reflux esophagitis, 2 cm HH, Urea negative   • JOINT REPLACEMENT Bilateral     knee   • [place] TOTAL KNEE ARTHROPLASTY Left 3/9/2018    Procedure: LEFT TOTAL KNEE ARTHROPLASTY;  Surgeon: Orlin Meza MD;  Location: [facility];  Service: Orthopedics   • REPLACEMENT TOTAL KNEE Right    ,   Family History   Problem Relation Age of Onset   • Aortic aneurysm Father    • Anorexia nervosa Mother    • No Known Problems Brother    • No Known Problems Sister    • No Known Problems Son    • No Known Problems Daughter    • No Known Problems Daughter    • No Known Problems Maternal Grandmother    • No Known Problems Paternal Grandmother    • No Known Problems Maternal Aunt    • No Known Problems Paternal Aunt    • Lymphoma Paternal Grandfather    • Breast cancer Neg Hx    • Ovarian cancer Neg Hx    • Colon cancer Neg Hx    • BRCA 1/2 Neg Hx    • Endometrial cancer Neg Hx    • Colon polyps Neg Hx    • Uterine cancer Neg Hx    ,   Social History     Tobacco Use   • Smoking status: Former Smoker     Packs/day: 0.50     Types: Cigarettes     Quit date:      Years since quittin.7   • Smokeless tobacco: Never Used   Substance Use Topics   • Alcohol use: No   • Drug use: No   , (Not in a hospital admission)   and Allergies:  Penicillins, Sulfa antibiotics, and Voltaren [diclofenac]    Current Outpatient Medications:   •  acetaminophen (Tylenol) 325 MG tablet, Take 2 tablets by mouth Every 6 (Six) Hours As Needed for Mild Pain  or Moderate Pain ., Disp: , Rfl:   •  magnesium oxide (MAGOX) 400 (241.3 Mg) MG tablet tablet, Take 3 tabs in the morning and 2 tabs at night, Disp: 30 each, Rfl:   •  Multiple Vitamins-Minerals (MULTIVITAMIN WITH MINERALS) tablet tablet, Take 1 tablet by mouth Daily., Disp: , Rfl:   •  pantoprazole  "(PROTONIX) 40 MG EC tablet, Take 1 tablet by mouth Every Morning Before Breakfast., Disp: 30 tablet, Rfl: 5  •  PARoxetine (PAXIL) 10 MG tablet, Take 10 mg by mouth Every Morning., Disp: , Rfl: 5  •  simvastatin (ZOCOR) 20 MG tablet, Take 1 tablet by mouth Daily., Disp: 90 tablet, Rfl: 2    Review of Systems:    All organ systems were evaluated and found negative except those which are mentioned in the History of Present Illness.    Objective     Physical Exam:    Vital Signs   /85   Pulse 87   Temp 97.1 °F (36.2 °C)   Ht 149.9 cm (59\")   Wt 78.5 kg (173 lb)   SpO2 95%   BMI 34.94 kg/m²        Constitutional:    Well-developed, well-nourished in no acute distress  Eyes:     Extraocular movements intact; pupils equal, round, and reactive  Ears, Nose, Mouth, Throat:  Hearing intact, nose midline, no mucosal lesions  Cardiovascular:   Regular rate and rhythm   Respiratory:    Clear to auscultation bilaterally  Gastrointestinal:   Soft, nontender, nondistended, no masses, bowel sounds intact  Genitourinary:    Deferred  Musculoskeletal:   Full range of motion, no muscle wasting, no weakness  Skin:     No rashes or excoriations  Neurological:    Moves all extremities, sensation intact  Psychiatric:    Alert and oriented to person, place, and time  Hematologic/Lymphatic/Immune: No lymphadenopathy      Results Review:         Assessment & Plan       Diagnoses and all orders for this visit:    1. Calculus of gallbladder without cholecystitis without obstruction (Primary)           The patient will be scheduled for laparoscopic cholecystectomy, possible cholangiogram, possible liver biopsy, with possible need for conversion to open.      The patient will be scheduled for prework testing including:  COVID; cbc, cmp, EKG, and CXR will be performed dependent upon anesthesia requirements.    An extensive review of patient intake forms, referring physician documents, laboratories, and imaging was performed in the " medical decision making and surgical planning of this patient.      The risks including:  bleeding, infection, injury to bowel, bile ducts, and other surrounding structures were discussed as well as the benefits, complications, and possible alternatives of the above procedures and the patient agreed to proceed.    Karen Benitez MD

## 2022-09-21 ENCOUNTER — OFFICE VISIT (OUTPATIENT)
Dept: SURGERY | Facility: CLINIC | Age: 67
End: 2022-09-21

## 2022-09-21 ENCOUNTER — PREP FOR SURGERY (OUTPATIENT)
Dept: OTHER | Facility: HOSPITAL | Age: 67
End: 2022-09-21

## 2022-09-21 VITALS
DIASTOLIC BLOOD PRESSURE: 85 MMHG | HEART RATE: 87 BPM | TEMPERATURE: 97.1 F | OXYGEN SATURATION: 95 % | WEIGHT: 173 LBS | HEIGHT: 59 IN | SYSTOLIC BLOOD PRESSURE: 142 MMHG | BODY MASS INDEX: 34.88 KG/M2

## 2022-09-21 DIAGNOSIS — K80.20 CALCULUS OF GALLBLADDER WITHOUT CHOLECYSTITIS WITHOUT OBSTRUCTION: Primary | ICD-10-CM

## 2022-09-21 PROCEDURE — 99204 OFFICE O/P NEW MOD 45 MIN: CPT | Performed by: SPECIALIST

## 2022-09-21 NOTE — PATIENT INSTRUCTIONS
Surgery is scheduled for October 10, 2022 at 11:00 a.m.  Prework is scheduled for October 3, 2022 at 12:45 p.m.  Nothing to eat or drink after midnight before surgery.  No Aspirin, Vitamins or Blood Thinners 5 days prior to surgery.  Please report to the hospital main registation for check in on both days.

## 2022-10-03 ENCOUNTER — PRE-ADMISSION TESTING (OUTPATIENT)
Dept: PREADMISSION TESTING | Facility: HOSPITAL | Age: 67
End: 2022-10-03

## 2022-10-03 VITALS
HEART RATE: 77 BPM | DIASTOLIC BLOOD PRESSURE: 71 MMHG | HEIGHT: 60 IN | RESPIRATION RATE: 16 BRPM | WEIGHT: 182.54 LBS | BODY MASS INDEX: 35.84 KG/M2 | OXYGEN SATURATION: 99 % | SYSTOLIC BLOOD PRESSURE: 161 MMHG

## 2022-10-03 LAB
ANION GAP SERPL CALCULATED.3IONS-SCNC: 12 MMOL/L (ref 5–15)
BUN SERPL-MCNC: 10 MG/DL (ref 8–23)
BUN/CREAT SERPL: 18.9 (ref 7–25)
CALCIUM SPEC-SCNC: 8.5 MG/DL (ref 8.6–10.5)
CHLORIDE SERPL-SCNC: 93 MMOL/L (ref 98–107)
CO2 SERPL-SCNC: 31 MMOL/L (ref 22–29)
CREAT SERPL-MCNC: 0.53 MG/DL (ref 0.57–1)
DEPRECATED RDW RBC AUTO: 46.2 FL (ref 37–54)
EGFRCR SERPLBLD CKD-EPI 2021: 102.1 ML/MIN/1.73
ERYTHROCYTE [DISTWIDTH] IN BLOOD BY AUTOMATED COUNT: 15.1 % (ref 12.3–15.4)
GLUCOSE SERPL-MCNC: 84 MG/DL (ref 65–99)
HCT VFR BLD AUTO: 38.9 % (ref 34–46.6)
HGB BLD-MCNC: 12.1 G/DL (ref 12–15.9)
MCH RBC QN AUTO: 26.4 PG (ref 26.6–33)
MCHC RBC AUTO-ENTMCNC: 31.1 G/DL (ref 31.5–35.7)
MCV RBC AUTO: 84.9 FL (ref 79–97)
PLATELET # BLD AUTO: 246 10*3/MM3 (ref 140–450)
PMV BLD AUTO: 9.9 FL (ref 6–12)
POTASSIUM SERPL-SCNC: 3.5 MMOL/L (ref 3.5–5.2)
RBC # BLD AUTO: 4.58 10*6/MM3 (ref 3.77–5.28)
SODIUM SERPL-SCNC: 136 MMOL/L (ref 136–145)
WBC NRBC COR # BLD: 10.42 10*3/MM3 (ref 3.4–10.8)

## 2022-10-03 PROCEDURE — 85027 COMPLETE CBC AUTOMATED: CPT

## 2022-10-03 PROCEDURE — 80048 BASIC METABOLIC PNL TOTAL CA: CPT

## 2022-10-03 PROCEDURE — 36415 COLL VENOUS BLD VENIPUNCTURE: CPT

## 2022-10-03 RX ORDER — PHENOL 1.4 %
600 AEROSOL, SPRAY (ML) MUCOUS MEMBRANE 2 TIMES DAILY
COMMUNITY

## 2022-10-03 NOTE — DISCHARGE INSTRUCTIONS
Before you come to the hospital        Arrival time: AS DIRECTED BY OFFICE     YOU MAY TAKE THE FOLLOWING MEDICATION(S) THE MORNING OF SURGERY WITH A SIP OF WATER: none           ALL OTHER HOME MEDICATION CHECK WITH YOUR PHYSICIAN (especially if you are taking diabetes medicines or blood thinners)    Do not take any Erectile Dysfunction medications (EX: CIALIS, VIAGRA) 24 hours prior to surgery.      If you were given and instructed to use a germ- killing soap, use as directed the night before surgery and again the morning of surgery or as directed by your surgeon.    (See attached information for How to Use Chlorhexidine for Bathing if applicable.)            Eating and drinking restrictions prior to scheduled arrival time    2 Hours before arrival time STOP If your arrival time is 11am stop all clear liquids at 9am  Drinking Clear liquids (water, apple juice-no pulp)     6 Hours before arrival time STOP   Milk or drinks that contain milk, full liquids    6 Hours before arrival time STOP at 5am  Light meals or foods, such as toast or cereal    8 Hours before arrival time STOP   Heavy foods, such as meat, fried foods, or fatty foods    (It is extremely important that you follow these guidelines to prevent delay or cancelation of your procedure)     Clear Liquids  Water and flavored water                                                                      Clear Fruit juices, such as cranberry juice and apple juice.  Black coffee (NO cream of any kind, including powdered).  Plain tea  Clear bouillon or broth.  Flavored gelatin.  Soda.  Gatorade or Powerade.  Full liquid examples  Juices that have pulp.  Frozen ice pops that contain fruit pieces.  Coffee with creamer  Milk.  Yogurt.                MANAGING PAIN AFTER SURGERY    We know you are probably wondering what your pain will be like after surgery.  Following surgery it is unrealistic to expect you will not have pain.   Pain is how our bodies let us know that  something is wrong or cautions us to be careful.  That said, our goal is to make your pain tolerable.    Methods we may use to treat your pain include (oral or IV medications, PCAs, epidurals, nerve blocks, etc.)   While some procedures require IV pain medications for a short time after surgery, transitioning to pain medications by mouth allows for better management of pain.   Your nurse will encourage you to take oral pain medications whenever possible.  IV medications work almost immediately, but only last a short while.  Taking medications by mouth allows for a more constant level of medication in your blood stream for a longer period of time.      Once your pain is out of control it is harder to get back under control.  It is important you are aware when your next dose of pain medication is due.  If you are admitted, your nurse may write the time of your next dose on the white board in your room to help you remember.      We are interested in your pain and encourage you to inform us about aggravating factors during your visit.   Many times a simple repositioning every few hours can make a big difference.    If your physician says it is okay, do not let your pain prevent you from getting out of bed. Be sure to call your nurse for assistance prior to getting up so you do not fall.      Before surgery, please decide your tolerable pain goal.  These faces help describe the pain ratings we use on a 0-10 scale.   Be prepared to tell us your goal and whether or not you take pain or anxiety medications at home.          Preparing for Surgery  Preparing for surgery is an important part of your care. It can make things go more smoothly and help you avoid complications. The steps leading up to surgery may vary among hospitals. Follow all instructions given to you by your health care providers. Ask questions if you do not understand something. Talk about any concerns that you have.  Here are some questions to consider  asking before your surgery:  If my surgery is not an emergency (is elective), when would be the best time to have the surgery?  What arrangements do I need to make for work, home, or school?  What will my recovery be like? How long will it be before I can return to normal activities?  Will I need to prepare my home? Will I need to arrange care for me or my children?  Should I expect to have pain after surgery? What are my pain management options? Are there nonmedical options that I can try for pain?  Tell a health care provider about:  Any allergies you have.  All medicines you are taking, including vitamins, herbs, eye drops, creams, and over-the-counter medicines.  Any problems you or family members have had with anesthetic medicines.  Any blood disorders you have.  Any surgeries you have had.  Any medical conditions you have.  Whether you are pregnant or may be pregnant.  What are the risks?  The risks and complications of surgery depend on the specific procedure that you have. Discuss all the risks with your health care providers before your surgery. Ask about common surgical complications, which may include:  Infection.  Bleeding or a need for blood replacement (transfusion).  Allergic reactions to medicines.  Damage to surrounding nerves, tissues, or structures.  A blood clot.  Scarring.  Failure of the surgery to correct the problem.  Follow these instructions before the procedure:  Several days or weeks before your procedure  You may have a physical exam by your primary health care provider to make sure it is safe for you to have surgery.  You may have testing. This may include a chest X-ray, blood and urine tests, electrocardiogram (ECG), or other testing.  Ask your health care provider about:  Changing or stopping your regular medicines. This is especially important if you are taking diabetes medicines or blood thinners.  Taking medicines such as aspirin and ibuprofen. These medicines can thin your blood.  Do not take these medicines unless your health care provider tells you to take them.  Taking over-the-counter medicines, vitamins, herbs, and supplements.  Do not use any products that contain nicotine or tobacco, such as cigarettes and e-cigarettes. If you need help quitting, ask your health care provider.  Avoid alcohol.  Ask your health care provider if there are exercises you can do to prepare for surgery.  Eat a healthy diet.   Plan to have someone take you home from the hospital or clinic.  Plan to have a responsible adult care for you for at least 24 hours after you leave the hospital or clinic. This is important.  The day before your procedure  You may be given antibiotic medicine to take by mouth to help prevent infection. Take it as told by your health care provider.  You may be asked to shower with a germ-killing soap.  Follow instructions from your health care provider about eating and drinking restrictions. This includes gum, mints and hard candy.  Pack comfortable clothes according to your procedure.   The day of your procedure  You may need to take another shower with a germ-killing soap before you leave home in the morning.  With a small sip of water, take only the medicines that you are told to take.  Remove all jewelry including rings.   Leave anything you consider valuable at home except hearing aids if needed.  Do not wear any makeup, nail polish, powder, deodorant, lotion, hair accessories, or anything on your skin or body except your clothes.  If you will be staying in the hospital, bring a case to hold your glasses, contacts, or dentures. You may also want to bring your robe and non-skid footwear.  If you wear oxygen at home, bring it with you the day of surgery.  If instructed by your health care provider, bring your sleep apnea device with you on the day of your surgery (if this applies to you).  You may want to leave your suitcase and sleep apnea device in the car until after surgery.    Arrive at the hospital as scheduled.  Bring a friend or family member with you who can help to answer questions and be present while you meet with your health care provider.  At the hospital  When you arrive at the hospital:  Go to registration located at the main entrance of the hospital. You will be registered and given a beeper and a sticker sheet. Take the stickers to the Outpatient nurses desk and place in the black tray. This is to notify staff that you have arrived. Then return to the lobby to wait.   When your beeper lights up and vibrates proceed through the double doors, under the stairs, and a member of the Outpatient Surgery staff will escort you to your preoperative room.  You may have to wear compression sleeves. These help to prevent blood clots and reduce swelling in your legs.  An IV may be inserted into one of your veins.              In the operating room, you may be given one or more of the following:        A medicine to help you relax (sedative).        A medicine to numb the area (local anesthetic).        A medicine to make you fall asleep (general anesthetic).        A medicine that is injected into an area of your body to numb everything below the                      injection site (regional anesthetic).  You may be given an antibiotic through your IV to help prevent infection.  Your surgical site will be marked or identified.    Contact a health care provider if you:  Develop a fever of more than 100.4°F (38°C) or other feelings of illness during the 48 hours before your surgery.  Have symptoms that get worse.  Have questions or concerns about your surgery.  Summary  Preparing for surgery can make the procedure go more smoothly and lower your risk of complications.  Before surgery, make a list of questions and concerns to discuss with your surgeon. Ask about the risks and possible complications.  In the days or weeks before your surgery, follow all instructions from your health care  provider. You may need to stop smoking, avoid alcohol, follow eating restrictions, and change or stop your regular medicines.  Contact your surgeon if you develop a fever or other signs of illness during the few days before your surgery.  This information is not intended to replace advice given to you by your health care provider. Make sure you discuss any questions you have with your health care provider.  Document Revised: 12/21/2018 Document Reviewed: 10/23/2018  Elsevier Patient Education © 2021 [employer]

## 2022-10-10 ENCOUNTER — HOSPITAL ENCOUNTER (OUTPATIENT)
Facility: HOSPITAL | Age: 67
Setting detail: HOSPITAL OUTPATIENT SURGERY
Discharge: HOME | End: 2022-10-10
Attending: SPECIALIST | Admitting: SPECIALIST

## 2022-10-10 ENCOUNTER — ANESTHESIA (OUTPATIENT)
Dept: PERIOP | Facility: HOSPITAL | Age: 67
End: 2022-10-10

## 2022-10-10 ENCOUNTER — ANESTHESIA EVENT (OUTPATIENT)
Dept: PERIOP | Facility: HOSPITAL | Age: 67
End: 2022-10-10

## 2022-10-10 VITALS
OXYGEN SATURATION: 93 % | SYSTOLIC BLOOD PRESSURE: 109 MMHG | TEMPERATURE: 98 F | HEART RATE: 73 BPM | RESPIRATION RATE: 16 BRPM | DIASTOLIC BLOOD PRESSURE: 49 MMHG

## 2022-10-10 DIAGNOSIS — K80.20 CALCULUS OF GALLBLADDER WITHOUT CHOLECYSTITIS WITHOUT OBSTRUCTION: ICD-10-CM

## 2022-10-10 PROCEDURE — 47562 LAPAROSCOPIC CHOLECYSTECTOMY: CPT | Performed by: SPECIALIST

## 2022-10-10 PROCEDURE — 25010000002 PROPOFOL 10 MG/ML EMULSION: Performed by: NURSE ANESTHETIST, CERTIFIED REGISTERED

## 2022-10-10 PROCEDURE — 88304 TISSUE EXAM BY PATHOLOGIST: CPT | Performed by: SPECIALIST

## 2022-10-10 PROCEDURE — 25010000002 FENTANYL CITRATE (PF) 250 MCG/5ML SOLUTION: Performed by: NURSE ANESTHETIST, CERTIFIED REGISTERED

## 2022-10-10 PROCEDURE — 25010000002 DEXAMETHASONE PER 1 MG: Performed by: ANESTHESIOLOGY

## 2022-10-10 PROCEDURE — 25010000002 MIDAZOLAM PER 1 MG: Performed by: ANESTHESIOLOGY

## 2022-10-10 PROCEDURE — 25010000002 FENTANYL CITRATE (PF) 50 MCG/ML SOLUTION: Performed by: ANESTHESIOLOGY

## 2022-10-10 DEVICE — LIGACLIP 10-M/L, 10MM ENDOSCOPIC ROTATING MULTIPLE CLIP APPLIERS
Type: IMPLANTABLE DEVICE | Site: ABDOMEN | Status: FUNCTIONAL
Brand: LIGACLIP

## 2022-10-10 RX ORDER — LIDOCAINE HYDROCHLORIDE 10 MG/ML
0.5 INJECTION, SOLUTION EPIDURAL; INFILTRATION; INTRACAUDAL; PERINEURAL ONCE AS NEEDED
Status: COMPLETED | OUTPATIENT
Start: 2022-10-10 | End: 2022-10-10

## 2022-10-10 RX ORDER — NEOSTIGMINE METHYLSULFATE 5 MG/5 ML
SYRINGE (ML) INTRAVENOUS AS NEEDED
Status: DISCONTINUED | OUTPATIENT
Start: 2022-10-10 | End: 2022-10-10 | Stop reason: SURG

## 2022-10-10 RX ORDER — LIDOCAINE HYDROCHLORIDE 10 MG/ML
0.5 INJECTION, SOLUTION EPIDURAL; INFILTRATION; INTRACAUDAL; PERINEURAL ONCE AS NEEDED
Status: DISCONTINUED | OUTPATIENT
Start: 2022-10-10 | End: 2022-10-10 | Stop reason: HOSPADM

## 2022-10-10 RX ORDER — OXYCODONE HYDROCHLORIDE AND ACETAMINOPHEN 5; 325 MG/1; MG/1
1 TABLET ORAL EVERY 4 HOURS PRN
Qty: 30 TABLET | Refills: 0 | Status: SHIPPED | OUTPATIENT
Start: 2022-10-10

## 2022-10-10 RX ORDER — BUPIVACAINE HYDROCHLORIDE AND EPINEPHRINE 2.5; 5 MG/ML; UG/ML
INJECTION, SOLUTION INFILTRATION; PERINEURAL AS NEEDED
Status: DISCONTINUED | OUTPATIENT
Start: 2022-10-10 | End: 2022-10-10 | Stop reason: HOSPADM

## 2022-10-10 RX ORDER — SODIUM CHLORIDE 0.9 % (FLUSH) 0.9 %
3 SYRINGE (ML) INJECTION AS NEEDED
Status: DISCONTINUED | OUTPATIENT
Start: 2022-10-10 | End: 2022-10-10 | Stop reason: HOSPADM

## 2022-10-10 RX ORDER — ONDANSETRON 2 MG/ML
4 INJECTION INTRAMUSCULAR; INTRAVENOUS ONCE AS NEEDED
Status: DISCONTINUED | OUTPATIENT
Start: 2022-10-10 | End: 2022-10-10 | Stop reason: HOSPADM

## 2022-10-10 RX ORDER — LIDOCAINE HYDROCHLORIDE 20 MG/ML
INJECTION, SOLUTION EPIDURAL; INFILTRATION; INTRACAUDAL; PERINEURAL AS NEEDED
Status: DISCONTINUED | OUTPATIENT
Start: 2022-10-10 | End: 2022-10-10 | Stop reason: SURG

## 2022-10-10 RX ORDER — MAGNESIUM HYDROXIDE 1200 MG/15ML
LIQUID ORAL AS NEEDED
Status: DISCONTINUED | OUTPATIENT
Start: 2022-10-10 | End: 2022-10-10 | Stop reason: HOSPADM

## 2022-10-10 RX ORDER — EPHEDRINE SULFATE 50 MG/ML
INJECTION INTRAVENOUS AS NEEDED
Status: DISCONTINUED | OUTPATIENT
Start: 2022-10-10 | End: 2022-10-10 | Stop reason: SURG

## 2022-10-10 RX ORDER — SODIUM CHLORIDE 0.9 % (FLUSH) 0.9 %
3 SYRINGE (ML) INJECTION EVERY 12 HOURS SCHEDULED
Status: DISCONTINUED | OUTPATIENT
Start: 2022-10-10 | End: 2022-10-10 | Stop reason: HOSPADM

## 2022-10-10 RX ORDER — VECURONIUM BROMIDE 1 MG/ML
INJECTION, POWDER, LYOPHILIZED, FOR SOLUTION INTRAVENOUS AS NEEDED
Status: DISCONTINUED | OUTPATIENT
Start: 2022-10-10 | End: 2022-10-10 | Stop reason: SURG

## 2022-10-10 RX ORDER — DEXAMETHASONE SODIUM PHOSPHATE 4 MG/ML
4 INJECTION, SOLUTION INTRA-ARTICULAR; INTRALESIONAL; INTRAMUSCULAR; INTRAVENOUS; SOFT TISSUE ONCE AS NEEDED
Status: COMPLETED | OUTPATIENT
Start: 2022-10-10 | End: 2022-10-10

## 2022-10-10 RX ORDER — DROPERIDOL 2.5 MG/ML
0.62 INJECTION, SOLUTION INTRAMUSCULAR; INTRAVENOUS ONCE AS NEEDED
Status: DISCONTINUED | OUTPATIENT
Start: 2022-10-10 | End: 2022-10-10 | Stop reason: HOSPADM

## 2022-10-10 RX ORDER — OXYCODONE HYDROCHLORIDE AND ACETAMINOPHEN 5; 325 MG/1; MG/1
1 TABLET ORAL ONCE AS NEEDED
Status: DISCONTINUED | OUTPATIENT
Start: 2022-10-10 | End: 2022-10-10 | Stop reason: HOSPADM

## 2022-10-10 RX ORDER — FENTANYL CITRATE 50 UG/ML
INJECTION, SOLUTION INTRAMUSCULAR; INTRAVENOUS AS NEEDED
Status: DISCONTINUED | OUTPATIENT
Start: 2022-10-10 | End: 2022-10-10 | Stop reason: SURG

## 2022-10-10 RX ORDER — FLUMAZENIL 0.1 MG/ML
0.2 INJECTION INTRAVENOUS AS NEEDED
Status: DISCONTINUED | OUTPATIENT
Start: 2022-10-10 | End: 2022-10-10 | Stop reason: HOSPADM

## 2022-10-10 RX ORDER — LABETALOL HYDROCHLORIDE 5 MG/ML
5 INJECTION, SOLUTION INTRAVENOUS
Status: DISCONTINUED | OUTPATIENT
Start: 2022-10-10 | End: 2022-10-10 | Stop reason: HOSPADM

## 2022-10-10 RX ORDER — OXYCODONE AND ACETAMINOPHEN 10; 325 MG/1; MG/1
1 TABLET ORAL ONCE AS NEEDED
Status: COMPLETED | OUTPATIENT
Start: 2022-10-10 | End: 2022-10-10

## 2022-10-10 RX ORDER — CLINDAMYCIN PHOSPHATE 600 MG/50ML
600 INJECTION INTRAVENOUS ONCE
Status: COMPLETED | OUTPATIENT
Start: 2022-10-10 | End: 2022-10-10

## 2022-10-10 RX ORDER — MIDAZOLAM HYDROCHLORIDE 1 MG/ML
1 INJECTION INTRAMUSCULAR; INTRAVENOUS
Status: COMPLETED | OUTPATIENT
Start: 2022-10-10 | End: 2022-10-10

## 2022-10-10 RX ORDER — SODIUM CHLORIDE 0.9 % (FLUSH) 0.9 %
3-10 SYRINGE (ML) INJECTION AS NEEDED
Status: DISCONTINUED | OUTPATIENT
Start: 2022-10-10 | End: 2022-10-10 | Stop reason: HOSPADM

## 2022-10-10 RX ORDER — SODIUM CHLORIDE 9 MG/ML
INJECTION, SOLUTION INTRAVENOUS AS NEEDED
Status: DISCONTINUED | OUTPATIENT
Start: 2022-10-10 | End: 2022-10-10 | Stop reason: HOSPADM

## 2022-10-10 RX ORDER — SODIUM CHLORIDE, SODIUM LACTATE, POTASSIUM CHLORIDE, CALCIUM CHLORIDE 600; 310; 30; 20 MG/100ML; MG/100ML; MG/100ML; MG/100ML
100 INJECTION, SOLUTION INTRAVENOUS CONTINUOUS
Status: DISCONTINUED | OUTPATIENT
Start: 2022-10-10 | End: 2022-10-10 | Stop reason: HOSPADM

## 2022-10-10 RX ORDER — PROPOFOL 10 MG/ML
VIAL (ML) INTRAVENOUS AS NEEDED
Status: DISCONTINUED | OUTPATIENT
Start: 2022-10-10 | End: 2022-10-10 | Stop reason: SURG

## 2022-10-10 RX ORDER — NALOXONE HCL 0.4 MG/ML
0.4 VIAL (ML) INJECTION AS NEEDED
Status: DISCONTINUED | OUTPATIENT
Start: 2022-10-10 | End: 2022-10-10 | Stop reason: HOSPADM

## 2022-10-10 RX ORDER — SCOLOPAMINE TRANSDERMAL SYSTEM 1 MG/1
1 PATCH, EXTENDED RELEASE TRANSDERMAL ONCE
Status: DISCONTINUED | OUTPATIENT
Start: 2022-10-10 | End: 2022-10-10 | Stop reason: HOSPADM

## 2022-10-10 RX ORDER — OXYCODONE AND ACETAMINOPHEN 7.5; 325 MG/1; MG/1
2 TABLET ORAL EVERY 4 HOURS PRN
Status: DISCONTINUED | OUTPATIENT
Start: 2022-10-10 | End: 2022-10-10 | Stop reason: HOSPADM

## 2022-10-10 RX ORDER — FENTANYL CITRATE 50 UG/ML
25 INJECTION, SOLUTION INTRAMUSCULAR; INTRAVENOUS
Status: DISCONTINUED | OUTPATIENT
Start: 2022-10-10 | End: 2022-10-10 | Stop reason: HOSPADM

## 2022-10-10 RX ORDER — SODIUM CHLORIDE, SODIUM LACTATE, POTASSIUM CHLORIDE, CALCIUM CHLORIDE 600; 310; 30; 20 MG/100ML; MG/100ML; MG/100ML; MG/100ML
1000 INJECTION, SOLUTION INTRAVENOUS CONTINUOUS
Status: DISCONTINUED | OUTPATIENT
Start: 2022-10-10 | End: 2022-10-10 | Stop reason: HOSPADM

## 2022-10-10 RX ORDER — ACETAMINOPHEN 500 MG
1000 TABLET ORAL ONCE
Status: COMPLETED | OUTPATIENT
Start: 2022-10-10 | End: 2022-10-10

## 2022-10-10 RX ADMIN — SODIUM CHLORIDE, POTASSIUM CHLORIDE, SODIUM LACTATE AND CALCIUM CHLORIDE 1000 ML: 600; 310; 30; 20 INJECTION, SOLUTION INTRAVENOUS at 12:43

## 2022-10-10 RX ADMIN — PROPOFOL 200 MG: 10 INJECTION, EMULSION INTRAVENOUS at 13:39

## 2022-10-10 RX ADMIN — FENTANYL CITRATE 25 MCG: 50 INJECTION INTRAMUSCULAR; INTRAVENOUS at 14:50

## 2022-10-10 RX ADMIN — MIDAZOLAM 1 MG: 1 INJECTION INTRAMUSCULAR; INTRAVENOUS at 12:48

## 2022-10-10 RX ADMIN — SCOPALAMINE 1 PATCH: 1 PATCH, EXTENDED RELEASE TRANSDERMAL at 12:53

## 2022-10-10 RX ADMIN — FENTANYL CITRATE 250 MCG: 0.05 INJECTION, SOLUTION INTRAMUSCULAR; INTRAVENOUS at 13:39

## 2022-10-10 RX ADMIN — Medication 3.5 MG: at 14:13

## 2022-10-10 RX ADMIN — FENTANYL CITRATE 25 MCG: 50 INJECTION INTRAMUSCULAR; INTRAVENOUS at 15:18

## 2022-10-10 RX ADMIN — GLYCOPYRROLATE 0.4 MG: 0.2 INJECTION INTRAMUSCULAR; INTRAVENOUS at 14:13

## 2022-10-10 RX ADMIN — CLINDAMYCIN IN 5 PERCENT DEXTROSE 600 MG: 12 INJECTION, SOLUTION INTRAVENOUS at 13:36

## 2022-10-10 RX ADMIN — EPHEDRINE SULFATE 10 MG: 50 INJECTION INTRAVENOUS at 13:46

## 2022-10-10 RX ADMIN — LIDOCAINE HYDROCHLORIDE 100 MG: 20 INJECTION, SOLUTION EPIDURAL; INFILTRATION; INTRACAUDAL; PERINEURAL at 13:39

## 2022-10-10 RX ADMIN — MIDAZOLAM 1 MG: 1 INJECTION INTRAMUSCULAR; INTRAVENOUS at 13:16

## 2022-10-10 RX ADMIN — LIDOCAINE HYDROCHLORIDE 0.5 ML: 10 INJECTION, SOLUTION EPIDURAL; INFILTRATION; INTRACAUDAL; PERINEURAL at 12:42

## 2022-10-10 RX ADMIN — VECURONIUM BROMIDE 5 MG: 1 INJECTION, POWDER, LYOPHILIZED, FOR SOLUTION INTRAVENOUS at 13:39

## 2022-10-10 RX ADMIN — OXYCODONE AND ACETAMINOPHEN 1 TABLET: 325; 10 TABLET ORAL at 14:49

## 2022-10-10 RX ADMIN — DEXAMETHASONE SODIUM PHOSPHATE 4 MG: 4 INJECTION INTRA-ARTICULAR; INTRALESIONAL; INTRAMUSCULAR; INTRAVENOUS; SOFT TISSUE at 12:48

## 2022-10-10 RX ADMIN — FENTANYL CITRATE 25 MCG: 50 INJECTION INTRAMUSCULAR; INTRAVENOUS at 14:45

## 2022-10-10 RX ADMIN — FENTANYL CITRATE 25 MCG: 50 INJECTION INTRAMUSCULAR; INTRAVENOUS at 15:13

## 2022-10-10 RX ADMIN — ACETAMINOPHEN 1000 MG: 500 TABLET ORAL at 12:48

## 2022-10-10 NOTE — ANESTHESIA PREPROCEDURE EVALUATION
Anesthesia Evaluation     Patient summary reviewed   history of anesthetic complications: PONV  NPO Solid Status: > 8 hours  NPO Liquid Status: > 8 hours           Airway   Mallampati: II  No difficulty expected  Dental          Pulmonary - negative pulmonary ROS   (-) asthma, sleep apnea, not a smoker  Cardiovascular   Exercise tolerance: good (4-7 METS)    ECG reviewed    (+) hyperlipidemia,       Neuro/Psych  (-) seizures, TIA, CVA  GI/Hepatic/Renal/Endo    (+) obesity,  GERD,    (-) liver disease, diabetes    Musculoskeletal     Abdominal    Substance History      OB/GYN          Other                        Anesthesia Plan    ASA 2     general     intravenous induction     Anesthetic plan, risks, benefits, and alternatives have been provided, discussed and informed consent has been obtained with: patient.        CODE STATUS:

## 2022-10-10 NOTE — ANESTHESIA POSTPROCEDURE EVALUATION
Patient: Sammi Cordero    Procedure Summary     Date: 10/10/22 Room / Location:  [facility] OR 08 /  [facility]    Anesthesia Start: 1336 Anesthesia Stop: 1423    Procedure: CHOLECYSTECTOMY LAPAROSCOPIC (Abdomen) Diagnosis:       Calculus of gallbladder without cholecystitis without obstruction      (Calculus of gallbladder without cholecystitis without obstruction [K80.20])    Surgeons: Karen Benitez MD Provider: Steven De Souza CRNA    Anesthesia Type: general ASA Status: 2          Anesthesia Type: general    Vitals  Vitals Value Taken Time   BP     Temp     Pulse 99 10/10/22 1423   Resp     SpO2 96 % 10/10/22 1423   Vitals shown include unvalidated device data.        Post Anesthesia Care and Evaluation    Patient location during evaluation: PACU  Patient participation: complete - patient participated  Level of consciousness: awake and alert  Pain management: adequate    Airway patency: patent  Anesthetic complications: No anesthetic complications    Cardiovascular status: acceptable  Respiratory status: acceptable  Hydration status: acceptable    Comments: Blood pressure 146/74, pulse 80, temperature 97.7 °F (36.5 °C), temperature source Temporal, resp. rate 18, SpO2 99 %, not currently breastfeeding.    Pt discharged from PACU based on augustin score >8

## 2022-10-10 NOTE — OP NOTE
Preoperative diagnosis:     Symptomatic cholelithiasis  Postoperative diagnosis:    same  Surgeon:     Karen Benitez MD FACS  Procedure:    Laparoscopic cholecystectomy  Anesthesia:    Get loc   EBL:     minimal  IVF:     See anesthesia notes  Indications:     The patient is a 67-year-old female who presents for cholecystectomy.  The risks, benefits, complications, and possible alternatives of the procedure were discussed with the patient who agreed to proceed.  Description of procedure:  The patient was laid supine.  The abdomen was prepped and draped in the usual sterile fashion.  The abdomen was entered with veress.  Trocars were placed.  The gallbladder was tensely distended.  An opening was created at the fundus to decompress to allow for grasping.  The fundus and infundibulum were grasped and elevated. The cystic duct and artery were skeletonized and identified. They were clipped proximally and distally and then transected.  The gallbladder was freed from the liver bed with bovie.  It was placed in an endocatch bag and removed.  The fascia at the epigastric site was closed with 0 vicryl using an endostitch passer. The skin was closed with 3-0 and 4-0 vicryl.  Mastisol, steri strips, and dry dressings were applied.  The sponge, needle, and instrument counts were correct at the end of the case.  Findings:    Distended gallbladder  Complications:   none  Disposition:    Good to PACU

## 2022-10-12 LAB
CYTO UR: NORMAL
LAB AP CASE REPORT: NORMAL
Lab: NORMAL
PATH REPORT.FINAL DX SPEC: NORMAL
PATH REPORT.GROSS SPEC: NORMAL

## 2022-10-13 NOTE — PROGRESS NOTES
Karen Benitez MD FACS Progress Note    Referring Provider: No ref. provider found      Chief complaint No chief complaint on file.   follow up    Subjective .     History of present illness:  Sammi Cordero  is a 67 y.o. female who presents status post laparoscopic cholecystectomy.  Diet is tolerated and bowel function is formed.      History  The following portions of the patient's history were reviewed and updated as appropriate: allergies, current medications, past family history, past medical history, past social history, past surgical history, and problem list.    Objective     Vital Signs   There were no vitals taken for this visit.     Physical Exam:  General The patient is well-developed, well-nourished, and in no acute distress.    HEENT No scleral icterus  Abdomen The port site incisions are well-approximated and healing without signs of infection.    Integument No jaundice      Class 2 Severe Obesity (BMI >=35 and <=39.9). Obesity-related health conditions include the following: none. Obesity is newly identified. BMI is is above average; BMI management plan is completed. We discussed portion control and increasing exercise.    Assessment & Plan       Diagnoses and all orders for this visit:    1. Postoperative visit (Primary)    2. Aftercare following surgery           The patient will return prn.      Karen Benitez MD

## 2022-10-17 ENCOUNTER — OFFICE VISIT (OUTPATIENT)
Dept: SURGERY | Facility: CLINIC | Age: 67
End: 2022-10-17

## 2022-10-17 DIAGNOSIS — Z48.89 AFTERCARE FOLLOWING SURGERY: ICD-10-CM

## 2022-10-17 DIAGNOSIS — Z48.89 POSTOPERATIVE VISIT: Primary | ICD-10-CM

## 2022-10-17 PROCEDURE — 99024 POSTOP FOLLOW-UP VISIT: CPT | Performed by: SPECIALIST

## 2022-11-01 ENCOUNTER — TRANSCRIBE ORDERS (OUTPATIENT)
Dept: ADMINISTRATIVE | Facility: HOSPITAL | Age: 67
End: 2022-11-01

## 2022-11-01 ENCOUNTER — HOSPITAL ENCOUNTER (OUTPATIENT)
Dept: GENERAL RADIOLOGY | Facility: HOSPITAL | Age: 67
Discharge: HOME | End: 2022-11-01
Admitting: NURSE PRACTITIONER

## 2022-11-01 DIAGNOSIS — R05.9 COUGH, UNSPECIFIED TYPE: ICD-10-CM

## 2022-11-01 DIAGNOSIS — J06.9 URI, ACUTE: Primary | ICD-10-CM

## 2022-11-01 DIAGNOSIS — J40 BRONCHITIS, NOT SPECIFIED AS ACUTE OR CHRONIC: ICD-10-CM

## 2022-11-01 DIAGNOSIS — J06.9 URI, ACUTE: ICD-10-CM

## 2022-11-01 PROCEDURE — 71046 X-RAY EXAM CHEST 2 VIEWS: CPT

## 2022-11-08 ENCOUNTER — TRANSCRIBE ORDERS (OUTPATIENT)
Dept: ADMINISTRATIVE | Facility: HOSPITAL | Age: 67
End: 2022-11-08

## 2022-11-08 DIAGNOSIS — R06.09 OTHER FORMS OF DYSPNEA: Primary | ICD-10-CM

## 2022-11-08 DIAGNOSIS — R05.1 ACUTE COUGH: ICD-10-CM

## 2022-11-09 ENCOUNTER — HOSPITAL ENCOUNTER (OUTPATIENT)
Dept: CT IMAGING | Facility: HOSPITAL | Age: 67
Discharge: HOME | End: 2022-11-09
Admitting: NURSE PRACTITIONER

## 2022-11-09 PROCEDURE — 71250 CT THORAX DX C-: CPT

## 2022-11-28 ENCOUNTER — LAB (OUTPATIENT)
Dept: LAB | Facility: HOSPITAL | Age: 67
End: 2022-11-28

## 2022-11-28 ENCOUNTER — TRANSCRIBE ORDERS (OUTPATIENT)
Dept: ADMINISTRATIVE | Facility: HOSPITAL | Age: 67
End: 2022-11-28

## 2022-11-28 DIAGNOSIS — J86.9 PYOTHORAX WITHOUT FISTULA: Primary | ICD-10-CM

## 2022-11-28 DIAGNOSIS — J86.9 PYOTHORAX WITHOUT FISTULA: ICD-10-CM

## 2022-11-28 LAB
ALBUMIN SERPL-MCNC: 3.2 G/DL (ref 3.5–5.2)
ALBUMIN/GLOB SERPL: 0.6 G/DL
ALP SERPL-CCNC: 97 U/L (ref 39–117)
ALT SERPL W P-5'-P-CCNC: 14 U/L (ref 1–33)
ANION GAP SERPL CALCULATED.3IONS-SCNC: 10 MMOL/L (ref 5–15)
AST SERPL-CCNC: 17 U/L (ref 1–32)
BILIRUB SERPL-MCNC: 0.2 MG/DL (ref 0–1.2)
BUN SERPL-MCNC: 16 MG/DL (ref 8–23)
BUN/CREAT SERPL: 25.8 (ref 7–25)
CALCIUM SPEC-SCNC: 8.5 MG/DL (ref 8.6–10.5)
CHLORIDE SERPL-SCNC: 97 MMOL/L (ref 98–107)
CO2 SERPL-SCNC: 31 MMOL/L (ref 22–29)
CREAT SERPL-MCNC: 0.62 MG/DL (ref 0.57–1)
DEPRECATED RDW RBC AUTO: 47.8 FL (ref 37–54)
EGFRCR SERPLBLD CKD-EPI 2021: 98.4 ML/MIN/1.73
ERYTHROCYTE [DISTWIDTH] IN BLOOD BY AUTOMATED COUNT: 15.9 % (ref 12.3–15.4)
GLOBULIN UR ELPH-MCNC: 5 GM/DL
GLUCOSE SERPL-MCNC: 147 MG/DL (ref 65–99)
HCT VFR BLD AUTO: 31.1 % (ref 34–46.6)
HGB BLD-MCNC: 9.3 G/DL (ref 12–15.9)
MCH RBC QN AUTO: 25 PG (ref 26.6–33)
MCHC RBC AUTO-ENTMCNC: 29.9 G/DL (ref 31.5–35.7)
MCV RBC AUTO: 83.6 FL (ref 79–97)
PLATELET # BLD AUTO: 523 10*3/MM3 (ref 140–450)
PMV BLD AUTO: 8.5 FL (ref 6–12)
POTASSIUM SERPL-SCNC: 3.8 MMOL/L (ref 3.5–5.2)
PROT SERPL-MCNC: 8.2 G/DL (ref 6–8.5)
RBC # BLD AUTO: 3.72 10*6/MM3 (ref 3.77–5.28)
SODIUM SERPL-SCNC: 138 MMOL/L (ref 136–145)
WBC NRBC COR # BLD: 8.11 10*3/MM3 (ref 3.4–10.8)

## 2022-11-28 PROCEDURE — 80053 COMPREHEN METABOLIC PANEL: CPT

## 2022-11-28 PROCEDURE — 36415 COLL VENOUS BLD VENIPUNCTURE: CPT

## 2022-11-28 PROCEDURE — 85027 COMPLETE CBC AUTOMATED: CPT

## 2023-01-10 ENCOUNTER — TRANSCRIBE ORDERS (OUTPATIENT)
Dept: ADMINISTRATIVE | Facility: HOSPITAL | Age: 68
End: 2023-01-10
Payer: MEDICARE

## 2023-01-10 ENCOUNTER — LAB (OUTPATIENT)
Dept: LAB | Facility: HOSPITAL | Age: 68
End: 2023-01-10
Payer: MEDICARE

## 2023-01-10 DIAGNOSIS — M15.0 PRIMARY GENERALIZED HYPERTROPHIC OSTEOARTHROSIS: ICD-10-CM

## 2023-01-10 DIAGNOSIS — E66.01 MORBID OBESITY: ICD-10-CM

## 2023-01-10 DIAGNOSIS — R73.03 DIABETES MELLITUS, LATENT: Primary | ICD-10-CM

## 2023-01-10 DIAGNOSIS — F41.1 GENERALIZED ANXIETY DISORDER: ICD-10-CM

## 2023-01-10 DIAGNOSIS — E78.00 PURE HYPERCHOLESTEROLEMIA: ICD-10-CM

## 2023-01-10 DIAGNOSIS — F32.1 MAJOR DEPRESSIVE DISORDER, SINGLE EPISODE, MODERATE: ICD-10-CM

## 2023-01-10 DIAGNOSIS — R73.03 DIABETES MELLITUS, LATENT: ICD-10-CM

## 2023-01-10 DIAGNOSIS — K21.9 CHALASIA OF LOWER ESOPHAGEAL SPHINCTER: ICD-10-CM

## 2023-01-10 LAB
ALBUMIN SERPL-MCNC: 4 G/DL (ref 3.5–5.2)
ALBUMIN UR-MCNC: <1.2 MG/DL
ALBUMIN/GLOB SERPL: 1.3 G/DL
ALP SERPL-CCNC: 75 U/L (ref 39–117)
ALT SERPL W P-5'-P-CCNC: 14 U/L (ref 1–33)
ANION GAP SERPL CALCULATED.3IONS-SCNC: 11.4 MMOL/L (ref 5–15)
AST SERPL-CCNC: 24 U/L (ref 1–32)
BILIRUB SERPL-MCNC: 0.4 MG/DL (ref 0–1.2)
BUN SERPL-MCNC: 16 MG/DL (ref 8–23)
BUN/CREAT SERPL: 21.1 (ref 7–25)
CALCIUM SPEC-SCNC: 8.6 MG/DL (ref 8.6–10.5)
CHLORIDE SERPL-SCNC: 94 MMOL/L (ref 98–107)
CHOLEST SERPL-MCNC: 204 MG/DL (ref 0–200)
CO2 SERPL-SCNC: 30.6 MMOL/L (ref 22–29)
CREAT SERPL-MCNC: 0.76 MG/DL (ref 0.57–1)
CREAT UR-MCNC: 22 MG/DL
EGFRCR SERPLBLD CKD-EPI 2021: 86.5 ML/MIN/1.73
GLOBULIN UR ELPH-MCNC: 3.2 GM/DL
GLUCOSE SERPL-MCNC: 104 MG/DL (ref 65–99)
HBA1C MFR BLD: 5.9 % (ref 4.8–5.6)
HDLC SERPL-MCNC: 72 MG/DL (ref 40–60)
LDLC SERPL CALC-MCNC: 120 MG/DL (ref 0–100)
LDLC/HDLC SERPL: 1.64 {RATIO}
MAGNESIUM SERPL-MCNC: 1 MG/DL (ref 1.6–2.4)
MICROALBUMIN/CREAT UR: NORMAL MG/G{CREAT}
POTASSIUM SERPL-SCNC: 3.4 MMOL/L (ref 3.5–5.2)
PROT SERPL-MCNC: 7.2 G/DL (ref 6–8.5)
SODIUM SERPL-SCNC: 136 MMOL/L (ref 136–145)
TRIGL SERPL-MCNC: 69 MG/DL (ref 0–150)
VLDLC SERPL-MCNC: 12 MG/DL (ref 5–40)

## 2023-01-10 PROCEDURE — 36415 COLL VENOUS BLD VENIPUNCTURE: CPT

## 2023-01-10 PROCEDURE — 83036 HEMOGLOBIN GLYCOSYLATED A1C: CPT

## 2023-01-10 PROCEDURE — 82570 ASSAY OF URINE CREATININE: CPT

## 2023-01-10 PROCEDURE — 83735 ASSAY OF MAGNESIUM: CPT

## 2023-01-10 PROCEDURE — 80061 LIPID PANEL: CPT

## 2023-01-10 PROCEDURE — 82043 UR ALBUMIN QUANTITATIVE: CPT

## 2023-01-10 PROCEDURE — 80053 COMPREHEN METABOLIC PANEL: CPT

## 2023-04-17 ENCOUNTER — OFFICE VISIT (OUTPATIENT)
Dept: OBSTETRICS AND GYNECOLOGY | Facility: CLINIC | Age: 68
End: 2023-04-17
Payer: MEDICARE

## 2023-04-17 VITALS
BODY MASS INDEX: 33.57 KG/M2 | HEIGHT: 60 IN | DIASTOLIC BLOOD PRESSURE: 82 MMHG | WEIGHT: 171 LBS | SYSTOLIC BLOOD PRESSURE: 142 MMHG

## 2023-04-17 DIAGNOSIS — Z12.31 SCREENING MAMMOGRAM FOR BREAST CANCER: ICD-10-CM

## 2023-04-17 DIAGNOSIS — Z01.419 WELL WOMAN EXAM WITH ROUTINE GYNECOLOGICAL EXAM: Primary | ICD-10-CM

## 2023-04-17 NOTE — PROGRESS NOTES
"CC: annual exam    SUBJECTIVE: Sammi Cordeor is a 68 y.o. female here today for annual GYN examination. She is menopausal and has not had any recent abnormal Pap smears. Last Pap 10/2020 normal and HPV negative. She denies any vaginal discharge or bleeding. She is not on hormone replacement therapy.  Her last mammogram was in 2022 and read as BIRADS 1. Today she wants me to check out a cyst of the vulva that has been present for a year or two.     HPI      Social History     Tobacco Use   • Smoking status: Former     Packs/day: 0.50     Types: Cigarettes     Quit date:      Years since quittin.3   • Smokeless tobacco: Never   • Tobacco comments:     quit in    Vaping Use   • Vaping Use: Never used   Substance Use Topics   • Alcohol use: No   • Drug use: No      Review of Systems   Constitutional: Negative for fever.   Respiratory: Negative for cough.    Genitourinary: Negative for vaginal bleeding.   Hematological: Does not bruise/bleed easily.       Visit Vitals  /82 (BP Location: Left arm, Patient Position: Sitting, Cuff Size: Adult)   Ht 152 cm (59.84\")   Wt 77.6 kg (171 lb)   LMP  (LMP Unknown)   Breastfeeding No   BMI 33.57 kg/m²      Objective   Physical Exam  Vitals and nursing note reviewed. Exam conducted with a chaperone present.   Constitutional:       General: She is not in acute distress.     Appearance: She is well-developed.   HENT:      Head: Normocephalic and atraumatic.   Neck:      Thyroid: No thyromegaly.   Cardiovascular:      Rate and Rhythm: Normal rate and regular rhythm.      Heart sounds: No murmur heard.  Pulmonary:      Effort: Pulmonary effort is normal.      Breath sounds: Normal breath sounds.   Chest:   Breasts:     Right: No inverted nipple or mass.      Left: No inverted nipple or mass.   Abdominal:      General: There is no distension.      Palpations: Abdomen is soft.      Tenderness: There is no abdominal tenderness.   Genitourinary:     General: Normal " vulva.      Exam position: Lithotomy position.      Pubic Area: No rash.       Labia:         Right: No tenderness or lesion.         Left: No tenderness or lesion.       Vagina: Normal. No vaginal discharge or bleeding.      Cervix: No cervical motion tenderness or discharge.      Uterus: Normal.       Adnexa:         Right: No tenderness or fullness.          Left: No tenderness or fullness.        Rectum: No external hemorrhoid or internal hemorrhoid. Normal anal tone.      Comments: Labia minora aggultinated, no lesions noted. 1.5cm sebaceous cyst right labia noted. Urethral meatus unremarkable, no prolapse of mucosa.  Anus/perineum normal.  Musculoskeletal:         General: Normal range of motion.      Cervical back: Normal range of motion and neck supple.   Skin:     General: Skin is warm and dry.   Neurological:      Mental Status: She is alert and oriented to person, place, and time.   Psychiatric:         Behavior: Behavior normal.         Judgment: Judgment normal.         Assessment/Plan   Diagnoses and all orders for this visit:    1. Well woman exam with routine gynecological exam (Primary)    2. Screening mammogram for breast cancer  -     Mammo Screening Digital Tomosynthesis Bilateral With CAD        We have discussed current Pap smear screening guidelines.  She will return in one year. In the meantime if she develops questions or problems, she will notify the office.

## 2023-04-28 ENCOUNTER — TELEPHONE (OUTPATIENT)
Dept: OBSTETRICS AND GYNECOLOGY | Facility: CLINIC | Age: 68
End: 2023-04-28

## 2023-04-28 NOTE — TELEPHONE ENCOUNTER
Provider: DR SUH  Caller: PRAVEEN QUINONES  Relationship to Patient: SELF    Reason for Call: PT WOULD LIKE SOMETHING TO GIVE TO INSURANCE TO SHOW SHE HAD HER ANNUAL AT HER LAST VISIT. SHE IS REQUESTING A RECEIPT OR PAPER GIVEN WHEN CHECKING OUT. PLEASE CALL PT TO ADVISE UNABLE TO WT CALL   When was the patient last seen: 4/17/23

## 2023-09-01 LAB
NCCN CRITERIA FLAG: NORMAL
TYRER CUZICK SCORE: 3.9

## 2023-09-11 ENCOUNTER — HOSPITAL ENCOUNTER (OUTPATIENT)
Dept: MAMMOGRAPHY | Facility: HOSPITAL | Age: 68
Discharge: HOME | End: 2023-09-11
Admitting: OBSTETRICS & GYNECOLOGY
Payer: MEDICARE

## 2023-09-11 ENCOUNTER — TRANSCRIBE ORDERS (OUTPATIENT)
Dept: ADMINISTRATIVE | Facility: HOSPITAL | Age: 68
End: 2023-09-11
Payer: MEDICARE

## 2023-09-11 DIAGNOSIS — Z78.0 ASYMPTOMATIC MENOPAUSAL STATE: Primary | ICD-10-CM

## 2023-09-11 PROCEDURE — 77063 BREAST TOMOSYNTHESIS BI: CPT

## 2023-09-11 PROCEDURE — 77067 SCR MAMMO BI INCL CAD: CPT

## 2023-09-22 ENCOUNTER — HOSPITAL ENCOUNTER (OUTPATIENT)
Dept: BONE DENSITY | Facility: HOSPITAL | Age: 68
Discharge: HOME | End: 2023-09-22
Admitting: INTERNAL MEDICINE
Payer: MEDICARE

## 2023-09-22 DIAGNOSIS — Z78.0 ASYMPTOMATIC MENOPAUSAL STATE: ICD-10-CM

## 2023-09-22 PROCEDURE — 77080 DXA BONE DENSITY AXIAL: CPT

## 2023-09-29 NOTE — ANESTHESIA PROCEDURE NOTES
Airway  Urgency: elective    Airway not difficult    General Information and Staff    Patient location during procedure: OR  CRNA/CAA: Steven De Souza CRNA    Indications and Patient Condition  Indications for airway management: airway protection    Preoxygenated: yes  MILS maintained throughout  Mask difficulty assessment: 1 - vent by mask    Final Airway Details  Final airway type: endotracheal airway      Successful airway: ETT  Cuffed: yes   Successful intubation technique: direct laryngoscopy  Endotracheal tube insertion site: oral  Blade: Childers  Blade size: 2  ETT size (mm): 7.0  Cormack-Lehane Classification: grade I - full view of glottis  Placement verified by: chest auscultation and capnometry   Cuff volume (mL): 5  Measured from: lips  ETT/EBT  to lips (cm): 20  Number of attempts at approach: 1  Assessment: lips, teeth, and gum same as pre-op and atraumatic intubation           None

## 2024-03-11 ENCOUNTER — NURSE TRIAGE (OUTPATIENT)
Dept: CALL CENTER | Facility: HOSPITAL | Age: 69
End: 2024-03-11
Payer: MEDICARE

## 2024-03-12 NOTE — TELEPHONE ENCOUNTER
"Reason for Disposition  • Doctor (or NP/PA) call to PCP    Additional Information  • Negative: Lab calling with strep throat test results and triager can call in prescription  • Negative: Lab calling with urinalysis test results and triager can call in prescription  • Negative: Medication questions  • Negative: Medication renewal and refill questions  • Negative: Pre-operative or pre-procedural questions  • Negative: ED call to PCP (i.e., primary care provider; doctor, NP, or PA)    Answer Assessment - Initial Assessment Questions  1. REASON FOR CALL or QUESTION: \"What is your reason for calling today?\" or \"How can I best  help you?\" or \"What question do you have that I can help answer?\"      Abnormal Magnesium level 0.9. Lab drawn at 8:34 am today.  2. CALLER: Document the source of call. (e.g., laboratory, patient).     Esther from [facility] lab    Protocols used: PCP Call - No Triage-ADULT-    "

## 2024-09-17 ENCOUNTER — TRANSCRIBE ORDERS (OUTPATIENT)
Dept: ADMINISTRATIVE | Facility: HOSPITAL | Age: 69
End: 2024-09-17
Payer: MEDICARE

## 2024-09-17 DIAGNOSIS — Z12.31 ENCOUNTER FOR SCREENING MAMMOGRAM FOR MALIGNANT NEOPLASM OF BREAST: Primary | ICD-10-CM

## 2024-09-24 ENCOUNTER — HOSPITAL ENCOUNTER (OUTPATIENT)
Dept: MAMMOGRAPHY | Facility: HOSPITAL | Age: 69
Discharge: HOME | End: 2024-09-24
Admitting: INTERNAL MEDICINE
Payer: MEDICARE

## 2024-09-24 DIAGNOSIS — Z12.31 ENCOUNTER FOR SCREENING MAMMOGRAM FOR MALIGNANT NEOPLASM OF BREAST: ICD-10-CM

## 2024-09-24 PROCEDURE — 77067 SCR MAMMO BI INCL CAD: CPT

## 2024-09-24 PROCEDURE — 77063 BREAST TOMOSYNTHESIS BI: CPT

## 2024-09-26 LAB
NCCN CRITERIA FLAG: NORMAL
TYRER CUZICK SCORE: 2.9

## 2024-09-27 ENCOUNTER — HOSPITAL ENCOUNTER (OUTPATIENT)
Dept: MAMMOGRAPHY | Facility: HOSPITAL | Age: 69
Discharge: HOME | End: 2024-09-27
Payer: MEDICARE

## 2024-09-27 ENCOUNTER — HOSPITAL ENCOUNTER (OUTPATIENT)
Dept: ULTRASOUND IMAGING | Facility: HOSPITAL | Age: 69
Discharge: HOME | End: 2024-09-27
Payer: MEDICARE

## 2024-09-27 DIAGNOSIS — R92.8 ABNORMAL MAMMOGRAM: ICD-10-CM

## 2024-09-27 PROCEDURE — 77065 DX MAMMO INCL CAD UNI: CPT

## 2024-09-27 PROCEDURE — G0279 TOMOSYNTHESIS, MAMMO: HCPCS

## 2024-10-18 ENCOUNTER — TELEPHONE (OUTPATIENT)
Age: 69
End: 2024-10-18

## 2024-10-18 NOTE — TELEPHONE ENCOUNTER
Called pt back who stated she fell a couple days ago and her knee feels \"tingly\" pt can walk and bare weight was just concerned about her knee replacement about 11 years ago. Advised pt to rest,ice and elevate can take tylenol or IBU for pain, advised pt to give it a couple weeks and call us back if there is not improvement. Pt verbalized understanding.

## 2024-10-18 NOTE — TELEPHONE ENCOUNTER
Patient is calling regarding the following: Wants to speak to the nurse directly PT states she fell and is have pain in the knee Dr. Levi did surgery on

## 2024-12-17 ENCOUNTER — HOSPITAL ENCOUNTER (OUTPATIENT)
Dept: GENERAL RADIOLOGY | Facility: HOSPITAL | Age: 69
Discharge: HOME | End: 2024-12-17
Admitting: INTERNAL MEDICINE
Payer: MEDICARE

## 2024-12-17 ENCOUNTER — TRANSCRIBE ORDERS (OUTPATIENT)
Dept: ADMINISTRATIVE | Facility: HOSPITAL | Age: 69
End: 2024-12-17
Payer: MEDICARE

## 2024-12-17 DIAGNOSIS — J06.9 ACUTE UPPER RESPIRATORY INFECTION, UNSPECIFIED: ICD-10-CM

## 2024-12-17 DIAGNOSIS — J06.9 ACUTE UPPER RESPIRATORY INFECTION, UNSPECIFIED: Primary | ICD-10-CM

## 2024-12-17 PROCEDURE — 71046 X-RAY EXAM CHEST 2 VIEWS: CPT

## 2025-04-25 ENCOUNTER — TRANSCRIBE ORDERS (OUTPATIENT)
Dept: ADMINISTRATIVE | Facility: HOSPITAL | Age: 70
End: 2025-04-25
Payer: MEDICARE

## 2025-04-25 ENCOUNTER — HOSPITAL ENCOUNTER (OUTPATIENT)
Dept: GENERAL RADIOLOGY | Facility: HOSPITAL | Age: 70
Discharge: HOME | End: 2025-04-25
Payer: MEDICARE

## 2025-04-25 ENCOUNTER — LAB (OUTPATIENT)
Dept: LAB | Facility: HOSPITAL | Age: 70
End: 2025-04-25
Payer: MEDICARE

## 2025-04-25 DIAGNOSIS — E78.5 HYPERLIPIDEMIA, UNSPECIFIED HYPERLIPIDEMIA TYPE: ICD-10-CM

## 2025-04-25 DIAGNOSIS — R10.9 ABDOMINAL PAIN, UNSPECIFIED ABDOMINAL LOCATION: ICD-10-CM

## 2025-04-25 DIAGNOSIS — R10.9 ABDOMINAL PAIN, UNSPECIFIED ABDOMINAL LOCATION: Primary | ICD-10-CM

## 2025-04-25 LAB
ALBUMIN SERPL-MCNC: 4.5 G/DL (ref 3.5–5.2)
ALBUMIN/GLOB SERPL: 1.5 G/DL
ALP SERPL-CCNC: 73 U/L (ref 39–117)
ALT SERPL W P-5'-P-CCNC: 17 U/L (ref 1–33)
ANION GAP SERPL CALCULATED.3IONS-SCNC: 14 MMOL/L (ref 5–15)
AST SERPL-CCNC: 24 U/L (ref 1–32)
BACTERIA UR QL AUTO: ABNORMAL /HPF
BASOPHILS # BLD AUTO: 0.03 10*3/MM3 (ref 0–0.2)
BASOPHILS NFR BLD AUTO: 0.3 % (ref 0–1.5)
BILIRUB SERPL-MCNC: 0.6 MG/DL (ref 0–1.2)
BILIRUB UR QL STRIP: NEGATIVE
BUN SERPL-MCNC: 19 MG/DL (ref 8–23)
BUN/CREAT SERPL: 38.8 (ref 7–25)
CALCIUM SPEC-SCNC: 8.7 MG/DL (ref 8.6–10.5)
CHLORIDE SERPL-SCNC: 95 MMOL/L (ref 98–107)
CLARITY UR: CLEAR
CO2 SERPL-SCNC: 28 MMOL/L (ref 22–29)
COLOR UR: YELLOW
CREAT SERPL-MCNC: 0.49 MG/DL (ref 0.57–1)
DEPRECATED RDW RBC AUTO: 43.7 FL (ref 37–54)
EGFRCR SERPLBLD CKD-EPI 2021: 102.2 ML/MIN/1.73
EOSINOPHIL # BLD AUTO: 0.12 10*3/MM3 (ref 0–0.4)
EOSINOPHIL NFR BLD AUTO: 1.1 % (ref 0.3–6.2)
ERYTHROCYTE [DISTWIDTH] IN BLOOD BY AUTOMATED COUNT: 14.2 % (ref 12.3–15.4)
GLOBULIN UR ELPH-MCNC: 3.1 GM/DL
GLUCOSE SERPL-MCNC: 103 MG/DL (ref 65–99)
GLUCOSE UR STRIP-MCNC: NEGATIVE MG/DL
HCT VFR BLD AUTO: 39.8 % (ref 34–46.6)
HGB BLD-MCNC: 13.1 G/DL (ref 12–15.9)
HGB UR QL STRIP.AUTO: NEGATIVE
HYALINE CASTS UR QL AUTO: ABNORMAL /LPF
IMM GRANULOCYTES # BLD AUTO: 0.04 10*3/MM3 (ref 0–0.05)
IMM GRANULOCYTES NFR BLD AUTO: 0.4 % (ref 0–0.5)
KETONES UR QL STRIP: NEGATIVE
LEUKOCYTE ESTERASE UR QL STRIP.AUTO: ABNORMAL
LYMPHOCYTES # BLD AUTO: 1.91 10*3/MM3 (ref 0.7–3.1)
LYMPHOCYTES NFR BLD AUTO: 17.2 % (ref 19.6–45.3)
MCH RBC QN AUTO: 27.8 PG (ref 26.6–33)
MCHC RBC AUTO-ENTMCNC: 32.9 G/DL (ref 31.5–35.7)
MCV RBC AUTO: 84.5 FL (ref 79–97)
MONOCYTES # BLD AUTO: 1 10*3/MM3 (ref 0.1–0.9)
MONOCYTES NFR BLD AUTO: 9 % (ref 5–12)
NEUTROPHILS NFR BLD AUTO: 7.99 10*3/MM3 (ref 1.7–7)
NEUTROPHILS NFR BLD AUTO: 72 % (ref 42.7–76)
NITRITE UR QL STRIP: NEGATIVE
NRBC BLD AUTO-RTO: 0 /100 WBC (ref 0–0.2)
PH UR STRIP.AUTO: 5.5 [PH] (ref 5–8)
PLATELET # BLD AUTO: 285 10*3/MM3 (ref 140–450)
PMV BLD AUTO: 9.8 FL (ref 6–12)
POTASSIUM SERPL-SCNC: 3.1 MMOL/L (ref 3.5–5.2)
PROT SERPL-MCNC: 7.6 G/DL (ref 6–8.5)
PROT UR QL STRIP: NEGATIVE
RBC # BLD AUTO: 4.71 10*6/MM3 (ref 3.77–5.28)
RBC # UR STRIP: ABNORMAL /HPF
REF LAB TEST METHOD: ABNORMAL
SODIUM SERPL-SCNC: 137 MMOL/L (ref 136–145)
SP GR UR STRIP: 1.02 (ref 1–1.03)
SQUAMOUS #/AREA URNS HPF: ABNORMAL /HPF
UROBILINOGEN UR QL STRIP: ABNORMAL
WBC # UR STRIP: ABNORMAL /HPF
WBC NRBC COR # BLD AUTO: 11.09 10*3/MM3 (ref 3.4–10.8)

## 2025-04-25 PROCEDURE — 74018 RADEX ABDOMEN 1 VIEW: CPT

## 2025-04-25 PROCEDURE — 36415 COLL VENOUS BLD VENIPUNCTURE: CPT

## 2025-04-25 PROCEDURE — 80053 COMPREHEN METABOLIC PANEL: CPT

## 2025-04-25 PROCEDURE — 85025 COMPLETE CBC W/AUTO DIFF WBC: CPT

## 2025-04-25 PROCEDURE — 81001 URINALYSIS AUTO W/SCOPE: CPT

## 2025-05-02 ENCOUNTER — TELEPHONE (OUTPATIENT)
Dept: OBSTETRICS AND GYNECOLOGY | Age: 70
End: 2025-05-02
Payer: MEDICARE

## 2025-05-05 NOTE — TELEPHONE ENCOUNTER
Pt last OV 4/17/23. Pt calling to report suprapubic pressure/pain as well as intermittent cramping lasting aproximately 3 weeks. Pt reports she has taken cipro and macrobid with last dose of macrobid this AM for symptoms of UTI with no relief of symptoms. Pt rates pelvic pain of 6-7/10 unrelieved by tylenol but denies any urinary symptoms, discharge or itching at this time. Pt scheduled for appt 5/7 with US for symptoms. Pt voices understanding to conversation.

## 2025-05-07 ENCOUNTER — LAB (OUTPATIENT)
Dept: LAB | Facility: HOSPITAL | Age: 70
End: 2025-05-07
Payer: MEDICARE

## 2025-05-07 ENCOUNTER — OFFICE VISIT (OUTPATIENT)
Dept: OBSTETRICS AND GYNECOLOGY | Age: 70
End: 2025-05-07
Payer: MEDICARE

## 2025-05-07 VITALS
HEIGHT: 59 IN | SYSTOLIC BLOOD PRESSURE: 130 MMHG | WEIGHT: 173 LBS | BODY MASS INDEX: 34.88 KG/M2 | DIASTOLIC BLOOD PRESSURE: 80 MMHG

## 2025-05-07 DIAGNOSIS — Z01.419 WOMEN'S ANNUAL ROUTINE GYNECOLOGICAL EXAMINATION: Primary | ICD-10-CM

## 2025-05-07 DIAGNOSIS — R30.0 DYSURIA: ICD-10-CM

## 2025-05-07 DIAGNOSIS — R10.2 PELVIC PRESSURE IN FEMALE: ICD-10-CM

## 2025-05-07 DIAGNOSIS — Q52.5 LABIAL FUSION: ICD-10-CM

## 2025-05-07 DIAGNOSIS — Z12.31 ENCOUNTER FOR SCREENING MAMMOGRAM FOR MALIGNANT NEOPLASM OF BREAST: ICD-10-CM

## 2025-05-07 LAB
BACTERIA UR QL AUTO: ABNORMAL /HPF
BILIRUB UR QL STRIP: NEGATIVE
CLARITY UR: CLEAR
COLOR UR: YELLOW
GLUCOSE UR STRIP-MCNC: NEGATIVE MG/DL
HGB UR QL STRIP.AUTO: ABNORMAL
HYALINE CASTS UR QL AUTO: ABNORMAL /LPF
KETONES UR QL STRIP: ABNORMAL
LEUKOCYTE ESTERASE UR QL STRIP.AUTO: ABNORMAL
NITRITE UR QL STRIP: NEGATIVE
PH UR STRIP.AUTO: 6.5 [PH] (ref 5–8)
PROT UR QL STRIP: NEGATIVE
RBC # UR STRIP: ABNORMAL /HPF
REF LAB TEST METHOD: ABNORMAL
SP GR UR STRIP: 1.02 (ref 1–1.03)
SQUAMOUS #/AREA URNS HPF: ABNORMAL /HPF
UROBILINOGEN UR QL STRIP: ABNORMAL
WBC # UR STRIP: ABNORMAL /HPF

## 2025-05-07 PROCEDURE — 81001 URINALYSIS AUTO W/SCOPE: CPT

## 2025-05-07 PROCEDURE — 87086 URINE CULTURE/COLONY COUNT: CPT

## 2025-05-07 RX ORDER — MELOXICAM 15 MG/1
15 TABLET ORAL DAILY PRN
COMMUNITY

## 2025-05-07 RX ORDER — POTASSIUM CHLORIDE 1500 MG/1
20 TABLET, EXTENDED RELEASE ORAL 2 TIMES DAILY
COMMUNITY
Start: 2025-04-28

## 2025-05-07 RX ORDER — LANOLIN ALCOHOL/MO/W.PET/CERES
CREAM (GRAM) TOPICAL
COMMUNITY
Start: 2025-02-25

## 2025-05-07 NOTE — PROGRESS NOTES
Chief Complaint   Patient presents with    Gynecologic Exam     Patient is here for annual well GYN Exam.  Last well GYN exam 4/17/23 and pap 10/12/20, normal/-HPV. Last diag mammo 9/27/24 by PCP, BIRADS Cat 2 benign. PT unsure if she has urinary incontinence due to sweating but denies noticing DEBORAH. Patient denies current abnormal vaginal bleeding or discharge, and voices no other complaints.    Pelvic Pain     Pt recently treated with Macrobid for UTI from PCP - finished antibiotics this Monday, still with feeling of suprapubic pain/pressure/cramping x 3 weeks. TVUS today, limited due to pt discomfort. Recently took Cipro 4/11-4/18 for spider bite on torso. Around 4/23/25 started feeling pressure and went to PCP. No urine culture performed, only microscopic.        History:  Sammi Cordero is a 70 y.o. female who presents today for evaluation of the above problems.      Sammi Cordero is a 70 y.o. female here today for annual GYN examination.   She is postmenopausal.   Her last Pap smear was 2020, and was normal.  She is not sexually active   Last Mammogram 09/2024     HRT-none  Her medical history is reviewed.       She is unable to urinate for additional testing today              ROS:  Review of Systems   Gastrointestinal:  Negative for constipation and diarrhea.   Genitourinary:  Positive for dysuria. Negative for vaginal bleeding.       Allergies   Allergen Reactions    Penicillins Swelling and Rash    Sulfa Antibiotics Rash and Other (See Comments)     Temp. fluctuations     Voltaren [Diclofenac] Rash     Past Medical History:   Diagnosis Date    WILDA (acute kidney injury)     Allergic rhinitis     Colon polyps     Depression     Difficulty swallowing solids     DJD (degenerative joint disease)     GERD (gastroesophageal reflux disease)     Gitelman syndrome     low magnesium    Gout     Hyperlipidemia     Obesity     Osteopenia     Spinal headache      Past Surgical History:   Procedure Laterality Date     AXILLARY LYMPH NODE BIOPSY/EXCISION Right 10/14/2019    Procedure: AXILLARY LYMPH NODE BIOPSY/EXCISION;  Surgeon: Kaern Benitez MD;  Location: [facility] OR;  Service: General    AXILLARY LYMPH NODE BIOPSY/EXCISION Left 11/26/2019    Procedure: AXILLARY LYMPH NODE BIOPSY/EXCISION;  Surgeon: Karen Benitez MD;  Location: [facility] OR;  Service: General    CATARACT EXTRACTION      9/23/20 right eye    CHEST TUBE INSERTION Right     CHOLECYSTECTOMY WITH INTRAOPERATIVE CHOLANGIOGRAM N/A 10/10/2022    Procedure: CHOLECYSTECTOMY LAPAROSCOPIC;  Surgeon: Karen Benitez MD;  Location: [facility] OR;  Service: General;  Laterality: N/A;    COLONOSCOPY  08/20/2010    COLONOSCOPY N/A 10/18/2021    Procedure: COLONOSCOPY WITH ANESTHESIA;  Surgeon: Isaac Murrell MD;  Location: [facility] ENDOSCOPY;  Service: Gastroenterology;  Laterality: N/A;  pre colon polyps  post  Avery Whaley MD    COLONOSCOPY W/ POLYPECTOMY  09/14/2016    2   5 mm sessile polyps removed with hot snare repeat 5 years    DILATATION AND CURETTAGE      ENDOSCOPY  09/14/2016    Medium sized HH, LA Grade A esohagitis, Fluid in the middle third of esohagus     ENDOSCOPY N/A 02/25/2020    Non-bleeding erosive gastropathy, medium size HH, H Pylori + treated     ENDOSCOPY N/A 04/24/2020    LA Grade A reflux esophagitis, 2 cm HH, Urea negative    JOINT REPLACEMENT Bilateral     knee    MO ARTHRP KNE CONDYLE&PLATU MEDIAL&LAT COMPARTMENTS Left 03/09/2018    Procedure: LEFT TOTAL KNEE ARTHROPLASTY;  Surgeon: Orlin Meza MD;  Location: [facility] OR;  Service: Orthopedics    REPLACEMENT TOTAL KNEE Right      Family History   Problem Relation Age of Onset    Aortic aneurysm Father     Anorexia nervosa Mother     No Known Problems Brother     No Known Problems Sister     No Known Problems Son     No Known Problems Daughter     No Known Problems Daughter     No Known Problems Maternal Grandmother     No Known Problems Paternal Grandmother     No Known Problems  "Maternal Aunt     No Known Problems Paternal Aunt     Lymphoma Paternal Grandfather     Breast cancer Neg Hx     Ovarian cancer Neg Hx     Colon cancer Neg Hx     BRCA 1/2 Neg Hx     Endometrial cancer Neg Hx     Colon polyps Neg Hx     Uterine cancer Neg Hx       reports that she quit smoking about 22 years ago. Her smoking use included cigarettes. She has never used smokeless tobacco. She reports that she does not drink alcohol and does not use drugs.      Current Outpatient Medications:     calcium carbonate (OS-JORDAN) 600 MG tablet, Take 1 tablet by mouth 2 (Two) Times a Day., Disp: , Rfl:     Magnesium Oxide -Mg Supplement 400 (240 Mg) MG tablet, TAKE 3 TABLETS BY MOUTH EVERY MORNING AND 2 TABLETS BY MOUTH EVERY EVENING, Disp: , Rfl:     meloxicam (MOBIC) 15 MG tablet, Take 1 tablet by mouth Daily As Needed., Disp: , Rfl:     Multiple Vitamins-Minerals (MULTIVITAMIN WITH MINERALS) tablet tablet, Take 1 tablet by mouth Daily., Disp: , Rfl:     NON FORMULARY, Take 1 tablet by mouth 2 (Two) Times a Day. [employer] Restore, Disp: , Rfl:     potassium chloride ER (K-TAB) 20 MEQ tablet controlled-release ER tablet, Take 1 tablet by mouth 2 (Two) Times a Day., Disp: , Rfl:     simvastatin (ZOCOR) 20 MG tablet, Take 1 tablet by mouth Daily., Disp: 90 tablet, Rfl: 2    OBJECTIVE:  /80   Ht 149.9 cm (59\")   Wt 78.5 kg (173 lb)   LMP  (LMP Unknown)   BMI 34.94 kg/m²    Physical Exam  Exam conducted with a chaperone present.   Constitutional:       Appearance: She is well-developed.   HENT:      Head: Normocephalic and atraumatic.   Eyes:      General: Lids are normal.      Conjunctiva/sclera: Conjunctivae normal.      Pupils: Pupils are equal, round, and reactive to light.   Neck:      Thyroid: No thyromegaly.   Cardiovascular:      Rate and Rhythm: Normal rate and regular rhythm.      Heart sounds: Normal heart sounds.   Pulmonary:      Effort: Pulmonary effort is normal.      Breath sounds: Normal breath sounds. "   Chest:      Comments: Patient declines breast exam  Abdominal:      General: Bowel sounds are normal.      Palpations: Abdomen is soft.      Hernia: There is no hernia in the left inguinal area or right inguinal area.   Genitourinary:     Exam position: Supine.      Pubic Area: No rash or pubic lice.       Labia:         Right: Lesion and injury present. No rash or tenderness.         Left: Injury present. No rash, tenderness or lesion.       Vagina: No signs of injury and foreign body. Vaginal discharge (thin clear) present. No erythema, tenderness or bleeding.      Cervix: No cervical motion tenderness, discharge or friability.      Uterus: Not deviated, not enlarged, not fixed and not tender.       Adnexa:         Right: No mass, tenderness or fullness.          Left: No mass, tenderness or fullness.        Rectum: Normal. No tenderness or external hemorrhoid.          Comments: Unable to visualize urethra due to labial fusion  Small tearing of the labia-from US probe  No pain of bladder on bi manual exam  Musculoskeletal:         General: Normal range of motion.      Cervical back: Normal range of motion and neck supple.   Skin:     General: Skin is warm and dry.   Neurological:      Mental Status: She is alert and oriented to person, place, and time.         Assessment/Plan    Diagnoses and all orders for this visit:    1. Women's annual routine gynecological examination (Primary)    2. Pelvic pressure in female  -     Cancel: POC Urinalysis Dipstick  -     Urinalysis With Culture If Indicated - Urine, Random Void; Future    3. Dysuria  -     Cancel: POC Urinalysis Dipstick  -     Urinalysis With Culture If Indicated - Urine, Random Void; Future    4. Labial fusion    5. Encounter for screening mammogram for malignant neoplasm of breast  -     Mammo screening digital tomosynthesis bilateral w CAD; Future    Patient is unable to void in office today to rule out UTI  She is unable to tolerate TV US for  evaluation of pelvic organs and TA view could not see pelvic organs.   She is not tender on exam. She declines change in bowel. No constipation or diarrhea.   Vaginal culture is done to assess for BV/yeast due to antibiotic use. She is agreeable to try to leave a urine in the outpatient lab to further evaluate for UTI.   Symptoms may be GI related due to antibiotics. If symptoms do not improve or worsen recommend CT abdomen and pelvic.         Preventative and Immunizations:      - Tetanus: Unknown or >10 years ago. Recommend to have at pharmacy or on injury.      - Influenza: recommended annually      - Pneumovax:once after age 65      - Prevnar: Once after age 65      - Zostavax: Once after age 60  Colon Cancer Screening: Due at 45  Mammogram: Due at 40.  PAP: over age 65  DEXA:  BMD testing (DXA) in all women 65 years of age and older and in postmenopausal women younger than 65 years of age with clinical risk factors for fracture   COVID vaccine information is available at vaccine.[place].gov   Additional preventative recommendations in AVS.       She will schedule a mammogram.  She will followup in one year or sooner if needed.        An After Visit Summary was printed and given to the patient at discharge.  Return in about 1 year (around 5/7/2026).          Bernie HERZOG 5/7/2025   Electronically signed

## 2025-05-08 LAB
A VAGINAE DNA VAG QL NAA+PROBE: NORMAL SCORE
BACTERIA SPEC AEROBE CULT: NO GROWTH
BVAB2 DNA VAG QL NAA+PROBE: NORMAL SCORE
C ALBICANS DNA VAG QL NAA+PROBE: NEGATIVE
C GLABRATA DNA VAG QL NAA+PROBE: NEGATIVE
MEGA1 DNA VAG QL NAA+PROBE: NORMAL SCORE

## 2025-06-16 ENCOUNTER — NURSE TRIAGE (OUTPATIENT)
Dept: CALL CENTER | Facility: HOSPITAL | Age: 70
End: 2025-06-16
Payer: MEDICARE

## 2025-06-17 ENCOUNTER — TELEPHONE (OUTPATIENT)
Dept: OBSTETRICS AND GYNECOLOGY | Age: 70
End: 2025-06-17
Payer: MEDICARE

## 2025-06-17 NOTE — TELEPHONE ENCOUNTER
Is this new/different pelvic pressure?  If new needs f/u in the office.   The CT was recommended prior to her Ultrasound being read by our MDs and her uterus at that time was normal.

## 2025-06-17 NOTE — TELEPHONE ENCOUNTER
Spoke with ROSENDA Gibbs and informed that pelvic pressure is same from OV 5/7. Pt informed CT recommended prior to US being read and normal results. Pt is advised to f/u with GI at this time as everything from OV 5/7 normal and no concern for prolapse noted per ROSENDA Gibbs. Pt is advised she may call office with any additional questions or concerns or if needing to schedule additional appt. Pt voices understanding.

## 2025-06-17 NOTE — TELEPHONE ENCOUNTER
Pt calling regarding possibility of bladder prolapse. Pt reports continued pressure and sensation in lower pelvic region. Pt had last OV 5/7 and discussion of possible CT of abdomen and pelvis may be performed with continued symptoms. Pt also reports possible stress incontinence but denies any report of burning or pain with urination. Vaginal cultures and urine culture negative at this visit as well. Please advise and I can inform pt.

## 2025-08-21 ENCOUNTER — TRANSCRIBE ORDERS (OUTPATIENT)
Dept: ADMINISTRATIVE | Facility: HOSPITAL | Age: 70
End: 2025-08-21
Payer: MEDICARE

## 2025-08-21 DIAGNOSIS — N39.3 URINARY, INCONTINENCE, STRESS FEMALE: Primary | ICD-10-CM

## 2025-08-21 DIAGNOSIS — R10.2 PELVIC PAIN: ICD-10-CM

## 2025-08-28 ENCOUNTER — HOSPITAL ENCOUNTER (OUTPATIENT)
Dept: CT IMAGING | Facility: HOSPITAL | Age: 70
Discharge: HOME | End: 2025-08-28
Admitting: NURSE PRACTITIONER
Payer: MEDICARE

## 2025-08-28 DIAGNOSIS — R10.2 PELVIC PAIN: ICD-10-CM

## 2025-08-28 DIAGNOSIS — N39.3 URINARY, INCONTINENCE, STRESS FEMALE: ICD-10-CM

## 2025-08-28 PROCEDURE — 25510000001 IOPAMIDOL 61 % SOLUTION: Performed by: NURSE PRACTITIONER

## 2025-08-28 PROCEDURE — 74178 CT ABD&PLV WO CNTR FLWD CNTR: CPT

## 2025-08-28 RX ORDER — IOPAMIDOL 612 MG/ML
100 INJECTION, SOLUTION INTRAVASCULAR
Status: COMPLETED | OUTPATIENT
Start: 2025-08-28 | End: 2025-08-28

## 2025-08-28 RX ADMIN — IOPAMIDOL 100 ML: 612 INJECTION, SOLUTION INTRAVENOUS at 11:49

## (undated) DEVICE — THE CHANNEL CLEANING BRUSH IS A NYLON FLEXI BRUSH ATTACHED TO A FLEXIBLE PLASTIC SHEATH DESIGNED TO SAFELY REMOVE DEBRIS FROM FLEXIBLE ENDOSCOPES.

## (undated) DEVICE — MONOPOLAR METZENBAUM SCISSOR, MINI BLADE TIP, DISPOSABLE: Brand: MONOPOLAR METZENBAUM SCISSOR, MINI BLADE TIP, DISPOSABLE

## (undated) DEVICE — TBG SMPL FLTR LINE NASL 02/C02 A/ BX/100

## (undated) DEVICE — CVR BRD ARM 13X30

## (undated) DEVICE — ANTIBACTERIAL UNDYED BRAIDED (POLYGLACTIN 910), SYNTHETIC ABSORBABLE SUTURE: Brand: COATED VICRYL

## (undated) DEVICE — STRIP,CLOSURE,WOUND,MEDI-STRIP,1/2X4: Brand: MEDLINE

## (undated) DEVICE — YANKAUER,BULB TIP WITH VENT: Brand: ARGYLE

## (undated) DEVICE — 3M™ STERI-STRIP™ REINFORCED ADHESIVE SKIN CLOSURES, R1547, 1/2 IN X 4 IN (12 MM X 100 MM), 6 STRIPS/ENVELOPE: Brand: 3M™ STERI-STRIP™

## (undated) DEVICE — MASK,OXYGEN,MED CONC,ADLT,7' TUB, UC: Brand: PENDING

## (undated) DEVICE — 2, DISPOSABLE SUCTION/IRRIGATOR WITHOUT DISPOSABLE TIP: Brand: STRYKEFLOW

## (undated) DEVICE — SUT VIC 0 SUTUPAK TIES 18IN J906G

## (undated) DEVICE — FRCP BIOP COLD ENDOJAW ALLGTR W/NDL 2.8X2300MM BLU

## (undated) DEVICE — ENDOPATH PNEUMONEEDLE INSUFFLATION NEEDLES WITH LUER LOCK CONNECTORS 120MM: Brand: ENDOPATH

## (undated) DEVICE — BAPTIST TURNOVER KIT: Brand: MEDLINE INDUSTRIES, INC.

## (undated) DEVICE — DRSNG WND GZ CURAD OIL EMULSION 3X8IN STRL PK/3

## (undated) DEVICE — SENSR O2 OXIMAX FNGR A/ 18IN NONSTR

## (undated) DEVICE — KT CARTRDG SMARTMIX TOWER W/SNAPOFF NOZL

## (undated) DEVICE — PK TURNOVER RM ADV

## (undated) DEVICE — WIPE THERAWASH SLV SPEC CARE 2PK

## (undated) DEVICE — PAD,ABDOMINAL,8"X10",ST,LF: Brand: MEDLINE

## (undated) DEVICE — ELECTRD BLD EDGE/INSUL1P 2.4X5.1MM STRL

## (undated) DEVICE — BLD SAW AGGR 1.27X19X90MM STRL

## (undated) DEVICE — ENDOPATH XCEL DILATING TIP TROCARS WITH STABILITY SLEEVES: Brand: ENDOPATH XCEL

## (undated) DEVICE — ELECTRD L HK EZ CLN 33CM

## (undated) DEVICE — BANDAGE,GAUZE,BULKEE II,4.5"X4.1YD,STRL: Brand: MEDLINE

## (undated) DEVICE — PAD MINOR UNIVERSAL: Brand: MEDLINE INDUSTRIES, INC.

## (undated) DEVICE — Device: Brand: DEFENDO AIR/WATER/SUCTION AND BIOPSY VALVE

## (undated) DEVICE — ADHS LIQ MASTISOL 2/3ML

## (undated) DEVICE — GLV SURG BIOGEL LTX PF 6 1/2

## (undated) DEVICE — PENCL ES MEGADINE EZ/CLEAN BUTN W/HOLSTR 10FT

## (undated) DEVICE — APPL CHLORAPREP W/TINT 26ML ORNG

## (undated) DEVICE — GAUZE,SPONGE,FLUFF,6"X6.75",STRL,10/TRAY: Brand: MEDLINE

## (undated) DEVICE — CONMED SCOPE SAVER BITE BLOCK, 20X27 MM: Brand: SCOPE SAVER

## (undated) DEVICE — TRAP FLD MINIVAC MEGADYNE 100ML

## (undated) DEVICE — NDL SPINE 22G 31/2IN BLK

## (undated) DEVICE — 4-PORT MANIFOLD: Brand: NEPTUNE 2

## (undated) DEVICE — SPNG GZ WOVN 4X4IN 12PLY 10/BX STRL

## (undated) DEVICE — Device: Brand: POWER-FLO®

## (undated) DEVICE — ENDOPATH XCEL WITH OPTIVIEW TECHNOLOGY DILATING TIP TROCARS WITH STABILITY SLEEVES: Brand: ENDOPATH XCEL OPTIVIEW

## (undated) DEVICE — PK KN TOTL 30

## (undated) DEVICE — 3M™ STERI-DRAPE™ U-DRAPE 1015: Brand: STERI-DRAPE™

## (undated) DEVICE — TOWEL,OR,DSP,ST,BLUE,STD,4/PK,20PK/CS: Brand: MEDLINE

## (undated) DEVICE — CUFF,BP,DISP,1 TUBE,ADULT,HP: Brand: MEDLINE

## (undated) DEVICE — KAMVAC CURVED MINI SUCTION TUBE: Brand: KAMVAC

## (undated) DEVICE — BLD SAW RECIP DBL SIDED 1.1X68MM STRL

## (undated) DEVICE — SUT ETHIB 0 MO7 18IN CX41D

## (undated) DEVICE — GLV SURG SENSICARE ALOE LF PF SZ7.5 GRN

## (undated) DEVICE — Device

## (undated) DEVICE — ENDOPOUCH RETRIEVER SPECIMEN RETRIEVAL BAGS: Brand: ENDOPOUCH RETRIEVER

## (undated) DEVICE — BNDG ELAS W/CLIP 6IN 10YD LF STRL

## (undated) DEVICE — ENDOPATH XCEL WITH OPTIVIEW TECHNOLOGY UNIVERSAL TROCAR STABILITY SLEEVES: Brand: ENDOPATH XCEL OPTIVIEW

## (undated) DEVICE — SYS SKIN CLS DERMABOND PRINEO W/60CM MESH TP

## (undated) DEVICE — PROXIMATE RH ROTATING HEAD SKIN STAPLERS (35 WIDE) CONTAINS 35 STAINLESS STEEL STAPLES: Brand: PROXIMATE

## (undated) DEVICE — PAD LAP CHOLE: Brand: MEDLINE INDUSTRIES, INC.

## (undated) DEVICE — TRY PREP SCRB VAG PVP

## (undated) DEVICE — CURETTE BONE STRL

## (undated) DEVICE — SHORT LENS-STERILE

## (undated) DEVICE — VAGINAL PREP TRAY: Brand: MEDLINE INDUSTRIES, INC.